# Patient Record
Sex: FEMALE | Race: WHITE | NOT HISPANIC OR LATINO | Employment: FULL TIME | URBAN - METROPOLITAN AREA
[De-identification: names, ages, dates, MRNs, and addresses within clinical notes are randomized per-mention and may not be internally consistent; named-entity substitution may affect disease eponyms.]

---

## 2017-03-10 NOTE — TELEPHONE ENCOUNTER
----- Message from Matilde Mercedes sent at 3/10/2017  1:28 PM EST -----  Contact: [phone]  PT NEEDS A REFILL ON HER MAXALT

## 2017-03-13 RX ORDER — RIZATRIPTAN BENZOATE 10 MG/1
10 TABLET ORAL ONCE AS NEEDED
Qty: 12 TABLET | Refills: 5 | Status: SHIPPED | OUTPATIENT
Start: 2017-03-13 | End: 2017-04-12

## 2017-05-08 ENCOUNTER — OFFICE VISIT (OUTPATIENT)
Dept: NEUROLOGY | Facility: CLINIC | Age: 32
End: 2017-05-08

## 2017-05-08 VITALS — WEIGHT: 276 LBS | HEART RATE: 86 BPM | OXYGEN SATURATION: 99 % | BODY MASS INDEX: 47.12 KG/M2 | HEIGHT: 64 IN

## 2017-05-08 DIAGNOSIS — G44.86 CERVICOGENIC HEADACHE: ICD-10-CM

## 2017-05-08 DIAGNOSIS — G43.019 INTRACTABLE MIGRAINE WITHOUT AURA AND WITHOUT STATUS MIGRAINOSUS: ICD-10-CM

## 2017-05-08 PROCEDURE — 99213 OFFICE O/P EST LOW 20 MIN: CPT | Performed by: PSYCHIATRY & NEUROLOGY

## 2017-05-08 RX ORDER — NORTRIPTYLINE HYDROCHLORIDE 50 MG/1
CAPSULE ORAL
COMMUNITY
Start: 2017-04-20 | End: 2017-05-08 | Stop reason: SDUPTHER

## 2017-05-08 RX ORDER — RIZATRIPTAN BENZOATE 10 MG/1
TABLET ORAL
COMMUNITY
Start: 2017-04-20 | End: 2017-05-08 | Stop reason: SDUPTHER

## 2017-05-08 RX ORDER — NORTRIPTYLINE HYDROCHLORIDE 50 MG/1
50 CAPSULE ORAL NIGHTLY
Qty: 30 CAPSULE | Refills: 6 | Status: SHIPPED | OUTPATIENT
Start: 2017-05-08 | End: 2017-06-07

## 2017-05-08 RX ORDER — RIZATRIPTAN BENZOATE 10 MG/1
10 TABLET ORAL ONCE AS NEEDED
Qty: 12 TABLET | Refills: 6 | Status: SHIPPED | OUTPATIENT
Start: 2017-05-08 | End: 2017-06-07

## 2018-07-02 ENCOUNTER — HOSPITAL ENCOUNTER (EMERGENCY)
Facility: HOSPITAL | Age: 33
Discharge: HOME | End: 2018-07-02
Attending: EMERGENCY MEDICINE | Admitting: EMERGENCY MEDICINE

## 2018-07-02 ENCOUNTER — APPOINTMENT (OUTPATIENT)
Dept: CT IMAGING | Facility: HOSPITAL | Age: 33
End: 2018-07-02

## 2018-07-02 VITALS
WEIGHT: 293 LBS | HEART RATE: 94 BPM | BODY MASS INDEX: 50.02 KG/M2 | RESPIRATION RATE: 18 BRPM | DIASTOLIC BLOOD PRESSURE: 90 MMHG | OXYGEN SATURATION: 97 % | SYSTOLIC BLOOD PRESSURE: 137 MMHG | HEIGHT: 64 IN | TEMPERATURE: 98.4 F

## 2018-07-02 DIAGNOSIS — J18.9 PNEUMONIA OF LEFT LOWER LOBE DUE TO INFECTIOUS ORGANISM: ICD-10-CM

## 2018-07-02 DIAGNOSIS — R10.32 ABDOMINAL WALL PAIN IN LEFT LOWER QUADRANT: Primary | ICD-10-CM

## 2018-07-02 LAB
ALBUMIN SERPL-MCNC: 3.8 G/DL (ref 3.5–5.2)
ALBUMIN/GLOB SERPL: 1.4 G/DL
ALP SERPL-CCNC: 71 U/L (ref 40–129)
ALT SERPL W P-5'-P-CCNC: 39 U/L (ref 5–33)
ANION GAP SERPL CALCULATED.3IONS-SCNC: 13.8 MMOL/L
AST SERPL-CCNC: 37 U/L (ref 5–32)
B-HCG UR QL: NEGATIVE
BASOPHILS # BLD AUTO: 0.02 10*3/MM3 (ref 0–0.2)
BASOPHILS NFR BLD AUTO: 0.6 % (ref 0–2)
BILIRUB SERPL-MCNC: 0.5 MG/DL (ref 0.2–1.2)
BILIRUB UR QL STRIP: NEGATIVE
BUN BLD-MCNC: 8 MG/DL (ref 6–20)
BUN/CREAT SERPL: 13.1 (ref 7–25)
CALCIUM SPEC-SCNC: 8.9 MG/DL (ref 8.6–10.5)
CHLORIDE SERPL-SCNC: 103 MMOL/L (ref 98–107)
CLARITY UR: ABNORMAL
CO2 SERPL-SCNC: 22.2 MMOL/L (ref 22–29)
COLOR UR: ABNORMAL
CREAT BLD-MCNC: 0.61 MG/DL (ref 0.57–1)
DEPRECATED RDW RBC AUTO: 39.7 FL (ref 37–54)
EOSINOPHIL # BLD AUTO: 0.07 10*3/MM3 (ref 0.1–0.3)
EOSINOPHIL NFR BLD AUTO: 2.1 % (ref 0–4)
ERYTHROCYTE [DISTWIDTH] IN BLOOD BY AUTOMATED COUNT: 13.7 % (ref 11.5–14.5)
GFR SERPL CREATININE-BSD FRML MDRD: 112 ML/MIN/1.73
GLOBULIN UR ELPH-MCNC: 2.8 GM/DL
GLUCOSE BLD-MCNC: 93 MG/DL (ref 65–99)
GLUCOSE UR STRIP-MCNC: NEGATIVE MG/DL
HCT VFR BLD AUTO: 42 % (ref 37–47)
HGB BLD-MCNC: 13.8 G/DL (ref 12–16)
HGB UR QL STRIP.AUTO: NEGATIVE
IMM GRANULOCYTES # BLD: 0.01 10*3/MM3 (ref 0–0.03)
IMM GRANULOCYTES NFR BLD: 0.3 % (ref 0–0.5)
KETONES UR QL STRIP: ABNORMAL
LEUKOCYTE ESTERASE UR QL STRIP.AUTO: NEGATIVE
LIPASE SERPL-CCNC: 30 U/L (ref 13–60)
LYMPHOCYTES # BLD AUTO: 1.15 10*3/MM3 (ref 0.6–4.8)
LYMPHOCYTES NFR BLD AUTO: 34.7 % (ref 20–45)
MCH RBC QN AUTO: 26.3 PG (ref 27–31)
MCHC RBC AUTO-ENTMCNC: 32.9 G/DL (ref 31–37)
MCV RBC AUTO: 80 FL (ref 81–99)
MONOCYTES # BLD AUTO: 0.33 10*3/MM3 (ref 0–1)
MONOCYTES NFR BLD AUTO: 10 % (ref 3–8)
NEUTROPHILS # BLD AUTO: 1.73 10*3/MM3 (ref 1.5–8.3)
NEUTROPHILS NFR BLD AUTO: 52.3 % (ref 45–70)
NITRITE UR QL STRIP: NEGATIVE
NRBC BLD MANUAL-RTO: 0 /100 WBC (ref 0–0)
PH UR STRIP.AUTO: 5.5 [PH] (ref 4.5–8)
PLATELET # BLD AUTO: 166 10*3/MM3 (ref 140–500)
PMV BLD AUTO: 10.6 FL (ref 7.4–10.4)
POTASSIUM BLD-SCNC: 3.8 MMOL/L (ref 3.5–5.2)
PROT SERPL-MCNC: 6.6 G/DL (ref 6–8.5)
PROT UR QL STRIP: NEGATIVE
RBC # BLD AUTO: 5.25 10*6/MM3 (ref 4.2–5.4)
SODIUM BLD-SCNC: 139 MMOL/L (ref 136–145)
SP GR UR STRIP: 1.02 (ref 1–1.03)
UROBILINOGEN UR QL STRIP: ABNORMAL
WBC NRBC COR # BLD: 3.31 10*3/MM3 (ref 4.8–10.8)

## 2018-07-02 PROCEDURE — 81003 URINALYSIS AUTO W/O SCOPE: CPT | Performed by: EMERGENCY MEDICINE

## 2018-07-02 PROCEDURE — 0 IOPAMIDOL PER 1 ML: Performed by: EMERGENCY MEDICINE

## 2018-07-02 PROCEDURE — 80053 COMPREHEN METABOLIC PANEL: CPT | Performed by: EMERGENCY MEDICINE

## 2018-07-02 PROCEDURE — 74177 CT ABD & PELVIS W/CONTRAST: CPT

## 2018-07-02 PROCEDURE — 25010000002 ONDANSETRON PER 1 MG: Performed by: EMERGENCY MEDICINE

## 2018-07-02 PROCEDURE — 99284 EMERGENCY DEPT VISIT MOD MDM: CPT | Performed by: EMERGENCY MEDICINE

## 2018-07-02 PROCEDURE — 81025 URINE PREGNANCY TEST: CPT | Performed by: EMERGENCY MEDICINE

## 2018-07-02 PROCEDURE — 96374 THER/PROPH/DIAG INJ IV PUSH: CPT

## 2018-07-02 PROCEDURE — 85025 COMPLETE CBC W/AUTO DIFF WBC: CPT | Performed by: EMERGENCY MEDICINE

## 2018-07-02 PROCEDURE — 99284 EMERGENCY DEPT VISIT MOD MDM: CPT

## 2018-07-02 PROCEDURE — 96361 HYDRATE IV INFUSION ADD-ON: CPT

## 2018-07-02 PROCEDURE — 83690 ASSAY OF LIPASE: CPT | Performed by: EMERGENCY MEDICINE

## 2018-07-02 RX ORDER — ONDANSETRON 2 MG/ML
8 INJECTION INTRAMUSCULAR; INTRAVENOUS ONCE
Status: COMPLETED | OUTPATIENT
Start: 2018-07-02 | End: 2018-07-02

## 2018-07-02 RX ORDER — SODIUM CHLORIDE 0.9 % (FLUSH) 0.9 %
10 SYRINGE (ML) INJECTION AS NEEDED
Status: DISCONTINUED | OUTPATIENT
Start: 2018-07-02 | End: 2018-07-02 | Stop reason: HOSPADM

## 2018-07-02 RX ORDER — AZITHROMYCIN 250 MG/1
TABLET, FILM COATED ORAL
Qty: 6 TABLET | Refills: 0 | Status: SHIPPED | OUTPATIENT
Start: 2018-07-02 | End: 2018-08-10

## 2018-07-02 RX ORDER — PROMETHAZINE HYDROCHLORIDE 25 MG/1
25 TABLET ORAL EVERY 6 HOURS PRN
Qty: 10 TABLET | Refills: 0 | Status: SHIPPED | OUTPATIENT
Start: 2018-07-02 | End: 2019-03-20

## 2018-07-02 RX ORDER — SODIUM CHLORIDE 9 MG/ML
250 INJECTION, SOLUTION INTRAVENOUS CONTINUOUS
Status: DISCONTINUED | OUTPATIENT
Start: 2018-07-02 | End: 2018-07-02 | Stop reason: HOSPADM

## 2018-07-02 RX ORDER — NAPROXEN SODIUM 220 MG
220 TABLET ORAL 2 TIMES DAILY PRN
COMMUNITY
End: 2021-08-17

## 2018-07-02 RX ORDER — AZITHROMYCIN 250 MG/1
500 TABLET, FILM COATED ORAL ONCE
Status: COMPLETED | OUTPATIENT
Start: 2018-07-02 | End: 2018-07-02

## 2018-07-02 RX ADMIN — SODIUM CHLORIDE 250 ML/HR: 9 INJECTION, SOLUTION INTRAVENOUS at 17:40

## 2018-07-02 RX ADMIN — ONDANSETRON 8 MG: 2 INJECTION, SOLUTION INTRAMUSCULAR; INTRAVENOUS at 17:42

## 2018-07-02 RX ADMIN — AZITHROMYCIN MONOHYDRATE 500 MG: 250 TABLET ORAL at 21:33

## 2018-07-02 RX ADMIN — IOPAMIDOL 95 ML: 755 INJECTION, SOLUTION INTRAVENOUS at 21:24

## 2018-07-03 NOTE — ED PROVIDER NOTES
"Subjective     History provided by:  Patient    History of Present Illness    · Chief complaint: Abdominal wall holding    · Location: Just below the umbilicus and to the left lower abdomen    · Quality/Severity: The patient reports a bulging of her lower abdomen below her umbilicus and left lower quadrant and is concerned that she might have a hernia.  She states that when she standing in the area is bulging, she has no pain.  She does report pain in that area when she lays supine.  She states the pain is \"dull\" and comes and go.    · Timing/Onset: Started about 3 months ago.    · Modifying Factors: The bulge is present when she stands, but she has no abdominal pain.  When she lays supine with a bolus this appears, but she has abdominal pain.    · Associated symptoms: She reports nausea without vomiting for about a week.  Reports anorexia.    · Narrative: The patient is a 32-year-old white female presented saying complaining of a bulging of her lower abdomen for the past 3 months.  She also reports abdominal discomfort when she lay supine, that is relieved by standing.  Her past medical history is significant for a 4 pound left ovarian cyst that was removed via midline incision about 4 years ago.  GYN history: She is a  2 para 2 with a history of polycystic ovary syndrome and no menstrual period for the last 5 months.  She is sexually active without birth control.  Social history she works for the [employer] and also works as a \"\".  She doesn't smoke or drink alcohol.    ED Triage Vitals   Temp Heart Rate Resp BP SpO2   18 1519 18 1519 18 1519 18 1519 18 1519   98.4 °F (36.9 °C) 99 18 (!) 162/111 96 %      Temp src Heart Rate Source Patient Position BP Location FiO2 (%)   -- -- 18 2131 18 1519 --     Lying Right arm        Review of Systems   Constitutional: Positive for appetite change. Negative for activity change, chills, diaphoresis, " fatigue and fever.   HENT: Negative for congestion, dental problem, ear pain, hearing loss, mouth sores, postnasal drip, rhinorrhea, sinus pressure, sore throat and voice change.    Eyes: Negative for photophobia, pain, discharge, redness and visual disturbance.   Respiratory: Positive for cough (nonproductive for a couple of days). Negative for chest tightness, shortness of breath, wheezing and stridor.    Cardiovascular: Negative for chest pain, palpitations and leg swelling.   Gastrointestinal: Positive for abdominal pain and nausea. Negative for blood in stool, diarrhea and vomiting.   Genitourinary: Negative for difficulty urinating, dysuria, flank pain, frequency, hematuria and urgency.   Musculoskeletal: Negative for arthralgias, back pain, gait problem, joint swelling, myalgias, neck pain and neck stiffness.   Skin: Negative for color change and rash.   Neurological: Negative for dizziness, tremors, seizures, syncope, facial asymmetry, speech difficulty, weakness, light-headedness, numbness and headaches.   Hematological: Negative for adenopathy.   Psychiatric/Behavioral: Negative.  Negative for confusion and decreased concentration. The patient is not nervous/anxious.        Past Medical History:   Diagnosis Date   • Depression    • Diabetes mellitus (CMS/HCC)    • Headache, tension-type    • Insulin resistance    • Migraine    • Panic attack    • PCOS (polycystic ovarian syndrome)    • Seasonal allergies        Allergies   Allergen Reactions   • Oxycodone-Acetaminophen Other (See Comments)     Pt reports this is no longer an allergy       Past Surgical History:   Procedure Laterality Date   • ADENOIDECTOMY     •  SECTION     • CHOLECYSTECTOMY     • HERNIA REPAIR     • HX OVARIAN CYSTECTOMY     • KNEE ACL RECONSTRUCTION     • TONSILLECTOMY         Family History   Problem Relation Age of Onset   • Stroke Mother    • Heart disease Mother    • Hypertension Mother    • Heart disease Father    •  Hypertension Father    • Diabetes Father    • Dementia Maternal Grandmother    • Stroke Maternal Grandmother    • Diabetes Maternal Grandmother    • Stroke Paternal Grandmother    • Hypertension Paternal Grandmother    • Cancer Paternal Grandmother    • Heart disease Paternal Grandfather    • Diabetes Paternal Grandfather        Social History     Social History   • Marital status:      Social History Main Topics   • Smoking status: Former Smoker   • Alcohol use Yes      Comment: occasional   • Drug use: No   • Sexual activity: Yes     Partners: Male     Birth control/ protection: OCP      Comment: LNMP irregular     Other Topics Concern   • Not on file           Objective   Physical Exam   Constitutional: She is oriented to person, place, and time. She appears well-developed and well-nourished. No distress.   The patient is morbidly obese and appears in no acute distress.  Review of her vital signs: She is afebrile, her blood pressure is elevated 162/111, she is tachycardic with heart rate of 99, oxygen saturation 96% on room air.   HENT:   Head: Normocephalic and atraumatic.   Nose: Nose normal.   Mouth/Throat: Oropharynx is clear and moist. No oropharyngeal exudate.   Eyes: EOM are normal. Pupils are equal, round, and reactive to light. Right eye exhibits no discharge. Left eye exhibits no discharge. No scleral icterus.   Neck: Normal range of motion. Neck supple. No JVD present. No thyromegaly present.   Cardiovascular: Normal rate, regular rhythm and normal heart sounds.    No murmur heard.  Pulmonary/Chest: Effort normal and breath sounds normal. She has no wheezes. She has no rales. She exhibits no tenderness.   Abdominal: Soft. Bowel sounds are normal.   The patient is abdomen is soft.  With standing she is a bulging of his other infraumbilical and left lower quadrant abdominal wall with some mild tenderness.  There is no guarding or rebound.  She does not have a surgical abdomen.  She does not have a  hugo abdominal wall herniation that I can appreciate.   Musculoskeletal: Normal range of motion. She exhibits no edema, tenderness or deformity.   Lymphadenopathy:     She has no cervical adenopathy.   Neurological: She is alert and oriented to person, place, and time. No cranial nerve deficit. Coordination normal.   No focal motor sensory deficit   Skin: Skin is warm and dry. No rash noted. She is not diaphoretic.   Psychiatric: She has a normal mood and affect. Her behavior is normal. Judgment and thought content normal.   Nursing note and vitals reviewed.      Procedures           ED Course  ED Course as of Jul 03 0003   Mon Jul 02, 2018   2017 [facility] Report [id number] is blank  [TP]   Tue Jul 03, 2018   0000 Review the patient's laboratory studies: Her white blood cell count was low at 3.3 with a normal differential, hemoglobin and hematocrit and platelets within normal limits.  Her CMP had normal electrolytes and normal renal function tests.  She had minimal elevations of her ALT and AST, but her total bili and alkaline phosphatase were within normal limits.  Her urinalysis was negative for infection.  Her urine hCG was negative.  A CT scan of the abdomen and pelvis with oral and IV contrast was obtained which showed an anterior abdominal wall bowling centered at the umbilicus, but no focal herniation.  It also showed a patchy infiltrate in the left posterior lower lobe.  The patient mentioned she did have a cough for a couple of days, so she was treated with Zithromax for a possible unrelated pneumonia.  [TP]      ED Course User Index  [TP] Brigido Snider MD                  MDM  Number of Diagnoses or Management Options  Abdominal wall pain in left lower quadrant: new and requires workup  Pneumonia of left lower lobe due to infectious organism (CMS/HCC):      Amount and/or Complexity of Data Reviewed  Clinical lab tests: ordered and reviewed  Tests in the radiology section of CPT®: ordered and  reviewed  Independent visualization of images, tracings, or specimens: yes    Risk of Complications, Morbidity, and/or Mortality  Presenting problems: high  Diagnostic procedures: high  Management options: high  General comments: My differential diagnosis for abdominal pain includes but is not limited to:  Gastritis, gastroenteritis, peptic ulcer disease, GERD, esophageal perforation, acute appendicitis, mesenteric adenitis, Meckel’s diverticulum, epiploic appendagitis, diverticulitis, colon cancer, ulcerative colitis, Crohn’s disease, intussusception, small bowel obstruction, adhesions, ischemic bowel, perforated viscus, ileus, obstipation, biliary colic, cholecystitis, cholelithiasis, Woody-Dusty Albert, hepatitis, pancreatitis, common bile duct obstruction, cholangitis, bile leak, splenic trauma, splenic rupture, splenic infarction, splenic abscess, abdominal abscess, ascites, spontaneous bacterial peritonitis, hernia, UTI, cystitis, prostatitis, ureterolithiasis, urinary obstruction, ovarian cyst, torsion, pregnancy, ectopic pregnancy, PID, pelvic abscess, mittelschmerz, endometriosis, AAA, myocardial infarction, pneumonia, cancer, porphyria, DKA, medications, sickle cell, viral syndrome, zoster    Patient Progress  Patient progress: stable        Final diagnoses:   Abdominal wall pain in left lower quadrant   Pneumonia of left lower lobe due to infectious organism (CMS/HCC)           Labs Reviewed   COMPREHENSIVE METABOLIC PANEL - Abnormal; Notable for the following:        Result Value    ALT (SGPT) 39 (*)     AST (SGOT) 37 (*)     All other components within normal limits   URINALYSIS W/ MICROSCOPIC IF INDICATED (NO CULTURE) - Abnormal; Notable for the following:     Appearance, UA Cloudy (*)     Ketones, UA Trace (*)     All other components within normal limits    Narrative:     Urine microscopic not indicated.   CBC WITH AUTO DIFFERENTIAL - Abnormal; Notable for the following:     WBC 3.31 (*)     MCV 80.0  (*)     MCH 26.3 (*)     MPV 10.6 (*)     Monocyte % 10.0 (*)     Eosinophils, Absolute 0.07 (*)     All other components within normal limits   PREGNANCY, URINE - Normal   LIPASE - Normal   CBC AND DIFFERENTIAL    Narrative:     The following orders were created for panel order CBC & Differential.  Procedure                               Abnormality         Status                     ---------                               -----------         ------                     CBC Auto Differential[[id number]]         Abnormal            Final result                 Please view results for these tests on the individual orders.     CT Abdomen Pelvis With Contrast   ED Interpretation   Patchy subsegmental infiltrate posterior left lung base, possible pneumonia.  Anterior abdominal wall bowing centered at the umbilicus without focal hernia.  Fatty liver.  Normal appendix.  Per Dr. Rasmussen             Medication List      New Prescriptions    azithromycin 250 MG tablet  Commonly known as:  ZITHROMAX Z-KAYLAN  Take 2 tablets the first day, then 1 tablet daily for 4 days.     promethazine 25 MG tablet  Commonly known as:  PHENERGAN  Take 1 tablet by mouth Every 6 (Six) Hours As Needed for Nausea or   Vomiting for up to 10 doses.               Brigido Snider MD  07/03/18 0003

## 2018-07-03 NOTE — ED NOTES
Educated pt on medications, home care, follow-up care, and reasons to return to ER. Patient verbalized understanding. Patient ambulatory from ER.     Hilda Tapia RN  07/02/18 [id number]

## 2018-08-10 ENCOUNTER — PROCEDURE VISIT (OUTPATIENT)
Dept: OBSTETRICS AND GYNECOLOGY | Facility: CLINIC | Age: 33
End: 2018-08-10

## 2018-08-10 ENCOUNTER — OFFICE VISIT (OUTPATIENT)
Dept: OBSTETRICS AND GYNECOLOGY | Facility: CLINIC | Age: 33
End: 2018-08-10

## 2018-08-10 VITALS
BODY MASS INDEX: 50.02 KG/M2 | DIASTOLIC BLOOD PRESSURE: 94 MMHG | WEIGHT: 293 LBS | SYSTOLIC BLOOD PRESSURE: 130 MMHG | HEIGHT: 64 IN

## 2018-08-10 DIAGNOSIS — Z01.419 WELL FEMALE EXAM WITH ROUTINE GYNECOLOGICAL EXAM: Primary | ICD-10-CM

## 2018-08-10 DIAGNOSIS — N93.8 DUB (DYSFUNCTIONAL UTERINE BLEEDING): Primary | ICD-10-CM

## 2018-08-10 DIAGNOSIS — N91.4 SECONDARY OLIGOMENORRHEA: ICD-10-CM

## 2018-08-10 DIAGNOSIS — Z11.51 SPECIAL SCREENING EXAMINATION FOR HUMAN PAPILLOMAVIRUS (HPV): ICD-10-CM

## 2018-08-10 LAB
B-HCG UR QL: NEGATIVE
BILIRUB BLD-MCNC: NEGATIVE MG/DL
CLARITY, POC: CLEAR
COLOR UR: YELLOW
GLUCOSE UR STRIP-MCNC: NEGATIVE MG/DL
INTERNAL NEGATIVE CONTROL: NEGATIVE
INTERNAL POSITIVE CONTROL: POSITIVE
KETONES UR QL: NEGATIVE
LEUKOCYTE EST, POC: NEGATIVE
Lab: NORMAL
NITRITE UR-MCNC: NEGATIVE MG/ML
PH UR: 6.5 [PH] (ref 5–8)
PROT UR STRIP-MCNC: NEGATIVE MG/DL
RBC # UR STRIP: NEGATIVE /UL
SP GR UR: 1.02 (ref 1–1.03)
UROBILINOGEN UR QL: NORMAL

## 2018-08-10 PROCEDURE — 99213 OFFICE O/P EST LOW 20 MIN: CPT | Performed by: OBSTETRICS & GYNECOLOGY

## 2018-08-10 PROCEDURE — 76830 TRANSVAGINAL US NON-OB: CPT | Performed by: OBSTETRICS & GYNECOLOGY

## 2018-08-10 PROCEDURE — 81002 URINALYSIS NONAUTO W/O SCOPE: CPT | Performed by: OBSTETRICS & GYNECOLOGY

## 2018-08-10 PROCEDURE — 81025 URINE PREGNANCY TEST: CPT | Performed by: OBSTETRICS & GYNECOLOGY

## 2018-08-10 PROCEDURE — 99395 PREV VISIT EST AGE 18-39: CPT | Performed by: OBSTETRICS & GYNECOLOGY

## 2018-08-10 RX ORDER — RIZATRIPTAN BENZOATE 10 MG/1
10 TABLET, ORALLY DISINTEGRATING ORAL ONCE AS NEEDED
COMMUNITY
End: 2021-03-08 | Stop reason: SDUPTHER

## 2018-08-10 RX ORDER — ERGOCALCIFEROL 1.25 MG/1
50000 CAPSULE ORAL WEEKLY
Status: ON HOLD | COMMUNITY
End: 2018-11-15

## 2018-08-10 RX ORDER — NORTRIPTYLINE HYDROCHLORIDE 50 MG/1
100 CAPSULE ORAL NIGHTLY
COMMUNITY
End: 2021-06-25

## 2018-08-10 NOTE — PROGRESS NOTES
GYN Annual Exam     CC- Here for annual exam.     Valery Aguilar is a 33 y.o. female who presents for annual well woman exam. Pt was last seen for an annual exam 2016. Periods are irregular.  Pt has PCOS and was just recently put on Metformin XR 1,000mg po daily. Pt has been off OCPs for at least a year. Pt has gone as long as 7 months without a cycle. Pt reports that she found a pill pack and she started it and had a very heavy cycle during placebo week.  Patient is sexually active  yes -  . Patient is satisfied with her contraception no. Pt needs to discuss contraception bc she is not on anything and having very irregular cycles.    OB History     No data available          Current contraception: none  History of abnormal Pap smear: no  History of abnormal mammogram: no  Family history of uterine, colon or ovarian cancer: no  Family history of breast cancer: yes - paternal aunt with breast cancer, paternal GM    Health Maintenance   Topic Date Due   • ANNUAL PHYSICAL  1987   • TDAP/TD VACCINES (1 - Tdap) 2003   • PAP SMEAR  2017   • INFLUENZA VACCINE  2018       Past Medical History:   Diagnosis Date   • Depression    • Diabetes mellitus (CMS/HCC)    • Headache, tension-type    • Insulin resistance    • Migraine    • Panic attack    • PCOS (polycystic ovarian syndrome)    • Seasonal allergies        Past Surgical History:   Procedure Laterality Date   • ADENOIDECTOMY     •  SECTION     • CHOLECYSTECTOMY     • HERNIA REPAIR     • HX OVARIAN CYSTECTOMY     • KNEE ACL RECONSTRUCTION     • TONSILLECTOMY           Current Outpatient Prescriptions:   •  metFORMIN (GLUCOPHAGE) 500 MG tablet, Take 500 mg by mouth 2 (Two) Times a Day With Meals., Disp: , Rfl:   •  nortriptyline (PAMELOR) 50 MG capsule, Take 50 mg by mouth 2 (Two) Times a Day., Disp: , Rfl:   •  rizatriptan MLT (MAXALT-MLT) 10 MG disintegrating tablet, Take 10 mg by mouth 1 (One) Time As Needed for Migraine. May  "repeat in 2 hours if needed, Disp: , Rfl:   •  vitamin D (ERGOCALCIFEROL) 67338 units capsule capsule, Take 50,000 Units by mouth 1 (One) Time Per Week., Disp: , Rfl:   •  naproxen sodium (ALEVE) 220 MG tablet, Take 220 mg by mouth 2 (Two) Times a Day As Needed., Disp: , Rfl:   •  promethazine (PHENERGAN) 25 MG tablet, Take 1 tablet by mouth Every 6 (Six) Hours As Needed for Nausea or Vomiting for up to 10 doses., Disp: 10 tablet, Rfl: 0    Allergies   Allergen Reactions   • Oxycodone-Acetaminophen Other (See Comments)     Pt reports this is no longer an allergy       Social History   Substance Use Topics   • Smoking status: Former Smoker   • Smokeless tobacco: Not on file   • Alcohol use Yes      Comment: occasional       Family History   Problem Relation Age of Onset   • Stroke Mother    • Heart disease Mother    • Hypertension Mother    • Heart disease Father    • Hypertension Father    • Diabetes Father    • Dementia Maternal Grandmother    • Stroke Maternal Grandmother    • Diabetes Maternal Grandmother    • Stroke Paternal Grandmother    • Hypertension Paternal Grandmother    • Cancer Paternal Grandmother    • Heart disease Paternal Grandfather    • Diabetes Paternal Grandfather        Review of Systems    /94   Ht 162.6 cm (64\")   Wt (!) 138 kg (304 lb)   LMP 07/17/2018   BMI 52.18 kg/m²     Physical Exam   Constitutional: She is oriented to person, place, and time. She appears well-developed and well-nourished.   Morbidly obese   HENT:   Mouth/Throat: Normal dentition. No dental caries.   Cardiovascular: Normal rate and regular rhythm.    Pulmonary/Chest: Effort normal and breath sounds normal. Right breast exhibits no inverted nipple, no mass, no nipple discharge, no skin change and no tenderness. Left breast exhibits no inverted nipple, no mass, no nipple discharge, no skin change and no tenderness.   Abdominal: Soft. She exhibits no distension and no mass. There is no tenderness. "   Genitourinary: There is no rash, tenderness or lesion on the right labia. There is no rash, tenderness or lesion on the left labia. Uterus is not deviated, not enlarged, not fixed and not tender. Cervix exhibits no motion tenderness, no discharge and no friability. Right adnexum displays no mass, no tenderness and no fullness. Left adnexum displays no mass, no tenderness and no fullness. No tenderness or bleeding in the vagina. No vaginal discharge found.   Neurological: She is alert and oriented to person, place, and time.   Psychiatric: She has a normal mood and affect. Her behavior is normal. Judgment and thought content normal.   Vitals reviewed.         Assessment/Plan    1) GYN HM: Check pap smear. SBE demonstrated and encouraged.  2) PCOS: Being managed by primary care physician. Pt is on metformin XR 1,000mg daily  3) Contraception: none. Discussed proceeding with a Mirena IUD.  4) Family Planning: .  5) Diet and Exercise discussed  6) Smoking Status: nonsmoker  7) Oligomenorrhea: Pt is at high risk for endometrial hyperplasia. TVUS done today reveals a normal EM lining of 6mm with a small 2 x 2 cm fibroid. Normal ovaries. Discussed different treatment options. Pt encouraged to consider a Mirena IUD to protect her endometrium. Otherwise, she can restart OCPs.   8) Follow up prn and 1 year       Diagnoses and all orders for this visit:    Well female exam with routine gynecological exam  -     POC Pregnancy, Urine  -     POC Urinalysis Dipstick  -     Pap IG, HPV-hr    Special screening examination for human papillomavirus (HPV)  -     Pap IG, HPV-hr    Other orders  -     metFORMIN (GLUCOPHAGE) 500 MG tablet; Take 500 mg by mouth 2 (Two) Times a Day With Meals.  -     nortriptyline (PAMELOR) 50 MG capsule; Take 50 mg by mouth 2 (Two) Times a Day.  -     rizatriptan MLT (MAXALT-MLT) 10 MG disintegrating tablet; Take 10 mg by mouth 1 (One) Time As Needed for Migraine. May repeat in 2 hours if needed  -      vitamin D (ERGOCALCIFEROL) 19502 units capsule capsule; Take 50,000 Units by mouth 1 (One) Time Per Week.          Karen Marvin DO  8/15/2018  9:53 PM

## 2018-08-15 LAB
CYTOLOGIST CVX/VAG CYTO: NORMAL
CYTOLOGY CVX/VAG DOC THIN PREP: NORMAL
DX ICD CODE: NORMAL
HIV 1 & 2 AB SER-IMP: NORMAL
HPV I/H RISK 1 DNA CVX QL PROBE+SIG AMP: NEGATIVE
OTHER STN SPEC: NORMAL
PATH REPORT.FINAL DX SPEC: NORMAL
PATHOLOGIST CVX/VAG CYTO: NORMAL
RECOM F/U CVX/VAG CYTO: NORMAL
STAT OF ADQ CVX/VAG CYTO-IMP: NORMAL

## 2018-08-20 PROBLEM — N91.4 SECONDARY OLIGOMENORRHEA: Status: ACTIVE | Noted: 2018-08-20

## 2018-10-09 ENCOUNTER — OFFICE VISIT (OUTPATIENT)
Dept: OBSTETRICS AND GYNECOLOGY | Facility: CLINIC | Age: 33
End: 2018-10-09

## 2018-10-09 VITALS
BODY MASS INDEX: 50.02 KG/M2 | SYSTOLIC BLOOD PRESSURE: 118 MMHG | DIASTOLIC BLOOD PRESSURE: 82 MMHG | WEIGHT: 293 LBS | HEIGHT: 64 IN

## 2018-10-09 DIAGNOSIS — Z13.9 SCREENING FOR CONDITION: ICD-10-CM

## 2018-10-09 DIAGNOSIS — Z80.3 FAMILY HISTORY OF BREAST CANCER: ICD-10-CM

## 2018-10-09 DIAGNOSIS — R19.04 LEFT LOWER QUADRANT ABDOMINAL MASS: ICD-10-CM

## 2018-10-09 DIAGNOSIS — Z30.430 ENCOUNTER FOR IUD INSERTION: Primary | ICD-10-CM

## 2018-10-09 LAB
B-HCG UR QL: NEGATIVE
BILIRUB BLD-MCNC: NEGATIVE MG/DL
CLARITY, POC: CLEAR
COLOR UR: YELLOW
GLUCOSE UR STRIP-MCNC: NEGATIVE MG/DL
INTERNAL NEGATIVE CONTROL: NEGATIVE
INTERNAL POSITIVE CONTROL: POSITIVE
KETONES UR QL: NEGATIVE
LEUKOCYTE EST, POC: NEGATIVE
Lab: NORMAL
NITRITE UR-MCNC: NEGATIVE MG/ML
PH UR: 5 [PH] (ref 5–8)
PROT UR STRIP-MCNC: NEGATIVE MG/DL
RBC # UR STRIP: NEGATIVE /UL
SP GR UR: 1 (ref 1–1.03)
UROBILINOGEN UR QL: NORMAL

## 2018-10-09 PROCEDURE — 58300 INSERT INTRAUTERINE DEVICE: CPT | Performed by: OBSTETRICS & GYNECOLOGY

## 2018-10-09 PROCEDURE — 99213 OFFICE O/P EST LOW 20 MIN: CPT | Performed by: OBSTETRICS & GYNECOLOGY

## 2018-10-09 PROCEDURE — 81002 URINALYSIS NONAUTO W/O SCOPE: CPT | Performed by: OBSTETRICS & GYNECOLOGY

## 2018-10-09 PROCEDURE — 81025 URINE PREGNANCY TEST: CPT | Performed by: OBSTETRICS & GYNECOLOGY

## 2018-10-09 NOTE — PROGRESS NOTES
Procedure: Intrauterine device insertion    Chief Complaint: IUD insertion    Pre procedure indication 1) Desires Mirena  Post procedure indication 1) Desires Mirena    The risks, benefits, and alternatives to IUD were explained at length with the patient. All her questions were answered and consents were signed.    The patient was placed in a dorsal lithotomy position on the examining table in [facility]. A bimanual exam confirmed the uterus was normal in size, anteverted. A warmed metal speculum was inserted into the vagina and the cervix was brought into view.    The cervix was prepped with Betadine. The anterior lip was grasped with a single-tooth tenaculum. The endometrial cavity was then sounded to 8 cm without use of a dilator. The IUD was removed in a sterile fashion.    The  was then carefully advanced to the cervical canal into the uterus to the level of the fundus. This was then backed off about 1.5-2 cm to allow sufficient space for the arms to open. The device was deployed. The  was removed carefully from the uterus. The threads were then cut leaving 2-3 cm visible outside of the cervix.  The single-tooth tenaculum was removed from the anterior lip. Good hemostasis was noted.     All other instruments were removed from the vagina.   There were no complications.  The patient tolerated the procedure well with a minimal amount of discomfort.    The patient was counseled about the need to return in 4 weeks for string check.     She was counseled about the need to use a backup method of contraception such as condoms for 1-2 weeks. The patient is counseled to contact us if she has any significant or increasing bleeding, pain, fever, chills, or other concerns. She is instructed to see a doctor right away if she believes that she may be pregnant at any time with the IUD in place.    Karen Marvin DO    10/9/2018  10:13 AM

## 2018-10-09 NOTE — PROGRESS NOTES
"PROBLEM VISIT    Chief Complaint: Oligomenorrhea      Valery Aguilar is a 33 y.o. patient who presents for IUD insertion. Pt has oligomenorrhea due to PCOS. Pt reporting that her paternal aunt was diagnosed with inflammatory breast cancer. This is is her second occurrence of breast cancer. She was initially diagnosed in the her 30's and had a mastectomy. Her paternal GM also had breast cancer dx in her 80's. Also, pt complaining of large bulge in LLQ/mid abdomen and she thinks it is getting bigger. Pt reporting some intermittent nausea but no vomiting.   Chief Complaint   Patient presents with   • Contraception     Mirena             The following portions of the patient's history were reviewed and updated as appropriate: allergies, current medications and problem list.    Review of Systems   Gastrointestinal: Positive for nausea. Negative for vomiting.   Genitourinary: Positive for menstrual problem. Negative for pelvic pain, vaginal bleeding and vaginal discharge.       /82   Ht 162.6 cm (64\")   Wt (!) 137 kg (303 lb)   LMP 09/15/2018 (Approximate)   BMI 52.01 kg/m²     Physical Exam   Constitutional: She is oriented to person, place, and time. She appears well-developed and well-nourished. No distress.   Abdominal: She exhibits mass. She exhibits no distension. There is no tenderness.   15cm palpable mass noted in subcutaneous layer? Located to left of umbilicus.   Neurological: She is alert and oriented to person, place, and time.   Skin: She is not diaphoretic.   Psychiatric: She has a normal mood and affect. Her behavior is normal. Judgment and thought content normal.   Vitals reviewed.        Assessment/Plan   Valery was seen today for contraception.    Diagnoses and all orders for this visit:    Encounter for IUD insertion    Screening for condition  -     POC Urinalysis Dipstick  -     POC Pregnancy, Urine    Left lower quadrant abdominal mass  -     Ambulatory Referral to General " "Surgery      32yo with hx of PCOS and oligomenorrhea presents for Mirena IUD insertion and is complaining of an abdominal mass and family history of breast cancer    1) PCOS and Oligomenorrhea: Pt had a TVUS done 8/10/18 with EM lining 7mm. Mirena IUD to be inserted today- see procedure note.    2) Abdominal mass?: Large, firm area palpated in the subcutaneous area measuring approx 15cm. Pt had a CT scan 7/2018 revealing \"anterior bowing of the bowel without hernia\". Since mass is palpable on PE will have pt evaluated by general surgery to see if this needs surgical exploration.    3) Family Hx of breast cancer: pt's paternal aunt has now gotten her second breast cancer with the first in her 30's. Pt's paternal aunt is still alive. Pt does not know if she has been tested for BRCA. Pt is a candidate if her aunt will not be tested. Pt oferred testing but she wants to talk to her aunt to see if she has been tested first. Will further discuss at fu appt in 4 weeks    4) FU 4 weeks for IUD string check             No Follow-up on file.      Karen Marvin, DO    10/9/2018  10:15 AM  "

## 2018-10-15 ENCOUNTER — PROCEDURE VISIT (OUTPATIENT)
Dept: OBSTETRICS AND GYNECOLOGY | Facility: CLINIC | Age: 33
End: 2018-10-15

## 2018-10-15 ENCOUNTER — OFFICE VISIT (OUTPATIENT)
Dept: OBSTETRICS AND GYNECOLOGY | Facility: CLINIC | Age: 33
End: 2018-10-15

## 2018-10-15 VITALS
DIASTOLIC BLOOD PRESSURE: 98 MMHG | HEIGHT: 64 IN | SYSTOLIC BLOOD PRESSURE: 144 MMHG | WEIGHT: 293 LBS | BODY MASS INDEX: 50.02 KG/M2

## 2018-10-15 DIAGNOSIS — Z13.9 SCREENING FOR CONDITION: Primary | ICD-10-CM

## 2018-10-15 DIAGNOSIS — T83.32XA INTRAUTERINE CONTRACEPTIVE DEVICE THREADS LOST, INITIAL ENCOUNTER: ICD-10-CM

## 2018-10-15 DIAGNOSIS — R10.32 LLQ PAIN: ICD-10-CM

## 2018-10-15 DIAGNOSIS — Z30.431 IUD CHECK UP: Primary | ICD-10-CM

## 2018-10-15 LAB
B-HCG UR QL: NEGATIVE
INTERNAL NEGATIVE CONTROL: NEGATIVE
INTERNAL POSITIVE CONTROL: POSITIVE
Lab: NORMAL

## 2018-10-15 PROCEDURE — 76830 TRANSVAGINAL US NON-OB: CPT | Performed by: OBSTETRICS & GYNECOLOGY

## 2018-10-15 PROCEDURE — 81025 URINE PREGNANCY TEST: CPT | Performed by: OBSTETRICS & GYNECOLOGY

## 2018-10-15 PROCEDURE — 99213 OFFICE O/P EST LOW 20 MIN: CPT | Performed by: OBSTETRICS & GYNECOLOGY

## 2018-10-15 NOTE — PROGRESS NOTES
"      Valery Aguilar is a 33 y.o. patient who presents for follow up of   Chief Complaint   Patient presents with   • Abdominal Pain         32 yo est pt of practice but new to me and comes into be seen for sharp left groin pain since her IUD insertion. She is also having pelvic cramping that has been \"intense\". She has been nauseated but no emesis. She has also had new onset GERD. She has a mass in her abdominal wall and is seeing Dr. Dumont next week for this issue. She had a CT of abd and pelvis that showed only \"bowing\" of the anterior abdominal wall.  No new partners. No fevers or chills.     The following portions of the patient's history were reviewed and updated as appropriate: allergies, current medications and problem list.    Review of Systems   Constitutional: Negative for activity change, appetite change, chills, fatigue and fever.   Gastrointestinal: Positive for abdominal pain and nausea. Negative for constipation, diarrhea and vomiting.   Genitourinary: Positive for pelvic pain. Negative for decreased urine volume and vaginal bleeding.   Musculoskeletal: Negative for back pain.   All other systems reviewed and are negative.      /98   Ht 162.6 cm (64.02\")   Wt (!) 137 kg (302 lb 12.8 oz)   LMP 09/15/2018 (Approximate)   Breastfeeding? No   BMI 51.95 kg/m²     Physical Exam   Constitutional: She is oriented to person, place, and time. She appears well-developed and well-nourished.   HENT:   Head: Normocephalic and atraumatic.   Eyes: Conjunctivae are normal. No scleral icterus.   Neck: Neck supple. No thyromegaly present.   Abdominal: Soft. Bowel sounds are normal. She exhibits mass. She exhibits no distension. There is tenderness. There is no rebound and no guarding. No hernia.   Large mass to the left of the midline/umbilicus ? hernia   Genitourinary: Uterus normal. Pelvic exam was performed with patient supine. There is no rash, tenderness, lesion or injury on the right labia. There is " "no rash, tenderness, lesion or injury on the left labia. Cervix exhibits no motion tenderness, no discharge and no friability. Right adnexum displays no mass, no tenderness and no fullness. Left adnexum displays tenderness. Left adnexum displays no mass and no fullness. No erythema, tenderness or bleeding in the vagina. No foreign body in the vagina. No signs of injury around the vagina. No vaginal discharge found.   Genitourinary Comments: IUD string not seen   Neurological: She is alert and oriented to person, place, and time.   Skin: Skin is warm and dry.   Psychiatric: She has a normal mood and affect. Her behavior is normal. Judgment and thought content normal.   Nursing note and vitals reviewed.      A/P:  1. \"Lost\" IUD strings- TVUS today shows 7.9 cm uterus with IUD seen in the endometrium and normal ovaries. No free fluid.US compared to that on 8/10/18.  2. LLQ pain- no gyn cause seen for her pain other than typical discomfort for IUD insertion. Encourage her to continue followup with Dr Dumont for abdominal mass. This feels like attenuation of the fascia more than a true hernia.     Assessment/Plan   Valery was seen today for abdominal pain.    Diagnoses and all orders for this visit:    Screening for condition  -     POC Pregnancy, Urine    Intrauterine contraceptive device threads lost, initial encounter    LLQ pain                   No Follow-up on file.      Anca Hathaway MD    10/15/18  7:45 AM  "

## 2018-10-24 ENCOUNTER — OFFICE VISIT (OUTPATIENT)
Dept: SURGERY | Facility: CLINIC | Age: 33
End: 2018-10-24

## 2018-10-24 VITALS
HEART RATE: 89 BPM | SYSTOLIC BLOOD PRESSURE: 140 MMHG | DIASTOLIC BLOOD PRESSURE: 90 MMHG | HEIGHT: 64 IN | OXYGEN SATURATION: 98 % | BODY MASS INDEX: 50.02 KG/M2 | WEIGHT: 293 LBS

## 2018-10-24 DIAGNOSIS — K43.2 INCISIONAL HERNIA, WITHOUT OBSTRUCTION OR GANGRENE: Primary | ICD-10-CM

## 2018-10-24 PROCEDURE — 99203 OFFICE O/P NEW LOW 30 MIN: CPT | Performed by: SURGERY

## 2018-10-24 NOTE — PROGRESS NOTES
Chief Complaint   Patient presents with   • Abdominal Pain     possible hernia        Patient is a 33 y.o. female referred by Karen Marvin M.D. for evaluation of abdominal discomfort and bulging.  Patient reports it is just a gnawing type sensation around her previous midline incision and she can feel a mass in this area.  Patient reports she can fill it better standing.  Activities make the discomfort worse rest makes it better.  Patient reports she is nauseated and it makes it hard to sleep.  She describes the pain as a constant discomfort.  Patient reports that the bulging is getting bigger.  Patient denies fever or chills.  Past Medical History:   Diagnosis Date   • Abdominal pain    • Depression    • Diabetes mellitus (CMS/HCC)    • Headache, tension-type    • Insulin resistance    • Migraine    • Panic attack    • PCOS (polycystic ovarian syndrome)    • Seasonal allergies      Past Surgical History:   Procedure Laterality Date   • ADENOIDECTOMY     •  SECTION     • CHOLECYSTECTOMY     • HERNIA REPAIR     • HX OVARIAN CYSTECTOMY     • KNEE ACL RECONSTRUCTION     • TONSILLECTOMY       Family History   Problem Relation Age of Onset   • Stroke Mother    • Heart disease Mother    • Hypertension Mother    • Heart disease Father    • Hypertension Father    • Diabetes Father    • Dementia Maternal Grandmother    • Stroke Maternal Grandmother    • Diabetes Maternal Grandmother    • Stroke Paternal Grandmother    • Hypertension Paternal Grandmother    • Cancer Paternal Grandmother    • Heart disease Paternal Grandfather    • Diabetes Paternal Grandfather      Social History   Substance Use Topics   • Smoking status: Former Smoker   • Smokeless tobacco: Never Used   • Alcohol use Yes      Comment: occasional     No Known Allergies    (Not in a hospital admission)    Review of Systems   Review of Systems   Gastrointestinal: Positive for abdominal distention and abdominal pain.   Musculoskeletal: Positive for  "joint swelling.   All other systems reviewed and are negative.  .  Vitals:    10/24/18 0852   BP: 140/90   Pulse: 89   SpO2: 98%   Weight: 135 kg (297 lb 9.6 oz)   Height: 162.6 cm (64\")       Physical Exam  General/physcological:   Alert and oriented x3, in no acute distress, obese  HEENT: Normal cephalic, atraumatic, PERRLA, EOMI, sclera anicteric, moist mucous membranes, neck is supple, no JVD, no carotid bruits, no thyromegaly no adenopathy  Respiratory: CTA and percussion  CVA: RRR, normal S1-S2, no murmurs, no gallops or rubs  GI: Positive BS, soft, nondistended, nontender, no rebound, no guarding, large reducible incisional hernia, no organomegaly and no masses  Musculoskeletal: Full range of motion, no clubbing, no cyanosis or edema  Neurovascular: Grossly intact  Debilities: none  Emotional behavior: appropriate   I have reviewed the CT scan from July which revealed a hernia    Patient does not use tobacco products currently and I have counseled the patient to not start using tobacco products in the future.    Assessment:  Incisional hernia symptomatic  Plan:  I have recommended that the patient undergo a open incisional hernia repair with mesh.  I have discussed this in detail with the patient.  I have discussed the risk, benefits and alternatives.  I have discussed the risk of anesthesia, bleeding, infection, DVTs, bowel injuries and recurrence.  Patient understands these risks, benefits and alternatives and wishes to proceed.  I have her scheduled at her earliest convenience.    Rachel Dolan MD  General, Minimally Invasive and Endoscopic Surgery  [facility] [address], [address]  [place], [place], [address]  P: [phone]  F: [phone]    Cc:  Zandra Rios MD                    "

## 2018-10-24 NOTE — PROGRESS NOTES
Subjective   Valery Aguilar is a 33 y.o. female.     History of Present Illness     {Common H&P Review Areas:90437}    Review of Systems   Gastrointestinal: Positive for abdominal distention and abdominal pain.   Musculoskeletal: Positive for joint swelling.   All other systems reviewed and are negative.      Objective   Physical Exam    Assessment/Plan   {Assess/PlanSmartLinks:25520}

## 2018-11-05 ENCOUNTER — APPOINTMENT (OUTPATIENT)
Dept: PREADMISSION TESTING | Facility: HOSPITAL | Age: 33
End: 2018-11-05

## 2018-11-05 VITALS
DIASTOLIC BLOOD PRESSURE: 96 MMHG | HEART RATE: 99 BPM | BODY MASS INDEX: 50.02 KG/M2 | RESPIRATION RATE: 18 BRPM | SYSTOLIC BLOOD PRESSURE: 142 MMHG | WEIGHT: 293 LBS | HEIGHT: 64 IN | OXYGEN SATURATION: 98 %

## 2018-11-05 LAB
ANION GAP SERPL CALCULATED.3IONS-SCNC: 14.9 MMOL/L
BUN BLD-MCNC: 12 MG/DL (ref 6–20)
BUN/CREAT SERPL: 19.4 (ref 7–25)
CALCIUM SPEC-SCNC: 8.6 MG/DL (ref 8.6–10.5)
CHLORIDE SERPL-SCNC: 104 MMOL/L (ref 98–107)
CO2 SERPL-SCNC: 19.1 MMOL/L (ref 22–29)
CREAT BLD-MCNC: 0.62 MG/DL (ref 0.57–1)
DEPRECATED RDW RBC AUTO: 38.7 FL (ref 37–54)
ERYTHROCYTE [DISTWIDTH] IN BLOOD BY AUTOMATED COUNT: 13.3 % (ref 11.5–14.5)
GFR SERPL CREATININE-BSD FRML MDRD: 110 ML/MIN/1.73
GLUCOSE BLD-MCNC: 106 MG/DL (ref 65–99)
HCT VFR BLD AUTO: 46.5 % (ref 37–47)
HGB BLD-MCNC: 14.8 G/DL (ref 12–16)
MCH RBC QN AUTO: 25.7 PG (ref 27–31)
MCHC RBC AUTO-ENTMCNC: 31.8 G/DL (ref 31–37)
MCV RBC AUTO: 80.7 FL (ref 81–99)
PLATELET # BLD AUTO: 210 10*3/MM3 (ref 140–500)
PMV BLD AUTO: 10.6 FL (ref 7.4–10.4)
POTASSIUM BLD-SCNC: 4.7 MMOL/L (ref 3.5–5.2)
RBC # BLD AUTO: 5.76 10*6/MM3 (ref 4.2–5.4)
SODIUM BLD-SCNC: 138 MMOL/L (ref 136–145)
WBC NRBC COR # BLD: 5.67 10*3/MM3 (ref 4.8–10.8)

## 2018-11-05 PROCEDURE — 80048 BASIC METABOLIC PNL TOTAL CA: CPT | Performed by: SURGERY

## 2018-11-05 PROCEDURE — 36415 COLL VENOUS BLD VENIPUNCTURE: CPT

## 2018-11-05 PROCEDURE — 93010 ELECTROCARDIOGRAM REPORT: CPT | Performed by: INTERNAL MEDICINE

## 2018-11-05 PROCEDURE — 93005 ELECTROCARDIOGRAM TRACING: CPT

## 2018-11-05 PROCEDURE — 85027 COMPLETE CBC AUTOMATED: CPT | Performed by: SURGERY

## 2018-11-05 RX ORDER — RANITIDINE 150 MG/1
150 TABLET ORAL NIGHTLY
COMMUNITY
End: 2021-03-06

## 2018-11-05 RX ORDER — CETIRIZINE HYDROCHLORIDE 10 MG/1
10 TABLET ORAL NIGHTLY
COMMUNITY

## 2018-11-05 NOTE — DISCHARGE INSTRUCTIONS
HOLD ALEVE FOR 1 WEEK PRIOR TO SURGERY    HOLD METFORMIN THE NIGHT BEFORE SURGERY    TO STOP CLEARS/GATORADE @ 4:30 AM    PRE-ADMISSION TESTING INSTRUCTIONS FOR ADULTS    Take these medications the morning of surgery with a small sip of water: NONE      No aspirin, advil, aleve, ibuprofen, naproxen, diet pills, decongestants, or herbal/vitamins for a week prior to surgery.    General Instructions:    • Do not eat solid food after midnight the night before surgery.  No gum, mints, or hard candy after midnight the night before surgery.  • You may drink clear liquids the day of surgery up until 2 hours before your arrival time.  • Clear liquids are liquids you can see through. Nothing RED in color.    Plain water    Sports drinks  Sodas     Gelatin (Jell-O)  Fruit juices without pulp such as white grape juice and apple juice  Popsicles that contain no fruit or yogurt  Tea or coffee (no cream or milk added)    • It is beneficial for you to have a clear drink that contains carbohydrates just before you leave your house and before your fasting time begins.  We suggest a 20 ounce bottle of Gatorade or Powerade for non-diabetic patients or a 20 ounce bottle of G2 or Powerade Zero for diabetic patients.     • Patients who avoid smoking, chewing tobacco and alcohol for 4 weeks prior to surgery have a reduced risk of post-operative complications.  If at all possible, quit smoking as many days before surgery as you can.    • Do not smoke, use chewing tobacco or drink alcohol the day of surgery    • Bring your C-PAP/ BI-PAP machine if you use one.  • Wear clean comfortable clothes and socks.  • Do not wear contact lenses, lotion, deodorant, or make-up.  Bring a case for your glasses if applicable. You may brush your teeth the morning of surgery.  • You may wear dentures/partials, do not put adhesive/glue on them.  • Bring crutches or walker if applicable.  • Leave all other jewelry and valuables at home.      Preventing a  Surgical Site Infection:    • Shower the night before and on the morning of surgery using the chlorhexidine soap you were given.  Use a clean washcloth with the soap.  Place clean sheets on your bed after showering the night before surgery. Do not use the CHG soap on your hair, face, or private areas. Wash your body gently for five (5) minutes. Do not scrub your skin.  Dry with a clean towel and dress in clean clothing.    • Do not shave the surgical area for 10 days-2 weeks prior to surgery  because the razor can irritate skin and make it easier to develop an infection.  • Make sure you, your family, and all healthcare providers clean their hands with soap and water or an alcohol based hand  before caring for you or your wound.      Day of surgery:    Your surgeon’s office will advise you of your arrival time for the day of surgery.    Upon arrival, a Pre-op nurse and Anesthesia provider will review your health history, obtain vital signs, and answer questions you may have.  The only belongings needed at this time will be your home medications and if applicable your C-PAP/BI-PAP machine.  If you are staying overnight your family can leave the rest of your belongings in the car and bring them to your room later.  A Pre-op nurse will start an IV and you may receive medication in preparation for surgery, including something to help you relax.  Your family will be able to see you in the Pre-op area.  While you are in surgery your family should notify the waiting room  if they leave the waiting room area and provide a contact phone number.    IF you have any questions, you can call the Pre-Admission Department at [phone] or your surgeon's office.  Notify your surgeon if  you become sick, have a fever, productive cough, or cannot be here the day of surgery    Please be aware that surgery does come with discomfort.  We want to make every effort to control your discomfort so please discuss any  uncontrolled symptoms with your nurse.   Your doctor will most likely have prescribed pain medications.      If you are going home after surgery, you will receive individualized written care instructions before being discharged.  A responsible adult (over the age of 18) must drive you to and from the hospital on the day of your surgery and stay with you for 24 hours after anesthesia.    If you are staying overnight following surgery, you will be transported to your hospital room following the recovery period.  [facility] has all private rooms.    Deductibles and co-payments are collected on the day of service. Please be prepared to pay the required co-pay, deductible or deposit on the day of service as defined by your plan.

## 2018-11-08 ENCOUNTER — OFFICE VISIT (OUTPATIENT)
Dept: OBSTETRICS AND GYNECOLOGY | Facility: CLINIC | Age: 33
End: 2018-11-08

## 2018-11-08 VITALS
DIASTOLIC BLOOD PRESSURE: 90 MMHG | HEIGHT: 64 IN | BODY MASS INDEX: 50.02 KG/M2 | SYSTOLIC BLOOD PRESSURE: 154 MMHG | WEIGHT: 293 LBS

## 2018-11-08 DIAGNOSIS — Z30.431 IUD CHECK UP: Primary | ICD-10-CM

## 2018-11-08 DIAGNOSIS — Z13.9 SCREENING FOR CONDITION: ICD-10-CM

## 2018-11-08 PROCEDURE — 99213 OFFICE O/P EST LOW 20 MIN: CPT | Performed by: OBSTETRICS & GYNECOLOGY

## 2018-11-08 PROCEDURE — 81025 URINE PREGNANCY TEST: CPT | Performed by: OBSTETRICS & GYNECOLOGY

## 2018-11-08 NOTE — PROGRESS NOTES
"PROBLEM VISIT    Chief Complaint: IUD check      Valery Aguilar is a 33 y.o. patient who presents for follow up of IUD. Pt reporting VB but mostly brown discharge. Cramping has resolved.  Pt is scheduled for a ventral hernia repair next week.  Chief Complaint   Patient presents with   • Follow-up     string check             The following portions of the patient's history were reviewed and updated as appropriate: allergies, current medications and problem list.    Review of Systems   Genitourinary: Positive for vaginal bleeding. Negative for dyspareunia, pelvic pain and vaginal discharge.       /90   Ht 162.6 cm (64\")   Wt 134 kg (295 lb)   LMP  (LMP Unknown)   BMI 50.64 kg/m²     Physical Exam   Constitutional: She is oriented to person, place, and time. She appears well-developed and well-nourished. No distress.   Genitourinary: There is no rash, tenderness, lesion or injury on the right labia. There is no rash, tenderness, lesion or injury on the left labia. There is bleeding in the vagina. No erythema or tenderness in the vagina. No foreign body in the vagina. No signs of injury around the vagina. No vaginal discharge found.   Genitourinary Comments: IUD string tips palpated. Brown discharge.   Neurological: She is alert and oriented to person, place, and time.   Skin: She is not diaphoretic.   Psychiatric: She has a normal mood and affect. Her behavior is normal. Judgment and thought content normal.   Vitals reviewed.        Assessment/Plan   Valery was seen today for follow-up.    Diagnoses and all orders for this visit:    IUD check up    Screening for condition  -     POC Pregnancy, Urine      32yo for IUD check    1) IUD check: IUD in place.    2) Gyn HM: LPS 8/2018    3) Ventral hernia: planned for repair next week             No Follow-up on file.      Karen Marvin DO    11/8/2018  2:02 PM  "

## 2018-11-14 ENCOUNTER — ANESTHESIA EVENT (OUTPATIENT)
Dept: PERIOP | Facility: HOSPITAL | Age: 33
End: 2018-11-14

## 2018-11-15 ENCOUNTER — HOSPITAL ENCOUNTER (OUTPATIENT)
Facility: HOSPITAL | Age: 33
Setting detail: OBSERVATION
Discharge: HOME | End: 2018-11-19
Attending: SURGERY | Admitting: SURGERY

## 2018-11-15 ENCOUNTER — ANESTHESIA (OUTPATIENT)
Dept: PERIOP | Facility: HOSPITAL | Age: 33
End: 2018-11-15

## 2018-11-15 DIAGNOSIS — K43.2 INCISIONAL HERNIA, WITHOUT OBSTRUCTION OR GANGRENE: ICD-10-CM

## 2018-11-15 LAB
GLUCOSE BLDC GLUCOMTR-MCNC: 160 MG/DL (ref 70–130)
GLUCOSE BLDC GLUCOMTR-MCNC: 166 MG/DL (ref 70–130)
GLUCOSE BLDC GLUCOMTR-MCNC: 92 MG/DL (ref 70–130)
HCG SERPL QL: NEGATIVE

## 2018-11-15 PROCEDURE — 25010000002 SUCCINYLCHOLINE PER 20 MG: Performed by: NURSE ANESTHETIST, CERTIFIED REGISTERED

## 2018-11-15 PROCEDURE — G0378 HOSPITAL OBSERVATION PER HR: HCPCS

## 2018-11-15 PROCEDURE — 25010000002 HYDROMORPHONE PER 4 MG: Performed by: NURSE ANESTHETIST, CERTIFIED REGISTERED

## 2018-11-15 PROCEDURE — 25010000002 PROMETHAZINE PER 50 MG: Performed by: NURSE ANESTHETIST, CERTIFIED REGISTERED

## 2018-11-15 PROCEDURE — 82962 GLUCOSE BLOOD TEST: CPT

## 2018-11-15 PROCEDURE — 25010000002 HYDROMORPHONE 1 MG/ML SOLUTION: Performed by: NURSE ANESTHETIST, CERTIFIED REGISTERED

## 2018-11-15 PROCEDURE — 25010000002 PROPOFOL 10 MG/ML EMULSION: Performed by: NURSE ANESTHETIST, CERTIFIED REGISTERED

## 2018-11-15 PROCEDURE — 25010000003 CEFAZOLIN PER 500 MG: Performed by: SURGERY

## 2018-11-15 PROCEDURE — 25010000002 ONDANSETRON PER 1 MG: Performed by: NURSE ANESTHETIST, CERTIFIED REGISTERED

## 2018-11-15 PROCEDURE — 15734 MUSCLE-SKIN GRAFT TRUNK: CPT | Performed by: SURGERY

## 2018-11-15 PROCEDURE — 88305 TISSUE EXAM BY PATHOLOGIST: CPT | Performed by: SURGERY

## 2018-11-15 PROCEDURE — 49560 PR REPAIR INCISIONAL HERNIA,REDUCIBLE: CPT | Performed by: SURGERY

## 2018-11-15 PROCEDURE — 25010000002 FENTANYL CITRATE (PF) 100 MCG/2ML SOLUTION: Performed by: NURSE ANESTHETIST, CERTIFIED REGISTERED

## 2018-11-15 PROCEDURE — C1781 MESH (IMPLANTABLE): HCPCS | Performed by: SURGERY

## 2018-11-15 PROCEDURE — 25010000002 DEXAMETHASONE PER 1 MG: Performed by: NURSE ANESTHETIST, CERTIFIED REGISTERED

## 2018-11-15 PROCEDURE — 94799 UNLISTED PULMONARY SVC/PX: CPT

## 2018-11-15 PROCEDURE — 25010000003 HYDROMORPHONE PER 4 MG

## 2018-11-15 PROCEDURE — 49568 PR IMPLANT MESH HERNIA REPAIR/DEBRIDEMENT CLOSURE: CPT | Performed by: SURGERY

## 2018-11-15 PROCEDURE — 84703 CHORIONIC GONADOTROPIN ASSAY: CPT | Performed by: NURSE ANESTHETIST, CERTIFIED REGISTERED

## 2018-11-15 PROCEDURE — 25010000002 METOCLOPRAMIDE PER 10 MG: Performed by: NURSE ANESTHETIST, CERTIFIED REGISTERED

## 2018-11-15 PROCEDURE — 25010000002 MIDAZOLAM PER 1 MG: Performed by: NURSE ANESTHETIST, CERTIFIED REGISTERED

## 2018-11-15 PROCEDURE — 25010000002 KETOROLAC TROMETHAMINE PER 15 MG: Performed by: NURSE ANESTHETIST, CERTIFIED REGISTERED

## 2018-11-15 DEVICE — BARD SOFT MESH
Type: IMPLANTABLE DEVICE | Site: ABDOMEN | Status: NON-FUNCTIONAL
Brand: BARD SOFT MESH
Removed: 2018-12-02

## 2018-11-15 DEVICE — SEALANT WND FIBRIN TISSEEL VAPOR/HEAT/PREFIL/SYR 10ML: Type: IMPLANTABLE DEVICE | Site: ABDOMEN | Status: FUNCTIONAL

## 2018-11-15 RX ORDER — SODIUM CHLORIDE, SODIUM LACTATE, POTASSIUM CHLORIDE, CALCIUM CHLORIDE 600; 310; 30; 20 MG/100ML; MG/100ML; MG/100ML; MG/100ML
9 INJECTION, SOLUTION INTRAVENOUS CONTINUOUS
Status: DISCONTINUED | OUTPATIENT
Start: 2018-11-15 | End: 2018-11-18

## 2018-11-15 RX ORDER — SODIUM CHLORIDE 9 MG/ML
40 INJECTION, SOLUTION INTRAVENOUS AS NEEDED
Status: DISCONTINUED | OUTPATIENT
Start: 2018-11-15 | End: 2018-11-15 | Stop reason: HOSPADM

## 2018-11-15 RX ORDER — LIDOCAINE HYDROCHLORIDE 20 MG/ML
INJECTION, SOLUTION INFILTRATION; PERINEURAL AS NEEDED
Status: DISCONTINUED | OUTPATIENT
Start: 2018-11-15 | End: 2018-11-15 | Stop reason: SURG

## 2018-11-15 RX ORDER — SUCCINYLCHOLINE CHLORIDE 20 MG/ML
INJECTION INTRAMUSCULAR; INTRAVENOUS AS NEEDED
Status: DISCONTINUED | OUTPATIENT
Start: 2018-11-15 | End: 2018-11-15 | Stop reason: SURG

## 2018-11-15 RX ORDER — DEXAMETHASONE SODIUM PHOSPHATE 4 MG/ML
8 INJECTION, SOLUTION INTRA-ARTICULAR; INTRALESIONAL; INTRAMUSCULAR; INTRAVENOUS; SOFT TISSUE ONCE
Status: COMPLETED | OUTPATIENT
Start: 2018-11-15 | End: 2018-11-15

## 2018-11-15 RX ORDER — SODIUM CHLORIDE, SODIUM LACTATE, POTASSIUM CHLORIDE, CALCIUM CHLORIDE 600; 310; 30; 20 MG/100ML; MG/100ML; MG/100ML; MG/100ML
100 INJECTION, SOLUTION INTRAVENOUS CONTINUOUS
Status: DISCONTINUED | OUTPATIENT
Start: 2018-11-15 | End: 2018-11-15

## 2018-11-15 RX ORDER — ONDANSETRON 4 MG/1
4 TABLET, ORALLY DISINTEGRATING ORAL EVERY 6 HOURS PRN
Status: DISCONTINUED | OUTPATIENT
Start: 2018-11-15 | End: 2018-11-19 | Stop reason: HOSPADM

## 2018-11-15 RX ORDER — PROMETHAZINE HYDROCHLORIDE 25 MG/ML
INJECTION, SOLUTION INTRAMUSCULAR; INTRAVENOUS AS NEEDED
Status: DISCONTINUED | OUTPATIENT
Start: 2018-11-15 | End: 2018-11-15 | Stop reason: SURG

## 2018-11-15 RX ORDER — ONDANSETRON 2 MG/ML
4 INJECTION INTRAMUSCULAR; INTRAVENOUS ONCE AS NEEDED
Status: DISCONTINUED | OUTPATIENT
Start: 2018-11-15 | End: 2018-11-15 | Stop reason: HOSPADM

## 2018-11-15 RX ORDER — MIDAZOLAM HYDROCHLORIDE 1 MG/ML
1 INJECTION INTRAMUSCULAR; INTRAVENOUS
Status: DISCONTINUED | OUTPATIENT
Start: 2018-11-15 | End: 2018-11-15 | Stop reason: HOSPADM

## 2018-11-15 RX ORDER — PROMETHAZINE HYDROCHLORIDE 25 MG/ML
12.5 INJECTION, SOLUTION INTRAMUSCULAR; INTRAVENOUS EVERY 4 HOURS PRN
Status: DISCONTINUED | OUTPATIENT
Start: 2018-11-15 | End: 2018-11-19 | Stop reason: HOSPADM

## 2018-11-15 RX ORDER — HYDROMORPHONE HCL 110MG/55ML
PATIENT CONTROLLED ANALGESIA SYRINGE INTRAVENOUS AS NEEDED
Status: DISCONTINUED | OUTPATIENT
Start: 2018-11-15 | End: 2018-11-15 | Stop reason: SURG

## 2018-11-15 RX ORDER — FENTANYL CITRATE 50 UG/ML
INJECTION, SOLUTION INTRAMUSCULAR; INTRAVENOUS AS NEEDED
Status: DISCONTINUED | OUTPATIENT
Start: 2018-11-15 | End: 2018-11-15 | Stop reason: SURG

## 2018-11-15 RX ORDER — NORTRIPTYLINE HYDROCHLORIDE 25 MG/1
100 CAPSULE ORAL NIGHTLY
Status: DISCONTINUED | OUTPATIENT
Start: 2018-11-15 | End: 2018-11-19 | Stop reason: HOSPADM

## 2018-11-15 RX ORDER — LIDOCAINE HYDROCHLORIDE 10 MG/ML
0.5 INJECTION, SOLUTION EPIDURAL; INFILTRATION; INTRACAUDAL; PERINEURAL ONCE AS NEEDED
Status: COMPLETED | OUTPATIENT
Start: 2018-11-15 | End: 2018-11-15

## 2018-11-15 RX ORDER — METOCLOPRAMIDE HYDROCHLORIDE 5 MG/ML
10 INJECTION INTRAMUSCULAR; INTRAVENOUS ONCE AS NEEDED
Status: COMPLETED | OUTPATIENT
Start: 2018-11-15 | End: 2018-11-15

## 2018-11-15 RX ORDER — SODIUM CHLORIDE 0.9 % (FLUSH) 0.9 %
1-10 SYRINGE (ML) INJECTION AS NEEDED
Status: DISCONTINUED | OUTPATIENT
Start: 2018-11-15 | End: 2018-11-15 | Stop reason: HOSPADM

## 2018-11-15 RX ORDER — ONDANSETRON 2 MG/ML
4 INJECTION INTRAMUSCULAR; INTRAVENOUS EVERY 6 HOURS PRN
Status: DISCONTINUED | OUTPATIENT
Start: 2018-11-15 | End: 2018-11-19 | Stop reason: HOSPADM

## 2018-11-15 RX ORDER — PROPOFOL 10 MG/ML
VIAL (ML) INTRAVENOUS AS NEEDED
Status: DISCONTINUED | OUTPATIENT
Start: 2018-11-15 | End: 2018-11-15 | Stop reason: SURG

## 2018-11-15 RX ORDER — SODIUM CHLORIDE 0.9 % (FLUSH) 0.9 %
3 SYRINGE (ML) INJECTION EVERY 12 HOURS SCHEDULED
Status: DISCONTINUED | OUTPATIENT
Start: 2018-11-15 | End: 2018-11-15 | Stop reason: HOSPADM

## 2018-11-15 RX ORDER — KETAMINE HYDROCHLORIDE 100 MG/ML
INJECTION INTRAMUSCULAR; INTRAVENOUS AS NEEDED
Status: DISCONTINUED | OUTPATIENT
Start: 2018-11-15 | End: 2018-11-15 | Stop reason: SURG

## 2018-11-15 RX ORDER — SCOLOPAMINE TRANSDERMAL SYSTEM 1 MG/1
1 PATCH, EXTENDED RELEASE TRANSDERMAL CONTINUOUS
Status: DISPENSED | OUTPATIENT
Start: 2018-11-15 | End: 2018-11-16

## 2018-11-15 RX ORDER — SODIUM CHLORIDE AND POTASSIUM CHLORIDE 150; 450 MG/100ML; MG/100ML
50 INJECTION, SOLUTION INTRAVENOUS CONTINUOUS
Status: DISCONTINUED | OUTPATIENT
Start: 2018-11-15 | End: 2018-11-18

## 2018-11-15 RX ORDER — CETIRIZINE HYDROCHLORIDE 10 MG/1
10 TABLET ORAL NIGHTLY
Status: DISCONTINUED | OUTPATIENT
Start: 2018-11-15 | End: 2018-11-19 | Stop reason: HOSPADM

## 2018-11-15 RX ORDER — ONDANSETRON 2 MG/ML
4 INJECTION INTRAMUSCULAR; INTRAVENOUS ONCE AS NEEDED
Status: COMPLETED | OUTPATIENT
Start: 2018-11-15 | End: 2018-11-15

## 2018-11-15 RX ORDER — MAGNESIUM HYDROXIDE 1200 MG/15ML
LIQUID ORAL AS NEEDED
Status: DISCONTINUED | OUTPATIENT
Start: 2018-11-15 | End: 2018-11-15 | Stop reason: HOSPADM

## 2018-11-15 RX ORDER — ONDANSETRON 4 MG/1
4 TABLET, FILM COATED ORAL EVERY 6 HOURS PRN
Status: DISCONTINUED | OUTPATIENT
Start: 2018-11-15 | End: 2018-11-19 | Stop reason: HOSPADM

## 2018-11-15 RX ORDER — KETOROLAC TROMETHAMINE 30 MG/ML
INJECTION, SOLUTION INTRAMUSCULAR; INTRAVENOUS AS NEEDED
Status: DISCONTINUED | OUTPATIENT
Start: 2018-11-15 | End: 2018-11-15 | Stop reason: SURG

## 2018-11-15 RX ORDER — DIPHENHYDRAMINE HYDROCHLORIDE 50 MG/ML
25 INJECTION INTRAMUSCULAR; INTRAVENOUS EVERY 6 HOURS PRN
Status: DISCONTINUED | OUTPATIENT
Start: 2018-11-15 | End: 2018-11-19 | Stop reason: HOSPADM

## 2018-11-15 RX ORDER — MIDAZOLAM HYDROCHLORIDE 1 MG/ML
2 INJECTION INTRAMUSCULAR; INTRAVENOUS
Status: DISCONTINUED | OUTPATIENT
Start: 2018-11-15 | End: 2018-11-15 | Stop reason: HOSPADM

## 2018-11-15 RX ORDER — FAMOTIDINE 20 MG/1
20 TABLET, FILM COATED ORAL 2 TIMES DAILY
Status: DISCONTINUED | OUTPATIENT
Start: 2018-11-15 | End: 2018-11-19 | Stop reason: HOSPADM

## 2018-11-15 RX ORDER — NICOTINE POLACRILEX 4 MG
15 LOZENGE BUCCAL
Status: DISCONTINUED | OUTPATIENT
Start: 2018-11-15 | End: 2018-11-19 | Stop reason: HOSPADM

## 2018-11-15 RX ORDER — DEXTROSE MONOHYDRATE 25 G/50ML
25 INJECTION, SOLUTION INTRAVENOUS
Status: DISCONTINUED | OUTPATIENT
Start: 2018-11-15 | End: 2018-11-19 | Stop reason: HOSPADM

## 2018-11-15 RX ORDER — NALOXONE HCL 0.4 MG/ML
0.1 VIAL (ML) INJECTION
Status: DISCONTINUED | OUTPATIENT
Start: 2018-11-15 | End: 2018-11-19 | Stop reason: HOSPADM

## 2018-11-15 RX ORDER — MEPERIDINE HYDROCHLORIDE 25 MG/ML
12.5 INJECTION INTRAMUSCULAR; INTRAVENOUS; SUBCUTANEOUS
Status: DISCONTINUED | OUTPATIENT
Start: 2018-11-15 | End: 2018-11-15 | Stop reason: HOSPADM

## 2018-11-15 RX ORDER — ROCURONIUM BROMIDE 10 MG/ML
INJECTION, SOLUTION INTRAVENOUS AS NEEDED
Status: DISCONTINUED | OUTPATIENT
Start: 2018-11-15 | End: 2018-11-15 | Stop reason: SURG

## 2018-11-15 RX ADMIN — CETIRIZINE HYDROCHLORIDE 10 MG: 10 TABLET, FILM COATED ORAL at 21:44

## 2018-11-15 RX ADMIN — ONDANSETRON 4 MG: 2 INJECTION INTRAMUSCULAR; INTRAVENOUS at 07:54

## 2018-11-15 RX ADMIN — DEXAMETHASONE SODIUM PHOSPHATE 8 MG: 4 INJECTION, SOLUTION INTRAMUSCULAR; INTRAVENOUS at 07:54

## 2018-11-15 RX ADMIN — PROPOFOL 200 MG: 10 INJECTION, EMULSION INTRAVENOUS at 08:31

## 2018-11-15 RX ADMIN — HYDROMORPHONE HYDROCHLORIDE 0.5 MG: 1 INJECTION, SOLUTION INTRAMUSCULAR; INTRAVENOUS; SUBCUTANEOUS at 11:52

## 2018-11-15 RX ADMIN — SUGAMMADEX 266 MG: 100 INJECTION, SOLUTION INTRAVENOUS at 10:39

## 2018-11-15 RX ADMIN — HYDROMORPHONE HYDROCHLORIDE 1 MG: 2 INJECTION, SOLUTION INTRAMUSCULAR; INTRAVENOUS; SUBCUTANEOUS at 10:46

## 2018-11-15 RX ADMIN — HYDROMORPHONE HYDROCHLORIDE 0.5 MG: 1 INJECTION, SOLUTION INTRAMUSCULAR; INTRAVENOUS; SUBCUTANEOUS at 11:43

## 2018-11-15 RX ADMIN — HYDROMORPHONE HYDROCHLORIDE 0.5 MG: 1 INJECTION, SOLUTION INTRAMUSCULAR; INTRAVENOUS; SUBCUTANEOUS at 12:05

## 2018-11-15 RX ADMIN — SODIUM CHLORIDE, POTASSIUM CHLORIDE, SODIUM LACTATE AND CALCIUM CHLORIDE 100 ML/HR: 600; 310; 30; 20 INJECTION, SOLUTION INTRAVENOUS at 13:20

## 2018-11-15 RX ADMIN — FENTANYL CITRATE 100 MCG: 50 INJECTION, SOLUTION INTRAMUSCULAR; INTRAVENOUS at 08:27

## 2018-11-15 RX ADMIN — LIDOCAINE HYDROCHLORIDE 100 MG: 20 INJECTION, SOLUTION INFILTRATION; PERINEURAL at 10:47

## 2018-11-15 RX ADMIN — METOCLOPRAMIDE 10 MG: 5 INJECTION, SOLUTION INTRAMUSCULAR; INTRAVENOUS at 08:20

## 2018-11-15 RX ADMIN — FAMOTIDINE 20 MG: 10 INJECTION, SOLUTION INTRAVENOUS at 07:54

## 2018-11-15 RX ADMIN — SODIUM CHLORIDE, POTASSIUM CHLORIDE, SODIUM LACTATE AND CALCIUM CHLORIDE 9 ML/HR: 600; 310; 30; 20 INJECTION, SOLUTION INTRAVENOUS at 06:55

## 2018-11-15 RX ADMIN — ROCURONIUM BROMIDE 20 MG: 10 INJECTION INTRAVENOUS at 09:52

## 2018-11-15 RX ADMIN — FAMOTIDINE 20 MG: 20 TABLET, FILM COATED ORAL at 21:44

## 2018-11-15 RX ADMIN — SODIUM CHLORIDE 3 G: 9 INJECTION, SOLUTION INTRAVENOUS at 08:46

## 2018-11-15 RX ADMIN — KETOROLAC TROMETHAMINE 30 MG: 30 INJECTION INTRAMUSCULAR; INTRAVENOUS at 10:39

## 2018-11-15 RX ADMIN — SUCCINYLCHOLINE CHLORIDE 200 MG: 20 INJECTION, SOLUTION INTRAMUSCULAR; INTRAVENOUS at 08:31

## 2018-11-15 RX ADMIN — KETAMINE HYDROCHLORIDE 20 MG: 100 INJECTION INTRAMUSCULAR; INTRAVENOUS at 09:56

## 2018-11-15 RX ADMIN — FENTANYL CITRATE 50 MCG: 50 INJECTION, SOLUTION INTRAMUSCULAR; INTRAVENOUS at 08:48

## 2018-11-15 RX ADMIN — LIDOCAINE HYDROCHLORIDE 0.5 ML: 10 INJECTION, SOLUTION EPIDURAL; INFILTRATION; INTRACAUDAL; PERINEURAL at 06:55

## 2018-11-15 RX ADMIN — ROCURONIUM BROMIDE 35 MG: 10 INJECTION INTRAVENOUS at 08:41

## 2018-11-15 RX ADMIN — PROMETHAZINE HYDROCHLORIDE 12.5 MG: 25 INJECTION INTRAMUSCULAR; INTRAVENOUS at 08:35

## 2018-11-15 RX ADMIN — SCOPALAMINE 1 PATCH: 1 PATCH, EXTENDED RELEASE TRANSDERMAL at 07:54

## 2018-11-15 RX ADMIN — FENTANYL CITRATE 50 MCG: 50 INJECTION, SOLUTION INTRAMUSCULAR; INTRAVENOUS at 09:52

## 2018-11-15 RX ADMIN — LIDOCAINE HYDROCHLORIDE 100 MG: 20 INJECTION, SOLUTION INFILTRATION; PERINEURAL at 08:27

## 2018-11-15 RX ADMIN — ROCURONIUM BROMIDE 5 MG: 10 INJECTION INTRAVENOUS at 08:27

## 2018-11-15 RX ADMIN — SODIUM CHLORIDE AND POTASSIUM CHLORIDE 100 ML/HR: 4.5; 1.49 INJECTION, SOLUTION INTRAVENOUS at 16:20

## 2018-11-15 RX ADMIN — FENTANYL CITRATE 50 MCG: 50 INJECTION, SOLUTION INTRAMUSCULAR; INTRAVENOUS at 09:35

## 2018-11-15 RX ADMIN — HYDROMORPHONE HYDROCHLORIDE: 10 INJECTION, SOLUTION INTRAMUSCULAR; INTRAVENOUS; SUBCUTANEOUS at 13:20

## 2018-11-15 RX ADMIN — HYDROMORPHONE HYDROCHLORIDE 0.5 MG: 1 INJECTION, SOLUTION INTRAMUSCULAR; INTRAVENOUS; SUBCUTANEOUS at 12:17

## 2018-11-15 RX ADMIN — NORTRIPTYLINE HYDROCHLORIDE 100 MG: 25 CAPSULE ORAL at 21:44

## 2018-11-15 RX ADMIN — KETAMINE HYDROCHLORIDE 40 MG: 100 INJECTION INTRAMUSCULAR; INTRAVENOUS at 08:34

## 2018-11-15 RX ADMIN — MIDAZOLAM HYDROCHLORIDE 2 MG: 1 INJECTION, SOLUTION INTRAMUSCULAR; INTRAVENOUS at 08:20

## 2018-11-15 RX ADMIN — ROCURONIUM BROMIDE 10 MG: 10 INJECTION INTRAVENOUS at 09:17

## 2018-11-15 NOTE — ANESTHESIA PROCEDURE NOTES
ANESTHESIA INTUBATION  Urgency: elective    Date/Time: 11/15/2018 8:32 AM  End Time:11/15/2018 8:32 AM  Airway not difficult    General Information and Staff    Patient location during procedure: OR  CRNA: Jacqueline Ruiz CRNA    Indications and Patient Condition  Indications for airway management: airway protection    Preoxygenated: yes  MILS maintained throughout  Mask difficulty assessment: 0 - not attempted    Final Airway Details  Final airway type: endotracheal airway      Successful airway: ETT  Cuffed: yes   Successful intubation technique: direct laryngoscopy  Facilitating devices/methods: intubating stylet  Endotracheal tube insertion site: oral  Blade: Queen  Blade size: 2  ETT size (mm): 7.0  Cormack-Lehane Classification: grade I - full view of glottis  Placement verified by: chest auscultation and capnometry   Measured from: lips  ETT to lips (cm): 21  Number of attempts at approach: 1    Additional Comments  BEBS, +ETCO2, VSS. TEETH AND GUMS AS PRE-OP.

## 2018-11-15 NOTE — INTERVAL H&P NOTE
H&P reviewed. The patient was examined and there are no changes to the H&P.      BP (!) 161/105 (BP Location: Right arm, Patient Position: Lying)   Pulse 96   Temp 97.9 °F (36.6 °C) (Oral)   Resp 18   Wt 133 kg (293 lb)   LMP  (LMP Unknown)   SpO2 97%   BMI 50.29 kg/m²

## 2018-11-15 NOTE — ANESTHESIA PREPROCEDURE EVALUATION
Anesthesia Evaluation     Patient summary reviewed and Nursing notes reviewed   history of anesthetic complications (spinal headache?): PONV  NPO Solid Status: > 8 hours  NPO Liquid Status: > 2 hours           Airway   Mallampati: II  TM distance: >3 FB  Neck ROM: full  No difficulty expected  Dental      Pulmonary     breath sounds clear to auscultation  (+) pneumonia (july) resolved , a smoker (quit at age 17 then socially, pt states has no smoked in a long time) Former,   Sleep apnea: snores, ? SHANE.  Cardiovascular   Exercise tolerance: good (4-7 METS)    ECG reviewed  Rhythm: regular  Rate: normal    (+) hypertension (not medicated ) poorly controlled, hyperlipidemia (boarderline not medicated ),     ROS comment: Narrative     RR Interval= 632 ms  IL Interval= 168 ms  QRSD Interval= 96 ms  QT Interval= 344 ms  QTc Interval= 433 ms  Heart Rate= 95 ms  P Axis= 35 deg  QRS Axis= 9 deg  T Wave Axis= 65 deg  I: 40 Axis= 29 deg  T: 40 Axis= 0 deg  ST Axis= 89 deg  Sinus rhythm  No Prior Tracing for Comparison  Electronically Signed by:  Arleth Knowles ([facility]) 05-Nov-2018 15:45:16  Date and Time of Study: 2018-11-05 10:26:44        Neuro/Psych  (+) headaches, psychiatric history (panic attacks) Anxiety and Depression,     GI/Hepatic/Renal/Endo    (+) morbid obesity, GERD well controlled,      Musculoskeletal     Abdominal   (+) obese,    Substance History   (+) alcohol use (social, occ),      OB/GYN    (+) Preeclampsia,     Comment: PCOS      Other   (+) arthritis     ROS/Med Hx Other: CL finished at 0515                  Anesthesia Plan    ASA 3     general     intravenous induction   Anesthetic plan, all risks, benefits, and alternatives have been provided, discussed and informed consent has been obtained with: patient.  Use of blood products discussed with patient  Consented to blood products.

## 2018-11-15 NOTE — PLAN OF CARE
Problem: Patient Care Overview  Goal: Plan of Care Review  Outcome: Ongoing (interventions implemented as appropriate)   11/15/18 1723   Coping/Psychosocial   Plan of Care Reviewed With patient   Plan of Care Review   Progress improving   OTHER   Outcome Summary patient post op open hernia repair, vitals stable, dilaudid PCA for pain, canseco for GI surgery, 2 SANDEEP drains with minimal output. Anticipate to get out of bed before shift change.      Goal: Individualization and Mutuality  Outcome: Ongoing (interventions implemented as appropriate)      Problem: Pain, Acute (Adult)  Goal: Identify Related Risk Factors and Signs and Symptoms  Outcome: Ongoing (interventions implemented as appropriate)   11/15/18 1723   Pain, Acute (Adult)   Related Risk Factors (Acute Pain) persistent pain;procedure/treatment   Signs and Symptoms (Acute Pain) verbalization of pain descriptors     Goal: Acceptable Pain Control/Comfort Level  Outcome: Ongoing (interventions implemented as appropriate)   11/15/18 1723   Pain, Acute (Adult)   Acceptable Pain Control/Comfort Level making progress toward outcome       Problem: Surgery Nonspecified (Adult)  Goal: Signs and Symptoms of Listed Potential Problems Will be Absent, Minimized or Managed (Surgery Nonspecified)  Outcome: Ongoing (interventions implemented as appropriate)   11/15/18 1723   Goal/Outcome Evaluation   Problems Assessed (Surgery) all   Problems Present (Surgery) none

## 2018-11-15 NOTE — OP NOTE
Pre-op Dx: 1. Incisional Hernia                      2. Right side skin lesion     Post-op Dx: 1. Same                        2. Pending pathology     Procedure:  1. Lateral component separation and advancement flaps                            herniorrhaphy with mesh                        2. Adhesiolysis                         3. Excision of right side skin lesion     Surgeon:  Rachel Dolan M.D.    Assistant: CAL Ortez    Anesthesia:  GET     EBL:  Minimum less than 10cc    Specimens: none    Order Name Source Comment Collection Info Order Time   HCG, SERUM, QUALITATIVE   Collected By: Bailee Tinsley RN 11/15/2018  6:33 AM       Complications:  None    Findings:  Large multiple incisional midline hernias, multiple omental adhesions     Indications:  This is a pleasant 33 year old female that presented with symptomatic incisional hernias.     Procedure:     After general endotracheal anesthesia, patient was prepped and draped in the usual sterile fashion. Using a 10 blade scalpel, a skin incision was performed over the palpable incisional hernia. The subcutaneous tissues dissected using cautery.  Multiple midline incisional hernias were noted throughout the length of the entire midline incision.  Therefore, the incision was extended from the supraumbilical down to the pubic symphysis.  The hernia sacs were identified in the subcutaneous tissue was dissected free from the hernia sac in a circumferential manner at the base of the fascia. The hernia sacs were opened and the omental contents was freed from the hernia sacs using cautery and Metzenbaum scissors. Once the omentum was freed from the hernia was returned intra-abdominally.  There were so many multiple midline hernias that they were connected using cautery throughout the length of the incision.  Extensive adhesio lysis was continued and carried out on the anterior abdominal wall bilaterally.  Bilateral rectus muscles had retracted  "laterally with loss of abdominal domain.  Therefore an extensive lateral component release with advancement flaps was carried out using cautery.  The subcutaneous tissue bilaterally was dissected using cautery down to the fascia of the anterior rectus abdominis sheath and extended laterally to the external oblique aponeurosis.  Bilaterally the external oblique aponeurosis was released from the costal margins down to the ASIS.  This created a nice advancement flap without tension to the midline.  Meticulous hemostasis was maintained throughout the procedure.  The rectus abdominis muscles then were closed using a looped #1 PDS superiorly and inferiorly.  A large, 12\" x 12\", Bard soft mesh was placed anteriorly to the rectus abdominis muscles and secured in a circumferential manner with 0 Nurolon pop-off's. The fascia was closed using figure eights over the top of the mesh With 0 Nurolon suture. The subcutaneous tissue was copiously irrigated.  2 #19 Uzbek Eze drains were laid in the lateral component area exiting the lateral sides.  Both drains were secured to the skin with 2-0 silk sutures.  10 cc of Tisseel was placed bilaterally.  The subcutaneous tissue was closed with interrupted 2-0 Vicryl.  All needles and sponge counts were correct ×2. The skin was closed using staples. A sterile dressing was applied and the patient was transferred to recovery room in stable condition.    Rachel Dolan MD  General, Minimally Invasive and Endoscopic Surgery  [facility]    [facility] [address]    [address]  [place] [id number]               [place] [address]    P: [phone]  F: [phone]    Cc:  Zandra Rios MD      "

## 2018-11-16 LAB
ANION GAP SERPL CALCULATED.3IONS-SCNC: 9.4 MMOL/L
BASOPHILS # BLD AUTO: 0.03 10*3/MM3 (ref 0–0.2)
BASOPHILS NFR BLD AUTO: 0.3 % (ref 0–2)
BUN BLD-MCNC: 8 MG/DL (ref 6–20)
BUN/CREAT SERPL: 12.1 (ref 7–25)
CALCIUM SPEC-SCNC: 8.9 MG/DL (ref 8.6–10.5)
CHLORIDE SERPL-SCNC: 103 MMOL/L (ref 98–107)
CO2 SERPL-SCNC: 23.6 MMOL/L (ref 22–29)
CREAT BLD-MCNC: 0.66 MG/DL (ref 0.57–1)
CYTO UR: NORMAL
DEPRECATED RDW RBC AUTO: 39.7 FL (ref 37–54)
EOSINOPHIL # BLD AUTO: 0 10*3/MM3 (ref 0.1–0.3)
EOSINOPHIL NFR BLD AUTO: 0 % (ref 0–4)
ERYTHROCYTE [DISTWIDTH] IN BLOOD BY AUTOMATED COUNT: 13.6 % (ref 11.5–14.5)
GFR SERPL CREATININE-BSD FRML MDRD: 103 ML/MIN/1.73
GLUCOSE BLD-MCNC: 115 MG/DL (ref 65–99)
GLUCOSE BLDC GLUCOMTR-MCNC: 102 MG/DL (ref 70–130)
GLUCOSE BLDC GLUCOMTR-MCNC: 134 MG/DL (ref 70–130)
GLUCOSE BLDC GLUCOMTR-MCNC: 79 MG/DL (ref 70–130)
GLUCOSE BLDC GLUCOMTR-MCNC: 84 MG/DL (ref 70–130)
HCT VFR BLD AUTO: 39.2 % (ref 37–47)
HGB BLD-MCNC: 12.6 G/DL (ref 12–16)
IMM GRANULOCYTES # BLD: 0.04 10*3/MM3 (ref 0–0.03)
IMM GRANULOCYTES NFR BLD: 0.4 % (ref 0–0.5)
LAB AP CASE REPORT: NORMAL
LYMPHOCYTES # BLD AUTO: 1.38 10*3/MM3 (ref 0.6–4.8)
LYMPHOCYTES NFR BLD AUTO: 12.2 % (ref 20–45)
MCH RBC QN AUTO: 26 PG (ref 27–31)
MCHC RBC AUTO-ENTMCNC: 32.1 G/DL (ref 31–37)
MCV RBC AUTO: 81 FL (ref 81–99)
MONOCYTES # BLD AUTO: 0.66 10*3/MM3 (ref 0–1)
MONOCYTES NFR BLD AUTO: 5.9 % (ref 3–8)
NEUTROPHILS # BLD AUTO: 9.16 10*3/MM3 (ref 1.5–8.3)
NEUTROPHILS NFR BLD AUTO: 81.2 % (ref 45–70)
NRBC BLD MANUAL-RTO: 0 /100 WBC (ref 0–0)
PATH REPORT.FINAL DX SPEC: NORMAL
PATH REPORT.GROSS SPEC: NORMAL
PLATELET # BLD AUTO: 225 10*3/MM3 (ref 140–500)
PMV BLD AUTO: 11 FL (ref 7.4–10.4)
POTASSIUM BLD-SCNC: 4.6 MMOL/L (ref 3.5–5.2)
RBC # BLD AUTO: 4.84 10*6/MM3 (ref 4.2–5.4)
SODIUM BLD-SCNC: 136 MMOL/L (ref 136–145)
WBC NRBC COR # BLD: 11.27 10*3/MM3 (ref 4.8–10.8)

## 2018-11-16 PROCEDURE — G0378 HOSPITAL OBSERVATION PER HR: HCPCS

## 2018-11-16 PROCEDURE — 82962 GLUCOSE BLOOD TEST: CPT

## 2018-11-16 PROCEDURE — 25010000002 PROMETHAZINE PER 50 MG: Performed by: SURGERY

## 2018-11-16 PROCEDURE — 25010000002 ENOXAPARIN PER 10 MG: Performed by: SURGERY

## 2018-11-16 PROCEDURE — 99024 POSTOP FOLLOW-UP VISIT: CPT | Performed by: SURGERY

## 2018-11-16 PROCEDURE — 25010000003 HYDROMORPHONE PER 4 MG

## 2018-11-16 PROCEDURE — 80048 BASIC METABOLIC PNL TOTAL CA: CPT | Performed by: SURGERY

## 2018-11-16 PROCEDURE — 85025 COMPLETE CBC W/AUTO DIFF WBC: CPT | Performed by: SURGERY

## 2018-11-16 PROCEDURE — 94799 UNLISTED PULMONARY SVC/PX: CPT

## 2018-11-16 RX ADMIN — FAMOTIDINE 20 MG: 20 TABLET, FILM COATED ORAL at 20:05

## 2018-11-16 RX ADMIN — ENOXAPARIN SODIUM 30 MG: 30 INJECTION SUBCUTANEOUS at 11:33

## 2018-11-16 RX ADMIN — FAMOTIDINE 20 MG: 20 TABLET, FILM COATED ORAL at 11:34

## 2018-11-16 RX ADMIN — SODIUM CHLORIDE AND POTASSIUM CHLORIDE 100 ML/HR: 4.5; 1.49 INJECTION, SOLUTION INTRAVENOUS at 12:13

## 2018-11-16 RX ADMIN — HYDROMORPHONE HYDROCHLORIDE: 10 INJECTION INTRAMUSCULAR; INTRAVENOUS; SUBCUTANEOUS at 16:27

## 2018-11-16 RX ADMIN — HYDROMORPHONE HYDROCHLORIDE: 10 INJECTION INTRAMUSCULAR; INTRAVENOUS; SUBCUTANEOUS at 01:34

## 2018-11-16 RX ADMIN — SODIUM CHLORIDE AND POTASSIUM CHLORIDE 100 ML/HR: 4.5; 1.49 INJECTION, SOLUTION INTRAVENOUS at 01:44

## 2018-11-16 RX ADMIN — NORTRIPTYLINE HYDROCHLORIDE 100 MG: 25 CAPSULE ORAL at 20:05

## 2018-11-16 RX ADMIN — PROMETHAZINE HYDROCHLORIDE 12.5 MG: 25 INJECTION INTRAMUSCULAR; INTRAVENOUS at 22:10

## 2018-11-16 RX ADMIN — CETIRIZINE HYDROCHLORIDE 10 MG: 10 TABLET, FILM COATED ORAL at 20:05

## 2018-11-16 NOTE — NURSING NOTE
Continued Stay Note  [facility]     Patient Name: Valery Aguilar  MRN: [id number]  Today's Date: 11/16/2018    Admit Date: 11/15/2018    Discharge Plan     Row Name 11/16/18 1031       Plan    Plan  Home when stable    Patient/Family in Agreement with Plan  yes    Plan Comments  Spoke with Mrs Candelario at bedside.  She and her  Alex live in a double wide in [place] with their two sons, 11 & 14.  She does not have home health, DME or oxygen.  She is independent with her ADL's and drives hersefl to her full-time job at [employer].  Her pharmacy is [facility] in [place] and there are no issues with paying for her prescriptions.  She does not have an advanced directive.  Plan is home when stable.  Will continue to follow.        Discharge Codes    No documentation.             Ashley Argueta RN

## 2018-11-16 NOTE — PROGRESS NOTES
Chief Complaint: POD # 1    Subjective   Pt reports ambulating in the chery, no N&V, tolerating clear liquids    Objective     Vital signs in last 24 hours:  Temp:  [97.1 °F (36.2 °C)-99.1 °F (37.3 °C)] 98.8 °F (37.1 °C)  Heart Rate:  [] 97  Resp:  [18-23] 23  BP: (112-156)/(65-74) 112/72    Intake/Output last 3 shifts:  I/O last 3 completed shifts:  In: 2130.1 [P.O.:240; I.V.:1890.1]  Out: 910 [Urine:700; Drains:210]    Intake/Output this shift:  I/O this shift:  In: 1600 [P.O.:600; I.V.:1000]  Out: 1175 [Urine:1175]    Physical Exam:  Respiratory: CTA, good inspiratory effort  CV: RRR  Abd: + BS, soft, ND, usual post-op tenderness, no rebound, no guarding, incision dry, JPs serosanguinous 60cc, 150cc  Results from last 7 days   Lab Units  11/16/18   0431   WBC 10*3/mm3  11.27*   HEMOGLOBIN g/dL  12.6   HEMATOCRIT %  39.2   PLATELETS 10*3/mm3  225     Results from last 7 days   Lab Units  11/16/18   0431   SODIUM mmol/L  136   POTASSIUM mmol/L  4.6   CHLORIDE mmol/L  103   CO2 mmol/L  23.6   BUN mg/dL  8   CREATININE mg/dL  0.66   GLUCOSE mg/dL  115*   CALCIUM mg/dL  8.9         Assessment/Plan   S/P Lateral component separation and advancement flaps         herniorrhaphy with mesh  Advance diet   Decrease IVFs      Rachel Dolan MD  General, Minimally Invasive and Endoscopic Surgery  [facility]    [address] [facility] [address] [facility] [address]    [address]  [address] [address]    P: [phone]  F: [phone]    Cc:  Zandra Rios MD  1

## 2018-11-16 NOTE — PROGRESS NOTES
Patient: Valery GARCIA Gross  Procedure(s):  Lateral Component Separation Herniorrhapy Repair with Mesh, Lysis of adhesions, and skin lesion excision of Right Side  Anesthesia type: [unfilled]    Patient location: [facility] Surgical Floor  Last vitals:   Vitals:    11/16/18 0900   BP:    Pulse:    Resp:    Temp:    SpO2: 98%     Level of consciousness: awake, alert and oriented    Post-anesthesia pain: adequate analgesia  Airway patency: patent  Respiratory: unassisted  Cardiovascular: stable and blood pressure at baseline  Hydration: euvolemic    Anesthetic complications: no

## 2018-11-16 NOTE — PLAN OF CARE
Problem: Patient Care Overview  Goal: Discharge Needs Assessment  Outcome: Ongoing (interventions implemented as appropriate)   11/15/18 1400 11/16/18 1030   Disability   Equipment Currently Used at Home --  none   Discharge Needs Assessment   Readmission Within the Last 30 Days --  no previous admission in last 30 days   Concerns to be Addressed --  no discharge needs identified;denies needs/concerns at this time   Patient/Family Anticipates Transition to --  home with family   Patient/Family Anticipated Services at Transition --  none   Transportation Concerns car, none --    Transportation Anticipated --  family or friend will provide   Anticipated Changes Related to Illness --  none

## 2018-11-16 NOTE — PROGRESS NOTES
S/W pt while on Med Surg. Pt did screen positive for sleep Apnea.  Has appointment on 11/27/18 at 1pm with Dr. Tod Gore at [facility].

## 2018-11-17 LAB
GLUCOSE BLDC GLUCOMTR-MCNC: 101 MG/DL (ref 70–130)
GLUCOSE BLDC GLUCOMTR-MCNC: 102 MG/DL (ref 70–130)
GLUCOSE BLDC GLUCOMTR-MCNC: 103 MG/DL (ref 70–130)
GLUCOSE BLDC GLUCOMTR-MCNC: 85 MG/DL (ref 70–130)

## 2018-11-17 PROCEDURE — 25010000002 ONDANSETRON PER 1 MG: Performed by: SURGERY

## 2018-11-17 PROCEDURE — 25010000002 KETOROLAC TROMETHAMINE PER 15 MG: Performed by: SURGERY

## 2018-11-17 PROCEDURE — 99024 POSTOP FOLLOW-UP VISIT: CPT | Performed by: SURGERY

## 2018-11-17 PROCEDURE — 25010000003 HYDROMORPHONE PER 4 MG

## 2018-11-17 PROCEDURE — 94799 UNLISTED PULMONARY SVC/PX: CPT

## 2018-11-17 PROCEDURE — 82962 GLUCOSE BLOOD TEST: CPT

## 2018-11-17 PROCEDURE — G0378 HOSPITAL OBSERVATION PER HR: HCPCS

## 2018-11-17 PROCEDURE — 25010000002 ENOXAPARIN PER 10 MG: Performed by: SURGERY

## 2018-11-17 RX ORDER — KETOROLAC TROMETHAMINE 30 MG/ML
30 INJECTION, SOLUTION INTRAMUSCULAR; INTRAVENOUS EVERY 8 HOURS
Status: DISCONTINUED | OUTPATIENT
Start: 2018-11-17 | End: 2018-11-19 | Stop reason: HOSPADM

## 2018-11-17 RX ORDER — SUMATRIPTAN 25 MG/1
50 TABLET, FILM COATED ORAL ONCE
Status: COMPLETED | OUTPATIENT
Start: 2018-11-17 | End: 2018-11-17

## 2018-11-17 RX ADMIN — SODIUM CHLORIDE AND POTASSIUM CHLORIDE 50 ML/HR: 4.5; 1.49 INJECTION, SOLUTION INTRAVENOUS at 05:13

## 2018-11-17 RX ADMIN — SUMATRIPTAN SUCCINATE 50 MG: 25 TABLET, FILM COATED ORAL at 12:05

## 2018-11-17 RX ADMIN — KETOROLAC TROMETHAMINE 30 MG: 30 INJECTION, SOLUTION INTRAMUSCULAR at 21:13

## 2018-11-17 RX ADMIN — FAMOTIDINE 20 MG: 20 TABLET, FILM COATED ORAL at 09:24

## 2018-11-17 RX ADMIN — ONDANSETRON 4 MG: 2 INJECTION, SOLUTION INTRAMUSCULAR; INTRAVENOUS at 11:24

## 2018-11-17 RX ADMIN — KETOROLAC TROMETHAMINE 30 MG: 30 INJECTION, SOLUTION INTRAMUSCULAR at 12:05

## 2018-11-17 RX ADMIN — CETIRIZINE HYDROCHLORIDE 10 MG: 10 TABLET, FILM COATED ORAL at 21:16

## 2018-11-17 RX ADMIN — NORTRIPTYLINE HYDROCHLORIDE 100 MG: 25 CAPSULE ORAL at 21:14

## 2018-11-17 RX ADMIN — ENOXAPARIN SODIUM 30 MG: 30 INJECTION SUBCUTANEOUS at 09:23

## 2018-11-17 RX ADMIN — FAMOTIDINE 20 MG: 20 TABLET, FILM COATED ORAL at 21:16

## 2018-11-17 RX ADMIN — HYDROMORPHONE HYDROCHLORIDE: 10 INJECTION INTRAMUSCULAR; INTRAVENOUS; SUBCUTANEOUS at 11:12

## 2018-11-17 NOTE — PROGRESS NOTES
Chief Complaint: POD # 2    Subjective   Pt reports she has a HA, nauseated   Objective     Vital signs in last 24 hours:  Temp:  [98 °F (36.7 °C)-99.8 °F (37.7 °C)] 99.1 °F (37.3 °C)  Heart Rate:  [] 103  Resp:  [18-22] 18  BP: (112-140)/(72-81) 133/79    Intake/Output last 3 shifts:  I/O last 3 completed shifts:  In: 3013.3 [P.O.:1080; I.V.:1933.3]  Out: 2124 [Urine:1875; Drains:249]    Intake/Output this shift:  No intake/output data recorded.    Physical Exam:  Respiratory: CTA, good inspiratory effort  CV: RRR  Abd: decreased BS, soft, ND, usual post-op tenderness, no rebound, no guarding, dressing dry   Results from last 7 days   Lab Units  11/16/18   0431   WBC 10*3/mm3  11.27*   HEMOGLOBIN g/dL  12.6   HEMATOCRIT %  39.2   PLATELETS 10*3/mm3  225     Results from last 7 days   Lab Units  11/16/18   0431   SODIUM mmol/L  136   POTASSIUM mmol/L  4.6   CHLORIDE mmol/L  103   CO2 mmol/L  23.6   BUN mg/dL  8   CREATININE mg/dL  0.66   GLUCOSE mg/dL  115*   CALCIUM mg/dL  8.9     Assessment/Plan   S/P Lateral component separation and advancement flaps         herniorrhaphy with mesh  Restart Maxalt  Toradol      Rachel Dolan MD  General, Minimally Invasive and Endoscopic Surgery  [facility]    [facility] [address]    [address]  [place] [address]               [place] [address]    P: [phone]  F: [phone]    Cc:  Zandra Rios MD

## 2018-11-17 NOTE — PLAN OF CARE
Problem: Patient Care Overview  Goal: Plan of Care Review  Outcome: Ongoing (interventions implemented as appropriate)    Goal: Individualization and Mutuality  Outcome: Ongoing (interventions implemented as appropriate)    Goal: Interprofessional Rounds/Family Conf  Outcome: Ongoing (interventions implemented as appropriate)      Problem: Pain, Acute (Adult)  Goal: Identify Related Risk Factors and Signs and Symptoms  Outcome: Ongoing (interventions implemented as appropriate)    Goal: Acceptable Pain Control/Comfort Level  Outcome: Ongoing (interventions implemented as appropriate)      Problem: Surgery Nonspecified (Adult)  Goal: Signs and Symptoms of Listed Potential Problems Will be Absent, Minimized or Managed (Surgery Nonspecified)  Outcome: Ongoing (interventions implemented as appropriate)    Goal: Anesthesia/Sedation Recovery  Outcome: Outcome(s) achieved Date Met: 11/17/18

## 2018-11-18 LAB — GLUCOSE BLDC GLUCOMTR-MCNC: 93 MG/DL (ref 70–130)

## 2018-11-18 PROCEDURE — 25010000002 ENOXAPARIN PER 10 MG: Performed by: SURGERY

## 2018-11-18 PROCEDURE — 99024 POSTOP FOLLOW-UP VISIT: CPT | Performed by: SURGERY

## 2018-11-18 PROCEDURE — 82962 GLUCOSE BLOOD TEST: CPT

## 2018-11-18 PROCEDURE — G0378 HOSPITAL OBSERVATION PER HR: HCPCS

## 2018-11-18 PROCEDURE — 25010000002 KETOROLAC TROMETHAMINE PER 15 MG: Performed by: SURGERY

## 2018-11-18 PROCEDURE — 94799 UNLISTED PULMONARY SVC/PX: CPT

## 2018-11-18 RX ORDER — OXYCODONE HYDROCHLORIDE AND ACETAMINOPHEN 5; 325 MG/1; MG/1
2 TABLET ORAL EVERY 4 HOURS PRN
Status: DISCONTINUED | OUTPATIENT
Start: 2018-11-18 | End: 2018-11-19 | Stop reason: HOSPADM

## 2018-11-18 RX ADMIN — NORTRIPTYLINE HYDROCHLORIDE 100 MG: 25 CAPSULE ORAL at 20:02

## 2018-11-18 RX ADMIN — OXYCODONE HYDROCHLORIDE AND ACETAMINOPHEN 2 TABLET: 5; 325 TABLET ORAL at 13:51

## 2018-11-18 RX ADMIN — FAMOTIDINE 20 MG: 20 TABLET, FILM COATED ORAL at 20:02

## 2018-11-18 RX ADMIN — SODIUM CHLORIDE AND POTASSIUM CHLORIDE 50 ML/HR: 4.5; 1.49 INJECTION, SOLUTION INTRAVENOUS at 01:35

## 2018-11-18 RX ADMIN — KETOROLAC TROMETHAMINE 30 MG: 30 INJECTION, SOLUTION INTRAMUSCULAR at 20:01

## 2018-11-18 RX ADMIN — CETIRIZINE HYDROCHLORIDE 10 MG: 10 TABLET, FILM COATED ORAL at 20:02

## 2018-11-18 RX ADMIN — OXYCODONE HYDROCHLORIDE AND ACETAMINOPHEN 2 TABLET: 5; 325 TABLET ORAL at 17:57

## 2018-11-18 RX ADMIN — KETOROLAC TROMETHAMINE 30 MG: 30 INJECTION, SOLUTION INTRAMUSCULAR at 12:10

## 2018-11-18 RX ADMIN — OXYCODONE HYDROCHLORIDE AND ACETAMINOPHEN 2 TABLET: 5; 325 TABLET ORAL at 22:18

## 2018-11-18 RX ADMIN — FAMOTIDINE 20 MG: 20 TABLET, FILM COATED ORAL at 09:01

## 2018-11-18 RX ADMIN — ENOXAPARIN SODIUM 30 MG: 30 INJECTION SUBCUTANEOUS at 09:01

## 2018-11-18 RX ADMIN — KETOROLAC TROMETHAMINE 30 MG: 30 INJECTION, SOLUTION INTRAMUSCULAR at 05:06

## 2018-11-18 NOTE — PLAN OF CARE
Problem: Patient Care Overview  Goal: Plan of Care Review  Outcome: Ongoing (interventions implemented as appropriate)    Goal: Interprofessional Rounds/Family Conf  Outcome: Ongoing (interventions implemented as appropriate)      Problem: Pain, Acute (Adult)  Goal: Identify Related Risk Factors and Signs and Symptoms  Outcome: Ongoing (interventions implemented as appropriate)    Goal: Acceptable Pain Control/Comfort Level  Outcome: Ongoing (interventions implemented as appropriate)      Problem: Surgery Nonspecified (Adult)  Goal: Signs and Symptoms of Listed Potential Problems Will be Absent, Minimized or Managed (Surgery Nonspecified)  Outcome: Ongoing (interventions implemented as appropriate)

## 2018-11-18 NOTE — PROGRESS NOTES
Chief Complaint: POD # 3    Subjective   Pt doing well, still mild HA, pain controlled   Objective     Vital signs in last 24 hours:  Temp:  [97.4 °F (36.3 °C)-98.8 °F (37.1 °C)] 98.1 °F (36.7 °C)  Heart Rate:  [102-108] 106  Resp:  [18-20] 20  BP: (120-129)/(64-78) 127/75    Intake/Output last 3 shifts:  I/O last 3 completed shifts:  In: 1042 [P.O.:360; I.V.:682]  Out: 36 [Drains:36]    Intake/Output this shift:  I/O this shift:  In: 480 [P.O.:480]  Out: -     Physical Exam:  Respiratory: CTA, good inspiratory effort  CV: RRR  Abd: + BS, soft, ND, usual post-op tenderness, no rebound, no guarding, incision C/D/I, JPs serosanguinous, 10cc and 22cc    Results from last 7 days   Lab Units  11/16/18   0431   WBC 10*3/mm3  11.27*   HEMOGLOBIN g/dL  12.6   HEMATOCRIT %  39.2   PLATELETS 10*3/mm3  225     Assessment/Plan   S/P Lateral component separation and advancement flaps         herniorrhaphy with mesh  Advance diet  Change PCA to po pain meds     Rachel Dolan MD  General, Minimally Invasive and Endoscopic Surgery  [facility]    [address] [facility] [address] [place] [address]    [address]  [address] [address]    P: [phone]  F: [phone]    Cc:  Zandra Rios MD

## 2018-11-18 NOTE — PLAN OF CARE
Problem: Patient Care Overview  Goal: Plan of Care Review  Outcome: Ongoing (interventions implemented as appropriate)   11/18/18 0526   Coping/Psychosocial   Plan of Care Reviewed With patient   Plan of Care Review   Progress improving       Problem: Pain, Acute (Adult)  Goal: Acceptable Pain Control/Comfort Level  Outcome: Ongoing (interventions implemented as appropriate)      Problem: Surgery Nonspecified (Adult)  Goal: Signs and Symptoms of Listed Potential Problems Will be Absent, Minimized or Managed (Surgery Nonspecified)  Outcome: Ongoing (interventions implemented as appropriate)   11/18/18 0526   Goal/Outcome Evaluation   Problems Assessed (Surgery) all   Problems Present (Surgery) none

## 2018-11-19 VITALS
TEMPERATURE: 97.2 F | BODY MASS INDEX: 49.68 KG/M2 | HEART RATE: 92 BPM | SYSTOLIC BLOOD PRESSURE: 126 MMHG | DIASTOLIC BLOOD PRESSURE: 74 MMHG | WEIGHT: 291.01 LBS | OXYGEN SATURATION: 93 % | RESPIRATION RATE: 20 BRPM | HEIGHT: 64 IN

## 2018-11-19 PROCEDURE — 25010000002 ENOXAPARIN PER 10 MG: Performed by: SURGERY

## 2018-11-19 PROCEDURE — 25010000002 KETOROLAC TROMETHAMINE PER 15 MG: Performed by: SURGERY

## 2018-11-19 PROCEDURE — 99024 POSTOP FOLLOW-UP VISIT: CPT | Performed by: SURGERY

## 2018-11-19 PROCEDURE — G0378 HOSPITAL OBSERVATION PER HR: HCPCS

## 2018-11-19 RX ORDER — OXYCODONE HYDROCHLORIDE AND ACETAMINOPHEN 5; 325 MG/1; MG/1
1 TABLET ORAL EVERY 4 HOURS PRN
Qty: 30 TABLET | Refills: 0 | Status: SHIPPED | OUTPATIENT
Start: 2018-11-19 | End: 2018-11-29

## 2018-11-19 RX ADMIN — FAMOTIDINE 20 MG: 20 TABLET, FILM COATED ORAL at 08:04

## 2018-11-19 RX ADMIN — KETOROLAC TROMETHAMINE 30 MG: 30 INJECTION, SOLUTION INTRAMUSCULAR at 05:07

## 2018-11-19 RX ADMIN — ENOXAPARIN SODIUM 30 MG: 30 INJECTION SUBCUTANEOUS at 08:04

## 2018-11-19 RX ADMIN — OXYCODONE HYDROCHLORIDE AND ACETAMINOPHEN 2 TABLET: 5; 325 TABLET ORAL at 08:04

## 2018-11-19 NOTE — DISCHARGE SUMMARY
Admitting date: 11/15/2018    Discharging date: 11/19/2018    Admitting diagnosis: Recurrent incisional hernia    Discharging diagnoses: Same    Admitting/discharging physician: Dr. Dolan    Procedures: Lateral component separation and advancement flaps herniorrhaphy with mesh    Hospital course:  This is a pleasant 33-year-old female patient who is admitted on 11/15/2018 for a recurrent incisional hernia.  Patient underwent a open lateral component separation advancement flaps herniorrhaphy with mesh.  Postoperatively the patient had an expected postoperative ileus.  Patient's diet was advanced slowly.  Patient was tolerating a regular diet, ambulating in the chery, urinating without difficulties and pain was controlled on oral pain medicine on 11/19/2018.  Patient was discharged home in stable condition on her admitting medications and Percocet.  Patient was instructed not to lift greater than 10 pounds.  Patient was educated on SANDEEP drain care and recording.  Patient will follow-up in my office in one week.        Rachel Dolan MD  General, Minimally Invasive and Endoscopic Surgery  [facility]    [facility] [address]    [address]  [place], [place] [address] [place] [address]    P: [phone]  F: [phone]    Cc:  Zandra Rios MD

## 2018-11-19 NOTE — PLAN OF CARE
Problem: Patient Care Overview  Goal: Plan of Care Review  Outcome: Ongoing (interventions implemented as appropriate)   11/19/18 0437   Coping/Psychosocial   Plan of Care Reviewed With patient;parent   Plan of Care Review   Progress improving       Problem: Pain, Acute (Adult)  Goal: Acceptable Pain Control/Comfort Level  Outcome: Ongoing (interventions implemented as appropriate)   11/19/18 0437   Pain, Acute (Adult)   Acceptable Pain Control/Comfort Level making progress toward outcome       Problem: Surgery Nonspecified (Adult)  Goal: Signs and Symptoms of Listed Potential Problems Will be Absent, Minimized or Managed (Surgery Nonspecified)  Outcome: Ongoing (interventions implemented as appropriate)   11/19/18 0437   Goal/Outcome Evaluation   Problems Assessed (Surgery) all   Problems Present (Surgery) pain

## 2018-11-19 NOTE — NURSING NOTE
Case Management Discharge Note    Final Note: Discharged home    Destination      No service has been selected for the patient.      Durable Medical Equipment      No service has been selected for the patient.      Dialysis/Infusion      No service has been selected for the patient.      Home Medical Care      No service has been selected for the patient.      Community Resources      No service has been selected for the patient.             Final Discharge Disposition Code: 01 - home or self-care

## 2018-11-19 NOTE — PROGRESS NOTES
Chief Complaint: POD # 4    Subjective   Pt tolerating diet   Objective     Vital signs in last 24 hours:  Temp:  [97.2 °F (36.2 °C)-98.9 °F (37.2 °C)] 97.2 °F (36.2 °C)  Heart Rate:  [] 92  Resp:  [20] 20  BP: (123-134)/(65-76) 126/74    Intake/Output last 3 shifts:  I/O last 3 completed shifts:  In: 1562 [P.O.:1080; I.V.:482]  Out: 310 [Drains:310]    Intake/Output this shift:  No intake/output data recorded.    Physical Exam:  Respiratory: CTA, good inspiratory effort  CV: RRR  Abd: + BS, soft, ND, usual post-op tenderness, no rebound, no guarding, incision C/D/I, JPs serous 280cc, 30cc    Assessment/Plan   S/P  Lateral component separation and advancement flaps         herniorrhaphy with mesh  D/c home  F/u in my office in one week   Teach pt SANDEEP drain care and recording of output     Rachel Dolan MD  General, Minimally Invasive and Endoscopic Surgery  [facility]    [address] [facility] [address] [facility] [address]    [address]  [place] [address]               [place] [address]    P: [phone]  F: [phone]    Cc:  Zandra Rios MD

## 2018-11-19 NOTE — DISCHARGE INSTRUCTIONS
ABDOMINAL HERNIA REPAIR  POST OP RECOMMENDATIONS  Dr. Dolan  [phone]    ACTIVITIES:  1. Expect to rest the day of surgery, but get up several times daily to reduce the risk of getting a clot in your legs.  2. No strenuous activity or lifting over 10 lbs. for until 6 weeks out from surgery.  Try to avoid squatting and deep bends for about a week.  3. Do not drive while on pain medicine.  You must be off pain meds 24 hours before driving.  4. You can climb stairs, but minimize this and initially do one step at a time (both feet on one step rather than going up with each step.)  5. If an abdominal binder is recommended, wear only when not recumbent.     SYMPTOMS:  1. Skin blistering is not unusual at the incision due to the thinness of the skin from the chronic herniation.  If this is detected at the post-operative appointment it may simply be treated with a topical antibiotic.  2. Constipation is common when taking pain medication for surgery.  Over the counter laxatives, such as Miralax and Milk of Magnesium, can be used temporarily.   3. Fatigue and decreased stamina is not unusual for about a week or so after surgery due to anesthesia.  Try to take walks and some mild activity between resting.  4. If your procedure was performed laparoscopically as opposed to open, shoulder pain is not unusual from the “gas” used in laparoscopy which will dissipate within 1-3 days.  If it does not, call your physician.    WOUND SITE:  1. Dressings can be removed 2 days after procedure.The “tega-derm” may be removed in 2 days if instructed to.  2. Dressings may occasionally have spots of blood on them.  As long as it is dry, these do not need to be changed.  If it is soaked, then the dressing should be removed and a new dressing placed.  3. Skin irritation, redness or itching can prompt removal of the bandage earlier if present.  4. Redness or warmth that extends outside of the bandage or is spreading should prompt a call to  physician.  5. Steri-strips are to be left in place until they fall off on their own in 1-2 weeks.  If they are irritating then they may be removed sooner.  6. Showering may occur while the “tega-derm” is in place if you do not have a drain.  If it is off, then you must wait 2 days after surgery before showering.  7. If you have a drain, no showering until removed, only sponge bathe.  Empty the drain daily and record output.  Keep drain entry site covered.    MEALS:  1. Eat and Drink very lightly when returning home following surgery.  Jell-O, ginger ale, chicken noodle soup and crackers are good examples.  The day after surgery you may broaden your diet.  2. Do NOT take pain pills on an empty stomach.    WORK:  1. In general if you have a sedentary job, you can return to work in 7-10 days at the earliest.  If heavy lifting is required it may be 6 weeks or more.   Any changes to these numbers will be discussed post-operatively.  2. Use discretion and remember that your stamina will be decreased post operatively.  3. Return to work notes can be provided at the time of your post-operative appointment.    FOLLOW UP:  Call and make a post-operative appointment for approximately 2 weeks after the procedure.      Rachel Dolan MD  General, Minimally Invasive and Endoscopic Surgery  [facility]    [facility] [address] [place] [address] [address]    [address]  [place] [address]               [place] [address]    P: [phone]  F: [phone]    Cc:  Zandra Rios MD

## 2018-11-20 ENCOUNTER — READMISSION MANAGEMENT (OUTPATIENT)
Dept: CALL CENTER | Facility: HOSPITAL | Age: 33
End: 2018-11-20

## 2018-11-20 NOTE — OUTREACH NOTE
Prep Survey      Responses   Facility patient discharged from?  [facility]   Is LACE score < 7 ?  Yes   Is patient eligible?  Yes   Discharge diagnosis  Recurrent incisional hernia, s/p Lateral component separation and advancement flaps hemiorrhaphy with mesh   Does the patient have one of the following disease processes/diagnoses(primary or secondary)?  Other   Does the patient have Home health ordered?  No   Is there a DME ordered?  No   Comments regarding appointments  See AVS   Prep survey completed?  Yes          Ashley Schofield RN

## 2018-11-21 ENCOUNTER — OFFICE VISIT (OUTPATIENT)
Dept: SURGERY | Facility: CLINIC | Age: 33
End: 2018-11-21

## 2018-11-21 ENCOUNTER — READMISSION MANAGEMENT (OUTPATIENT)
Dept: CALL CENTER | Facility: HOSPITAL | Age: 33
End: 2018-11-21

## 2018-11-21 VITALS
BODY MASS INDEX: 50.02 KG/M2 | DIASTOLIC BLOOD PRESSURE: 80 MMHG | SYSTOLIC BLOOD PRESSURE: 120 MMHG | OXYGEN SATURATION: 99 % | WEIGHT: 293 LBS | HEART RATE: 98 BPM | HEIGHT: 64 IN

## 2018-11-21 DIAGNOSIS — Z09 FOLLOW UP: Primary | ICD-10-CM

## 2018-11-21 PROCEDURE — 99024 POSTOP FOLLOW-UP VISIT: CPT | Performed by: SURGERY

## 2018-11-21 NOTE — OUTREACH NOTE
LAG < 7 Survey      Responses   Facility patient discharged from?  [facility]   Does the patient have one of the following disease processes/diagnoses(primary or secondary)?  Other   Is there a successful TCM telephone encounter documented?  No   BHLAG <7 Attempt successful?  Yes   Call start time  0732   Call end time  0734   Discharge diagnosis  Recurrent incisional hernia, s/p Lateral component separation and advancement flaps hemiorrhaphy with mesh   Meds reviewed with patient/caregiver?  Yes   Is the patient having any side effects they believe may be caused by any medication additions or changes?  No   Does the patient have all medications ordered at discharge?  Yes   Is the patient taking all medications as directed (includes completed medication regime)?  Yes   Does the patient have a primary care provider?   Yes   Does the patient have an appointment with their PCP within 7 days of discharge?  Yes   Has the patient kept scheduled appointments due by today?  Yes   Comments  Seeing her surgeon today at 11:45.   Has home health visited the patient within 72 hours of discharge?  N/A   Psychosocial issues?  No   Did the patient receive a copy of their discharge instructions?  Yes   Nursing interventions  Reviewed instructions with patient   What is the patient's perception of their health status since discharge?  Improving   Is the patient/caregiver able to teach back signs and symptoms related to disease process for when to call PCP?  Yes   Is the patient/caregiver able to teach back signs and symptoms related to disease process for when to call 911?  Yes   Is the patient/caregiver able to teach back the hierarchy of who to call/visit for symptoms/problems? PCP, Specialist, Home health nurse, Urgent Care, ED, 911  Yes   Graduated  Yes          Winsome Mcdowell RN

## 2018-11-21 NOTE — PROGRESS NOTES
"Chief complaint:  Post-op  Follow up    History of Present Illness    This is Valery Aguilar 33 y.o. status post Lateral component separation hernia repair with mesh. Patient concerned that the left drain was not functioning. Patient denies fever, chills, nausea or vomiting.  Patient's pain is well-controlled.      The following portions of the patient's history were reviewed and updated as appropriate: allergies, current medications, past family history, past medical history, past social history, past surgical history and problem list.    Vitals:    11/21/18 1135   BP: 120/80   Pulse: 98   SpO2: 99%   Weight: 134 kg (294 lb 12.8 oz)   Height: 162.6 cm (64\")       Physical Exam  Gen.: Patient is alert and oriented ×3, no acute distress  HEENT: Normal cephalic, atraumatic, moist mucous membranes, anicteric  Incision is well-healed without evidence of infection. SANDEEP drains serous and functioning    Assessment/plan:  status post Lateral component separation hernia repair with mesh  Drains were stripped and are functioning properly   I have instructed the patient not lift greater than 10 pounds for total of 6 weeks from the time of surgery.   I have instructed the patient follow-up next week.    Rachel Dolan MD  General, Minimally Invasive and Endoscopic Surgery  [facility]    [address] [facility] [address] [facility] [address]    [address]  [place], [place] [address] [place] [address]    P: [phone]  F: [phone]    Cc:  Zandra Rios MD  "

## 2018-11-27 ENCOUNTER — OFFICE VISIT (OUTPATIENT)
Dept: SLEEP MEDICINE | Facility: HOSPITAL | Age: 33
End: 2018-11-27
Attending: INTERNAL MEDICINE

## 2018-11-27 VITALS
HEART RATE: 106 BPM | DIASTOLIC BLOOD PRESSURE: 90 MMHG | WEIGHT: 290 LBS | BODY MASS INDEX: 49.51 KG/M2 | HEIGHT: 64 IN | SYSTOLIC BLOOD PRESSURE: 137 MMHG

## 2018-11-27 DIAGNOSIS — G43.019 INTRACTABLE MIGRAINE WITHOUT AURA AND WITHOUT STATUS MIGRAINOSUS: ICD-10-CM

## 2018-11-27 DIAGNOSIS — K21.9 GERD WITH APNEA: ICD-10-CM

## 2018-11-27 DIAGNOSIS — G47.33 OBSTRUCTIVE SLEEP APNEA, ADULT: Primary | ICD-10-CM

## 2018-11-27 DIAGNOSIS — R06.81 GERD WITH APNEA: ICD-10-CM

## 2018-11-27 PROCEDURE — G0463 HOSPITAL OUTPT CLINIC VISIT: HCPCS

## 2018-11-27 NOTE — PROGRESS NOTES
PULMONARY SLEEP CONSULT NOTE      PATIENT IDENTIFICATION:  Name: Valery Aguilar  Age: 33 y.o.  Sex: female  :  1985  MRN: [id number]    DATE OF CONSULTATION:  2018   Referring Physician: No admitting provider for patient encounter.                  CHIEF COMPLAINT: Obstructive Sleep Apnea    History of Present Illness:   Valery Aguilar is a 33 y.o. female  Pt with still multiple wakening up at night with sleepiness fatigue and snoring, witnessed apnea, Hard  to get up in the morning. Daytime fatigue sleepiness loss of energy, Gaines score of ( 13 )    pt had sleep study showed SHANE and after that tonsilectomy but she did not f/u    Review of Systems:   Constitutional: As above    Eyes: negative   ENT/oropharynx: negative   Cardiovascular: negative   Respiratory: negative   Gastrointestinal: negative   Genitourinary: negative   Neurological: negative   Musculoskeletal: negative   Integument/breast: negative   Endocrine: negative   Allergic/Immunologic: negative     Past Medical History:  Past Medical History:   Diagnosis Date   • Abdominal pain    • Anxiety    • Arthritis     KNEE   • Depression    • GERD (gastroesophageal reflux disease)    • Headache, tension-type    • Hypertension     PRE-ECLAMPSIA WITH BOTH CHILDREN   • Incisional hernia     SCHEDULED FOR REPAIR   • Insulin resistance     D/T PCOS   • Migraine    • Panic attack    • PCOS (polycystic ovarian syndrome)    • Pneumonia 2018   • PONV (postoperative nausea and vomiting)    • Seasonal allergies    • Spinal headache        Past Surgical History:  Past Surgical History:   Procedure Laterality Date   • ADENOIDECTOMY     •  SECTION      X2   • CHOLECYSTECTOMY      LAP   • HERNIA REPAIR     • HX OVARIAN CYSTECTOMY     • KNEE ACL RECONSTRUCTION     • TONSILLECTOMY          Family History:  Family History   Problem Relation Age of Onset   • Stroke Mother    • Heart disease Mother    • Hypertension Mother    • Heart disease  Father    • Hypertension Father    • Diabetes Father    • Dementia Maternal Grandmother    • Stroke Maternal Grandmother    • Diabetes Maternal Grandmother    • Stroke Paternal Grandmother    • Hypertension Paternal Grandmother    • Cancer Paternal Grandmother    • Heart disease Paternal Grandfather    • Diabetes Paternal Grandfather         Social History:   Social History     Tobacco Use   • Smoking status: Former Smoker     Packs/day: 0.50     Years: 1.00     Pack years: 0.50     Last attempt to quit: 2004     Years since quittin.9   • Smokeless tobacco: Never Used   Substance Use Topics   • Alcohol use: Yes     Comment: occasional        Allergies:  No Known Allergies    Home Meds:    (Not in a hospital admission)    Objective:    Vitals Ranges:   Heart Rate:  [106] 106  BP: (137)/(90) 137/90  Body mass index is 49.78 kg/m².     Exam:  General Appearance:  WDWN    HEENT:   without obvious abnormality,  Conjunctiva/corneas clear,  Normal external ear canals, no drainage    Clear orsalmucosa,  Mallampati score 3    Neck:  Supple, symmetrical, trachea midline. No JVD.  Lungs:   Bilateral basal rhonchi bilaterally, respirations unlabored symmetrical wall movement.    Chest wall:  No tenderness or deformity.    Heart:  Regular rate and rhythm, S1 and S2 normal.  Extremities: Trace edema no clubbing or Cyanosis        Data Review:  All labs (24hrs): No results found for this or any previous visit (from the past 24 hour(s)).     Imaging:  [unfilled]    ASSESSMENT:  Diagnoses and all orders for this visit:    Obstructive sleep apnea, adult    Intractable migraine without aura and without status migrainosus    GERD with apnea        PLAN:   This is patient with symptoms of obstructive sleep apnea, NPSG study ASAP / split night study, Avoid supine avoid sedative meds in pm, weight loss, Avoid driving. Long discussion with patient about the physiology of SHANE, and long term and short term   benefit of treating SHANE     proved. Encourage to use it more frequent, I re-emphasized on pt the long and short term benefit of treating SHANE.     Treating SHANE will improve BP control    It is recommended to keep thyroid function optimized to improve quality of sleep    Tod Gore MD. D, ABSM.  11/27/2018  1:20 PM

## 2018-11-28 ENCOUNTER — OFFICE VISIT (OUTPATIENT)
Dept: SURGERY | Facility: CLINIC | Age: 33
End: 2018-11-28

## 2018-11-28 VITALS
SYSTOLIC BLOOD PRESSURE: 140 MMHG | HEART RATE: 111 BPM | HEIGHT: 64 IN | BODY MASS INDEX: 49.37 KG/M2 | DIASTOLIC BLOOD PRESSURE: 90 MMHG | OXYGEN SATURATION: 98 % | WEIGHT: 289.2 LBS

## 2018-11-28 DIAGNOSIS — Z09 FOLLOW UP: Primary | ICD-10-CM

## 2018-11-28 PROCEDURE — 99024 POSTOP FOLLOW-UP VISIT: CPT | Performed by: SURGERY

## 2018-11-28 RX ORDER — SULFAMETHOXAZOLE AND TRIMETHOPRIM 800; 160 MG/1; MG/1
1 TABLET ORAL 2 TIMES DAILY
Qty: 28 TABLET | Refills: 0 | Status: SHIPPED | OUTPATIENT
Start: 2018-11-28 | End: 2018-12-14 | Stop reason: HOSPADM

## 2018-11-28 RX ORDER — FLUCONAZOLE 100 MG/1
100 TABLET ORAL DAILY
Qty: 3 TABLET | Refills: 0 | Status: SHIPPED | OUTPATIENT
Start: 2018-11-28 | End: 2018-12-14 | Stop reason: HOSPADM

## 2018-11-28 RX ORDER — OXYCODONE HYDROCHLORIDE AND ACETAMINOPHEN 5; 325 MG/1; MG/1
1 TABLET ORAL EVERY 4 HOURS PRN
Qty: 30 TABLET | Refills: 0 | Status: ON HOLD | OUTPATIENT
Start: 2018-11-28 | End: 2018-12-14

## 2018-11-28 NOTE — PROGRESS NOTES
"Chief complaint:  Post-op  Follow up    History of Present Illness    This is Valery Aguilar 33 y.o. status post 2 weeks from a lateral component separation incisional hernia repair with mesh.  Patient reports she is still having pain.  Patient reports she's having some foul older wrist drainage around the SANDEEP drain on the left side.  Patient denies fever, chills, nausea or vomiting.      The following portions of the patient's history were reviewed and updated as appropriate: allergies, current medications, past family history, past medical history, past social history, past surgical history and problem list.    Vitals:    11/28/18 0851   BP: 140/90   Pulse: 111   SpO2: 98%   Weight: 131 kg (289 lb 3.2 oz)   Height: 162.6 cm (64\")       Physical Exam  Gen.: Patient is alert and oriented ×3, no acute distress  HEENT: Normal cephalic, atraumatic, moist mucous membranes, anicteric  The upper part of the incision is well-healed with no evidence of infection and the lower part of the incision has ecchymosis and some blistering as well as partial separation between the staples.  A sterile Q-tip was placed in the opening and no drainage was returned.  The left SANDEEP drain appeared to be draining a dark colored old blood.  Patient reported she had had minimal drainage from the left drain.  I removed the left drain and the opening had an appearance of some necrotic tissue which was debrided manually.  The upper part of the incision staples were removed with a staple remover.  The right drain appeared to have a serosanguineous appearance.    Assessment/plan:  Status post 2 weeks from a lateral component separation incisional hernia repair with mesh  Left drain was removed, upper staples were removed  Secondary to the foul older around the left drain I have placed the patient on Bactrim  Will see her back in one week for a wound check  Patient has been instructed to call sooner if any other issues arise.    Rachel CRUZ" MD [place]  General, Minimally Invasive and Endoscopic Surgery  [facility]    [address]    [address]  [address] [address]    P: [phone]  F: [phone]    Cc:  Zandra Rios MD

## 2018-12-01 ENCOUNTER — APPOINTMENT (OUTPATIENT)
Dept: CT IMAGING | Facility: HOSPITAL | Age: 33
End: 2018-12-01

## 2018-12-01 ENCOUNTER — HOSPITAL ENCOUNTER (INPATIENT)
Facility: HOSPITAL | Age: 33
LOS: 12 days | Discharge: HOME HEALTH | End: 2018-12-14
Attending: EMERGENCY MEDICINE | Admitting: SURGERY

## 2018-12-01 DIAGNOSIS — T81.49XA WOUND INFECTION AFTER SURGERY: ICD-10-CM

## 2018-12-01 DIAGNOSIS — T14.8XXA WOUND INFECTION: Primary | ICD-10-CM

## 2018-12-01 DIAGNOSIS — L08.9 WOUND INFECTION: Primary | ICD-10-CM

## 2018-12-01 DIAGNOSIS — R50.9 FEVER, UNSPECIFIED FEVER CAUSE: ICD-10-CM

## 2018-12-01 LAB
ALBUMIN SERPL-MCNC: 3.5 G/DL (ref 3.5–5.2)
ALBUMIN/GLOB SERPL: 0.8 G/DL
ALP SERPL-CCNC: 70 U/L (ref 40–129)
ALT SERPL W P-5'-P-CCNC: 50 U/L (ref 5–33)
ANION GAP SERPL CALCULATED.3IONS-SCNC: 15.2 MMOL/L
AST SERPL-CCNC: 35 U/L (ref 5–32)
B-HCG UR QL: NEGATIVE
BACTERIA UR QL AUTO: ABNORMAL /HPF
BASOPHILS # BLD AUTO: 0.05 10*3/MM3 (ref 0–0.2)
BASOPHILS NFR BLD AUTO: 0.5 % (ref 0–2)
BILIRUB SERPL-MCNC: 0.5 MG/DL (ref 0.2–1.2)
BILIRUB UR QL STRIP: ABNORMAL
BUN BLD-MCNC: 11 MG/DL (ref 6–20)
BUN/CREAT SERPL: 14.5 (ref 7–25)
CALCIUM SPEC-SCNC: 9.1 MG/DL (ref 8.6–10.5)
CHLORIDE SERPL-SCNC: 100 MMOL/L (ref 98–107)
CLARITY UR: ABNORMAL
CO2 SERPL-SCNC: 18.8 MMOL/L (ref 22–29)
COLOR UR: YELLOW
CREAT BLD-MCNC: 0.76 MG/DL (ref 0.57–1)
D-LACTATE SERPL-SCNC: 1.7 MMOL/L (ref 0.5–2)
DEPRECATED RDW RBC AUTO: 39.8 FL (ref 37–54)
EOSINOPHIL # BLD AUTO: 0.12 10*3/MM3 (ref 0.1–0.3)
EOSINOPHIL NFR BLD AUTO: 1.2 % (ref 0–4)
ERYTHROCYTE [DISTWIDTH] IN BLOOD BY AUTOMATED COUNT: 14 % (ref 11.5–14.5)
GFR SERPL CREATININE-BSD FRML MDRD: 87 ML/MIN/1.73
GLOBULIN UR ELPH-MCNC: 4.3 GM/DL
GLUCOSE BLD-MCNC: 138 MG/DL (ref 65–99)
GLUCOSE UR STRIP-MCNC: NEGATIVE MG/DL
HCT VFR BLD AUTO: 36.8 % (ref 37–47)
HGB BLD-MCNC: 11.9 G/DL (ref 12–16)
HGB UR QL STRIP.AUTO: ABNORMAL
HYALINE CASTS UR QL AUTO: ABNORMAL /LPF
IMM GRANULOCYTES # BLD: 0.09 10*3/MM3 (ref 0–0.03)
IMM GRANULOCYTES NFR BLD: 0.9 % (ref 0–0.5)
KETONES UR QL STRIP: NEGATIVE
LEUKOCYTE ESTERASE UR QL STRIP.AUTO: NEGATIVE
LYMPHOCYTES # BLD AUTO: 2.29 10*3/MM3 (ref 0.6–4.8)
LYMPHOCYTES NFR BLD AUTO: 23.7 % (ref 20–45)
MCH RBC QN AUTO: 25.3 PG (ref 27–31)
MCHC RBC AUTO-ENTMCNC: 32.3 G/DL (ref 31–37)
MCV RBC AUTO: 78.1 FL (ref 81–99)
MONOCYTES # BLD AUTO: 0.86 10*3/MM3 (ref 0–1)
MONOCYTES NFR BLD AUTO: 8.9 % (ref 3–8)
NEUTROPHILS # BLD AUTO: 6.27 10*3/MM3 (ref 1.5–8.3)
NEUTROPHILS NFR BLD AUTO: 64.8 % (ref 45–70)
NITRITE UR QL STRIP: NEGATIVE
NRBC BLD MANUAL-RTO: 0 /100 WBC (ref 0–0)
PH UR STRIP.AUTO: 6.5 [PH] (ref 4.5–8)
PLATELET # BLD AUTO: 431 10*3/MM3 (ref 140–500)
PMV BLD AUTO: 10.3 FL (ref 7.4–10.4)
POTASSIUM BLD-SCNC: 3.9 MMOL/L (ref 3.5–5.2)
PROT SERPL-MCNC: 7.8 G/DL (ref 6–8.5)
PROT UR QL STRIP: NEGATIVE
RBC # BLD AUTO: 4.71 10*6/MM3 (ref 4.2–5.4)
RBC # UR: ABNORMAL /HPF
REF LAB TEST METHOD: ABNORMAL
SODIUM BLD-SCNC: 134 MMOL/L (ref 136–145)
SP GR UR STRIP: 1.03 (ref 1–1.03)
SQUAMOUS #/AREA URNS HPF: ABNORMAL /HPF
UROBILINOGEN UR QL STRIP: ABNORMAL
WBC NRBC COR # BLD: 9.68 10*3/MM3 (ref 4.8–10.8)
WBC UR QL AUTO: ABNORMAL /HPF

## 2018-12-01 PROCEDURE — 99285 EMERGENCY DEPT VISIT HI MDM: CPT

## 2018-12-01 PROCEDURE — 81025 URINE PREGNANCY TEST: CPT | Performed by: EMERGENCY MEDICINE

## 2018-12-01 PROCEDURE — 25010000002 MORPHINE PER 10 MG: Performed by: EMERGENCY MEDICINE

## 2018-12-01 PROCEDURE — 25010000002 ONDANSETRON PER 1 MG: Performed by: EMERGENCY MEDICINE

## 2018-12-01 PROCEDURE — 74177 CT ABD & PELVIS W/CONTRAST: CPT

## 2018-12-01 PROCEDURE — 83605 ASSAY OF LACTIC ACID: CPT | Performed by: EMERGENCY MEDICINE

## 2018-12-01 PROCEDURE — 85025 COMPLETE CBC W/AUTO DIFF WBC: CPT | Performed by: EMERGENCY MEDICINE

## 2018-12-01 PROCEDURE — 87040 BLOOD CULTURE FOR BACTERIA: CPT | Performed by: EMERGENCY MEDICINE

## 2018-12-01 PROCEDURE — 80053 COMPREHEN METABOLIC PANEL: CPT | Performed by: EMERGENCY MEDICINE

## 2018-12-01 PROCEDURE — 81001 URINALYSIS AUTO W/SCOPE: CPT | Performed by: EMERGENCY MEDICINE

## 2018-12-01 RX ORDER — ONDANSETRON 2 MG/ML
4 INJECTION INTRAMUSCULAR; INTRAVENOUS ONCE
Status: COMPLETED | OUTPATIENT
Start: 2018-12-01 | End: 2018-12-01

## 2018-12-01 RX ORDER — SODIUM CHLORIDE, SODIUM LACTATE, POTASSIUM CHLORIDE, CALCIUM CHLORIDE 600; 310; 30; 20 MG/100ML; MG/100ML; MG/100ML; MG/100ML
150 INJECTION, SOLUTION INTRAVENOUS CONTINUOUS
Status: DISCONTINUED | OUTPATIENT
Start: 2018-12-01 | End: 2018-12-02

## 2018-12-01 RX ORDER — SODIUM CHLORIDE 0.9 % (FLUSH) 0.9 %
10 SYRINGE (ML) INJECTION AS NEEDED
Status: DISCONTINUED | OUTPATIENT
Start: 2018-12-01 | End: 2018-12-14 | Stop reason: HOSPADM

## 2018-12-01 RX ADMIN — MORPHINE SULFATE 4 MG: 4 INJECTION INTRAVENOUS at 23:23

## 2018-12-01 RX ADMIN — ONDANSETRON 4 MG: 2 INJECTION, SOLUTION INTRAMUSCULAR; INTRAVENOUS at 23:23

## 2018-12-01 RX ADMIN — SODIUM CHLORIDE, POTASSIUM CHLORIDE, SODIUM LACTATE AND CALCIUM CHLORIDE 125 ML/HR: 600; 310; 30; 20 INJECTION, SOLUTION INTRAVENOUS at 23:23

## 2018-12-02 ENCOUNTER — ANESTHESIA (OUTPATIENT)
Dept: PERIOP | Facility: HOSPITAL | Age: 33
End: 2018-12-02

## 2018-12-02 ENCOUNTER — ANESTHESIA EVENT (OUTPATIENT)
Dept: PERIOP | Facility: HOSPITAL | Age: 33
End: 2018-12-02

## 2018-12-02 PROBLEM — T14.8XXA WOUND INFECTION: Status: ACTIVE | Noted: 2018-12-02

## 2018-12-02 PROBLEM — L08.9 WOUND INFECTION: Status: ACTIVE | Noted: 2018-12-02

## 2018-12-02 LAB
ANION GAP SERPL CALCULATED.3IONS-SCNC: 13.8 MMOL/L
BUN BLD-MCNC: 11 MG/DL (ref 6–20)
BUN/CREAT SERPL: 14.7 (ref 7–25)
CALCIUM SPEC-SCNC: 8.8 MG/DL (ref 8.6–10.5)
CHLORIDE SERPL-SCNC: 102 MMOL/L (ref 98–107)
CO2 SERPL-SCNC: 21.2 MMOL/L (ref 22–29)
CREAT BLD-MCNC: 0.75 MG/DL (ref 0.57–1)
DEPRECATED RDW RBC AUTO: 42.1 FL (ref 37–54)
ERYTHROCYTE [DISTWIDTH] IN BLOOD BY AUTOMATED COUNT: 14.3 % (ref 11.5–14.5)
GFR SERPL CREATININE-BSD FRML MDRD: 88 ML/MIN/1.73
GLUCOSE BLD-MCNC: 102 MG/DL (ref 65–99)
HCT VFR BLD AUTO: 33.4 % (ref 37–47)
HGB BLD-MCNC: 10.3 G/DL (ref 12–16)
MCH RBC QN AUTO: 24.9 PG (ref 27–31)
MCHC RBC AUTO-ENTMCNC: 30.8 G/DL (ref 31–37)
MCV RBC AUTO: 80.7 FL (ref 81–99)
PLATELET # BLD AUTO: 344 10*3/MM3 (ref 140–500)
PMV BLD AUTO: 10.6 FL (ref 7.4–10.4)
POTASSIUM BLD-SCNC: 4.1 MMOL/L (ref 3.5–5.2)
RBC # BLD AUTO: 4.14 10*6/MM3 (ref 4.2–5.4)
SODIUM BLD-SCNC: 137 MMOL/L (ref 136–145)
WBC NRBC COR # BLD: 7.93 10*3/MM3 (ref 4.8–10.8)

## 2018-12-02 PROCEDURE — 87186 SC STD MICRODIL/AGAR DIL: CPT | Performed by: EMERGENCY MEDICINE

## 2018-12-02 PROCEDURE — 25010000002 PIPERACILLIN SOD-TAZOBACTAM PER 1 G: Performed by: SURGERY

## 2018-12-02 PROCEDURE — 0WPF0YZ REMOVAL OF OTHER DEVICE FROM ABDOMINAL WALL, OPEN APPROACH: ICD-10-PCS | Performed by: SURGERY

## 2018-12-02 PROCEDURE — 25010000002 MIDAZOLAM PER 1 MG: Performed by: ANESTHESIOLOGY

## 2018-12-02 PROCEDURE — 25010000002 FENTANYL CITRATE (PF) 100 MCG/2ML SOLUTION: Performed by: ANESTHESIOLOGY

## 2018-12-02 PROCEDURE — 25010000002 ONDANSETRON PER 1 MG: Performed by: SURGERY

## 2018-12-02 PROCEDURE — 87185 SC STD ENZYME DETCJ PER NZM: CPT | Performed by: SURGERY

## 2018-12-02 PROCEDURE — 0JB80ZZ EXCISION OF ABDOMEN SUBCUTANEOUS TISSUE AND FASCIA, OPEN APPROACH: ICD-10-PCS | Performed by: SURGERY

## 2018-12-02 PROCEDURE — 87070 CULTURE OTHR SPECIMN AEROBIC: CPT | Performed by: SURGERY

## 2018-12-02 PROCEDURE — 87077 CULTURE AEROBIC IDENTIFY: CPT | Performed by: EMERGENCY MEDICINE

## 2018-12-02 PROCEDURE — 25010000002 IOPAMIDOL 61 % SOLUTION: Performed by: EMERGENCY MEDICINE

## 2018-12-02 PROCEDURE — 11005 DBRDMT SKIN ABDOMINAL WALL: CPT | Performed by: SURGERY

## 2018-12-02 PROCEDURE — 87076 CULTURE ANAEROBE IDENT EACH: CPT | Performed by: EMERGENCY MEDICINE

## 2018-12-02 PROCEDURE — 11008 RMV PRSTC MTRL/MESH ABD WALL: CPT | Performed by: SURGERY

## 2018-12-02 PROCEDURE — 87075 CULTR BACTERIA EXCEPT BLOOD: CPT | Performed by: SURGERY

## 2018-12-02 PROCEDURE — 25010000002 HYDROMORPHONE PER 4 MG: Performed by: ANESTHESIOLOGY

## 2018-12-02 PROCEDURE — 25010000002 MORPHINE PER 10 MG

## 2018-12-02 PROCEDURE — 94799 UNLISTED PULMONARY SVC/PX: CPT

## 2018-12-02 PROCEDURE — 10061 I&D ABSCESS COMP/MULTIPLE: CPT | Performed by: SURGERY

## 2018-12-02 PROCEDURE — 99284 EMERGENCY DEPT VISIT MOD MDM: CPT | Performed by: EMERGENCY MEDICINE

## 2018-12-02 PROCEDURE — 87070 CULTURE OTHR SPECIMN AEROBIC: CPT | Performed by: EMERGENCY MEDICINE

## 2018-12-02 PROCEDURE — 87186 SC STD MICRODIL/AGAR DIL: CPT | Performed by: SURGERY

## 2018-12-02 PROCEDURE — 87077 CULTURE AEROBIC IDENTIFY: CPT | Performed by: SURGERY

## 2018-12-02 PROCEDURE — 25010000002 PROMETHAZINE PER 50 MG: Performed by: ANESTHESIOLOGY

## 2018-12-02 PROCEDURE — 80048 BASIC METABOLIC PNL TOTAL CA: CPT | Performed by: SURGERY

## 2018-12-02 PROCEDURE — 25010000002 PIPERACILLIN-TAZOBACTAM: Performed by: EMERGENCY MEDICINE

## 2018-12-02 PROCEDURE — 25010000002 PROPOFOL 10 MG/ML EMULSION: Performed by: ANESTHESIOLOGY

## 2018-12-02 PROCEDURE — 0W9F0ZZ DRAINAGE OF ABDOMINAL WALL, OPEN APPROACH: ICD-10-PCS | Performed by: SURGERY

## 2018-12-02 PROCEDURE — 87205 SMEAR GRAM STAIN: CPT | Performed by: EMERGENCY MEDICINE

## 2018-12-02 PROCEDURE — 85027 COMPLETE CBC AUTOMATED: CPT | Performed by: SURGERY

## 2018-12-02 PROCEDURE — 87076 CULTURE ANAEROBE IDENT EACH: CPT | Performed by: SURGERY

## 2018-12-02 PROCEDURE — 25010000002 NEOSTIGMINE PER 0.5 MG: Performed by: ANESTHESIOLOGY

## 2018-12-02 PROCEDURE — 87205 SMEAR GRAM STAIN: CPT | Performed by: SURGERY

## 2018-12-02 PROCEDURE — 25010000002 SUCCINYLCHOLINE PER 20 MG: Performed by: ANESTHESIOLOGY

## 2018-12-02 PROCEDURE — 99223 1ST HOSP IP/OBS HIGH 75: CPT | Performed by: SURGERY

## 2018-12-02 RX ORDER — MORPHINE SULFATE 2 MG/ML
INJECTION, SOLUTION INTRAMUSCULAR; INTRAVENOUS
Status: COMPLETED
Start: 2018-12-02 | End: 2018-12-02

## 2018-12-02 RX ORDER — ACETAMINOPHEN 500 MG
1000 TABLET ORAL ONCE
Status: COMPLETED | OUTPATIENT
Start: 2018-12-02 | End: 2018-12-02

## 2018-12-02 RX ORDER — OXYCODONE HYDROCHLORIDE AND ACETAMINOPHEN 5; 325 MG/1; MG/1
1 TABLET ORAL EVERY 4 HOURS PRN
Status: DISCONTINUED | OUTPATIENT
Start: 2018-12-02 | End: 2018-12-06 | Stop reason: SDUPTHER

## 2018-12-02 RX ORDER — ONDANSETRON 2 MG/ML
4 INJECTION INTRAMUSCULAR; INTRAVENOUS EVERY 4 HOURS PRN
Status: DISCONTINUED | OUTPATIENT
Start: 2018-12-02 | End: 2018-12-14 | Stop reason: HOSPADM

## 2018-12-02 RX ORDER — FENTANYL CITRATE 50 UG/ML
INJECTION, SOLUTION INTRAMUSCULAR; INTRAVENOUS AS NEEDED
Status: DISCONTINUED | OUTPATIENT
Start: 2018-12-02 | End: 2018-12-02 | Stop reason: SURG

## 2018-12-02 RX ORDER — SODIUM HYPOCHLORITE 1.25 MG/ML
SOLUTION TOPICAL AS NEEDED
Status: DISCONTINUED | OUTPATIENT
Start: 2018-12-02 | End: 2018-12-02 | Stop reason: HOSPADM

## 2018-12-02 RX ORDER — SODIUM CHLORIDE, SODIUM LACTATE, POTASSIUM CHLORIDE, CALCIUM CHLORIDE 600; 310; 30; 20 MG/100ML; MG/100ML; MG/100ML; MG/100ML
9 INJECTION, SOLUTION INTRAVENOUS CONTINUOUS PRN
Status: DISCONTINUED | OUTPATIENT
Start: 2018-12-02 | End: 2018-12-02

## 2018-12-02 RX ORDER — CLINDAMYCIN PHOSPHATE 900 MG/50ML
900 INJECTION INTRAVENOUS EVERY 8 HOURS
Status: DISCONTINUED | OUTPATIENT
Start: 2018-12-02 | End: 2018-12-08

## 2018-12-02 RX ORDER — SODIUM CHLORIDE 0.9 % (FLUSH) 0.9 %
1-10 SYRINGE (ML) INJECTION AS NEEDED
Status: DISCONTINUED | OUTPATIENT
Start: 2018-12-02 | End: 2018-12-14 | Stop reason: HOSPADM

## 2018-12-02 RX ORDER — PROMETHAZINE HYDROCHLORIDE 25 MG/ML
INJECTION, SOLUTION INTRAMUSCULAR; INTRAVENOUS AS NEEDED
Status: DISCONTINUED | OUTPATIENT
Start: 2018-12-02 | End: 2018-12-02 | Stop reason: SURG

## 2018-12-02 RX ORDER — SUCCINYLCHOLINE CHLORIDE 20 MG/ML
INJECTION INTRAMUSCULAR; INTRAVENOUS AS NEEDED
Status: DISCONTINUED | OUTPATIENT
Start: 2018-12-02 | End: 2018-12-02 | Stop reason: SURG

## 2018-12-02 RX ORDER — SODIUM CHLORIDE, SODIUM LACTATE, POTASSIUM CHLORIDE, CALCIUM CHLORIDE 600; 310; 30; 20 MG/100ML; MG/100ML; MG/100ML; MG/100ML
100 INJECTION, SOLUTION INTRAVENOUS CONTINUOUS
Status: DISCONTINUED | OUTPATIENT
Start: 2018-12-02 | End: 2018-12-02

## 2018-12-02 RX ORDER — SODIUM CHLORIDE 0.9 % (FLUSH) 0.9 %
10 SYRINGE (ML) INJECTION EVERY 12 HOURS SCHEDULED
Status: DISCONTINUED | OUTPATIENT
Start: 2018-12-02 | End: 2018-12-05

## 2018-12-02 RX ORDER — MORPHINE SULFATE 2 MG/ML
2 INJECTION, SOLUTION INTRAMUSCULAR; INTRAVENOUS
Status: DISCONTINUED | OUTPATIENT
Start: 2018-12-02 | End: 2018-12-12

## 2018-12-02 RX ORDER — MIDAZOLAM HYDROCHLORIDE 1 MG/ML
1 INJECTION INTRAMUSCULAR; INTRAVENOUS
Status: DISCONTINUED | OUTPATIENT
Start: 2018-12-02 | End: 2018-12-02

## 2018-12-02 RX ORDER — SODIUM CHLORIDE 0.9 % (FLUSH) 0.9 %
20 SYRINGE (ML) INJECTION AS NEEDED
Status: DISCONTINUED | OUTPATIENT
Start: 2018-12-02 | End: 2018-12-14 | Stop reason: HOSPADM

## 2018-12-02 RX ORDER — MORPHINE SULFATE 10 MG/ML
4 INJECTION INTRAMUSCULAR; INTRAVENOUS; SUBCUTANEOUS
Status: DISCONTINUED | OUTPATIENT
Start: 2018-12-02 | End: 2018-12-12

## 2018-12-02 RX ORDER — DOCUSATE SODIUM 100 MG/1
100 CAPSULE, LIQUID FILLED ORAL 2 TIMES DAILY
COMMUNITY
End: 2019-01-17

## 2018-12-02 RX ORDER — SODIUM CHLORIDE 0.9 % (FLUSH) 0.9 %
10 SYRINGE (ML) INJECTION AS NEEDED
Status: DISCONTINUED | OUTPATIENT
Start: 2018-12-02 | End: 2018-12-14 | Stop reason: HOSPADM

## 2018-12-02 RX ORDER — SODIUM HYPOCHLORITE 1.25 MG/ML
SOLUTION TOPICAL ONCE
Status: DISCONTINUED | OUTPATIENT
Start: 2018-12-02 | End: 2018-12-14 | Stop reason: HOSPADM

## 2018-12-02 RX ORDER — HYDROMORPHONE HCL 110MG/55ML
PATIENT CONTROLLED ANALGESIA SYRINGE INTRAVENOUS AS NEEDED
Status: DISCONTINUED | OUTPATIENT
Start: 2018-12-02 | End: 2018-12-02 | Stop reason: SURG

## 2018-12-02 RX ORDER — MAGNESIUM HYDROXIDE 1200 MG/15ML
LIQUID ORAL AS NEEDED
Status: DISCONTINUED | OUTPATIENT
Start: 2018-12-02 | End: 2018-12-02 | Stop reason: HOSPADM

## 2018-12-02 RX ORDER — SODIUM CHLORIDE 0.9 % (FLUSH) 0.9 %
3 SYRINGE (ML) INJECTION EVERY 12 HOURS SCHEDULED
Status: DISCONTINUED | OUTPATIENT
Start: 2018-12-02 | End: 2018-12-14 | Stop reason: HOSPADM

## 2018-12-02 RX ORDER — SCOLOPAMINE TRANSDERMAL SYSTEM 1 MG/1
1 PATCH, EXTENDED RELEASE TRANSDERMAL CONTINUOUS
Status: ACTIVE | OUTPATIENT
Start: 2018-12-02 | End: 2018-12-03

## 2018-12-02 RX ORDER — ROCURONIUM BROMIDE 10 MG/ML
INJECTION, SOLUTION INTRAVENOUS AS NEEDED
Status: DISCONTINUED | OUTPATIENT
Start: 2018-12-02 | End: 2018-12-02 | Stop reason: SURG

## 2018-12-02 RX ORDER — PROPOFOL 10 MG/ML
VIAL (ML) INTRAVENOUS AS NEEDED
Status: DISCONTINUED | OUTPATIENT
Start: 2018-12-02 | End: 2018-12-02 | Stop reason: SURG

## 2018-12-02 RX ORDER — MIDAZOLAM HYDROCHLORIDE 1 MG/ML
2 INJECTION INTRAMUSCULAR; INTRAVENOUS
Status: DISCONTINUED | OUTPATIENT
Start: 2018-12-02 | End: 2018-12-02

## 2018-12-02 RX ORDER — SODIUM CHLORIDE, SODIUM LACTATE, POTASSIUM CHLORIDE, CALCIUM CHLORIDE 600; 310; 30; 20 MG/100ML; MG/100ML; MG/100ML; MG/100ML
50 INJECTION, SOLUTION INTRAVENOUS CONTINUOUS
Status: DISCONTINUED | OUTPATIENT
Start: 2018-12-02 | End: 2018-12-05

## 2018-12-02 RX ORDER — LIDOCAINE HYDROCHLORIDE 20 MG/ML
INJECTION, SOLUTION INFILTRATION; PERINEURAL AS NEEDED
Status: DISCONTINUED | OUTPATIENT
Start: 2018-12-02 | End: 2018-12-02 | Stop reason: SURG

## 2018-12-02 RX ORDER — GLYCOPYRROLATE 0.2 MG/ML
INJECTION INTRAMUSCULAR; INTRAVENOUS AS NEEDED
Status: DISCONTINUED | OUTPATIENT
Start: 2018-12-02 | End: 2018-12-02 | Stop reason: SURG

## 2018-12-02 RX ADMIN — CLINDAMYCIN IN 5 PERCENT DEXTROSE 900 MG: 18 INJECTION, SOLUTION INTRAVENOUS at 18:00

## 2018-12-02 RX ADMIN — NEOSTIGMINE METHYLSULFATE 2 MG: 1 INJECTION, SOLUTION INTRAMUSCULAR; INTRAVENOUS; SUBCUTANEOUS at 11:39

## 2018-12-02 RX ADMIN — OXYCODONE HYDROCHLORIDE AND ACETAMINOPHEN 1 TABLET: 5; 325 TABLET ORAL at 18:06

## 2018-12-02 RX ADMIN — MIDAZOLAM HYDROCHLORIDE 2 MG: 1 INJECTION, SOLUTION INTRAMUSCULAR; INTRAVENOUS at 10:40

## 2018-12-02 RX ADMIN — CLINDAMYCIN IN 5 PERCENT DEXTROSE 900 MG: 18 INJECTION, SOLUTION INTRAVENOUS at 11:00

## 2018-12-02 RX ADMIN — SCOPALAMINE 1 PATCH: 1 PATCH, EXTENDED RELEASE TRANSDERMAL at 10:40

## 2018-12-02 RX ADMIN — PIPERACILLIN SODIUM,TAZOBACTAM SODIUM 4.5 G: 4; .5 INJECTION, POWDER, FOR SOLUTION INTRAVENOUS at 08:03

## 2018-12-02 RX ADMIN — GLYCOPYRROLATE 0.3 MG: 0.2 INJECTION INTRAMUSCULAR; INTRAVENOUS at 11:39

## 2018-12-02 RX ADMIN — SODIUM CHLORIDE, PRESERVATIVE FREE 20 ML: 5 INJECTION INTRAVENOUS at 11:05

## 2018-12-02 RX ADMIN — SODIUM CHLORIDE, POTASSIUM CHLORIDE, SODIUM LACTATE AND CALCIUM CHLORIDE 100 ML/HR: 600; 310; 30; 20 INJECTION, SOLUTION INTRAVENOUS at 19:51

## 2018-12-02 RX ADMIN — MORPHINE SULFATE 2 MG: 2 INJECTION, SOLUTION INTRAMUSCULAR; INTRAVENOUS at 08:56

## 2018-12-02 RX ADMIN — HYDROMORPHONE HYDROCHLORIDE 1 MG: 2 INJECTION, SOLUTION INTRAMUSCULAR; INTRAVENOUS; SUBCUTANEOUS at 11:54

## 2018-12-02 RX ADMIN — ROCURONIUM BROMIDE 10 MG: 10 INJECTION INTRAVENOUS at 11:09

## 2018-12-02 RX ADMIN — OXYCODONE HYDROCHLORIDE AND ACETAMINOPHEN 1 TABLET: 5; 325 TABLET ORAL at 22:02

## 2018-12-02 RX ADMIN — CLINDAMYCIN IN 5 PERCENT DEXTROSE 900 MG: 18 INJECTION, SOLUTION INTRAVENOUS at 04:13

## 2018-12-02 RX ADMIN — MORPHINE SULFATE 2 MG: 2 INJECTION, SOLUTION INTRAMUSCULAR; INTRAVENOUS at 14:59

## 2018-12-02 RX ADMIN — ONDANSETRON 4 MG: 2 INJECTION, SOLUTION INTRAMUSCULAR; INTRAVENOUS at 08:56

## 2018-12-02 RX ADMIN — SUCCINYLCHOLINE CHLORIDE 160 MG: 20 INJECTION, SOLUTION INTRAMUSCULAR; INTRAVENOUS at 10:55

## 2018-12-02 RX ADMIN — SODIUM CHLORIDE, POTASSIUM CHLORIDE, SODIUM LACTATE AND CALCIUM CHLORIDE 150 ML/HR: 600; 310; 30; 20 INJECTION, SOLUTION INTRAVENOUS at 07:58

## 2018-12-02 RX ADMIN — TAZOBACTAM SODIUM AND PIPERACILLIN SODIUM 4.5 G: .5; 4 INJECTION, POWDER, LYOPHILIZED, FOR SOLUTION INTRAVENOUS at 02:13

## 2018-12-02 RX ADMIN — PIPERACILLIN SODIUM,TAZOBACTAM SODIUM 4.5 G: 4; .5 INJECTION, POWDER, FOR SOLUTION INTRAVENOUS at 17:02

## 2018-12-02 RX ADMIN — ROCURONIUM BROMIDE 5 MG: 10 INJECTION INTRAVENOUS at 10:54

## 2018-12-02 RX ADMIN — LIDOCAINE HYDROCHLORIDE 100 MG: 20 INJECTION, SOLUTION INFILTRATION; PERINEURAL at 10:55

## 2018-12-02 RX ADMIN — PROMETHAZINE HYDROCHLORIDE 10 MG: 25 INJECTION INTRAMUSCULAR; INTRAVENOUS at 11:06

## 2018-12-02 RX ADMIN — SODIUM CHLORIDE, PRESERVATIVE FREE 3 ML: 5 INJECTION INTRAVENOUS at 08:18

## 2018-12-02 RX ADMIN — FENTANYL CITRATE 50 MCG: 50 INJECTION, SOLUTION INTRAMUSCULAR; INTRAVENOUS at 10:51

## 2018-12-02 RX ADMIN — IOPAMIDOL 100 ML: 612 INJECTION, SOLUTION INTRAVENOUS at 00:22

## 2018-12-02 RX ADMIN — FAMOTIDINE 20 MG: 10 INJECTION INTRAVENOUS at 10:39

## 2018-12-02 RX ADMIN — PROPOFOL 150 MG: 10 INJECTION, EMULSION INTRAVENOUS at 10:55

## 2018-12-02 RX ADMIN — ACETAMINOPHEN 1000 MG: 500 TABLET, FILM COATED ORAL at 00:13

## 2018-12-02 NOTE — ANESTHESIA PROCEDURE NOTES
ANESTHESIA INTUBATION  Urgency: elective    Date/Time: 12/2/2018 10:56 AM  Airway not difficult    General Information and Staff    Patient location during procedure: OR  Anesthesiologist: Meg Stiles MD    Indications and Patient Condition  Indications for airway management: airway protection    Preoxygenated: yes  MILS maintained throughout  Mask difficulty assessment: 1 - vent by mask    Final Airway Details  Final airway type: endotracheal airway      Successful airway: ETT  Cuffed: yes   Successful intubation technique: direct laryngoscopy  Facilitating devices/methods: intubating stylet  Endotracheal tube insertion site: oral  Blade: Queen  Blade size: 3  ETT size (mm): 7.5  Cormack-Lehane Classification: grade IIa - partial view of glottis  Placement verified by: chest auscultation and capnometry   Cuff volume (mL): 4  Measured from: lips  ETT to lips (cm): 21  Number of attempts at approach: 1    Additional Comments  Atraumatic intubation, teeth and gums as before, equal bilateral breath sounds, positive EtCO2.  Ramped and reverse trendelenburg for intubation.

## 2018-12-02 NOTE — ANESTHESIA PREPROCEDURE EVALUATION
Anesthesia Evaluation     history of anesthetic complications: PONV               Airway   Mallampati: I  TM distance: >3 FB  Neck ROM: full  No difficulty expected  Dental - normal exam     Pulmonary - normal exam    breath sounds clear to auscultation  (+) shortness of breath (easily SOA since surgery, tires with taking shower), sleep apnea (to do a sleep study),   (-) not a smokerPneumonia: 7/2018.  Cardiovascular - normal exam  Exercise tolerance: poor (<4 METS)    ECG reviewed  Rhythm: regular  Rate: normal    Hypertension: denies.    ROS comment: Narrative     RR Interval= 632 ms  OH Interval= 168 ms  QRSD Interval= 96 ms  QT Interval= 344 ms  QTc Interval= 433 ms  Heart Rate= 95 ms  P Axis= 35 deg  QRS Axis= 9 deg  T Wave Axis= 65 deg  I: 40 Axis= 29 deg  T: 40 Axis= 0 deg  ST Axis= 89 deg  Sinus rhythm  No Prior Tracing for Comparison  Electronically Signed by:  Arleth Knowles ([facility]) 05-Nov-2018 15:45:16  Date and Time of Study: 2018-11-05 10:26:44        Neuro/Psych  (+) headaches (migraines),     Dizziness: dizzy yesterday with current issues.  GI/Hepatic/Renal/Endo    (+) morbid obesity, GERD (zantac, no real indigestion complaints) well controlled,      Musculoskeletal     Abdominal   (+) obese,    Substance History   Alcohol use: rare.     OB/GYN      Comment: PCOS      Other   (+) arthritis (knees)                     Anesthesia Plan    ASA 3 - emergent     general     intravenous induction   Anesthetic plan, all risks, benefits, and alternatives have been provided, discussed and informed consent has been obtained with: patient and spouse/significant other.  Use of blood products discussed with patient and spouse/significant other  Consented to blood products.

## 2018-12-02 NOTE — ANESTHESIA POSTPROCEDURE EVALUATION
Patient: Valery Aguilar    Procedure Summary     Date:  12/02/18 Room / Location:   [facility] OR 2 /  [facility] OR    Anesthesia Start:  1044 Anesthesia Stop:  1203    Procedure:  Debridement of abdominal wall (N/A Abdomen) Diagnosis:       Wound infection      (Wound infection [T14.8XXA, L08.9])    Surgeon:  Brigido Navarrete MD Provider:  Meg Stiles MD    Anesthesia Type:  general ASA Status:  3 - Emergent          Anesthesia Type: general  Last vitals  BP   127/75 (12/02/18 1232)   Temp   96.6 °F (35.9 °C) (12/02/18 1205)   Pulse   109 (12/02/18 1232)   Resp   15 (12/02/18 1227)     SpO2   96 % (12/02/18 1232)     Post Anesthesia Care and Evaluation    Patient location during evaluation: PACU  Patient participation: complete - patient participated  Level of consciousness: awake and alert  Pain score: 6  Pain management: satisfactory to patient  Airway patency: patent  Anesthetic complications: No anesthetic complications  PONV Status: none  Cardiovascular status: acceptable  Respiratory status: acceptable  Hydration status: acceptable

## 2018-12-03 ENCOUNTER — APPOINTMENT (OUTPATIENT)
Dept: GENERAL RADIOLOGY | Facility: HOSPITAL | Age: 33
End: 2018-12-03

## 2018-12-03 LAB
ANION GAP SERPL CALCULATED.3IONS-SCNC: 12.5 MMOL/L
BASOPHILS # BLD AUTO: 0.04 10*3/MM3 (ref 0–0.2)
BASOPHILS NFR BLD AUTO: 0.5 % (ref 0–2)
BUN BLD-MCNC: 8 MG/DL (ref 6–20)
BUN/CREAT SERPL: 13.6 (ref 7–25)
CALCIUM SPEC-SCNC: 8.6 MG/DL (ref 8.6–10.5)
CHLORIDE SERPL-SCNC: 100 MMOL/L (ref 98–107)
CO2 SERPL-SCNC: 22.5 MMOL/L (ref 22–29)
CREAT BLD-MCNC: 0.59 MG/DL (ref 0.57–1)
DEPRECATED RDW RBC AUTO: 41 FL (ref 37–54)
EOSINOPHIL # BLD AUTO: 0.1 10*3/MM3 (ref 0.1–0.3)
EOSINOPHIL NFR BLD AUTO: 1.1 % (ref 0–4)
ERYTHROCYTE [DISTWIDTH] IN BLOOD BY AUTOMATED COUNT: 14.3 % (ref 11.5–14.5)
GFR SERPL CREATININE-BSD FRML MDRD: 117 ML/MIN/1.73
GLUCOSE BLD-MCNC: 117 MG/DL (ref 65–99)
HCT VFR BLD AUTO: 30.8 % (ref 37–47)
HGB BLD-MCNC: 9.8 G/DL (ref 12–16)
IMM GRANULOCYTES # BLD: 0.08 10*3/MM3 (ref 0–0.03)
IMM GRANULOCYTES NFR BLD: 0.9 % (ref 0–0.5)
LYMPHOCYTES # BLD AUTO: 1.73 10*3/MM3 (ref 0.6–4.8)
LYMPHOCYTES NFR BLD AUTO: 19.7 % (ref 20–45)
MCH RBC QN AUTO: 25.1 PG (ref 27–31)
MCHC RBC AUTO-ENTMCNC: 31.8 G/DL (ref 31–37)
MCV RBC AUTO: 79 FL (ref 81–99)
MONOCYTES # BLD AUTO: 0.91 10*3/MM3 (ref 0–1)
MONOCYTES NFR BLD AUTO: 10.4 % (ref 3–8)
NEUTROPHILS # BLD AUTO: 5.91 10*3/MM3 (ref 1.5–8.3)
NEUTROPHILS NFR BLD AUTO: 67.4 % (ref 45–70)
NRBC BLD MANUAL-RTO: 0 /100 WBC (ref 0–0)
PLATELET # BLD AUTO: 367 10*3/MM3 (ref 140–500)
PMV BLD AUTO: 10.6 FL (ref 7.4–10.4)
POTASSIUM BLD-SCNC: 4.2 MMOL/L (ref 3.5–5.2)
RBC # BLD AUTO: 3.9 10*6/MM3 (ref 4.2–5.4)
SODIUM BLD-SCNC: 135 MMOL/L (ref 136–145)
WBC NRBC COR # BLD: 8.77 10*3/MM3 (ref 4.8–10.8)

## 2018-12-03 PROCEDURE — 02HV33Z INSERTION OF INFUSION DEVICE INTO SUPERIOR VENA CAVA, PERCUTANEOUS APPROACH: ICD-10-PCS | Performed by: SURGERY

## 2018-12-03 PROCEDURE — 25010000002 KETOROLAC TROMETHAMINE PER 15 MG: Performed by: SURGERY

## 2018-12-03 PROCEDURE — 85025 COMPLETE CBC W/AUTO DIFF WBC: CPT | Performed by: SURGERY

## 2018-12-03 PROCEDURE — 99024 POSTOP FOLLOW-UP VISIT: CPT | Performed by: SURGERY

## 2018-12-03 PROCEDURE — C1751 CATH, INF, PER/CENT/MIDLINE: HCPCS

## 2018-12-03 PROCEDURE — 25010000002 PIPERACILLIN-TAZOBACTAM: Performed by: SURGERY

## 2018-12-03 PROCEDURE — 25010000002 PIPERACILLIN SOD-TAZOBACTAM PER 1 G: Performed by: SURGERY

## 2018-12-03 PROCEDURE — 25010000002 ENOXAPARIN PER 10 MG: Performed by: SURGERY

## 2018-12-03 PROCEDURE — 80048 BASIC METABOLIC PNL TOTAL CA: CPT | Performed by: SURGERY

## 2018-12-03 PROCEDURE — 71045 X-RAY EXAM CHEST 1 VIEW: CPT

## 2018-12-03 RX ORDER — FAMOTIDINE 20 MG/1
20 TABLET, FILM COATED ORAL 2 TIMES DAILY
Status: DISCONTINUED | OUTPATIENT
Start: 2018-12-03 | End: 2018-12-05 | Stop reason: SDUPTHER

## 2018-12-03 RX ORDER — KETOROLAC TROMETHAMINE 30 MG/ML
30 INJECTION, SOLUTION INTRAMUSCULAR; INTRAVENOUS EVERY 6 HOURS PRN
Status: DISPENSED | OUTPATIENT
Start: 2018-12-03 | End: 2018-12-08

## 2018-12-03 RX ORDER — SODIUM CHLORIDE 0.9 % (FLUSH) 0.9 %
20 SYRINGE (ML) INJECTION AS NEEDED
Status: DISCONTINUED | OUTPATIENT
Start: 2018-12-03 | End: 2018-12-14 | Stop reason: HOSPADM

## 2018-12-03 RX ORDER — SODIUM CHLORIDE 0.9 % (FLUSH) 0.9 %
10 SYRINGE (ML) INJECTION AS NEEDED
Status: DISCONTINUED | OUTPATIENT
Start: 2018-12-03 | End: 2018-12-13 | Stop reason: SDUPTHER

## 2018-12-03 RX ORDER — SODIUM CHLORIDE 0.9 % (FLUSH) 0.9 %
10 SYRINGE (ML) INJECTION EVERY 12 HOURS SCHEDULED
Status: DISCONTINUED | OUTPATIENT
Start: 2018-12-03 | End: 2018-12-14 | Stop reason: HOSPADM

## 2018-12-03 RX ORDER — NORTRIPTYLINE HYDROCHLORIDE 25 MG/1
50 CAPSULE ORAL NIGHTLY
Status: DISCONTINUED | OUTPATIENT
Start: 2018-12-03 | End: 2018-12-04

## 2018-12-03 RX ORDER — RIZATRIPTAN BENZOATE 10 MG/1
10 TABLET ORAL
Status: DISCONTINUED | OUTPATIENT
Start: 2018-12-03 | End: 2018-12-14 | Stop reason: HOSPADM

## 2018-12-03 RX ADMIN — PIPERACILLIN SODIUM,TAZOBACTAM SODIUM 4.5 G: 4; .5 INJECTION, POWDER, FOR SOLUTION INTRAVENOUS at 16:53

## 2018-12-03 RX ADMIN — KETOROLAC TROMETHAMINE 30 MG: 30 INJECTION, SOLUTION INTRAMUSCULAR at 23:21

## 2018-12-03 RX ADMIN — SODIUM CHLORIDE, POTASSIUM CHLORIDE, SODIUM LACTATE AND CALCIUM CHLORIDE 50 ML/HR: 600; 310; 30; 20 INJECTION, SOLUTION INTRAVENOUS at 08:56

## 2018-12-03 RX ADMIN — CLINDAMYCIN IN 5 PERCENT DEXTROSE 900 MG: 18 INJECTION, SOLUTION INTRAVENOUS at 20:48

## 2018-12-03 RX ADMIN — ENOXAPARIN SODIUM 40 MG: 40 INJECTION SUBCUTANEOUS at 08:49

## 2018-12-03 RX ADMIN — OXYCODONE HYDROCHLORIDE AND ACETAMINOPHEN 1 TABLET: 5; 325 TABLET ORAL at 16:09

## 2018-12-03 RX ADMIN — OXYCODONE HYDROCHLORIDE AND ACETAMINOPHEN 1 TABLET: 5; 325 TABLET ORAL at 02:17

## 2018-12-03 RX ADMIN — SODIUM CHLORIDE, PRESERVATIVE FREE 10 ML: 5 INJECTION INTRAVENOUS at 10:25

## 2018-12-03 RX ADMIN — OXYCODONE HYDROCHLORIDE AND ACETAMINOPHEN 1 TABLET: 5; 325 TABLET ORAL at 21:14

## 2018-12-03 RX ADMIN — NORTRIPTYLINE HYDROCHLORIDE 50 MG: 25 CAPSULE ORAL at 21:08

## 2018-12-03 RX ADMIN — CLINDAMYCIN IN 5 PERCENT DEXTROSE 900 MG: 18 INJECTION, SOLUTION INTRAVENOUS at 12:04

## 2018-12-03 RX ADMIN — CLINDAMYCIN IN 5 PERCENT DEXTROSE 900 MG: 18 INJECTION, SOLUTION INTRAVENOUS at 02:23

## 2018-12-03 RX ADMIN — KETOROLAC TROMETHAMINE 30 MG: 30 INJECTION, SOLUTION INTRAMUSCULAR at 17:20

## 2018-12-03 RX ADMIN — OXYCODONE HYDROCHLORIDE AND ACETAMINOPHEN 1 TABLET: 5; 325 TABLET ORAL at 06:47

## 2018-12-03 RX ADMIN — SODIUM CHLORIDE, POTASSIUM CHLORIDE, SODIUM LACTATE AND CALCIUM CHLORIDE 50 ML/HR: 600; 310; 30; 20 INJECTION, SOLUTION INTRAVENOUS at 12:59

## 2018-12-03 RX ADMIN — FAMOTIDINE 20 MG: 20 TABLET ORAL at 20:52

## 2018-12-03 RX ADMIN — RIZATRIPTAN BENZOATE 10 MG: 10 TABLET ORAL at 06:47

## 2018-12-03 RX ADMIN — OXYCODONE HYDROCHLORIDE AND ACETAMINOPHEN 1 TABLET: 5; 325 TABLET ORAL at 12:04

## 2018-12-03 RX ADMIN — PIPERACILLIN SODIUM,TAZOBACTAM SODIUM 4.5 G: 4; .5 INJECTION, POWDER, FOR SOLUTION INTRAVENOUS at 08:48

## 2018-12-03 RX ADMIN — PIPERACILLIN SODIUM,TAZOBACTAM SODIUM 4.5 G: 4; .5 INJECTION, POWDER, FOR SOLUTION INTRAVENOUS at 00:00

## 2018-12-03 RX ADMIN — SODIUM CHLORIDE, PRESERVATIVE FREE 3 ML: 5 INJECTION INTRAVENOUS at 08:56

## 2018-12-04 ENCOUNTER — ANESTHESIA EVENT (OUTPATIENT)
Dept: PERIOP | Facility: HOSPITAL | Age: 33
End: 2018-12-04

## 2018-12-04 LAB
BACTERIA SPEC AEROBE CULT: ABNORMAL
BACTERIA SPEC AEROBE CULT: ABNORMAL
GRAM STN SPEC: ABNORMAL

## 2018-12-04 PROCEDURE — 25010000002 PIPERACILLIN SOD-TAZOBACTAM PER 1 G: Performed by: SURGERY

## 2018-12-04 PROCEDURE — 25010000002 ENOXAPARIN PER 10 MG: Performed by: SURGERY

## 2018-12-04 PROCEDURE — 25010000002 KETOROLAC TROMETHAMINE PER 15 MG: Performed by: SURGERY

## 2018-12-04 PROCEDURE — 99024 POSTOP FOLLOW-UP VISIT: CPT | Performed by: SURGERY

## 2018-12-04 RX ORDER — METFORMIN HYDROCHLORIDE 500 MG/1
500 TABLET, EXTENDED RELEASE ORAL
Status: DISCONTINUED | OUTPATIENT
Start: 2018-12-04 | End: 2018-12-14 | Stop reason: HOSPADM

## 2018-12-04 RX ORDER — NORTRIPTYLINE HYDROCHLORIDE 25 MG/1
50 CAPSULE ORAL NIGHTLY
Status: DISCONTINUED | OUTPATIENT
Start: 2018-12-04 | End: 2018-12-05

## 2018-12-04 RX ADMIN — OXYCODONE HYDROCHLORIDE AND ACETAMINOPHEN 1 TABLET: 5; 325 TABLET ORAL at 10:31

## 2018-12-04 RX ADMIN — SODIUM CHLORIDE, POTASSIUM CHLORIDE, SODIUM LACTATE AND CALCIUM CHLORIDE 50 ML/HR: 600; 310; 30; 20 INJECTION, SOLUTION INTRAVENOUS at 17:44

## 2018-12-04 RX ADMIN — OXYCODONE HYDROCHLORIDE AND ACETAMINOPHEN 1 TABLET: 5; 325 TABLET ORAL at 05:26

## 2018-12-04 RX ADMIN — OXYCODONE HYDROCHLORIDE AND ACETAMINOPHEN 1 TABLET: 5; 325 TABLET ORAL at 21:36

## 2018-12-04 RX ADMIN — KETOROLAC TROMETHAMINE 30 MG: 30 INJECTION, SOLUTION INTRAMUSCULAR at 06:34

## 2018-12-04 RX ADMIN — SODIUM CHLORIDE, PRESERVATIVE FREE 10 ML: 5 INJECTION INTRAVENOUS at 08:32

## 2018-12-04 RX ADMIN — RIZATRIPTAN BENZOATE 10 MG: 10 TABLET ORAL at 00:19

## 2018-12-04 RX ADMIN — PIPERACILLIN SODIUM,TAZOBACTAM SODIUM 4.5 G: 4; .5 INJECTION, POWDER, FOR SOLUTION INTRAVENOUS at 08:29

## 2018-12-04 RX ADMIN — PIPERACILLIN SODIUM,TAZOBACTAM SODIUM 4.5 G: 4; .5 INJECTION, POWDER, FOR SOLUTION INTRAVENOUS at 16:09

## 2018-12-04 RX ADMIN — SODIUM CHLORIDE, PRESERVATIVE FREE 3 ML: 5 INJECTION INTRAVENOUS at 08:33

## 2018-12-04 RX ADMIN — CLINDAMYCIN IN 5 PERCENT DEXTROSE 900 MG: 18 INJECTION, SOLUTION INTRAVENOUS at 02:32

## 2018-12-04 RX ADMIN — FAMOTIDINE 20 MG: 20 TABLET ORAL at 08:33

## 2018-12-04 RX ADMIN — RIZATRIPTAN BENZOATE 10 MG: 10 TABLET ORAL at 23:23

## 2018-12-04 RX ADMIN — FAMOTIDINE 20 MG: 20 TABLET ORAL at 21:36

## 2018-12-04 RX ADMIN — ENOXAPARIN SODIUM 40 MG: 40 INJECTION SUBCUTANEOUS at 08:33

## 2018-12-04 RX ADMIN — CLINDAMYCIN IN 5 PERCENT DEXTROSE 900 MG: 18 INJECTION, SOLUTION INTRAVENOUS at 17:44

## 2018-12-04 RX ADMIN — PIPERACILLIN SODIUM,TAZOBACTAM SODIUM 4.5 G: 4; .5 INJECTION, POWDER, FOR SOLUTION INTRAVENOUS at 00:19

## 2018-12-04 RX ADMIN — NORTRIPTYLINE HYDROCHLORIDE 50 MG: 25 CAPSULE ORAL at 21:36

## 2018-12-04 RX ADMIN — METFORMIN HYDROCHLORIDE 500 MG: 500 TABLET, EXTENDED RELEASE ORAL at 10:31

## 2018-12-04 RX ADMIN — MAGNESIUM HYDROXIDE 30 ML: 400 SUSPENSION ORAL at 10:31

## 2018-12-04 RX ADMIN — OXYCODONE HYDROCHLORIDE AND ACETAMINOPHEN 1 TABLET: 5; 325 TABLET ORAL at 15:17

## 2018-12-04 RX ADMIN — KETOROLAC TROMETHAMINE 30 MG: 30 INJECTION, SOLUTION INTRAMUSCULAR at 13:04

## 2018-12-04 RX ADMIN — CLINDAMYCIN IN 5 PERCENT DEXTROSE 900 MG: 18 INJECTION, SOLUTION INTRAVENOUS at 10:43

## 2018-12-05 ENCOUNTER — ANESTHESIA (OUTPATIENT)
Dept: PERIOP | Facility: HOSPITAL | Age: 33
End: 2018-12-05

## 2018-12-05 LAB
BACTERIA SPEC AEROBE CULT: ABNORMAL
BASOPHILS # BLD AUTO: 0.02 10*3/MM3 (ref 0–0.2)
BASOPHILS NFR BLD AUTO: 0.3 % (ref 0–2)
CYTO UR: NORMAL
DEPRECATED RDW RBC AUTO: 41.1 FL (ref 37–54)
EOSINOPHIL # BLD AUTO: 0.19 10*3/MM3 (ref 0.1–0.3)
EOSINOPHIL NFR BLD AUTO: 2.7 % (ref 0–4)
ERYTHROCYTE [DISTWIDTH] IN BLOOD BY AUTOMATED COUNT: 14.1 % (ref 11.5–14.5)
GLUCOSE BLDC GLUCOMTR-MCNC: 102 MG/DL (ref 70–130)
GRAM STN SPEC: ABNORMAL
HCT VFR BLD AUTO: 28.5 % (ref 37–47)
HGB BLD-MCNC: 9.1 G/DL (ref 12–16)
IMM GRANULOCYTES # BLD: 0.1 10*3/MM3 (ref 0–0.03)
IMM GRANULOCYTES NFR BLD: 1.4 % (ref 0–0.5)
LAB AP CASE REPORT: NORMAL
LYMPHOCYTES # BLD AUTO: 2 10*3/MM3 (ref 0.6–4.8)
LYMPHOCYTES NFR BLD AUTO: 28.5 % (ref 20–45)
MCH RBC QN AUTO: 25.6 PG (ref 27–31)
MCHC RBC AUTO-ENTMCNC: 31.9 G/DL (ref 31–37)
MCV RBC AUTO: 80.1 FL (ref 81–99)
MONOCYTES # BLD AUTO: 0.56 10*3/MM3 (ref 0–1)
MONOCYTES NFR BLD AUTO: 8 % (ref 3–8)
NEUTROPHILS # BLD AUTO: 4.15 10*3/MM3 (ref 1.5–8.3)
NEUTROPHILS NFR BLD AUTO: 59.1 % (ref 45–70)
NRBC BLD MANUAL-RTO: 0 /100 WBC (ref 0–0)
PATH REPORT.FINAL DX SPEC: NORMAL
PATH REPORT.GROSS SPEC: NORMAL
PLATELET # BLD AUTO: 331 10*3/MM3 (ref 140–500)
PMV BLD AUTO: 10.3 FL (ref 7.4–10.4)
RBC # BLD AUTO: 3.56 10*6/MM3 (ref 4.2–5.4)
WBC NRBC COR # BLD: 7.02 10*3/MM3 (ref 4.8–10.8)

## 2018-12-05 PROCEDURE — 25010000002 ONDANSETRON PER 1 MG: Performed by: NURSE ANESTHETIST, CERTIFIED REGISTERED

## 2018-12-05 PROCEDURE — 25010000002 PROPOFOL 10 MG/ML EMULSION: Performed by: ANESTHESIOLOGY

## 2018-12-05 PROCEDURE — 25010000002 MIDAZOLAM PER 1 MG: Performed by: NURSE ANESTHETIST, CERTIFIED REGISTERED

## 2018-12-05 PROCEDURE — 82962 GLUCOSE BLOOD TEST: CPT

## 2018-12-05 PROCEDURE — 94799 UNLISTED PULMONARY SVC/PX: CPT

## 2018-12-05 PROCEDURE — 25010000002 KETOROLAC TROMETHAMINE PER 15 MG: Performed by: SURGERY

## 2018-12-05 PROCEDURE — 25010000002 MORPHINE PER 10 MG: Performed by: SURGERY

## 2018-12-05 PROCEDURE — 25010000002 SUCCINYLCHOLINE PER 20 MG: Performed by: ANESTHESIOLOGY

## 2018-12-05 PROCEDURE — 25010000002 PIPERACILLIN SOD-TAZOBACTAM PER 1 G: Performed by: SURGERY

## 2018-12-05 PROCEDURE — 25010000002 DEXAMETHASONE PER 1 MG: Performed by: NURSE ANESTHETIST, CERTIFIED REGISTERED

## 2018-12-05 PROCEDURE — 11045 DBRDMT SUBQ TISS EACH ADDL: CPT | Performed by: SURGERY

## 2018-12-05 PROCEDURE — 25010000002 FENTANYL CITRATE (PF) 100 MCG/2ML SOLUTION: Performed by: ANESTHESIOLOGY

## 2018-12-05 PROCEDURE — 11042 DBRDMT SUBQ TIS 1ST 20SQCM/<: CPT | Performed by: SURGERY

## 2018-12-05 PROCEDURE — 85025 COMPLETE CBC W/AUTO DIFF WBC: CPT | Performed by: SURGERY

## 2018-12-05 PROCEDURE — 25010000002 MORPHINE PER 10 MG

## 2018-12-05 PROCEDURE — 0JB80ZZ EXCISION OF ABDOMEN SUBCUTANEOUS TISSUE AND FASCIA, OPEN APPROACH: ICD-10-PCS | Performed by: SURGERY

## 2018-12-05 RX ORDER — SCOLOPAMINE TRANSDERMAL SYSTEM 1 MG/1
1 PATCH, EXTENDED RELEASE TRANSDERMAL CONTINUOUS
Status: ACTIVE | OUTPATIENT
Start: 2018-12-05 | End: 2018-12-06

## 2018-12-05 RX ORDER — SODIUM CHLORIDE 0.9 % (FLUSH) 0.9 %
1-10 SYRINGE (ML) INJECTION AS NEEDED
Status: DISCONTINUED | OUTPATIENT
Start: 2018-12-05 | End: 2018-12-05

## 2018-12-05 RX ORDER — LIDOCAINE HYDROCHLORIDE 10 MG/ML
0.5 INJECTION, SOLUTION EPIDURAL; INFILTRATION; INTRACAUDAL; PERINEURAL ONCE AS NEEDED
Status: DISCONTINUED | OUTPATIENT
Start: 2018-12-05 | End: 2018-12-05

## 2018-12-05 RX ORDER — MEPERIDINE HYDROCHLORIDE 25 MG/ML
12.5 INJECTION INTRAMUSCULAR; INTRAVENOUS; SUBCUTANEOUS
Status: DISCONTINUED | OUTPATIENT
Start: 2018-12-05 | End: 2018-12-05 | Stop reason: HOSPADM

## 2018-12-05 RX ORDER — ONDANSETRON 2 MG/ML
4 INJECTION INTRAMUSCULAR; INTRAVENOUS ONCE AS NEEDED
Status: DISCONTINUED | OUTPATIENT
Start: 2018-12-05 | End: 2018-12-05 | Stop reason: HOSPADM

## 2018-12-05 RX ORDER — OXYCODONE HYDROCHLORIDE AND ACETAMINOPHEN 5; 325 MG/1; MG/1
1 TABLET ORAL EVERY 4 HOURS PRN
Status: DISCONTINUED | OUTPATIENT
Start: 2018-12-05 | End: 2018-12-14 | Stop reason: HOSPADM

## 2018-12-05 RX ORDER — SODIUM CHLORIDE 9 MG/ML
40 INJECTION, SOLUTION INTRAVENOUS AS NEEDED
Status: DISCONTINUED | OUTPATIENT
Start: 2018-12-05 | End: 2018-12-05

## 2018-12-05 RX ORDER — SODIUM CHLORIDE, SODIUM LACTATE, POTASSIUM CHLORIDE, CALCIUM CHLORIDE 600; 310; 30; 20 MG/100ML; MG/100ML; MG/100ML; MG/100ML
9 INJECTION, SOLUTION INTRAVENOUS CONTINUOUS
Status: DISCONTINUED | OUTPATIENT
Start: 2018-12-05 | End: 2018-12-06

## 2018-12-05 RX ORDER — SUCCINYLCHOLINE CHLORIDE 20 MG/ML
INJECTION INTRAMUSCULAR; INTRAVENOUS AS NEEDED
Status: DISCONTINUED | OUTPATIENT
Start: 2018-12-05 | End: 2018-12-05 | Stop reason: SURG

## 2018-12-05 RX ORDER — NORTRIPTYLINE HYDROCHLORIDE 25 MG/1
100 CAPSULE ORAL NIGHTLY
Status: DISCONTINUED | OUTPATIENT
Start: 2018-12-05 | End: 2018-12-14 | Stop reason: HOSPADM

## 2018-12-05 RX ORDER — ONDANSETRON 2 MG/ML
4 INJECTION INTRAMUSCULAR; INTRAVENOUS ONCE AS NEEDED
Status: COMPLETED | OUTPATIENT
Start: 2018-12-05 | End: 2018-12-05

## 2018-12-05 RX ORDER — FAMOTIDINE 20 MG/1
20 TABLET, FILM COATED ORAL DAILY
Status: DISCONTINUED | OUTPATIENT
Start: 2018-12-05 | End: 2018-12-14 | Stop reason: HOSPADM

## 2018-12-05 RX ORDER — PROMETHAZINE HYDROCHLORIDE 25 MG/1
25 TABLET ORAL EVERY 6 HOURS PRN
Status: DISCONTINUED | OUTPATIENT
Start: 2018-12-05 | End: 2018-12-14 | Stop reason: HOSPADM

## 2018-12-05 RX ORDER — PROPOFOL 10 MG/ML
VIAL (ML) INTRAVENOUS AS NEEDED
Status: DISCONTINUED | OUTPATIENT
Start: 2018-12-05 | End: 2018-12-05 | Stop reason: SURG

## 2018-12-05 RX ORDER — MAGNESIUM HYDROXIDE 1200 MG/15ML
LIQUID ORAL AS NEEDED
Status: DISCONTINUED | OUTPATIENT
Start: 2018-12-05 | End: 2018-12-05 | Stop reason: HOSPADM

## 2018-12-05 RX ORDER — SODIUM HYPOCHLORITE 1.25 MG/ML
SOLUTION TOPICAL AS NEEDED
Status: DISCONTINUED | OUTPATIENT
Start: 2018-12-05 | End: 2018-12-05 | Stop reason: HOSPADM

## 2018-12-05 RX ORDER — FENTANYL CITRATE 50 UG/ML
INJECTION, SOLUTION INTRAMUSCULAR; INTRAVENOUS AS NEEDED
Status: DISCONTINUED | OUTPATIENT
Start: 2018-12-05 | End: 2018-12-05 | Stop reason: SURG

## 2018-12-05 RX ORDER — MIDAZOLAM HYDROCHLORIDE 1 MG/ML
1 INJECTION INTRAMUSCULAR; INTRAVENOUS
Status: DISCONTINUED | OUTPATIENT
Start: 2018-12-05 | End: 2018-12-05

## 2018-12-05 RX ORDER — SODIUM CHLORIDE 0.9 % (FLUSH) 0.9 %
3 SYRINGE (ML) INJECTION EVERY 12 HOURS SCHEDULED
Status: DISCONTINUED | OUTPATIENT
Start: 2018-12-05 | End: 2018-12-05

## 2018-12-05 RX ORDER — SODIUM CHLORIDE, SODIUM LACTATE, POTASSIUM CHLORIDE, CALCIUM CHLORIDE 600; 310; 30; 20 MG/100ML; MG/100ML; MG/100ML; MG/100ML
100 INJECTION, SOLUTION INTRAVENOUS CONTINUOUS
Status: DISCONTINUED | OUTPATIENT
Start: 2018-12-05 | End: 2018-12-08

## 2018-12-05 RX ORDER — ROCURONIUM BROMIDE 10 MG/ML
INJECTION, SOLUTION INTRAVENOUS AS NEEDED
Status: DISCONTINUED | OUTPATIENT
Start: 2018-12-05 | End: 2018-12-05 | Stop reason: SURG

## 2018-12-05 RX ORDER — MIDAZOLAM HYDROCHLORIDE 1 MG/ML
2 INJECTION INTRAMUSCULAR; INTRAVENOUS
Status: DISCONTINUED | OUTPATIENT
Start: 2018-12-05 | End: 2018-12-05

## 2018-12-05 RX ORDER — LIDOCAINE HYDROCHLORIDE 20 MG/ML
INJECTION, SOLUTION INFILTRATION; PERINEURAL AS NEEDED
Status: DISCONTINUED | OUTPATIENT
Start: 2018-12-05 | End: 2018-12-05 | Stop reason: SURG

## 2018-12-05 RX ORDER — DEXAMETHASONE SODIUM PHOSPHATE 4 MG/ML
8 INJECTION, SOLUTION INTRA-ARTICULAR; INTRALESIONAL; INTRAMUSCULAR; INTRAVENOUS; SOFT TISSUE ONCE AS NEEDED
Status: COMPLETED | OUTPATIENT
Start: 2018-12-05 | End: 2018-12-05

## 2018-12-05 RX ADMIN — DEXAMETHASONE SODIUM PHOSPHATE 8 MG: 4 INJECTION, SOLUTION INTRA-ARTICULAR; INTRALESIONAL; INTRAMUSCULAR; INTRAVENOUS; SOFT TISSUE at 12:12

## 2018-12-05 RX ADMIN — PIPERACILLIN SODIUM,TAZOBACTAM SODIUM 4.5 G: 4; .5 INJECTION, POWDER, FOR SOLUTION INTRAVENOUS at 16:08

## 2018-12-05 RX ADMIN — CLINDAMYCIN IN 5 PERCENT DEXTROSE 900 MG: 18 INJECTION, SOLUTION INTRAVENOUS at 03:34

## 2018-12-05 RX ADMIN — FENTANYL CITRATE 50 MCG: 50 INJECTION, SOLUTION INTRAMUSCULAR; INTRAVENOUS at 14:07

## 2018-12-05 RX ADMIN — NORTRIPTYLINE HYDROCHLORIDE 100 MG: 25 CAPSULE ORAL at 21:16

## 2018-12-05 RX ADMIN — RIZATRIPTAN BENZOATE 10 MG: 10 TABLET ORAL at 15:19

## 2018-12-05 RX ADMIN — MORPHINE SULFATE 2 MG: 2 INJECTION, SOLUTION INTRAMUSCULAR; INTRAVENOUS at 06:34

## 2018-12-05 RX ADMIN — SODIUM CHLORIDE, PRESERVATIVE FREE 10 ML: 5 INJECTION INTRAVENOUS at 08:20

## 2018-12-05 RX ADMIN — SCOPALAMINE 1 PATCH: 1 PATCH, EXTENDED RELEASE TRANSDERMAL at 12:13

## 2018-12-05 RX ADMIN — SODIUM CHLORIDE, PRESERVATIVE FREE 3 ML: 5 INJECTION INTRAVENOUS at 08:20

## 2018-12-05 RX ADMIN — PIPERACILLIN SODIUM,TAZOBACTAM SODIUM 4.5 G: 4; .5 INJECTION, POWDER, FOR SOLUTION INTRAVENOUS at 08:19

## 2018-12-05 RX ADMIN — SODIUM CHLORIDE, POTASSIUM CHLORIDE, SODIUM LACTATE AND CALCIUM CHLORIDE: 600; 310; 30; 20 INJECTION, SOLUTION INTRAVENOUS at 12:30

## 2018-12-05 RX ADMIN — MORPHINE SULFATE 4 MG: 10 INJECTION INTRAVENOUS at 03:22

## 2018-12-05 RX ADMIN — PROPOFOL 150 MG: 10 INJECTION, EMULSION INTRAVENOUS at 12:53

## 2018-12-05 RX ADMIN — LIDOCAINE HYDROCHLORIDE 100 MG: 20 INJECTION, SOLUTION INFILTRATION; PERINEURAL at 12:49

## 2018-12-05 RX ADMIN — FENTANYL CITRATE 50 MCG: 50 INJECTION, SOLUTION INTRAMUSCULAR; INTRAVENOUS at 13:06

## 2018-12-05 RX ADMIN — FENTANYL CITRATE 50 MCG: 50 INJECTION, SOLUTION INTRAMUSCULAR; INTRAVENOUS at 13:48

## 2018-12-05 RX ADMIN — ONDANSETRON 4 MG: 2 INJECTION, SOLUTION INTRAMUSCULAR; INTRAVENOUS at 12:12

## 2018-12-05 RX ADMIN — OXYCODONE HYDROCHLORIDE AND ACETAMINOPHEN 1 TABLET: 5; 325 TABLET ORAL at 21:16

## 2018-12-05 RX ADMIN — KETOROLAC TROMETHAMINE 30 MG: 30 INJECTION, SOLUTION INTRAMUSCULAR at 08:24

## 2018-12-05 RX ADMIN — SODIUM CHLORIDE, POTASSIUM CHLORIDE, SODIUM LACTATE AND CALCIUM CHLORIDE 100 ML/HR: 600; 310; 30; 20 INJECTION, SOLUTION INTRAVENOUS at 20:45

## 2018-12-05 RX ADMIN — PIPERACILLIN SODIUM,TAZOBACTAM SODIUM 4.5 G: 4; .5 INJECTION, POWDER, FOR SOLUTION INTRAVENOUS at 00:19

## 2018-12-05 RX ADMIN — SODIUM CHLORIDE, POTASSIUM CHLORIDE, SODIUM LACTATE AND CALCIUM CHLORIDE 100 ML/HR: 600; 310; 30; 20 INJECTION, SOLUTION INTRAVENOUS at 15:30

## 2018-12-05 RX ADMIN — FAMOTIDINE 20 MG: 10 INJECTION, SOLUTION INTRAVENOUS at 12:13

## 2018-12-05 RX ADMIN — CLINDAMYCIN IN 5 PERCENT DEXTROSE 900 MG: 18 INJECTION, SOLUTION INTRAVENOUS at 10:04

## 2018-12-05 RX ADMIN — ROCURONIUM BROMIDE 5 MG: 10 INJECTION INTRAVENOUS at 12:48

## 2018-12-05 RX ADMIN — OXYCODONE HYDROCHLORIDE AND ACETAMINOPHEN 1 TABLET: 5; 325 TABLET ORAL at 17:19

## 2018-12-05 RX ADMIN — SUCCINYLCHOLINE CHLORIDE 160 MG: 20 INJECTION, SOLUTION INTRAMUSCULAR; INTRAVENOUS at 12:49

## 2018-12-05 RX ADMIN — CLINDAMYCIN IN 5 PERCENT DEXTROSE 900 MG: 18 INJECTION, SOLUTION INTRAVENOUS at 17:50

## 2018-12-05 RX ADMIN — FENTANYL CITRATE 50 MCG: 50 INJECTION, SOLUTION INTRAMUSCULAR; INTRAVENOUS at 12:46

## 2018-12-05 RX ADMIN — FAMOTIDINE 20 MG: 20 TABLET ORAL at 15:23

## 2018-12-05 RX ADMIN — MIDAZOLAM HYDROCHLORIDE 2 MG: 1 INJECTION, SOLUTION INTRAMUSCULAR; INTRAVENOUS at 12:24

## 2018-12-05 RX ADMIN — MORPHINE SULFATE 2 MG: 4 INJECTION, SOLUTION INTRAMUSCULAR; INTRAVENOUS at 06:34

## 2018-12-05 NOTE — ANESTHESIA PREPROCEDURE EVALUATION
Anesthesia Evaluation     Patient summary reviewed and Nursing notes reviewed   history of anesthetic complications: PONV    NPO Liquid Status: > 8 hours           Airway   Mallampati: I  TM distance: >3 FB  Neck ROM: full  No difficulty expected  Dental - normal exam     Pulmonary - normal exam    breath sounds clear to auscultation  (+) shortness of breath (easily SOA since surgery), sleep apnea (to do a sleep study),   (-) not a smokerPneumonia: 7/2018.  Cardiovascular - normal exam  Exercise tolerance: poor (<4 METS)    ECG reviewed  Rhythm: regular  Rate: normal    Hypertension: denies.    ROS comment: Narrative     RR Interval= 632 ms  AL Interval= 168 ms  QRSD Interval= 96 ms  QT Interval= 344 ms  QTc Interval= 433 ms  Heart Rate= 95 ms  P Axis= 35 deg  QRS Axis= 9 deg  T Wave Axis= 65 deg  I: 40 Axis= 29 deg  T: 40 Axis= 0 deg  ST Axis= 89 deg  Sinus rhythm  No Prior Tracing for Comparison  Electronically Signed by:  Arleth Knowles ([facility]) 05-Nov-2018 15:45:16  Date and Time of Study: 2018-11-05 10:26:44        Neuro/Psych  (+) headaches (migraines), psychiatric history ( hx anxiety/depression but not currently being treated ) Anxiety and Depression,     Dizziness: dizzy yesterday with current issues.  GI/Hepatic/Renal/Endo    (+) morbid obesity, GERD (zantac, no real indigestion complaints) well controlled,      Musculoskeletal     Abdominal   (+) obese,    Substance History   Alcohol use: rare.     OB/GYN      Comment: PCOS      Other   (+) arthritis (knees)     ROS/Med Hx Other: Metformin for PCOS                    Anesthesia Plan    ASA 3 - emergent     general     intravenous induction   Anesthetic plan, all risks, benefits, and alternatives have been provided, discussed and informed consent has been obtained with: patient and spouse/significant other.  Use of blood products discussed with patient and spouse/significant other  Consented to blood products.

## 2018-12-05 NOTE — ANESTHESIA POSTPROCEDURE EVALUATION
Patient: Valery Aguilar    Procedure Summary     Date:  12/05/18 Room / Location:   [facility] OR 2 /  [facility] OR    Anesthesia Start:  1243 Anesthesia Stop:  1422    Procedure:  Abdominal wound debridement (N/A Abdomen) Diagnosis:       Wound infection      (Wound infection [T14.8XXA, L08.9])    Surgeon:  Rachel Dolan MD Provider:  Meg Stiles MD    Anesthesia Type:  general ASA Status:  3 - Emergent          Anesthesia Type: general  Last vitals  BP   152/85 (12/05/18 1457)   Temp   97.6 °F (36.4 °C) (12/05/18 1422)   Pulse   100 (12/05/18 1457)   Resp   26 (12/05/18 1422)     SpO2   95 % (12/05/18 1457)     Post Anesthesia Care and Evaluation    Patient location during evaluation: bedside  Patient participation: complete - patient participated  Level of consciousness: awake and alert  Pain score: 1  Pain management: adequate  Airway patency: patent  Anesthetic complications: No anesthetic complications  PONV Status: none  Cardiovascular status: acceptable  Respiratory status: acceptable  Hydration status: acceptable

## 2018-12-05 NOTE — ANESTHESIA PROCEDURE NOTES
ANESTHESIA INTUBATION  Urgency: elective    Date/Time: 12/5/2018 12:51 PM  Airway not difficult    General Information and Staff    Patient location during procedure: OR  Anesthesiologist: Meg Stiles MD    Indications and Patient Condition  Indications for airway management: airway protection    Preoxygenated: yes  MILS maintained throughout  Mask difficulty assessment: 1 - vent by mask    Final Airway Details  Final airway type: endotracheal airway      Successful airway: ETT  Cuffed: yes   Successful intubation technique: direct laryngoscopy  Facilitating devices/methods: intubating stylet  Endotracheal tube insertion site: oral  Blade: Queen  Blade size: 3  ETT size (mm): 7.5  Cormack-Lehane Classification: grade I - full view of glottis  Placement verified by: chest auscultation and capnometry   Cuff volume (mL): 5  Measured from: lips  ETT to lips (cm): 21  Number of attempts at approach: 1    Additional Comments  Atraumatic intubation, teeth and gums as before. Equal bilateral breath sounds, positive end tidal CO2.

## 2018-12-06 LAB
BACTERIA SPEC AEROBE CULT: NORMAL
BACTERIA SPEC AEROBE CULT: NORMAL

## 2018-12-06 PROCEDURE — 99024 POSTOP FOLLOW-UP VISIT: CPT | Performed by: SURGERY

## 2018-12-06 PROCEDURE — 25010000002 PIPERACILLIN SOD-TAZOBACTAM PER 1 G: Performed by: SURGERY

## 2018-12-06 PROCEDURE — 94799 UNLISTED PULMONARY SVC/PX: CPT

## 2018-12-06 PROCEDURE — 25010000002 ENOXAPARIN PER 10 MG: Performed by: SURGERY

## 2018-12-06 PROCEDURE — 25010000002 KETOROLAC TROMETHAMINE PER 15 MG: Performed by: SURGERY

## 2018-12-06 RX ADMIN — CLINDAMYCIN IN 5 PERCENT DEXTROSE 900 MG: 18 INJECTION, SOLUTION INTRAVENOUS at 18:32

## 2018-12-06 RX ADMIN — OXYCODONE HYDROCHLORIDE AND ACETAMINOPHEN 1 TABLET: 5; 325 TABLET ORAL at 06:46

## 2018-12-06 RX ADMIN — PIPERACILLIN SODIUM,TAZOBACTAM SODIUM 4.5 G: 4; .5 INJECTION, POWDER, FOR SOLUTION INTRAVENOUS at 08:41

## 2018-12-06 RX ADMIN — METFORMIN HYDROCHLORIDE 500 MG: 500 TABLET, EXTENDED RELEASE ORAL at 08:05

## 2018-12-06 RX ADMIN — OXYCODONE HYDROCHLORIDE AND ACETAMINOPHEN 1 TABLET: 5; 325 TABLET ORAL at 10:56

## 2018-12-06 RX ADMIN — OXYCODONE HYDROCHLORIDE AND ACETAMINOPHEN 1 TABLET: 5; 325 TABLET ORAL at 14:57

## 2018-12-06 RX ADMIN — OXYCODONE HYDROCHLORIDE AND ACETAMINOPHEN 1 TABLET: 5; 325 TABLET ORAL at 20:09

## 2018-12-06 RX ADMIN — PIPERACILLIN SODIUM,TAZOBACTAM SODIUM 4.5 G: 4; .5 INJECTION, POWDER, FOR SOLUTION INTRAVENOUS at 00:29

## 2018-12-06 RX ADMIN — SODIUM CHLORIDE, POTASSIUM CHLORIDE, SODIUM LACTATE AND CALCIUM CHLORIDE 100 ML/HR: 600; 310; 30; 20 INJECTION, SOLUTION INTRAVENOUS at 18:32

## 2018-12-06 RX ADMIN — OXYCODONE HYDROCHLORIDE AND ACETAMINOPHEN 1 TABLET: 5; 325 TABLET ORAL at 01:08

## 2018-12-06 RX ADMIN — KETOROLAC TROMETHAMINE 30 MG: 30 INJECTION, SOLUTION INTRAMUSCULAR at 11:57

## 2018-12-06 RX ADMIN — KETOROLAC TROMETHAMINE 30 MG: 30 INJECTION, SOLUTION INTRAMUSCULAR at 21:18

## 2018-12-06 RX ADMIN — ENOXAPARIN SODIUM 40 MG: 40 INJECTION SUBCUTANEOUS at 08:05

## 2018-12-06 RX ADMIN — FAMOTIDINE 20 MG: 20 TABLET ORAL at 08:05

## 2018-12-06 RX ADMIN — NORTRIPTYLINE HYDROCHLORIDE 100 MG: 25 CAPSULE ORAL at 20:08

## 2018-12-06 RX ADMIN — CLINDAMYCIN IN 5 PERCENT DEXTROSE 900 MG: 18 INJECTION, SOLUTION INTRAVENOUS at 10:56

## 2018-12-06 RX ADMIN — PIPERACILLIN SODIUM,TAZOBACTAM SODIUM 4.5 G: 4; .5 INJECTION, POWDER, FOR SOLUTION INTRAVENOUS at 17:19

## 2018-12-06 RX ADMIN — SODIUM CHLORIDE, POTASSIUM CHLORIDE, SODIUM LACTATE AND CALCIUM CHLORIDE 100 ML/HR: 600; 310; 30; 20 INJECTION, SOLUTION INTRAVENOUS at 08:01

## 2018-12-06 RX ADMIN — CLINDAMYCIN IN 5 PERCENT DEXTROSE 900 MG: 18 INJECTION, SOLUTION INTRAVENOUS at 04:46

## 2018-12-07 LAB
BACTERIA SPEC ANAEROBE CULT: ABNORMAL
BACTERIA SPEC ANAEROBE CULT: ABNORMAL

## 2018-12-07 PROCEDURE — 25010000002 KETOROLAC TROMETHAMINE PER 15 MG: Performed by: SURGERY

## 2018-12-07 PROCEDURE — 25010000002 PIPERACILLIN SOD-TAZOBACTAM PER 1 G: Performed by: SURGERY

## 2018-12-07 PROCEDURE — 94799 UNLISTED PULMONARY SVC/PX: CPT

## 2018-12-07 PROCEDURE — 25010000002 ENOXAPARIN PER 10 MG: Performed by: SURGERY

## 2018-12-07 RX ADMIN — SODIUM CHLORIDE, POTASSIUM CHLORIDE, SODIUM LACTATE AND CALCIUM CHLORIDE 100 ML/HR: 600; 310; 30; 20 INJECTION, SOLUTION INTRAVENOUS at 04:46

## 2018-12-07 RX ADMIN — SODIUM CHLORIDE, PRESERVATIVE FREE 10 ML: 5 INJECTION INTRAVENOUS at 08:01

## 2018-12-07 RX ADMIN — NORTRIPTYLINE HYDROCHLORIDE 100 MG: 25 CAPSULE ORAL at 20:48

## 2018-12-07 RX ADMIN — OXYCODONE HYDROCHLORIDE AND ACETAMINOPHEN 1 TABLET: 5; 325 TABLET ORAL at 19:31

## 2018-12-07 RX ADMIN — OXYCODONE HYDROCHLORIDE AND ACETAMINOPHEN 1 TABLET: 5; 325 TABLET ORAL at 06:28

## 2018-12-07 RX ADMIN — KETOROLAC TROMETHAMINE 30 MG: 30 INJECTION, SOLUTION INTRAMUSCULAR at 11:47

## 2018-12-07 RX ADMIN — FAMOTIDINE 20 MG: 20 TABLET ORAL at 08:02

## 2018-12-07 RX ADMIN — OXYCODONE HYDROCHLORIDE AND ACETAMINOPHEN 1 TABLET: 5; 325 TABLET ORAL at 10:29

## 2018-12-07 RX ADMIN — METFORMIN HYDROCHLORIDE 500 MG: 500 TABLET, EXTENDED RELEASE ORAL at 08:01

## 2018-12-07 RX ADMIN — CLINDAMYCIN IN 5 PERCENT DEXTROSE 900 MG: 18 INJECTION, SOLUTION INTRAVENOUS at 18:03

## 2018-12-07 RX ADMIN — SODIUM CHLORIDE, PRESERVATIVE FREE 3 ML: 5 INJECTION INTRAVENOUS at 20:48

## 2018-12-07 RX ADMIN — ENOXAPARIN SODIUM 40 MG: 40 INJECTION SUBCUTANEOUS at 08:02

## 2018-12-07 RX ADMIN — KETOROLAC TROMETHAMINE 30 MG: 30 INJECTION, SOLUTION INTRAMUSCULAR at 05:35

## 2018-12-07 RX ADMIN — CLINDAMYCIN IN 5 PERCENT DEXTROSE 900 MG: 18 INJECTION, SOLUTION INTRAVENOUS at 04:33

## 2018-12-07 RX ADMIN — KETOROLAC TROMETHAMINE 30 MG: 30 INJECTION, SOLUTION INTRAMUSCULAR at 18:03

## 2018-12-07 RX ADMIN — CLINDAMYCIN IN 5 PERCENT DEXTROSE 900 MG: 18 INJECTION, SOLUTION INTRAVENOUS at 10:29

## 2018-12-07 RX ADMIN — PIPERACILLIN SODIUM,TAZOBACTAM SODIUM 4.5 G: 4; .5 INJECTION, POWDER, FOR SOLUTION INTRAVENOUS at 16:55

## 2018-12-07 RX ADMIN — SODIUM CHLORIDE, POTASSIUM CHLORIDE, SODIUM LACTATE AND CALCIUM CHLORIDE 100 ML/HR: 600; 310; 30; 20 INJECTION, SOLUTION INTRAVENOUS at 14:40

## 2018-12-07 RX ADMIN — SODIUM CHLORIDE, PRESERVATIVE FREE 10 ML: 5 INJECTION INTRAVENOUS at 20:48

## 2018-12-07 RX ADMIN — PIPERACILLIN SODIUM,TAZOBACTAM SODIUM 4.5 G: 4; .5 INJECTION, POWDER, FOR SOLUTION INTRAVENOUS at 08:08

## 2018-12-07 RX ADMIN — PIPERACILLIN SODIUM,TAZOBACTAM SODIUM 4.5 G: 4; .5 INJECTION, POWDER, FOR SOLUTION INTRAVENOUS at 00:37

## 2018-12-07 RX ADMIN — OXYCODONE HYDROCHLORIDE AND ACETAMINOPHEN 1 TABLET: 5; 325 TABLET ORAL at 14:34

## 2018-12-08 ENCOUNTER — APPOINTMENT (OUTPATIENT)
Dept: ULTRASOUND IMAGING | Facility: HOSPITAL | Age: 33
End: 2018-12-08

## 2018-12-08 LAB
ANION GAP SERPL CALCULATED.3IONS-SCNC: 11.8 MMOL/L
BASOPHILS # BLD AUTO: 0.05 10*3/MM3 (ref 0–0.2)
BASOPHILS NFR BLD AUTO: 0.6 % (ref 0–2)
BUN BLD-MCNC: 8 MG/DL (ref 6–20)
BUN/CREAT SERPL: 11 (ref 7–25)
CALCIUM SPEC-SCNC: 8.6 MG/DL (ref 8.6–10.5)
CHLORIDE SERPL-SCNC: 99 MMOL/L (ref 98–107)
CO2 SERPL-SCNC: 27.2 MMOL/L (ref 22–29)
CREAT BLD-MCNC: 0.73 MG/DL (ref 0.57–1)
DEPRECATED RDW RBC AUTO: 41.2 FL (ref 37–54)
EOSINOPHIL # BLD AUTO: 0.25 10*3/MM3 (ref 0.1–0.3)
EOSINOPHIL NFR BLD AUTO: 3 % (ref 0–4)
ERYTHROCYTE [DISTWIDTH] IN BLOOD BY AUTOMATED COUNT: 14.3 % (ref 11.5–14.5)
GFR SERPL CREATININE-BSD FRML MDRD: 91 ML/MIN/1.73
GLUCOSE BLD-MCNC: 92 MG/DL (ref 65–99)
HCT VFR BLD AUTO: 29.7 % (ref 37–47)
HGB BLD-MCNC: 9.2 G/DL (ref 12–16)
IMM GRANULOCYTES # BLD: 0.49 10*3/MM3 (ref 0–0.03)
IMM GRANULOCYTES NFR BLD: 5.9 % (ref 0–0.5)
LYMPHOCYTES # BLD AUTO: 2.02 10*3/MM3 (ref 0.6–4.8)
LYMPHOCYTES NFR BLD AUTO: 24.2 % (ref 20–45)
MCH RBC QN AUTO: 24.7 PG (ref 27–31)
MCHC RBC AUTO-ENTMCNC: 31 G/DL (ref 31–37)
MCV RBC AUTO: 79.8 FL (ref 81–99)
MONOCYTES # BLD AUTO: 0.56 10*3/MM3 (ref 0–1)
MONOCYTES NFR BLD AUTO: 6.7 % (ref 3–8)
NEUTROPHILS # BLD AUTO: 4.98 10*3/MM3 (ref 1.5–8.3)
NEUTROPHILS NFR BLD AUTO: 59.6 % (ref 45–70)
NRBC BLD MANUAL-RTO: 0.2 /100 WBC (ref 0–0)
PLAT MORPH BLD: NORMAL
PLATELET # BLD AUTO: 401 10*3/MM3 (ref 140–500)
PMV BLD AUTO: 10.2 FL (ref 7.4–10.4)
POTASSIUM BLD-SCNC: 4.4 MMOL/L (ref 3.5–5.2)
RBC # BLD AUTO: 3.72 10*6/MM3 (ref 4.2–5.4)
RBC MORPH BLD: NORMAL
SODIUM BLD-SCNC: 138 MMOL/L (ref 136–145)
WBC MORPH BLD: NORMAL
WBC NRBC COR # BLD: 8.35 10*3/MM3 (ref 4.8–10.8)

## 2018-12-08 PROCEDURE — 25010000002 ENOXAPARIN PER 10 MG: Performed by: SURGERY

## 2018-12-08 PROCEDURE — 85025 COMPLETE CBC W/AUTO DIFF WBC: CPT | Performed by: SURGERY

## 2018-12-08 PROCEDURE — 94799 UNLISTED PULMONARY SVC/PX: CPT

## 2018-12-08 PROCEDURE — 25010000003 AMPICILLIN-SULBACTAM PER 1.5 G: Performed by: INTERNAL MEDICINE

## 2018-12-08 PROCEDURE — 25010000002 PIPERACILLIN SOD-TAZOBACTAM PER 1 G: Performed by: SURGERY

## 2018-12-08 PROCEDURE — 85007 BL SMEAR W/DIFF WBC COUNT: CPT | Performed by: SURGERY

## 2018-12-08 PROCEDURE — 99024 POSTOP FOLLOW-UP VISIT: CPT | Performed by: SURGERY

## 2018-12-08 PROCEDURE — 25010000002 KETOROLAC TROMETHAMINE PER 15 MG: Performed by: SURGERY

## 2018-12-08 PROCEDURE — 80048 BASIC METABOLIC PNL TOTAL CA: CPT | Performed by: SURGERY

## 2018-12-08 PROCEDURE — 93971 EXTREMITY STUDY: CPT

## 2018-12-08 RX ADMIN — SODIUM CHLORIDE, PRESERVATIVE FREE 3 ML: 5 INJECTION INTRAVENOUS at 20:35

## 2018-12-08 RX ADMIN — AMPICILLIN SODIUM AND SULBACTAM SODIUM 3 G: 2; 1 INJECTION, POWDER, FOR SOLUTION INTRAMUSCULAR; INTRAVENOUS at 11:47

## 2018-12-08 RX ADMIN — CLINDAMYCIN IN 5 PERCENT DEXTROSE 900 MG: 18 INJECTION, SOLUTION INTRAVENOUS at 02:07

## 2018-12-08 RX ADMIN — PIPERACILLIN SODIUM,TAZOBACTAM SODIUM 4.5 G: 4; .5 INJECTION, POWDER, FOR SOLUTION INTRAVENOUS at 08:14

## 2018-12-08 RX ADMIN — KETOROLAC TROMETHAMINE 30 MG: 30 INJECTION, SOLUTION INTRAMUSCULAR at 08:30

## 2018-12-08 RX ADMIN — KETOROLAC TROMETHAMINE 30 MG: 30 INJECTION, SOLUTION INTRAMUSCULAR at 14:59

## 2018-12-08 RX ADMIN — OXYCODONE HYDROCHLORIDE AND ACETAMINOPHEN 1 TABLET: 5; 325 TABLET ORAL at 16:25

## 2018-12-08 RX ADMIN — METFORMIN HYDROCHLORIDE 500 MG: 500 TABLET, EXTENDED RELEASE ORAL at 08:15

## 2018-12-08 RX ADMIN — SODIUM CHLORIDE, PRESERVATIVE FREE 10 ML: 5 INJECTION INTRAVENOUS at 08:16

## 2018-12-08 RX ADMIN — ENOXAPARIN SODIUM 40 MG: 40 INJECTION SUBCUTANEOUS at 09:01

## 2018-12-08 RX ADMIN — KETOROLAC TROMETHAMINE 30 MG: 30 INJECTION, SOLUTION INTRAMUSCULAR at 01:37

## 2018-12-08 RX ADMIN — NORTRIPTYLINE HYDROCHLORIDE 100 MG: 25 CAPSULE ORAL at 20:36

## 2018-12-08 RX ADMIN — AMPICILLIN SODIUM AND SULBACTAM SODIUM 3 G: 2; 1 INJECTION, POWDER, FOR SOLUTION INTRAMUSCULAR; INTRAVENOUS at 17:18

## 2018-12-08 RX ADMIN — OXYCODONE HYDROCHLORIDE AND ACETAMINOPHEN 1 TABLET: 5; 325 TABLET ORAL at 10:42

## 2018-12-08 RX ADMIN — OXYCODONE HYDROCHLORIDE AND ACETAMINOPHEN 1 TABLET: 5; 325 TABLET ORAL at 20:32

## 2018-12-08 RX ADMIN — FAMOTIDINE 20 MG: 20 TABLET ORAL at 09:01

## 2018-12-08 RX ADMIN — SODIUM CHLORIDE, POTASSIUM CHLORIDE, SODIUM LACTATE AND CALCIUM CHLORIDE 100 ML/HR: 600; 310; 30; 20 INJECTION, SOLUTION INTRAVENOUS at 01:28

## 2018-12-08 RX ADMIN — SODIUM CHLORIDE, PRESERVATIVE FREE 10 ML: 5 INJECTION INTRAVENOUS at 20:34

## 2018-12-08 RX ADMIN — PIPERACILLIN SODIUM,TAZOBACTAM SODIUM 4.5 G: 4; .5 INJECTION, POWDER, FOR SOLUTION INTRAVENOUS at 01:27

## 2018-12-08 RX ADMIN — OXYCODONE HYDROCHLORIDE AND ACETAMINOPHEN 1 TABLET: 5; 325 TABLET ORAL at 05:57

## 2018-12-09 PROCEDURE — 99024 POSTOP FOLLOW-UP VISIT: CPT | Performed by: SURGERY

## 2018-12-09 PROCEDURE — 25010000003 AMPICILLIN-SULBACTAM PER 1.5 G: Performed by: INTERNAL MEDICINE

## 2018-12-09 PROCEDURE — 94799 UNLISTED PULMONARY SVC/PX: CPT

## 2018-12-09 PROCEDURE — 25010000002 ENOXAPARIN PER 10 MG: Performed by: SURGERY

## 2018-12-09 RX ADMIN — SODIUM CHLORIDE, PRESERVATIVE FREE 10 ML: 5 INJECTION INTRAVENOUS at 08:57

## 2018-12-09 RX ADMIN — OXYCODONE HYDROCHLORIDE AND ACETAMINOPHEN 1 TABLET: 5; 325 TABLET ORAL at 00:34

## 2018-12-09 RX ADMIN — OXYCODONE HYDROCHLORIDE AND ACETAMINOPHEN 1 TABLET: 5; 325 TABLET ORAL at 14:37

## 2018-12-09 RX ADMIN — OXYCODONE HYDROCHLORIDE AND ACETAMINOPHEN 1 TABLET: 5; 325 TABLET ORAL at 18:29

## 2018-12-09 RX ADMIN — AMPICILLIN SODIUM AND SULBACTAM SODIUM 3 G: 2; 1 INJECTION, POWDER, FOR SOLUTION INTRAMUSCULAR; INTRAVENOUS at 00:30

## 2018-12-09 RX ADMIN — SODIUM CHLORIDE, PRESERVATIVE FREE 10 ML: 5 INJECTION INTRAVENOUS at 21:18

## 2018-12-09 RX ADMIN — OXYCODONE HYDROCHLORIDE AND ACETAMINOPHEN 1 TABLET: 5; 325 TABLET ORAL at 05:54

## 2018-12-09 RX ADMIN — OXYCODONE HYDROCHLORIDE AND ACETAMINOPHEN 1 TABLET: 5; 325 TABLET ORAL at 10:21

## 2018-12-09 RX ADMIN — AMPICILLIN SODIUM AND SULBACTAM SODIUM 3 G: 2; 1 INJECTION, POWDER, FOR SOLUTION INTRAMUSCULAR; INTRAVENOUS at 10:43

## 2018-12-09 RX ADMIN — ENOXAPARIN SODIUM 40 MG: 40 INJECTION SUBCUTANEOUS at 08:56

## 2018-12-09 RX ADMIN — NORTRIPTYLINE HYDROCHLORIDE 100 MG: 25 CAPSULE ORAL at 21:18

## 2018-12-09 RX ADMIN — FAMOTIDINE 20 MG: 20 TABLET ORAL at 08:56

## 2018-12-09 RX ADMIN — AMPICILLIN SODIUM AND SULBACTAM SODIUM 3 G: 2; 1 INJECTION, POWDER, FOR SOLUTION INTRAMUSCULAR; INTRAVENOUS at 23:43

## 2018-12-09 RX ADMIN — METFORMIN HYDROCHLORIDE 500 MG: 500 TABLET, EXTENDED RELEASE ORAL at 08:56

## 2018-12-09 RX ADMIN — AMPICILLIN SODIUM AND SULBACTAM SODIUM 3 G: 2; 1 INJECTION, POWDER, FOR SOLUTION INTRAMUSCULAR; INTRAVENOUS at 05:41

## 2018-12-09 RX ADMIN — AMPICILLIN SODIUM AND SULBACTAM SODIUM 3 G: 2; 1 INJECTION, POWDER, FOR SOLUTION INTRAMUSCULAR; INTRAVENOUS at 16:32

## 2018-12-09 RX ADMIN — OXYCODONE HYDROCHLORIDE AND ACETAMINOPHEN 1 TABLET: 5; 325 TABLET ORAL at 22:32

## 2018-12-10 PROCEDURE — 25010000002 ENOXAPARIN PER 10 MG: Performed by: SURGERY

## 2018-12-10 PROCEDURE — 99024 POSTOP FOLLOW-UP VISIT: CPT | Performed by: SURGERY

## 2018-12-10 PROCEDURE — 25010000003 AMPICILLIN-SULBACTAM PER 1.5 G: Performed by: INTERNAL MEDICINE

## 2018-12-10 RX ADMIN — FAMOTIDINE 20 MG: 20 TABLET ORAL at 09:30

## 2018-12-10 RX ADMIN — OXYCODONE HYDROCHLORIDE AND ACETAMINOPHEN 1 TABLET: 5; 325 TABLET ORAL at 19:03

## 2018-12-10 RX ADMIN — OXYCODONE HYDROCHLORIDE AND ACETAMINOPHEN 1 TABLET: 5; 325 TABLET ORAL at 10:39

## 2018-12-10 RX ADMIN — NORTRIPTYLINE HYDROCHLORIDE 100 MG: 25 CAPSULE ORAL at 20:13

## 2018-12-10 RX ADMIN — SODIUM CHLORIDE, PRESERVATIVE FREE 10 ML: 5 INJECTION INTRAVENOUS at 09:32

## 2018-12-10 RX ADMIN — OXYCODONE HYDROCHLORIDE AND ACETAMINOPHEN 1 TABLET: 5; 325 TABLET ORAL at 02:40

## 2018-12-10 RX ADMIN — SODIUM CHLORIDE, PRESERVATIVE FREE 3 ML: 5 INJECTION INTRAVENOUS at 09:33

## 2018-12-10 RX ADMIN — OXYCODONE HYDROCHLORIDE AND ACETAMINOPHEN 1 TABLET: 5; 325 TABLET ORAL at 06:43

## 2018-12-10 RX ADMIN — AMPICILLIN SODIUM AND SULBACTAM SODIUM 3 G: 2; 1 INJECTION, POWDER, FOR SOLUTION INTRAMUSCULAR; INTRAVENOUS at 13:27

## 2018-12-10 RX ADMIN — ENOXAPARIN SODIUM 40 MG: 40 INJECTION SUBCUTANEOUS at 09:30

## 2018-12-10 RX ADMIN — OXYCODONE HYDROCHLORIDE AND ACETAMINOPHEN 1 TABLET: 5; 325 TABLET ORAL at 14:36

## 2018-12-10 RX ADMIN — AMPICILLIN SODIUM AND SULBACTAM SODIUM 3 G: 2; 1 INJECTION, POWDER, FOR SOLUTION INTRAMUSCULAR; INTRAVENOUS at 18:09

## 2018-12-10 RX ADMIN — OXYCODONE HYDROCHLORIDE AND ACETAMINOPHEN 1 TABLET: 5; 325 TABLET ORAL at 23:03

## 2018-12-10 RX ADMIN — METFORMIN HYDROCHLORIDE 500 MG: 500 TABLET, EXTENDED RELEASE ORAL at 09:32

## 2018-12-10 RX ADMIN — SODIUM CHLORIDE, PRESERVATIVE FREE 10 ML: 5 INJECTION INTRAVENOUS at 20:13

## 2018-12-10 RX ADMIN — AMPICILLIN SODIUM AND SULBACTAM SODIUM 3 G: 2; 1 INJECTION, POWDER, FOR SOLUTION INTRAMUSCULAR; INTRAVENOUS at 06:19

## 2018-12-11 ENCOUNTER — ANESTHESIA (OUTPATIENT)
Dept: PERIOP | Facility: HOSPITAL | Age: 33
End: 2018-12-11

## 2018-12-11 ENCOUNTER — ANESTHESIA EVENT (OUTPATIENT)
Dept: PERIOP | Facility: HOSPITAL | Age: 33
End: 2018-12-11

## 2018-12-11 PROCEDURE — 25010000002 PROPOFOL 10 MG/ML EMULSION: Performed by: NURSE ANESTHETIST, CERTIFIED REGISTERED

## 2018-12-11 PROCEDURE — 25010000002 SUCCINYLCHOLINE PER 20 MG: Performed by: NURSE ANESTHETIST, CERTIFIED REGISTERED

## 2018-12-11 PROCEDURE — 25010000002 ONDANSETRON PER 1 MG: Performed by: NURSE ANESTHETIST, CERTIFIED REGISTERED

## 2018-12-11 PROCEDURE — 25010000003 AMPICILLIN-SULBACTAM PER 1.5 G: Performed by: INTERNAL MEDICINE

## 2018-12-11 PROCEDURE — 94799 UNLISTED PULMONARY SVC/PX: CPT

## 2018-12-11 PROCEDURE — 11042 DBRDMT SUBQ TIS 1ST 20SQCM/<: CPT | Performed by: SURGERY

## 2018-12-11 PROCEDURE — 25010000002 MORPHINE PER 10 MG: Performed by: SURGERY

## 2018-12-11 PROCEDURE — 11045 DBRDMT SUBQ TISS EACH ADDL: CPT | Performed by: SURGERY

## 2018-12-11 PROCEDURE — 25010000002 DEXAMETHASONE PER 1 MG: Performed by: NURSE ANESTHETIST, CERTIFIED REGISTERED

## 2018-12-11 PROCEDURE — 25010000002 FENTANYL CITRATE (PF) 100 MCG/2ML SOLUTION: Performed by: NURSE ANESTHETIST, CERTIFIED REGISTERED

## 2018-12-11 PROCEDURE — 0JB80ZZ EXCISION OF ABDOMEN SUBCUTANEOUS TISSUE AND FASCIA, OPEN APPROACH: ICD-10-PCS | Performed by: SURGERY

## 2018-12-11 PROCEDURE — 25010000002 MIDAZOLAM PER 1 MG: Performed by: NURSE ANESTHETIST, CERTIFIED REGISTERED

## 2018-12-11 RX ORDER — LIDOCAINE HYDROCHLORIDE 20 MG/ML
INJECTION, SOLUTION INFILTRATION; PERINEURAL AS NEEDED
Status: DISCONTINUED | OUTPATIENT
Start: 2018-12-11 | End: 2018-12-11 | Stop reason: SURG

## 2018-12-11 RX ORDER — PROMETHAZINE HYDROCHLORIDE 25 MG/1
25 TABLET ORAL ONCE AS NEEDED
Status: DISCONTINUED | OUTPATIENT
Start: 2018-12-11 | End: 2018-12-11 | Stop reason: HOSPADM

## 2018-12-11 RX ORDER — SODIUM CHLORIDE 0.9 % (FLUSH) 0.9 %
3 SYRINGE (ML) INJECTION EVERY 12 HOURS SCHEDULED
Status: DISCONTINUED | OUTPATIENT
Start: 2018-12-11 | End: 2018-12-11 | Stop reason: HOSPADM

## 2018-12-11 RX ORDER — SUCCINYLCHOLINE CHLORIDE 20 MG/ML
INJECTION INTRAMUSCULAR; INTRAVENOUS AS NEEDED
Status: DISCONTINUED | OUTPATIENT
Start: 2018-12-11 | End: 2018-12-11 | Stop reason: SURG

## 2018-12-11 RX ORDER — SODIUM HYPOCHLORITE 1.25 MG/ML
SOLUTION TOPICAL AS NEEDED
Status: DISCONTINUED | OUTPATIENT
Start: 2018-12-11 | End: 2018-12-11 | Stop reason: HOSPADM

## 2018-12-11 RX ORDER — MIDAZOLAM HYDROCHLORIDE 1 MG/ML
2 INJECTION INTRAMUSCULAR; INTRAVENOUS
Status: DISCONTINUED | OUTPATIENT
Start: 2018-12-11 | End: 2018-12-11 | Stop reason: HOSPADM

## 2018-12-11 RX ORDER — SODIUM CHLORIDE, SODIUM LACTATE, POTASSIUM CHLORIDE, CALCIUM CHLORIDE 600; 310; 30; 20 MG/100ML; MG/100ML; MG/100ML; MG/100ML
9 INJECTION, SOLUTION INTRAVENOUS CONTINUOUS
Status: DISCONTINUED | OUTPATIENT
Start: 2018-12-11 | End: 2018-12-14 | Stop reason: HOSPADM

## 2018-12-11 RX ORDER — PROMETHAZINE HYDROCHLORIDE 25 MG/1
25 SUPPOSITORY RECTAL ONCE AS NEEDED
Status: DISCONTINUED | OUTPATIENT
Start: 2018-12-11 | End: 2018-12-11 | Stop reason: HOSPADM

## 2018-12-11 RX ORDER — ONDANSETRON 2 MG/ML
4 INJECTION INTRAMUSCULAR; INTRAVENOUS ONCE AS NEEDED
Status: DISCONTINUED | OUTPATIENT
Start: 2018-12-11 | End: 2018-12-11 | Stop reason: HOSPADM

## 2018-12-11 RX ORDER — ONDANSETRON 2 MG/ML
4 INJECTION INTRAMUSCULAR; INTRAVENOUS ONCE AS NEEDED
Status: COMPLETED | OUTPATIENT
Start: 2018-12-11 | End: 2018-12-11

## 2018-12-11 RX ORDER — PROMETHAZINE HYDROCHLORIDE 25 MG/ML
6.25 INJECTION, SOLUTION INTRAMUSCULAR; INTRAVENOUS ONCE AS NEEDED
Status: DISCONTINUED | OUTPATIENT
Start: 2018-12-11 | End: 2018-12-11 | Stop reason: HOSPADM

## 2018-12-11 RX ORDER — SODIUM CHLORIDE, SODIUM LACTATE, POTASSIUM CHLORIDE, CALCIUM CHLORIDE 600; 310; 30; 20 MG/100ML; MG/100ML; MG/100ML; MG/100ML
100 INJECTION, SOLUTION INTRAVENOUS CONTINUOUS
Status: DISCONTINUED | OUTPATIENT
Start: 2018-12-11 | End: 2018-12-12

## 2018-12-11 RX ORDER — MIDAZOLAM HYDROCHLORIDE 1 MG/ML
1 INJECTION INTRAMUSCULAR; INTRAVENOUS
Status: DISCONTINUED | OUTPATIENT
Start: 2018-12-11 | End: 2018-12-11 | Stop reason: HOSPADM

## 2018-12-11 RX ORDER — SODIUM CHLORIDE 0.9 % (FLUSH) 0.9 %
1-10 SYRINGE (ML) INJECTION AS NEEDED
Status: DISCONTINUED | OUTPATIENT
Start: 2018-12-11 | End: 2018-12-11 | Stop reason: HOSPADM

## 2018-12-11 RX ORDER — ROCURONIUM BROMIDE 10 MG/ML
INJECTION, SOLUTION INTRAVENOUS AS NEEDED
Status: DISCONTINUED | OUTPATIENT
Start: 2018-12-11 | End: 2018-12-11 | Stop reason: SURG

## 2018-12-11 RX ORDER — FENTANYL CITRATE 50 UG/ML
INJECTION, SOLUTION INTRAMUSCULAR; INTRAVENOUS AS NEEDED
Status: DISCONTINUED | OUTPATIENT
Start: 2018-12-11 | End: 2018-12-11 | Stop reason: SURG

## 2018-12-11 RX ORDER — SCOLOPAMINE TRANSDERMAL SYSTEM 1 MG/1
1 PATCH, EXTENDED RELEASE TRANSDERMAL CONTINUOUS
Status: ACTIVE | OUTPATIENT
Start: 2018-12-11 | End: 2018-12-12

## 2018-12-11 RX ORDER — MAGNESIUM HYDROXIDE 1200 MG/15ML
LIQUID ORAL AS NEEDED
Status: DISCONTINUED | OUTPATIENT
Start: 2018-12-11 | End: 2018-12-11 | Stop reason: HOSPADM

## 2018-12-11 RX ORDER — LIDOCAINE HYDROCHLORIDE 10 MG/ML
0.5 INJECTION, SOLUTION EPIDURAL; INFILTRATION; INTRACAUDAL; PERINEURAL ONCE AS NEEDED
Status: DISCONTINUED | OUTPATIENT
Start: 2018-12-11 | End: 2018-12-11 | Stop reason: HOSPADM

## 2018-12-11 RX ORDER — FENTANYL CITRATE 50 UG/ML
50 INJECTION, SOLUTION INTRAMUSCULAR; INTRAVENOUS
Status: DISCONTINUED | OUTPATIENT
Start: 2018-12-11 | End: 2018-12-11 | Stop reason: HOSPADM

## 2018-12-11 RX ORDER — DEXAMETHASONE SODIUM PHOSPHATE 4 MG/ML
8 INJECTION, SOLUTION INTRA-ARTICULAR; INTRALESIONAL; INTRAMUSCULAR; INTRAVENOUS; SOFT TISSUE ONCE AS NEEDED
Status: COMPLETED | OUTPATIENT
Start: 2018-12-11 | End: 2018-12-11

## 2018-12-11 RX ORDER — SODIUM CHLORIDE 9 MG/ML
40 INJECTION, SOLUTION INTRAVENOUS AS NEEDED
Status: DISCONTINUED | OUTPATIENT
Start: 2018-12-11 | End: 2018-12-11 | Stop reason: HOSPADM

## 2018-12-11 RX ORDER — PROPOFOL 10 MG/ML
VIAL (ML) INTRAVENOUS AS NEEDED
Status: DISCONTINUED | OUTPATIENT
Start: 2018-12-11 | End: 2018-12-11 | Stop reason: SURG

## 2018-12-11 RX ADMIN — RIZATRIPTAN BENZOATE 10 MG: 10 TABLET ORAL at 18:18

## 2018-12-11 RX ADMIN — RIZATRIPTAN BENZOATE 10 MG: 10 TABLET ORAL at 04:18

## 2018-12-11 RX ADMIN — SODIUM CHLORIDE, PRESERVATIVE FREE 3 ML: 5 INJECTION INTRAVENOUS at 21:21

## 2018-12-11 RX ADMIN — OXYCODONE HYDROCHLORIDE AND ACETAMINOPHEN 1 TABLET: 5; 325 TABLET ORAL at 06:10

## 2018-12-11 RX ADMIN — DEXAMETHASONE SODIUM PHOSPHATE 8 MG: 4 INJECTION, SOLUTION INTRA-ARTICULAR; INTRALESIONAL; INTRAMUSCULAR; INTRAVENOUS; SOFT TISSUE at 14:47

## 2018-12-11 RX ADMIN — NORTRIPTYLINE HYDROCHLORIDE 100 MG: 25 CAPSULE ORAL at 21:21

## 2018-12-11 RX ADMIN — MIDAZOLAM HYDROCHLORIDE 2 MG: 1 INJECTION, SOLUTION INTRAMUSCULAR; INTRAVENOUS at 15:25

## 2018-12-11 RX ADMIN — SODIUM CHLORIDE, POTASSIUM CHLORIDE, SODIUM LACTATE AND CALCIUM CHLORIDE 100 ML/HR: 600; 310; 30; 20 INJECTION, SOLUTION INTRAVENOUS at 18:15

## 2018-12-11 RX ADMIN — LIDOCAINE HYDROCHLORIDE 100 MG: 20 INJECTION, SOLUTION INFILTRATION; PERINEURAL at 15:37

## 2018-12-11 RX ADMIN — PROPOFOL 20 MG: 10 INJECTION, EMULSION INTRAVENOUS at 16:25

## 2018-12-11 RX ADMIN — FENTANYL CITRATE 50 MCG: 50 INJECTION, SOLUTION INTRAMUSCULAR; INTRAVENOUS at 16:30

## 2018-12-11 RX ADMIN — PROPOFOL 30 MG: 10 INJECTION, EMULSION INTRAVENOUS at 15:48

## 2018-12-11 RX ADMIN — SCOPALAMINE 1 PATCH: 1 PATCH, EXTENDED RELEASE TRANSDERMAL at 14:48

## 2018-12-11 RX ADMIN — AMPICILLIN SODIUM AND SULBACTAM SODIUM 3 G: 2; 1 INJECTION, POWDER, FOR SOLUTION INTRAMUSCULAR; INTRAVENOUS at 11:24

## 2018-12-11 RX ADMIN — FAMOTIDINE 20 MG: 10 INJECTION, SOLUTION INTRAVENOUS at 14:47

## 2018-12-11 RX ADMIN — PROPOFOL 150 MG: 10 INJECTION, EMULSION INTRAVENOUS at 15:38

## 2018-12-11 RX ADMIN — SODIUM CHLORIDE, POTASSIUM CHLORIDE, SODIUM LACTATE AND CALCIUM CHLORIDE: 600; 310; 30; 20 INJECTION, SOLUTION INTRAVENOUS at 15:28

## 2018-12-11 RX ADMIN — AMPICILLIN SODIUM AND SULBACTAM SODIUM 3 G: 2; 1 INJECTION, POWDER, FOR SOLUTION INTRAMUSCULAR; INTRAVENOUS at 00:10

## 2018-12-11 RX ADMIN — FENTANYL CITRATE 50 MCG: 50 INJECTION, SOLUTION INTRAMUSCULAR; INTRAVENOUS at 15:37

## 2018-12-11 RX ADMIN — SODIUM CHLORIDE, PRESERVATIVE FREE 10 ML: 5 INJECTION INTRAVENOUS at 21:00

## 2018-12-11 RX ADMIN — AMPICILLIN SODIUM AND SULBACTAM SODIUM 3 G: 2; 1 INJECTION, POWDER, FOR SOLUTION INTRAMUSCULAR; INTRAVENOUS at 06:11

## 2018-12-11 RX ADMIN — ROCURONIUM BROMIDE 5 MG: 10 INJECTION INTRAVENOUS at 15:36

## 2018-12-11 RX ADMIN — FENTANYL CITRATE 50 MCG: 50 INJECTION, SOLUTION INTRAMUSCULAR; INTRAVENOUS at 15:36

## 2018-12-11 RX ADMIN — FENTANYL CITRATE 50 MCG: 50 INJECTION, SOLUTION INTRAMUSCULAR; INTRAVENOUS at 17:00

## 2018-12-11 RX ADMIN — ONDANSETRON 4 MG: 2 INJECTION, SOLUTION INTRAMUSCULAR; INTRAVENOUS at 14:47

## 2018-12-11 RX ADMIN — AMPICILLIN SODIUM AND SULBACTAM SODIUM 3 G: 2; 1 INJECTION, POWDER, FOR SOLUTION INTRAMUSCULAR; INTRAVENOUS at 23:54

## 2018-12-11 RX ADMIN — MORPHINE SULFATE 2 MG: 10 INJECTION INTRAVENOUS at 11:23

## 2018-12-11 RX ADMIN — SUCCINYLCHOLINE CHLORIDE 140 MG: 20 INJECTION, SOLUTION INTRAMUSCULAR; INTRAVENOUS at 15:38

## 2018-12-11 NOTE — ANESTHESIA PREPROCEDURE EVALUATION
Anesthesia Evaluation     Patient summary reviewed and Nursing notes reviewed   history of anesthetic complications: PONV  NPO Solid Status: > 8 hours  NPO Liquid Status: > 8 hours           Airway   Mallampati: I  TM distance: >3 FB  Neck ROM: full  No difficulty expected  Dental - normal exam     Pulmonary - normal exam    breath sounds clear to auscultation  (+) shortness of breath (easily SOA since surgery), sleep apnea (to do a sleep study),   (-) not a smokerPneumonia: 7/2018.  Cardiovascular - normal exam  Exercise tolerance: poor (<4 METS)    ECG reviewed  Rhythm: regular  Rate: normal    Hypertension: denies.    ROS comment: Narrative     RR Interval= 632 ms  SC Interval= 168 ms  QRSD Interval= 96 ms  QT Interval= 344 ms  QTc Interval= 433 ms  Heart Rate= 95 ms  P Axis= 35 deg  QRS Axis= 9 deg  T Wave Axis= 65 deg  I: 40 Axis= 29 deg  T: 40 Axis= 0 deg  ST Axis= 89 deg  Sinus rhythm  No Prior Tracing for Comparison  Electronically Signed by:  Arleth Knowles ([facility]) 05-Nov-2018 15:45:16  Date and Time of Study: 2018-11-05 10:26:44        Neuro/Psych  (+) headaches (migraines), psychiatric history ( hx anxiety/depression but not currently being treated ) Anxiety and Depression,     Dizziness: dizzy yesterday with current issues.  GI/Hepatic/Renal/Endo    (+) morbid obesity, GERD (zantac, no real indigestion complaints) well controlled,      Musculoskeletal     Abdominal   (+) obese,    Substance History   Alcohol use: rare.     OB/GYN      Comment: PCOS      Other   (+) arthritis (knees)     ROS/Med Hx Other: Metformin for PCOS         no significant changes since last anesthesia           Anesthesia Plan    ASA 3 - emergent     general     intravenous induction   Anesthetic plan, all risks, benefits, and alternatives have been provided, discussed and informed consent has been obtained with: patient and spouse/significant other.  Use of blood products discussed with patient and spouse/significant other   Consented to blood products.

## 2018-12-11 NOTE — ANESTHESIA PROCEDURE NOTES
ANESTHESIA INTUBATION  Urgency: elective    Airway not difficult    General Information and Staff    Patient location during procedure: OR  CRNA: Bo Bruno CRNA    Indications and Patient Condition  Indications for airway management: airway protection    Preoxygenated: yes  MILS not maintained throughout      Final Airway Details  Final airway type: endotracheal airway      Successful airway: ETT  Cuffed: yes   Successful intubation technique: direct laryngoscopy  Facilitating devices/methods: intubating stylet and cricoid pressure  Endotracheal tube insertion site: oral  Blade: Queen  Blade size: 2  ETT size (mm): 7.5  Cormack-Lehane Classification: grade I - full view of glottis  Placement verified by: chest auscultation and capnometry   Cuff volume (mL): 7  Measured from: lips  ETT to lips (cm): 21  Number of attempts at approach: 1    Additional Comments  Atraumatic

## 2018-12-12 PROCEDURE — 94799 UNLISTED PULMONARY SVC/PX: CPT

## 2018-12-12 PROCEDURE — 25010000002 ENOXAPARIN PER 10 MG: Performed by: SURGERY

## 2018-12-12 PROCEDURE — 25010000003 AMPICILLIN-SULBACTAM PER 1.5 G: Performed by: INTERNAL MEDICINE

## 2018-12-12 PROCEDURE — 25010000002 PIPERACILLIN-TAZOBACTAM: Performed by: INTERNAL MEDICINE

## 2018-12-12 PROCEDURE — 25010000003 AMPICILLIN-SULBACTAM PER 1.5 G

## 2018-12-12 PROCEDURE — 99024 POSTOP FOLLOW-UP VISIT: CPT | Performed by: SURGERY

## 2018-12-12 RX ADMIN — NORTRIPTYLINE HYDROCHLORIDE 100 MG: 25 CAPSULE ORAL at 21:10

## 2018-12-12 RX ADMIN — METFORMIN HYDROCHLORIDE 500 MG: 500 TABLET, EXTENDED RELEASE ORAL at 08:22

## 2018-12-12 RX ADMIN — OXYCODONE HYDROCHLORIDE AND ACETAMINOPHEN 1 TABLET: 5; 325 TABLET ORAL at 06:03

## 2018-12-12 RX ADMIN — SODIUM CHLORIDE, PRESERVATIVE FREE 10 ML: 5 INJECTION INTRAVENOUS at 22:48

## 2018-12-12 RX ADMIN — FAMOTIDINE 20 MG: 20 TABLET ORAL at 08:22

## 2018-12-12 RX ADMIN — ENOXAPARIN SODIUM 40 MG: 40 INJECTION SUBCUTANEOUS at 08:22

## 2018-12-12 RX ADMIN — AMPICILLIN SODIUM AND SULBACTAM SODIUM 3 G: 2; 1 INJECTION, POWDER, FOR SOLUTION INTRAMUSCULAR; INTRAVENOUS at 06:04

## 2018-12-12 RX ADMIN — AMPICILLIN SODIUM AND SULBACTAM SODIUM 3 G: 2; 1 INJECTION, POWDER, FOR SOLUTION INTRAMUSCULAR; INTRAVENOUS at 11:18

## 2018-12-12 RX ADMIN — SODIUM CHLORIDE, POTASSIUM CHLORIDE, SODIUM LACTATE AND CALCIUM CHLORIDE 100 ML/HR: 600; 310; 30; 20 INJECTION, SOLUTION INTRAVENOUS at 06:08

## 2018-12-12 RX ADMIN — SODIUM CHLORIDE, PRESERVATIVE FREE 3 ML: 5 INJECTION INTRAVENOUS at 08:22

## 2018-12-12 RX ADMIN — OXYCODONE HYDROCHLORIDE AND ACETAMINOPHEN 1 TABLET: 5; 325 TABLET ORAL at 15:16

## 2018-12-12 RX ADMIN — SODIUM CHLORIDE, PRESERVATIVE FREE 10 ML: 5 INJECTION INTRAVENOUS at 08:22

## 2018-12-12 RX ADMIN — OXYCODONE HYDROCHLORIDE AND ACETAMINOPHEN 1 TABLET: 5; 325 TABLET ORAL at 21:12

## 2018-12-12 RX ADMIN — OXYCODONE HYDROCHLORIDE AND ACETAMINOPHEN 1 TABLET: 5; 325 TABLET ORAL at 11:23

## 2018-12-12 RX ADMIN — OXYCODONE HYDROCHLORIDE AND ACETAMINOPHEN 1 TABLET: 5; 325 TABLET ORAL at 00:12

## 2018-12-12 RX ADMIN — TAZOBACTAM SODIUM AND PIPERACILLIN SODIUM 4.5 G: .5; 4 INJECTION, POWDER, LYOPHILIZED, FOR SOLUTION INTRAVENOUS at 17:07

## 2018-12-13 LAB
ANION GAP SERPL CALCULATED.3IONS-SCNC: 11.2 MMOL/L
BASOPHILS # BLD AUTO: 0.02 10*3/MM3 (ref 0–0.2)
BASOPHILS NFR BLD AUTO: 0.3 % (ref 0–2)
BUN BLD-MCNC: 14 MG/DL (ref 6–20)
BUN/CREAT SERPL: 21.2 (ref 7–25)
CALCIUM SPEC-SCNC: 8.5 MG/DL (ref 8.6–10.5)
CHLORIDE SERPL-SCNC: 104 MMOL/L (ref 98–107)
CO2 SERPL-SCNC: 24.8 MMOL/L (ref 22–29)
CREAT BLD-MCNC: 0.66 MG/DL (ref 0.57–1)
DEPRECATED RDW RBC AUTO: 43.9 FL (ref 37–54)
EOSINOPHIL # BLD AUTO: 0.14 10*3/MM3 (ref 0.1–0.3)
EOSINOPHIL NFR BLD AUTO: 1.9 % (ref 0–4)
ERYTHROCYTE [DISTWIDTH] IN BLOOD BY AUTOMATED COUNT: 15.1 % (ref 11.5–14.5)
GFR SERPL CREATININE-BSD FRML MDRD: 103 ML/MIN/1.73
GLUCOSE BLD-MCNC: 94 MG/DL (ref 65–99)
HCT VFR BLD AUTO: 29 % (ref 37–47)
HGB BLD-MCNC: 8.9 G/DL (ref 12–16)
IMM GRANULOCYTES # BLD: 0.23 10*3/MM3 (ref 0–0.03)
IMM GRANULOCYTES NFR BLD: 3.1 % (ref 0–0.5)
LYMPHOCYTES # BLD AUTO: 2.5 10*3/MM3 (ref 0.6–4.8)
LYMPHOCYTES NFR BLD AUTO: 33.5 % (ref 20–45)
MCH RBC QN AUTO: 24.9 PG (ref 27–31)
MCHC RBC AUTO-ENTMCNC: 30.7 G/DL (ref 31–37)
MCV RBC AUTO: 81 FL (ref 81–99)
MONOCYTES # BLD AUTO: 0.5 10*3/MM3 (ref 0–1)
MONOCYTES NFR BLD AUTO: 6.7 % (ref 3–8)
NEUTROPHILS # BLD AUTO: 4.08 10*3/MM3 (ref 1.5–8.3)
NEUTROPHILS NFR BLD AUTO: 54.5 % (ref 45–70)
NRBC BLD MANUAL-RTO: 0 /100 WBC (ref 0–0)
PLATELET # BLD AUTO: 311 10*3/MM3 (ref 140–500)
PMV BLD AUTO: 9.9 FL (ref 7.4–10.4)
POTASSIUM BLD-SCNC: 4.2 MMOL/L (ref 3.5–5.2)
RBC # BLD AUTO: 3.58 10*6/MM3 (ref 4.2–5.4)
SODIUM BLD-SCNC: 140 MMOL/L (ref 136–145)
WBC NRBC COR # BLD: 7.47 10*3/MM3 (ref 4.8–10.8)

## 2018-12-13 PROCEDURE — 80048 BASIC METABOLIC PNL TOTAL CA: CPT | Performed by: SURGERY

## 2018-12-13 PROCEDURE — 25010000002 PIPERACILLIN SOD-TAZOBACTAM PER 1 G: Performed by: INTERNAL MEDICINE

## 2018-12-13 PROCEDURE — 25010000002 PIPERACILLIN-TAZOBACTAM: Performed by: INTERNAL MEDICINE

## 2018-12-13 PROCEDURE — 85025 COMPLETE CBC W/AUTO DIFF WBC: CPT | Performed by: SURGERY

## 2018-12-13 PROCEDURE — 94799 UNLISTED PULMONARY SVC/PX: CPT

## 2018-12-13 PROCEDURE — 25010000002 ENOXAPARIN PER 10 MG: Performed by: SURGERY

## 2018-12-13 PROCEDURE — 99024 POSTOP FOLLOW-UP VISIT: CPT | Performed by: SURGERY

## 2018-12-13 RX ADMIN — SODIUM CHLORIDE, PRESERVATIVE FREE 10 ML: 5 INJECTION INTRAVENOUS at 20:38

## 2018-12-13 RX ADMIN — PIPERACILLIN AND TAZOBACTAM 4.5 G: 4; .5 INJECTION, POWDER, LYOPHILIZED, FOR SOLUTION INTRAVENOUS; PARENTERAL at 18:06

## 2018-12-13 RX ADMIN — SODIUM CHLORIDE, PRESERVATIVE FREE 3 ML: 5 INJECTION INTRAVENOUS at 20:38

## 2018-12-13 RX ADMIN — OXYCODONE HYDROCHLORIDE AND ACETAMINOPHEN 1 TABLET: 5; 325 TABLET ORAL at 01:51

## 2018-12-13 RX ADMIN — METFORMIN HYDROCHLORIDE 500 MG: 500 TABLET, EXTENDED RELEASE ORAL at 08:51

## 2018-12-13 RX ADMIN — OXYCODONE HYDROCHLORIDE AND ACETAMINOPHEN 1 TABLET: 5; 325 TABLET ORAL at 06:06

## 2018-12-13 RX ADMIN — SODIUM CHLORIDE, PRESERVATIVE FREE 10 ML: 5 INJECTION INTRAVENOUS at 00:47

## 2018-12-13 RX ADMIN — NORTRIPTYLINE HYDROCHLORIDE 100 MG: 25 CAPSULE ORAL at 20:34

## 2018-12-13 RX ADMIN — SODIUM CHLORIDE, PRESERVATIVE FREE 10 ML: 5 INJECTION INTRAVENOUS at 08:51

## 2018-12-13 RX ADMIN — PIPERACILLIN AND TAZOBACTAM 4.5 G: 4; .5 INJECTION, POWDER, LYOPHILIZED, FOR SOLUTION INTRAVENOUS; PARENTERAL at 08:50

## 2018-12-13 RX ADMIN — OXYCODONE HYDROCHLORIDE AND ACETAMINOPHEN 1 TABLET: 5; 325 TABLET ORAL at 20:33

## 2018-12-13 RX ADMIN — OXYCODONE HYDROCHLORIDE AND ACETAMINOPHEN 1 TABLET: 5; 325 TABLET ORAL at 15:36

## 2018-12-13 RX ADMIN — PIPERACILLIN AND TAZOBACTAM 4.5 G: 4; .5 INJECTION, POWDER, LYOPHILIZED, FOR SOLUTION INTRAVENOUS; PARENTERAL at 00:47

## 2018-12-13 RX ADMIN — SODIUM CHLORIDE, PRESERVATIVE FREE 10 ML: 5 INJECTION INTRAVENOUS at 06:02

## 2018-12-13 RX ADMIN — OXYCODONE HYDROCHLORIDE AND ACETAMINOPHEN 1 TABLET: 5; 325 TABLET ORAL at 11:37

## 2018-12-13 RX ADMIN — FAMOTIDINE 20 MG: 20 TABLET ORAL at 08:51

## 2018-12-13 RX ADMIN — ENOXAPARIN SODIUM 40 MG: 40 INJECTION SUBCUTANEOUS at 08:50

## 2018-12-14 ENCOUNTER — TELEPHONE (OUTPATIENT)
Dept: SURGERY | Facility: CLINIC | Age: 33
End: 2018-12-14

## 2018-12-14 VITALS
RESPIRATION RATE: 20 BRPM | HEART RATE: 97 BPM | BODY MASS INDEX: 48.55 KG/M2 | HEIGHT: 64 IN | TEMPERATURE: 98.4 F | WEIGHT: 284.39 LBS | SYSTOLIC BLOOD PRESSURE: 129 MMHG | OXYGEN SATURATION: 100 % | DIASTOLIC BLOOD PRESSURE: 92 MMHG

## 2018-12-14 PROCEDURE — 25010000002 PIPERACILLIN SOD-TAZOBACTAM PER 1 G: Performed by: INTERNAL MEDICINE

## 2018-12-14 PROCEDURE — 94799 UNLISTED PULMONARY SVC/PX: CPT

## 2018-12-14 PROCEDURE — 99024 POSTOP FOLLOW-UP VISIT: CPT | Performed by: SURGERY

## 2018-12-14 PROCEDURE — 25010000002 ONDANSETRON PER 1 MG: Performed by: SURGERY

## 2018-12-14 PROCEDURE — 25010000002 ENOXAPARIN PER 10 MG: Performed by: SURGERY

## 2018-12-14 RX ORDER — OXYCODONE HYDROCHLORIDE AND ACETAMINOPHEN 5; 325 MG/1; MG/1
2 TABLET ORAL EVERY 4 HOURS PRN
Qty: 30 TABLET | Refills: 0 | Status: SHIPPED | OUTPATIENT
Start: 2018-12-14 | End: 2018-12-19

## 2018-12-14 RX ORDER — SODIUM HYPOCHLORITE 1.25 MG/ML
25 SOLUTION TOPICAL 2 TIMES DAILY
Qty: 473 ML | Refills: 0 | Status: SHIPPED | OUTPATIENT
Start: 2018-12-14 | End: 2019-01-17

## 2018-12-14 RX ORDER — OXYCODONE HYDROCHLORIDE AND ACETAMINOPHEN 5; 325 MG/1; MG/1
1 TABLET ORAL EVERY 4 HOURS PRN
Qty: 30 TABLET | Refills: 0 | Status: SHIPPED | OUTPATIENT
Start: 2018-12-14 | End: 2018-12-19

## 2018-12-14 RX ADMIN — FAMOTIDINE 20 MG: 20 TABLET ORAL at 07:36

## 2018-12-14 RX ADMIN — PIPERACILLIN AND TAZOBACTAM 4.5 G: 4; .5 INJECTION, POWDER, LYOPHILIZED, FOR SOLUTION INTRAVENOUS; PARENTERAL at 00:35

## 2018-12-14 RX ADMIN — PIPERACILLIN AND TAZOBACTAM 4.5 G: 4; .5 INJECTION, POWDER, LYOPHILIZED, FOR SOLUTION INTRAVENOUS; PARENTERAL at 09:27

## 2018-12-14 RX ADMIN — SODIUM CHLORIDE, PRESERVATIVE FREE 3 ML: 5 INJECTION INTRAVENOUS at 09:25

## 2018-12-14 RX ADMIN — OXYCODONE HYDROCHLORIDE AND ACETAMINOPHEN 1 TABLET: 5; 325 TABLET ORAL at 07:35

## 2018-12-14 RX ADMIN — METFORMIN HYDROCHLORIDE 500 MG: 500 TABLET, EXTENDED RELEASE ORAL at 07:35

## 2018-12-14 RX ADMIN — OXYCODONE HYDROCHLORIDE AND ACETAMINOPHEN 1 TABLET: 5; 325 TABLET ORAL at 12:31

## 2018-12-14 RX ADMIN — SODIUM CHLORIDE, PRESERVATIVE FREE 10 ML: 5 INJECTION INTRAVENOUS at 09:24

## 2018-12-14 RX ADMIN — ENOXAPARIN SODIUM 40 MG: 40 INJECTION SUBCUTANEOUS at 09:24

## 2018-12-14 RX ADMIN — ONDANSETRON 4 MG: 2 INJECTION, SOLUTION INTRAMUSCULAR; INTRAVENOUS at 09:27

## 2018-12-14 NOTE — TELEPHONE ENCOUNTER
FTI Patient called concerning how many times to change her dressing.  Hospital discharge was different from what we told her.  She is having home health visit tomorrow and is going to consult them.  Her appt for post op is 12/19/2018.  Advised her to call office on Tuesday

## 2018-12-15 ENCOUNTER — READMISSION MANAGEMENT (OUTPATIENT)
Dept: CALL CENTER | Facility: HOSPITAL | Age: 33
End: 2018-12-15

## 2018-12-15 NOTE — OUTREACH NOTE
Prep Survey      Responses   Facility patient discharged from?  [facility]   Is LACE score < 7 ?  No   Is patient eligible?  Yes   Discharge diagnosis  soft tissue infectionS/P exploration of abdominal wall incision and drainage of abscess explantation of mesh and excisional debridement skin and subcutaneous tissue 19 x 11 x 11    Does the patient have one of the following disease processes/diagnoses(primary or secondary)?  General Surgery   Does the patient have Home health ordered?  Yes   What is the [facility] agency?   [facility]    Is there a DME ordered?  Yes   What DME was ordered?  [employer] [place] for bed rail    Prep survey completed?  Yes          Ely Jacobo RN

## 2018-12-17 ENCOUNTER — READMISSION MANAGEMENT (OUTPATIENT)
Dept: CALL CENTER | Facility: HOSPITAL | Age: 33
End: 2018-12-17

## 2018-12-17 ENCOUNTER — LAB REQUISITION (OUTPATIENT)
Dept: LAB | Facility: HOSPITAL | Age: 33
End: 2018-12-17

## 2018-12-17 DIAGNOSIS — K46.9 ABDOMINAL HERNIA WITHOUT OBSTRUCTION OR GANGRENE: ICD-10-CM

## 2018-12-17 DIAGNOSIS — T86.821 SKIN GRAFT FAILURE: ICD-10-CM

## 2018-12-17 LAB
ANION GAP SERPL CALCULATED.3IONS-SCNC: 14.1 MMOL/L
BASOPHILS # BLD AUTO: 0.05 10*3/MM3 (ref 0–0.2)
BASOPHILS NFR BLD AUTO: 0.7 % (ref 0–2)
BUN BLD-MCNC: 10 MG/DL (ref 6–20)
BUN/CREAT SERPL: 14.1 (ref 7–25)
CALCIUM SPEC-SCNC: 8.9 MG/DL (ref 8.6–10.5)
CHLORIDE SERPL-SCNC: 100 MMOL/L (ref 98–107)
CO2 SERPL-SCNC: 23.9 MMOL/L (ref 22–29)
CREAT BLD-MCNC: 0.71 MG/DL (ref 0.57–1)
CRP SERPL-MCNC: 4.96 MG/DL (ref 0–0.5)
DEPRECATED RDW RBC AUTO: 44.3 FL (ref 37–54)
EOSINOPHIL # BLD AUTO: 0.15 10*3/MM3 (ref 0.1–0.3)
EOSINOPHIL NFR BLD AUTO: 2 % (ref 0–4)
ERYTHROCYTE [DISTWIDTH] IN BLOOD BY AUTOMATED COUNT: 15.7 % (ref 11.5–14.5)
ERYTHROCYTE [SEDIMENTATION RATE] IN BLOOD: 85 MM/HR (ref 0–20)
GFR SERPL CREATININE-BSD FRML MDRD: 94 ML/MIN/1.73
GLUCOSE BLD-MCNC: 110 MG/DL (ref 65–99)
HCT VFR BLD AUTO: 33.6 % (ref 37–47)
HGB BLD-MCNC: 10.1 G/DL (ref 12–16)
IMM GRANULOCYTES # BLD: 0.07 10*3/MM3 (ref 0–0.03)
IMM GRANULOCYTES NFR BLD: 0.9 % (ref 0–0.5)
LYMPHOCYTES # BLD AUTO: 1.92 10*3/MM3 (ref 0.6–4.8)
LYMPHOCYTES NFR BLD AUTO: 25.7 % (ref 20–45)
MCH RBC QN AUTO: 24.3 PG (ref 27–31)
MCHC RBC AUTO-ENTMCNC: 30.1 G/DL (ref 31–37)
MCV RBC AUTO: 80.8 FL (ref 81–99)
MONOCYTES # BLD AUTO: 0.52 10*3/MM3 (ref 0–1)
MONOCYTES NFR BLD AUTO: 7 % (ref 3–8)
NEUTROPHILS # BLD AUTO: 4.77 10*3/MM3 (ref 1.5–8.3)
NEUTROPHILS NFR BLD AUTO: 63.7 % (ref 45–70)
NRBC BLD MANUAL-RTO: 0 /100 WBC (ref 0–0)
PLATELET # BLD AUTO: 318 10*3/MM3 (ref 140–500)
PMV BLD AUTO: 10.7 FL (ref 7.4–10.4)
POTASSIUM BLD-SCNC: 4.8 MMOL/L (ref 3.5–5.2)
RBC # BLD AUTO: 4.16 10*6/MM3 (ref 4.2–5.4)
SODIUM BLD-SCNC: 138 MMOL/L (ref 136–145)
WBC NRBC COR # BLD: 7.48 10*3/MM3 (ref 4.8–10.8)

## 2018-12-17 PROCEDURE — 85652 RBC SED RATE AUTOMATED: CPT | Performed by: SURGERY

## 2018-12-17 PROCEDURE — 85025 COMPLETE CBC W/AUTO DIFF WBC: CPT | Performed by: SURGERY

## 2018-12-17 PROCEDURE — 80048 BASIC METABOLIC PNL TOTAL CA: CPT | Performed by: SURGERY

## 2018-12-17 PROCEDURE — 86140 C-REACTIVE PROTEIN: CPT | Performed by: SURGERY

## 2018-12-17 NOTE — OUTREACH NOTE
General Surgery Week 1 Survey      Responses   Facility patient discharged from?  [facility]   Does the patient have one of the following disease processes/diagnoses(primary or secondary)?  General Surgery   Is there a successful TCM telephone encounter documented?  No   Week 1 attempt successful?  Yes   Call start time  1209   Call end time  1223   Discharge diagnosis  soft tissue infectionS/P exploration of abdominal wall incision and drainage of abscess explantation of mesh and excisional debridement skin and subcutaneous tissue 19 x 11 x 11    Is patient permission given to speak with other caregiver?  Yes   List who call center can speak with  can speak to anyone   Meds reviewed with patient/caregiver?  Yes   Is the patient having any side effects they believe may be caused by any medication additions or changes?  No   Does the patient have all medications related to this admission filled (includes all antibiotics, pain medications, etc.)  Yes   Is the patient taking all medications as directed (includes completed medication regime)?  Yes   Medication comments  states she is just taking one percocet instead of two   Does the patient have a follow up appointment scheduled with their surgeon?  Yes   Has the patient kept scheduled appointments due by today?  N/A   What is the Home health agency?   [facility]   Has home health visited the patient within 72 hours of discharge?  Yes   What DME was ordered?  [place] for bed rail    Has all DME been delivered?  Yes   Psychosocial issues?  No   Did the patient receive a copy of their discharge instructions?  Yes   Nursing interventions  Reviewed instructions with patient   What is the patient's perception of their health status since discharge?  Improving   Nursing interventions  Nurse provided patient education   Is the patient /caregiver able to teach back basic post-op care?  Continue use of incentive spirometry at least 1 week post discharge, Practice 'cough and deep breath',  Drive as instructed by MD in discharge instructions, Take showers only when approved by MD-sponge bathe until then, Lifting as instructed by MD in discharge instructions, Keep incision areas clean,dry and protected, No tub bath, swimming, or hot tub until instructed by MD, Do not remove steri-strips   Is the patient/caregiver able to teach back signs and symptoms of incisional infection?  Increased redness, swelling or pain at the incisonal site, Increased drainage or bleeding, Incisional warmth, Pus or odor from incision, Fever   Is the patient/caregiver able to teach back steps to recovery at home?  Set small, achievable goals for return to baseline health, Make a list of questions for surgeon's appointment, Eat a well-balance diet, Rest and rebuild strength, gradually increase activity   If the patient is a current smoker, are they able to teach back resources for cessation?  -- [non-smoker]   Is the patient/caregiver able to teach back the hierarchy of who to call/visit for symptoms/problems? PCP, Specialist, Home health nurse, Urgent Care, ED, 911  Yes   Week 1 call completed?  Yes          Yoli Hester RN

## 2018-12-19 ENCOUNTER — OFFICE VISIT (OUTPATIENT)
Dept: SURGERY | Facility: CLINIC | Age: 33
End: 2018-12-19

## 2018-12-19 VITALS
SYSTOLIC BLOOD PRESSURE: 130 MMHG | OXYGEN SATURATION: 98 % | BODY MASS INDEX: 47.25 KG/M2 | DIASTOLIC BLOOD PRESSURE: 74 MMHG | HEIGHT: 64 IN | WEIGHT: 276.8 LBS | HEART RATE: 105 BPM

## 2018-12-19 DIAGNOSIS — Z09 FOLLOW UP: Primary | ICD-10-CM

## 2018-12-19 PROCEDURE — 99024 POSTOP FOLLOW-UP VISIT: CPT | Performed by: SURGERY

## 2018-12-19 RX ORDER — EPINEPHRINE 0.3 MG/.3ML
INJECTION SUBCUTANEOUS
COMMUNITY
Start: 2018-12-13 | End: 2021-03-06

## 2018-12-19 NOTE — PROGRESS NOTES
"Chief complaint:  Wound check    History of Present Illness    Patient is presenting for wound check.  Patient denies fever, chills, nausea or vomiting.  Patient's pain is well-controlled.      The following portions of the patient's history were reviewed and updated as appropriate: allergies, current medications, past family history, past medical history, past social history, past surgical history and problem list.    Vitals:    12/19/18 0935   BP: 130/74   Pulse: 105   SpO2: 98%   Weight: 126 kg (276 lb 12.8 oz)   Height: 162.6 cm (64\")       Physical Exam  Gen.: Patient is alert and oriented ×3, no acute distress  HEENT: Normal cephalic, atraumatic, moist mucous membranes, anicteric  Wound: Midline has good granulation tissue and roni in nicely, right lower transverse wound appears to be healthy, left skin flap with some necrosis which was bluntly created and sharply with scissors in the office    Assessment/plan:  Continue with dressing changes wet-to-dry with Dakin's twice a day.  Follow-up in 2 weeks for a wound check.    Rachel Dolan MD  General, Minimally Invasive and Endoscopic Surgery  [facility]    [address] [facility] [address] [facility] [address]    [address]  [address] [address]    P: [phone]  F: [phone]    Cc:  Zandra Rios MD  "

## 2018-12-24 ENCOUNTER — LAB REQUISITION (OUTPATIENT)
Dept: LAB | Facility: HOSPITAL | Age: 33
End: 2018-12-24

## 2018-12-24 DIAGNOSIS — T81.49XA INFECTION FOLLOWING A PROCEDURE, OTHER SURGICAL SITE, INITIAL ENCOUNTER: ICD-10-CM

## 2018-12-24 LAB
ALBUMIN SERPL-MCNC: 3.9 G/DL (ref 3.5–5.2)
ALBUMIN/GLOB SERPL: 1 G/DL
ALP SERPL-CCNC: 62 U/L (ref 40–129)
ALT SERPL W P-5'-P-CCNC: 17 U/L (ref 5–33)
ANION GAP SERPL CALCULATED.3IONS-SCNC: 14.4 MMOL/L
AST SERPL-CCNC: 19 U/L (ref 5–32)
BASOPHILS # BLD AUTO: 0.04 10*3/MM3 (ref 0–0.2)
BASOPHILS NFR BLD AUTO: 0.7 % (ref 0–2)
BILIRUB SERPL-MCNC: 0.4 MG/DL (ref 0.2–1.2)
BUN BLD-MCNC: 11 MG/DL (ref 6–20)
BUN/CREAT SERPL: 18.3 (ref 7–25)
CALCIUM SPEC-SCNC: 9.2 MG/DL (ref 8.6–10.5)
CHLORIDE SERPL-SCNC: 103 MMOL/L (ref 98–107)
CO2 SERPL-SCNC: 22.6 MMOL/L (ref 22–29)
CREAT BLD-MCNC: 0.6 MG/DL (ref 0.57–1)
CRP SERPL-MCNC: 5.98 MG/DL (ref 0–0.5)
DEPRECATED RDW RBC AUTO: 46.5 FL (ref 37–54)
EOSINOPHIL # BLD AUTO: 0.14 10*3/MM3 (ref 0.1–0.3)
EOSINOPHIL NFR BLD AUTO: 2.3 % (ref 0–4)
ERYTHROCYTE [DISTWIDTH] IN BLOOD BY AUTOMATED COUNT: 15.9 % (ref 11.5–14.5)
ERYTHROCYTE [SEDIMENTATION RATE] IN BLOOD: 90 MM/HR (ref 0–20)
GFR SERPL CREATININE-BSD FRML MDRD: 114 ML/MIN/1.73
GLOBULIN UR ELPH-MCNC: 3.8 GM/DL
GLUCOSE BLD-MCNC: 111 MG/DL (ref 65–99)
HCT VFR BLD AUTO: 34.9 % (ref 37–47)
HGB BLD-MCNC: 10.6 G/DL (ref 12–16)
IMM GRANULOCYTES # BLD AUTO: 0.02 10*3/MM3 (ref 0–0.03)
IMM GRANULOCYTES NFR BLD AUTO: 0.3 % (ref 0–0.5)
LYMPHOCYTES # BLD AUTO: 2.3 10*3/MM3 (ref 0.6–4.8)
LYMPHOCYTES NFR BLD AUTO: 37.5 % (ref 20–45)
MCH RBC QN AUTO: 24.5 PG (ref 27–31)
MCHC RBC AUTO-ENTMCNC: 30.4 G/DL (ref 31–37)
MCV RBC AUTO: 80.8 FL (ref 81–99)
MONOCYTES # BLD AUTO: 0.53 10*3/MM3 (ref 0–1)
MONOCYTES NFR BLD AUTO: 8.6 % (ref 3–8)
NEUTROPHILS # BLD AUTO: 3.1 10*3/MM3 (ref 1.5–8.3)
NEUTROPHILS NFR BLD AUTO: 50.6 % (ref 45–70)
NRBC BLD AUTO-RTO: 0 /100 WBC (ref 0–0)
PLATELET # BLD AUTO: 281 10*3/MM3 (ref 140–500)
PMV BLD AUTO: 10.7 FL (ref 7.4–10.4)
POTASSIUM BLD-SCNC: 4.1 MMOL/L (ref 3.5–5.2)
PROT SERPL-MCNC: 7.7 G/DL (ref 6–8.5)
RBC # BLD AUTO: 4.32 10*6/MM3 (ref 4.2–5.4)
SODIUM BLD-SCNC: 140 MMOL/L (ref 136–145)
WBC NRBC COR # BLD: 6.13 10*3/MM3 (ref 4.8–10.8)

## 2018-12-24 PROCEDURE — 80053 COMPREHEN METABOLIC PANEL: CPT | Performed by: INTERNAL MEDICINE

## 2018-12-24 PROCEDURE — 86140 C-REACTIVE PROTEIN: CPT | Performed by: INTERNAL MEDICINE

## 2018-12-24 PROCEDURE — 85652 RBC SED RATE AUTOMATED: CPT | Performed by: INTERNAL MEDICINE

## 2018-12-24 PROCEDURE — 85025 COMPLETE CBC W/AUTO DIFF WBC: CPT | Performed by: INTERNAL MEDICINE

## 2018-12-25 RX ORDER — OXYCODONE HYDROCHLORIDE AND ACETAMINOPHEN 5; 325 MG/1; MG/1
1 TABLET ORAL EVERY 4 HOURS PRN
Qty: 30 TABLET | Refills: 0 | Status: SHIPPED | OUTPATIENT
Start: 2018-12-25 | End: 2019-01-04

## 2018-12-27 ENCOUNTER — READMISSION MANAGEMENT (OUTPATIENT)
Dept: CALL CENTER | Facility: HOSPITAL | Age: 33
End: 2018-12-27

## 2018-12-27 NOTE — OUTREACH NOTE
General Surgery Week 2 Survey      Responses   Facility patient discharged from?  [facility]   Does the patient have one of the following disease processes/diagnoses(primary or secondary)?  General Surgery   Week 2 attempt successful?  Yes   Call start time  1010   Call end time  1017   Discharge diagnosis  soft tissue infectionS/P exploration of abdominal wall incision and drainage of abscess explantation of mesh and excisional debridement skin and subcutaneous tissue 19 x 11 x 11    Meds reviewed with patient/caregiver?  Yes   Is the patient having any side effects they believe may be caused by any medication additions or changes?  No   Does the patient have all medications related to this admission filled (includes all antibiotics, pain medications, etc.)  Yes   Is the patient taking all medications as directed (includes completed medication regime)?  Yes   Medication comments  IV antibiotic to PICC line until Jan 2.  Patient administers her antibiotics.   Has the patient kept scheduled appointments due by today?  Yes   Comments  12/25 Saw surgeon and had some debriding at her office.   What is the Home health agency?   [facility]   Home health comments  SN comes once a week for dressing changes to wound and PICC line.   Psychosocial issues?  No   Did the patient receive a copy of their discharge instructions?  Yes   Nursing interventions  Educated on MyChart, Reviewed instructions with patient   What is the patient's perception of their health status since discharge?  Improving   Nursing interventions  Nurse provided patient education   Is the patient /caregiver able to teach back basic post-op care?  Practice 'cough and deep breath', Lifting as instructed by MD in discharge instructions, Keep incision areas clean,dry and protected, Drive as instructed by MD in discharge instructions, Take showers only when approved by MD-sponge bathe until then, No tub bath, swimming, or hot tub until instructed by MD   Is the  patient/caregiver able to teach back signs and symptoms of incisional infection?  Increased redness, swelling or pain at the incisonal site, Increased drainage or bleeding, Incisional warmth, Pus or odor from incision, Fever   Is the patient/caregiver able to teach back steps to recovery at home?  Set small, achievable goals for return to baseline health, Rest and rebuild strength, gradually increase activity, Eat a well-balance diet   Additional teach back comments  Packing wound BID. Family members do the packing. Dankins soln stopped, using noraml saline  for packing.   Week 2 call completed?  Yes          Kori Salazar RN

## 2018-12-30 ENCOUNTER — HOSPITAL ENCOUNTER (EMERGENCY)
Facility: HOSPITAL | Age: 33
Discharge: HOME | End: 2018-12-30
Attending: EMERGENCY MEDICINE | Admitting: EMERGENCY MEDICINE

## 2018-12-30 ENCOUNTER — APPOINTMENT (OUTPATIENT)
Dept: CT IMAGING | Facility: HOSPITAL | Age: 33
End: 2018-12-30

## 2018-12-30 VITALS
TEMPERATURE: 99.4 F | WEIGHT: 276 LBS | OXYGEN SATURATION: 97 % | RESPIRATION RATE: 18 BRPM | HEART RATE: 110 BPM | BODY MASS INDEX: 47.12 KG/M2 | DIASTOLIC BLOOD PRESSURE: 86 MMHG | SYSTOLIC BLOOD PRESSURE: 133 MMHG | HEIGHT: 64 IN

## 2018-12-30 DIAGNOSIS — R10.13 EPIGASTRIC PAIN: Primary | ICD-10-CM

## 2018-12-30 LAB
ALBUMIN SERPL-MCNC: 3.7 G/DL (ref 3.5–5.2)
ALBUMIN/GLOB SERPL: 0.9 G/DL
ALP SERPL-CCNC: 69 U/L (ref 40–129)
ALT SERPL W P-5'-P-CCNC: 16 U/L (ref 5–33)
AMYLASE SERPL-CCNC: 18 U/L (ref 28–100)
ANION GAP SERPL CALCULATED.3IONS-SCNC: 15.3 MMOL/L
AST SERPL-CCNC: 19 U/L (ref 5–32)
BASOPHILS # BLD AUTO: 0.03 10*3/MM3 (ref 0–0.2)
BASOPHILS NFR BLD AUTO: 0.6 % (ref 0–2)
BILIRUB SERPL-MCNC: 0.5 MG/DL (ref 0.2–1.2)
BUN BLD-MCNC: 8 MG/DL (ref 6–20)
BUN/CREAT SERPL: 11.9 (ref 7–25)
CALCIUM SPEC-SCNC: 9.2 MG/DL (ref 8.6–10.5)
CHLORIDE SERPL-SCNC: 99 MMOL/L (ref 98–107)
CO2 SERPL-SCNC: 21.7 MMOL/L (ref 22–29)
CREAT BLD-MCNC: 0.67 MG/DL (ref 0.57–1)
DEPRECATED RDW RBC AUTO: 44.9 FL (ref 37–54)
EOSINOPHIL # BLD AUTO: 0.16 10*3/MM3 (ref 0.1–0.3)
EOSINOPHIL NFR BLD AUTO: 3 % (ref 0–4)
ERYTHROCYTE [DISTWIDTH] IN BLOOD BY AUTOMATED COUNT: 15.9 % (ref 11.5–14.5)
FLUAV AG NPH QL: NEGATIVE
FLUBV AG NPH QL IA: NEGATIVE
GFR SERPL CREATININE-BSD FRML MDRD: 101 ML/MIN/1.73
GLOBULIN UR ELPH-MCNC: 4.1 GM/DL
GLUCOSE BLD-MCNC: 118 MG/DL (ref 65–99)
HCT VFR BLD AUTO: 34.8 % (ref 37–47)
HGB BLD-MCNC: 10.6 G/DL (ref 12–16)
IMM GRANULOCYTES # BLD AUTO: 0.02 10*3/MM3 (ref 0–0.03)
IMM GRANULOCYTES NFR BLD AUTO: 0.4 % (ref 0–0.5)
LIPASE SERPL-CCNC: 22 U/L (ref 13–60)
LYMPHOCYTES # BLD AUTO: 2.09 10*3/MM3 (ref 0.6–4.8)
LYMPHOCYTES NFR BLD AUTO: 38.8 % (ref 20–45)
MCH RBC QN AUTO: 23.7 PG (ref 27–31)
MCHC RBC AUTO-ENTMCNC: 30.5 G/DL (ref 31–37)
MCV RBC AUTO: 77.9 FL (ref 81–99)
MONOCYTES # BLD AUTO: 0.68 10*3/MM3 (ref 0–1)
MONOCYTES NFR BLD AUTO: 12.6 % (ref 3–8)
NEUTROPHILS # BLD AUTO: 2.4 10*3/MM3 (ref 1.5–8.3)
NEUTROPHILS NFR BLD AUTO: 44.6 % (ref 45–70)
NRBC BLD AUTO-RTO: 0 /100 WBC (ref 0–0)
PLATELET # BLD AUTO: 357 10*3/MM3 (ref 140–500)
PMV BLD AUTO: 10 FL (ref 7.4–10.4)
POTASSIUM BLD-SCNC: 4 MMOL/L (ref 3.5–5.2)
PROT SERPL-MCNC: 7.8 G/DL (ref 6–8.5)
RBC # BLD AUTO: 4.47 10*6/MM3 (ref 4.2–5.4)
SODIUM BLD-SCNC: 136 MMOL/L (ref 136–145)
WBC NRBC COR # BLD: 5.38 10*3/MM3 (ref 4.8–10.8)

## 2018-12-30 PROCEDURE — 0 IOPAMIDOL PER 1 ML: Performed by: EMERGENCY MEDICINE

## 2018-12-30 PROCEDURE — 74177 CT ABD & PELVIS W/CONTRAST: CPT

## 2018-12-30 PROCEDURE — 83690 ASSAY OF LIPASE: CPT | Performed by: EMERGENCY MEDICINE

## 2018-12-30 PROCEDURE — 82150 ASSAY OF AMYLASE: CPT | Performed by: EMERGENCY MEDICINE

## 2018-12-30 PROCEDURE — 85025 COMPLETE CBC W/AUTO DIFF WBC: CPT | Performed by: EMERGENCY MEDICINE

## 2018-12-30 PROCEDURE — 25010000002 ONDANSETRON PER 1 MG: Performed by: EMERGENCY MEDICINE

## 2018-12-30 PROCEDURE — 25010000002 MORPHINE PER 10 MG: Performed by: EMERGENCY MEDICINE

## 2018-12-30 PROCEDURE — 99284 EMERGENCY DEPT VISIT MOD MDM: CPT | Performed by: EMERGENCY MEDICINE

## 2018-12-30 PROCEDURE — 80053 COMPREHEN METABOLIC PANEL: CPT | Performed by: EMERGENCY MEDICINE

## 2018-12-30 PROCEDURE — 99284 EMERGENCY DEPT VISIT MOD MDM: CPT

## 2018-12-30 PROCEDURE — 96375 TX/PRO/DX INJ NEW DRUG ADDON: CPT

## 2018-12-30 PROCEDURE — 96374 THER/PROPH/DIAG INJ IV PUSH: CPT

## 2018-12-30 PROCEDURE — 87804 INFLUENZA ASSAY W/OPTIC: CPT | Performed by: EMERGENCY MEDICINE

## 2018-12-30 PROCEDURE — 87040 BLOOD CULTURE FOR BACTERIA: CPT | Performed by: EMERGENCY MEDICINE

## 2018-12-30 RX ORDER — ONDANSETRON 2 MG/ML
4 INJECTION INTRAMUSCULAR; INTRAVENOUS ONCE
Status: COMPLETED | OUTPATIENT
Start: 2018-12-30 | End: 2018-12-30

## 2018-12-30 RX ORDER — SODIUM CHLORIDE 0.9 % (FLUSH) 0.9 %
10 SYRINGE (ML) INJECTION AS NEEDED
Status: DISCONTINUED | OUTPATIENT
Start: 2018-12-30 | End: 2018-12-30 | Stop reason: HOSPADM

## 2018-12-30 RX ADMIN — MORPHINE SULFATE 4 MG: 4 INJECTION, SOLUTION INTRAMUSCULAR; INTRAVENOUS at 05:10

## 2018-12-30 RX ADMIN — ONDANSETRON 4 MG: 2 INJECTION, SOLUTION INTRAMUSCULAR; INTRAVENOUS at 05:10

## 2018-12-30 RX ADMIN — IOPAMIDOL 100 ML: 755 INJECTION, SOLUTION INTRAVENOUS at 06:56

## 2018-12-31 ENCOUNTER — LAB REQUISITION (OUTPATIENT)
Dept: LAB | Facility: HOSPITAL | Age: 33
End: 2018-12-31

## 2018-12-31 DIAGNOSIS — T81.49XA INFECTION FOLLOWING A PROCEDURE, OTHER SURGICAL SITE, INITIAL ENCOUNTER: ICD-10-CM

## 2018-12-31 LAB
ANION GAP SERPL CALCULATED.3IONS-SCNC: 12.9 MMOL/L
BUN BLD-MCNC: 8 MG/DL (ref 6–20)
BUN/CREAT SERPL: 13.8 (ref 7–25)
CALCIUM SPEC-SCNC: 8.6 MG/DL (ref 8.6–10.5)
CHLORIDE SERPL-SCNC: 99 MMOL/L (ref 98–107)
CO2 SERPL-SCNC: 26.1 MMOL/L (ref 22–29)
CREAT BLD-MCNC: 0.58 MG/DL (ref 0.57–1)
CRP SERPL-MCNC: 17.22 MG/DL (ref 0–0.5)
DEPRECATED RDW RBC AUTO: 45.7 FL (ref 37–54)
ERYTHROCYTE [DISTWIDTH] IN BLOOD BY AUTOMATED COUNT: 16.1 % (ref 11.5–14.5)
ERYTHROCYTE [SEDIMENTATION RATE] IN BLOOD: 101 MM/HR (ref 0–20)
GFR SERPL CREATININE-BSD FRML MDRD: 119 ML/MIN/1.73
GLUCOSE BLD-MCNC: 82 MG/DL (ref 65–99)
HCT VFR BLD AUTO: 33.9 % (ref 37–47)
HGB BLD-MCNC: 10 G/DL (ref 12–16)
MCH RBC QN AUTO: 23.2 PG (ref 27–31)
MCHC RBC AUTO-ENTMCNC: 29.5 G/DL (ref 31–37)
MCV RBC AUTO: 78.7 FL (ref 81–99)
PLATELET # BLD AUTO: 358 10*3/MM3 (ref 140–500)
PMV BLD AUTO: 10.2 FL (ref 7.4–10.4)
POTASSIUM BLD-SCNC: 3.8 MMOL/L (ref 3.5–5.2)
RBC # BLD AUTO: 4.31 10*6/MM3 (ref 4.2–5.4)
SODIUM BLD-SCNC: 138 MMOL/L (ref 136–145)
WBC NRBC COR # BLD: 4.19 10*3/MM3 (ref 4.8–10.8)

## 2018-12-31 PROCEDURE — 80048 BASIC METABOLIC PNL TOTAL CA: CPT | Performed by: INTERNAL MEDICINE

## 2018-12-31 PROCEDURE — 85027 COMPLETE CBC AUTOMATED: CPT | Performed by: INTERNAL MEDICINE

## 2018-12-31 PROCEDURE — 85652 RBC SED RATE AUTOMATED: CPT | Performed by: INTERNAL MEDICINE

## 2018-12-31 PROCEDURE — 86140 C-REACTIVE PROTEIN: CPT | Performed by: INTERNAL MEDICINE

## 2019-01-02 ENCOUNTER — OFFICE VISIT (OUTPATIENT)
Dept: SURGERY | Facility: CLINIC | Age: 34
End: 2019-01-02

## 2019-01-02 VITALS
SYSTOLIC BLOOD PRESSURE: 128 MMHG | HEART RATE: 122 BPM | BODY MASS INDEX: 46.61 KG/M2 | OXYGEN SATURATION: 98 % | WEIGHT: 273 LBS | HEIGHT: 64 IN | DIASTOLIC BLOOD PRESSURE: 78 MMHG

## 2019-01-02 DIAGNOSIS — S31.109S CHRONIC ABDOMINAL WOUND INFECTION, SEQUELA: ICD-10-CM

## 2019-01-02 DIAGNOSIS — Z09 FOLLOW UP: Primary | ICD-10-CM

## 2019-01-02 DIAGNOSIS — L08.9 CHRONIC ABDOMINAL WOUND INFECTION, SEQUELA: ICD-10-CM

## 2019-01-02 PROCEDURE — 99024 POSTOP FOLLOW-UP VISIT: CPT | Performed by: SURGERY

## 2019-01-02 RX ORDER — OXYCODONE HYDROCHLORIDE AND ACETAMINOPHEN 5; 325 MG/1; MG/1
1 TABLET ORAL EVERY 4 HOURS PRN
Qty: 30 TABLET | Refills: 0 | Status: SHIPPED | OUTPATIENT
Start: 2019-01-02 | End: 2019-01-12

## 2019-01-02 RX ORDER — ONDANSETRON 4 MG/1
4 TABLET, FILM COATED ORAL EVERY 4 HOURS PRN
Qty: 30 TABLET | Refills: 1 | Status: SHIPPED | OUTPATIENT
Start: 2019-01-02 | End: 2019-02-01

## 2019-01-02 NOTE — PROGRESS NOTES
"Chief complaint:  Post-op  Follow up wound check     History of Present Illness     Patient is presenting today for a wound check.  Patient reports she had presented to the emergency room secondary to increasing fever, chills and abdominal pain.  Patient underwent a CT scan which was normal.  Wound appeared normal.  Patient does have a PICC line and has completed her Zosyn therapy.    The following portions of the patient's history were reviewed and updated as appropriate: allergies, current medications, past family history, past medical history, past social history, past surgical history and problem list.    Vitals:    01/02/19 1021   BP: 128/78   Pulse: (!) 122   SpO2: 98%   Weight: 124 kg (273 lb)   Height: 162.6 cm (64\")       Physical Exam  Gen.: Patient is alert and oriented ×3, no acute distress  HEENT: Normal cephalic, atraumatic, moist mucous membranes, anicteric  Right arm PICC line was removed and a sterile bandage was applied  Wound with very little necrotic tissue that was debrided with Metzenbaum scissors on the right side patient is developing good granulation tissue and should be ready for a wound VAC.    Assessment/plan:  Chronic wound with very little necrosis that was sharply debrided today in the office and is ready for a wound VAC which I have ordered.  I have removed the PICC line.  I have advised patient follow back up in one week.    Rachel Dolan MD  General, Minimally Invasive and Endoscopic Surgery  [facility]    [address] [facility] [address] [place] [address]    [address]  [address] [address]    P: [phone]  F: [phone]    Cc:  Zandra Rios MD  "

## 2019-01-03 ENCOUNTER — READMISSION MANAGEMENT (OUTPATIENT)
Dept: CALL CENTER | Facility: HOSPITAL | Age: 34
End: 2019-01-03

## 2019-01-03 NOTE — OUTREACH NOTE
General Surgery Week 3 Survey      Responses   Facility patient discharged from?  [facility]   Does the patient have one of the following disease processes/diagnoses(primary or secondary)?  General Surgery   Week 3 attempt successful?  Yes   Call start time  1422   Call end time  1433   Discharge diagnosis  soft tissue infectionS/P exploration of abdominal wall incision and drainage of abscess explantation of mesh and excisional debridement skin and subcutaneous tissue 19 x 11 x 11    Is patient permission given to speak with other caregiver?  Yes   List who call center can speak with  can speak to anyone   Meds reviewed with patient/caregiver?  Yes   Is the patient having any side effects they believe may be caused by any medication additions or changes?  No   Does the patient have all medications related to this admission filled (includes all antibiotics, pain medications, etc.)  Yes   Is the patient taking all medications as directed (includes completed medication regime)?  Yes   Medication comments  PICC line dc'd yesterday with Dr Dolan appt. Patient no longer on antibiotics.    Does the patient have a follow up appointment scheduled with their surgeon?  Yes   Has the patient kept scheduled appointments due by today?  Yes   What is the Home health agency?   [facility]   Has home health visited the patient within 72 hours of discharge?  Yes   Home health comments  Continued HH.    What DME was ordered?  Patient states that she will be getting a wound vac after her appt with Dr Dolan yesterday.    Psychosocial issues?  No   Did the patient receive a copy of their discharge instructions?  Yes   Nursing interventions  Reviewed instructions with patient   What is the patient's perception of their health status since discharge?  Improving   Nursing interventions  Nurse provided patient education   Is the patient /caregiver able to teach back basic post-op care?  No tub bath, swimming, or hot tub until instructed by MD,  Lifting as instructed by MD in discharge instructions   Is the patient/caregiver able to teach back signs and symptoms of incisional infection?  Increased redness, swelling or pain at the incisonal site, Increased drainage or bleeding, Incisional warmth, Pus or odor from incision, Fever   Is the patient/caregiver able to teach back steps to recovery at home?  Set small, achievable goals for return to baseline health, Rest and rebuild strength, gradually increase activity   Is the patient/caregiver able to teach back the hierarchy of who to call/visit for symptoms/problems? PCP, Specialist, Home health nurse, Urgent Care, ED, 911  Yes   Week 3 call completed?  Yes          Ashley Rodgers RN

## 2019-01-04 LAB
BACTERIA SPEC AEROBE CULT: NORMAL
BACTERIA SPEC AEROBE CULT: NORMAL

## 2019-01-09 ENCOUNTER — OFFICE VISIT (OUTPATIENT)
Dept: SURGERY | Facility: CLINIC | Age: 34
End: 2019-01-09

## 2019-01-09 VITALS
DIASTOLIC BLOOD PRESSURE: 70 MMHG | BODY MASS INDEX: 46.23 KG/M2 | WEIGHT: 270.8 LBS | SYSTOLIC BLOOD PRESSURE: 110 MMHG | HEIGHT: 64 IN | OXYGEN SATURATION: 98 % | HEART RATE: 105 BPM

## 2019-01-09 DIAGNOSIS — Z09 FOLLOW UP: Primary | ICD-10-CM

## 2019-01-09 PROCEDURE — 99024 POSTOP FOLLOW-UP VISIT: CPT | Performed by: SURGERY

## 2019-01-09 RX ORDER — OXYCODONE HYDROCHLORIDE AND ACETAMINOPHEN 5; 325 MG/1; MG/1
1 TABLET ORAL EVERY 4 HOURS PRN
Qty: 50 TABLET | Refills: 0 | Status: SHIPPED | OUTPATIENT
Start: 2019-01-09 | End: 2019-01-14

## 2019-01-09 NOTE — PROGRESS NOTES
"Chief complaint:  Post-op  Follow up    History of Present Illness    This is Valery Aguilar 33 y.o. presenting for follow-up wound check.  Patient denies fever, chills, nausea or vomiting.  Patient's pain is well-controlled.      The following portions of the patient's history were reviewed and updated as appropriate: allergies, current medications, past family history, past medical history, past social history, past surgical history and problem list.    Vitals:    01/09/19 1135   BP: 110/70   Pulse: 105   SpO2: 98%   Weight: 123 kg (270 lb 12.8 oz)   Height: 162.6 cm (64\")       Physical Exam  Gen.: Patient is alert and oriented ×3, no acute distress  HEENT: Normal cephalic, atraumatic, moist mucous membranes, anicteric  Wound appears healthy with good granulation tissue and appears ready for a wound VAC    Assessment/plan:  I will have wound VAC placed and patient will follow-up in 2 weeks    Rachel Dolan MD  General, Minimally Invasive and Endoscopic Surgery  [facility]    [address] [facility] [address] [place] [address]    [address]  [place], [place] [id number] [place] [id number]    P: [phone]  F: [phone]    Cc:  Zandra Rios MD  "

## 2019-01-17 ENCOUNTER — HOSPITAL ENCOUNTER (EMERGENCY)
Facility: HOSPITAL | Age: 34
Discharge: HOME | End: 2019-01-17
Attending: EMERGENCY MEDICINE | Admitting: EMERGENCY MEDICINE

## 2019-01-17 ENCOUNTER — APPOINTMENT (OUTPATIENT)
Dept: GENERAL RADIOLOGY | Facility: HOSPITAL | Age: 34
End: 2019-01-17

## 2019-01-17 ENCOUNTER — APPOINTMENT (OUTPATIENT)
Dept: CT IMAGING | Facility: HOSPITAL | Age: 34
End: 2019-01-17

## 2019-01-17 VITALS
TEMPERATURE: 97.7 F | OXYGEN SATURATION: 98 % | BODY MASS INDEX: 45.75 KG/M2 | WEIGHT: 268 LBS | HEART RATE: 107 BPM | RESPIRATION RATE: 16 BRPM | HEIGHT: 64 IN | SYSTOLIC BLOOD PRESSURE: 138 MMHG | DIASTOLIC BLOOD PRESSURE: 72 MMHG

## 2019-01-17 DIAGNOSIS — R11.0 NAUSEA: ICD-10-CM

## 2019-01-17 DIAGNOSIS — R07.9 CHEST PAIN, UNSPECIFIED TYPE: Primary | ICD-10-CM

## 2019-01-17 LAB
ALBUMIN SERPL-MCNC: 4 G/DL (ref 3.5–5.2)
ALBUMIN/GLOB SERPL: 1 G/DL
ALP SERPL-CCNC: 77 U/L (ref 40–129)
ALT SERPL W P-5'-P-CCNC: 28 U/L (ref 5–33)
ANION GAP SERPL CALCULATED.3IONS-SCNC: 17.6 MMOL/L
AST SERPL-CCNC: 40 U/L (ref 5–32)
B-HCG UR QL: NEGATIVE
BASOPHILS # BLD AUTO: 0.04 10*3/MM3 (ref 0–0.2)
BASOPHILS NFR BLD AUTO: 0.4 % (ref 0–2)
BILIRUB SERPL-MCNC: 0.4 MG/DL (ref 0.2–1.2)
BUN BLD-MCNC: 7 MG/DL (ref 6–20)
BUN/CREAT SERPL: 11.3 (ref 7–25)
CALCIUM SPEC-SCNC: 9.7 MG/DL (ref 8.6–10.5)
CHLORIDE SERPL-SCNC: 97 MMOL/L (ref 98–107)
CO2 SERPL-SCNC: 23.4 MMOL/L (ref 22–29)
CREAT BLD-MCNC: 0.62 MG/DL (ref 0.57–1)
DEPRECATED RDW RBC AUTO: 45.3 FL (ref 37–54)
EOSINOPHIL # BLD AUTO: 0.28 10*3/MM3 (ref 0.1–0.3)
EOSINOPHIL NFR BLD AUTO: 2.9 % (ref 0–4)
ERYTHROCYTE [DISTWIDTH] IN BLOOD BY AUTOMATED COUNT: 17 % (ref 11.5–14.5)
GFR SERPL CREATININE-BSD FRML MDRD: 110 ML/MIN/1.73
GLOBULIN UR ELPH-MCNC: 3.9 GM/DL
GLUCOSE BLD-MCNC: 129 MG/DL (ref 65–99)
HCT VFR BLD AUTO: 42.2 % (ref 37–47)
HGB BLD-MCNC: 12.4 G/DL (ref 12–16)
IMM GRANULOCYTES # BLD AUTO: 0.05 10*3/MM3 (ref 0–0.03)
IMM GRANULOCYTES NFR BLD AUTO: 0.5 % (ref 0–0.5)
LYMPHOCYTES # BLD AUTO: 2.36 10*3/MM3 (ref 0.6–4.8)
LYMPHOCYTES NFR BLD AUTO: 24.7 % (ref 20–45)
MCH RBC QN AUTO: 22.3 PG (ref 27–31)
MCHC RBC AUTO-ENTMCNC: 29.4 G/DL (ref 31–37)
MCV RBC AUTO: 75.8 FL (ref 81–99)
MONOCYTES # BLD AUTO: 0.53 10*3/MM3 (ref 0–1)
MONOCYTES NFR BLD AUTO: 5.5 % (ref 3–8)
NEUTROPHILS # BLD AUTO: 6.3 10*3/MM3 (ref 1.5–8.3)
NEUTROPHILS NFR BLD AUTO: 66 % (ref 45–70)
NRBC BLD AUTO-RTO: 0 /100 WBC (ref 0–0)
PLATELET # BLD AUTO: 479 10*3/MM3 (ref 140–500)
PMV BLD AUTO: 10.4 FL (ref 7.4–10.4)
POTASSIUM BLD-SCNC: 3.6 MMOL/L (ref 3.5–5.2)
PROT SERPL-MCNC: 7.9 G/DL (ref 6–8.5)
RBC # BLD AUTO: 5.57 10*6/MM3 (ref 4.2–5.4)
SODIUM BLD-SCNC: 138 MMOL/L (ref 136–145)
TROPONIN T SERPL-MCNC: <0.01 NG/ML (ref 0–0.03)
TROPONIN T SERPL-MCNC: >0.01 NG/ML (ref 0–0.03)
WBC NRBC COR # BLD: 9.56 10*3/MM3 (ref 4.8–10.8)

## 2019-01-17 PROCEDURE — 25010000002 PROMETHAZINE PER 50 MG: Performed by: EMERGENCY MEDICINE

## 2019-01-17 PROCEDURE — 71046 X-RAY EXAM CHEST 2 VIEWS: CPT

## 2019-01-17 PROCEDURE — 93010 ELECTROCARDIOGRAM REPORT: CPT | Performed by: INTERNAL MEDICINE

## 2019-01-17 PROCEDURE — 25010000002 MORPHINE PER 10 MG: Performed by: EMERGENCY MEDICINE

## 2019-01-17 PROCEDURE — 96374 THER/PROPH/DIAG INJ IV PUSH: CPT

## 2019-01-17 PROCEDURE — 84484 ASSAY OF TROPONIN QUANT: CPT | Performed by: EMERGENCY MEDICINE

## 2019-01-17 PROCEDURE — 0 IOPAMIDOL PER 1 ML: Performed by: EMERGENCY MEDICINE

## 2019-01-17 PROCEDURE — 93005 ELECTROCARDIOGRAM TRACING: CPT | Performed by: EMERGENCY MEDICINE

## 2019-01-17 PROCEDURE — 81025 URINE PREGNANCY TEST: CPT | Performed by: EMERGENCY MEDICINE

## 2019-01-17 PROCEDURE — 85025 COMPLETE CBC W/AUTO DIFF WBC: CPT | Performed by: EMERGENCY MEDICINE

## 2019-01-17 PROCEDURE — 25010000002 ONDANSETRON PER 1 MG: Performed by: EMERGENCY MEDICINE

## 2019-01-17 PROCEDURE — 99284 EMERGENCY DEPT VISIT MOD MDM: CPT

## 2019-01-17 PROCEDURE — 96375 TX/PRO/DX INJ NEW DRUG ADDON: CPT

## 2019-01-17 PROCEDURE — 99284 EMERGENCY DEPT VISIT MOD MDM: CPT | Performed by: EMERGENCY MEDICINE

## 2019-01-17 PROCEDURE — 71275 CT ANGIOGRAPHY CHEST: CPT

## 2019-01-17 PROCEDURE — 80053 COMPREHEN METABOLIC PANEL: CPT | Performed by: EMERGENCY MEDICINE

## 2019-01-17 PROCEDURE — 96361 HYDRATE IV INFUSION ADD-ON: CPT

## 2019-01-17 RX ORDER — SODIUM CHLORIDE 0.9 % (FLUSH) 0.9 %
10 SYRINGE (ML) INJECTION AS NEEDED
Status: DISCONTINUED | OUTPATIENT
Start: 2019-01-17 | End: 2019-01-17 | Stop reason: HOSPADM

## 2019-01-17 RX ORDER — PROMETHAZINE HYDROCHLORIDE 25 MG/ML
12.5 INJECTION, SOLUTION INTRAMUSCULAR; INTRAVENOUS ONCE
Status: COMPLETED | OUTPATIENT
Start: 2019-01-17 | End: 2019-01-17

## 2019-01-17 RX ORDER — OXYCODONE HYDROCHLORIDE AND ACETAMINOPHEN 5; 325 MG/1; MG/1
1 TABLET ORAL EVERY 4 HOURS PRN
COMMUNITY
End: 2019-04-10

## 2019-01-17 RX ORDER — ONDANSETRON 2 MG/ML
4 INJECTION INTRAMUSCULAR; INTRAVENOUS ONCE
Status: COMPLETED | OUTPATIENT
Start: 2019-01-17 | End: 2019-01-17

## 2019-01-17 RX ORDER — MELATONIN
2000 DAILY
COMMUNITY

## 2019-01-17 RX ADMIN — MORPHINE SULFATE 4 MG: 4 INJECTION INTRAVENOUS at 14:16

## 2019-01-17 RX ADMIN — ONDANSETRON 4 MG: 2 INJECTION, SOLUTION INTRAMUSCULAR; INTRAVENOUS at 14:16

## 2019-01-17 RX ADMIN — PROMETHAZINE HYDROCHLORIDE 12.5 MG: 25 INJECTION INTRAMUSCULAR; INTRAVENOUS at 14:52

## 2019-01-17 RX ADMIN — SODIUM CHLORIDE 1000 ML: 9 INJECTION, SOLUTION INTRAVENOUS at 14:16

## 2019-01-17 RX ADMIN — IOPAMIDOL 124 ML: 755 INJECTION, SOLUTION INTRAVENOUS at 16:12

## 2019-01-17 NOTE — DISCHARGE INSTRUCTIONS
Please return to the emergency room for any worsening pain, fevers, nausea, vomiting, weakness, dizziness, difficulties breathing or any other concerns.

## 2019-01-17 NOTE — ED PROVIDER NOTES
Subjective   History of Present Illness  History of Present Illness    Chief complaint: Chest pain, nausea    Location: Central chest, nonradiating    Quality/Severity:  Moderate sharp pain    Timing/Duration: Just began suddenly within the past hour    Modifying Factors: None    Narrative: This patient presents for evaluation of new onset Central chest pain symptoms as well as some nausea.  She has been recovering from an abdominal hernia surgery with wound infection complication.  She is wearing a wound VAC and has been followed by Dr. Navarrete and Dr. Dolan in the surgery office.  She says that she was feeling well this morning and did not have any chest symptoms.  Then, suddenly, this afternoon she had new sharp pain in the central chest as well as some nausea symptoms.  She had taken one Percocet tablet earlier in the day before this pain began.  When the nausea began she also took one Phenergan tablet.  However, the pain persisted.  The patient has no known cardiac history but there is a family history of cardiac problems that she is aware of.    Associated Symptoms: As above    Review of Systems   Constitutional: Negative for activity change and fever.   HENT: Negative.    Eyes: Negative for pain and visual disturbance.   Respiratory: Positive for shortness of breath. Negative for cough.    Cardiovascular: Positive for chest pain.   Gastrointestinal: Positive for nausea. Negative for abdominal pain and vomiting.   Genitourinary: Negative for dysuria and flank pain.   Skin: Negative for color change and rash.   Neurological: Positive for light-headedness. Negative for syncope and headaches.   Psychiatric/Behavioral: The patient is nervous/anxious.    All other systems reviewed and are negative.      Past Medical History:   Diagnosis Date   • Abdominal pain    • Anxiety    • Arthritis     KNEE   • Depression    • GERD (gastroesophageal reflux disease)    • Headache, tension-type    • Hypertension     PRE-ECLAMPSIA  WITH BOTH CHILDREN   • Incisional hernia     SCHEDULED FOR REPAIR   • Insulin resistance     D/T PCOS   • Migraine    • Panic attack    • PCOS (polycystic ovarian syndrome)    • Pneumonia 2018   • PONV (postoperative nausea and vomiting)    • Seasonal allergies    • Spinal headache        No Known Allergies    Past Surgical History:   Procedure Laterality Date   • ABDOMINAL WALL ABSCESS INCISION AND DRAINAGE N/A 2018    Procedure: Debridement of abdominal wall;  Surgeon: Brigido Navarrete MD;  Location:  [facility] OR;  Service: General   • ADENOIDECTOMY     •  SECTION      X2   • CHOLECYSTECTOMY      LAP   • HERNIA REPAIR     • HX OVARIAN CYSTECTOMY     • INCISION AND DRAINAGE TRUNK N/A 2018    Procedure: wound debridement, abdomen;  Surgeon: Rachel Dolan MD;  Location: [facility] OR;  Service: General   • KNEE ACL RECONSTRUCTION     • TONSILLECTOMY     • TRUNK DEBRIDEMENT N/A 2018    Procedure: Abdominal wound debridement;  Surgeon: Rachel Dolan MD;  Location: [facility] OR;  Service: General   • VENTRAL/INCISIONAL HERNIA REPAIR N/A 11/15/2018    Procedure: Lateral Component Separation Herniorrhapy Repair with Mesh, Lysis of adhesions, and skin lesion excision of Right Side;  Surgeon: Rachel Dolan MD;  Location: [facility] OR;  Service: General       Family History   Problem Relation Age of Onset   • Stroke Mother    • Heart disease Mother    • Hypertension Mother    • Heart disease Father    • Hypertension Father    • Diabetes Father    • Dementia Maternal Grandmother    • Stroke Maternal Grandmother    • Diabetes Maternal Grandmother    • Stroke Paternal Grandmother    • Hypertension Paternal Grandmother    • Cancer Paternal Grandmother    • Heart disease Paternal Grandfather    • Diabetes Paternal Grandfather        Social History     Socioeconomic History   • Marital status:      Spouse name: Not on file   • Number of children: Not on file   • Years of education: Not on  file   • Highest education level: Not on file   Tobacco Use   • Smoking status: Former Smoker     Packs/day: 0.50     Years: 1.00     Pack years: 0.50     Last attempt to quit: 2004     Years since quitting: 15.0   • Smokeless tobacco: Never Used   Substance and Sexual Activity   • Alcohol use: Yes     Comment: occasional   • Drug use: No   • Sexual activity: Defer     Partners: Male     Birth control/protection: OCP     Comment: LNMP irregular       ED Triage Vitals [01/17/19 1357]   Temp Heart Rate Resp BP SpO2   97.7 °F (36.5 °C) (!) 122 20 152/99 97 %      Temp src Heart Rate Source Patient Position BP Location FiO2 (%)   Oral Monitor -- -- --         Objective   Physical Exam   Constitutional: She is oriented to person, place, and time. She appears well-developed and well-nourished.  Non-toxic appearance. She does not appear ill. No distress.   Anxious appearing, but no distress   HENT:   Head: Normocephalic and atraumatic.   Eyes: EOM are normal. Pupils are equal, round, and reactive to light. Right eye exhibits no discharge. Left eye exhibits no discharge.   Neck: Normal range of motion. Neck supple.   Cardiovascular: Normal rate, regular rhythm and normal pulses. Exam reveals no gallop and no distant heart sounds.   No murmur heard.  Pulmonary/Chest: Effort normal. No respiratory distress. She has no decreased breath sounds. She has no wheezes. She has no rhonchi. She has no rales.   Abdominal: Soft. There is no rebound and no guarding.   Obese, soft.  Wound VAC noted   Musculoskeletal: Normal range of motion. She exhibits no edema or deformity.        Right lower leg: Normal. She exhibits no tenderness and no edema.        Left lower leg: Normal. She exhibits no tenderness and no edema.   Neurological: She is alert and oriented to person, place, and time.   Skin: Skin is warm and dry. No ecchymosis and no rash noted. No erythema. No pallor.   Psychiatric: Her behavior is normal. Judgment and thought content  normal. Her mood appears anxious.   Nursing note and vitals reviewed.      EKG           EKG time/Interp time: 1358/1400  Rhythm/Rate: Sinus tachycardia, 122 bpm  P waves and AR: P waves are present, 159 ms  QRS, axis: 91 ms, normal axis   ST and T waves: Baseline artifact limits interpretation.  No grossly apparent ischemic changes notable, however.     Independently interpreted by me contemporaneously with treatment    Results for orders placed or performed during the hospital encounter of 01/17/19   Comprehensive Metabolic Panel   Result Value Ref Range    Glucose 129 (H) 65 - 99 mg/dL    BUN 7 6 - 20 mg/dL    Creatinine 0.62 0.57 - 1.00 mg/dL    Sodium 138 136 - 145 mmol/L    Potassium 3.6 3.5 - 5.2 mmol/L    Chloride 97 (L) 98 - 107 mmol/L    CO2 23.4 22.0 - 29.0 mmol/L    Calcium 9.7 8.6 - 10.5 mg/dL    Total Protein 7.9 6.0 - 8.5 g/dL    Albumin 4.00 3.50 - 5.20 g/dL    ALT (SGPT) 28 5 - 33 U/L    AST (SGOT) 40 (H) 5 - 32 U/L    Alkaline Phosphatase 77 40 - 129 U/L    Total Bilirubin 0.4 0.2 - 1.2 mg/dL    eGFR Non African Amer 110 >60 mL/min/1.73    Globulin 3.9 gm/dL    A/G Ratio 1.0 g/dL    BUN/Creatinine Ratio 11.3 7.0 - 25.0    Anion Gap 17.6 mmol/L   Pregnancy, Urine - Urine, Clean Catch   Result Value Ref Range    HCG, Urine QL Negative Negative   Troponin   Result Value Ref Range    Troponin T <0.010 0.000 - 0.030 ng/mL   CBC Auto Differential   Result Value Ref Range    WBC 9.56 4.80 - 10.80 10*3/mm3    RBC 5.57 (H) 4.20 - 5.40 10*6/mm3    Hemoglobin 12.4 12.0 - 16.0 g/dL    Hematocrit 42.2 37.0 - 47.0 %    MCV 75.8 (L) 81.0 - 99.0 fL    MCH 22.3 (L) 27.0 - 31.0 pg    MCHC 29.4 (L) 31.0 - 37.0 g/dL    RDW 17.0 (H) 11.5 - 14.5 %    RDW-SD 45.3 37.0 - 54.0 fl    MPV 10.4 7.4 - 10.4 fL    Platelets 479 140 - 500 10*3/mm3    Neutrophil % 66.0 45.0 - 70.0 %    Lymphocyte % 24.7 20.0 - 45.0 %    Monocyte % 5.5 3.0 - 8.0 %    Eosinophil % 2.9 0.0 - 4.0 %    Basophil % 0.4 0.0 - 2.0 %    Immature Grans % 0.5  0.0 - 0.5 %    Neutrophils, Absolute 6.30 1.50 - 8.30 10*3/mm3    Lymphocytes, Absolute 2.36 0.60 - 4.80 10*3/mm3    Monocytes, Absolute 0.53 0.00 - 1.00 10*3/mm3    Eosinophils, Absolute 0.28 0.10 - 0.30 10*3/mm3    Basophils, Absolute 0.04 0.00 - 0.20 10*3/mm3    Immature Grans, Absolute 0.05 (H) 0.00 - 0.03 10*3/mm3    nRBC 0.0 0.0 - 0.0 /100 WBC   Troponin   Result Value Ref Range    Troponin T >0.010 0.000 - 0.030 ng/mL           RADIOLOGY        Study: Chest x-ray    Findings: Negative Chest    Interpreted contemporaneously with treatment by Dr. Oh, independently viewed by me      RADIOLOGY        Study: CTA chest    Findings: There is somewhat poor contrast opacification of the medial mid and small pulmonary arteries in the mid and peripheral lung zones. No large PE is present within main, lobar or interlobar pulmonary arteries. Thoracic aorta is normal in caliber with no aneurysm or dissection. Heart size is normal, and there is no pericardial effusion. The lungs are expanded and clear. No pleural effusion. No rib fracture or other chest wall lesion.     Interpreted contemporaneously with treatment by Dr. Oh, independently viewed by me                    Procedures           ED Course  ED Course as of Jan 18 1554   Thu Jan 17, 2019   8047 I have reviewed the EKG and labs and chest x-ray and CTA report from today's visit.  All findings so far are quite reassuring for no evidence of acute cardiopulmonary emergency condition.  The patient is feeling much better now.  She says she does not have chest pain.  She says she believes it was probably indigestion but given her family history she wanted to be cautious and make sure there was no other problem to blame.  I'm checking a second troponin at this time.  As long as that test is also normal, then I think the patient can be comfortably discharged home in good condition to follow up with her primary care doctor for further evaluation.  [JV]      ED  Course User Index  [JV] Kamari Alonzo MD                  MDM  Number of Diagnoses or Management Options     Amount and/or Complexity of Data Reviewed  Clinical lab tests: reviewed and ordered  Tests in the radiology section of CPT®: ordered and reviewed  Decide to obtain previous medical records or to obtain history from someone other than the patient: yes  Review and summarize past medical records: yes  Independent visualization of images, tracings, or specimens: yes    Risk of Complications, Morbidity, and/or Mortality  Presenting problems: moderate  Diagnostic procedures: moderate  Management options: moderate          Final diagnoses:   Chest pain, unspecified type   Nausea            Kamari Alonzo MD  01/18/19 1554

## 2019-01-19 ENCOUNTER — APPOINTMENT (OUTPATIENT)
Dept: CT IMAGING | Facility: HOSPITAL | Age: 34
End: 2019-01-19

## 2019-01-19 ENCOUNTER — HOSPITAL ENCOUNTER (INPATIENT)
Facility: HOSPITAL | Age: 34
LOS: 6 days | Discharge: HOME HEALTH | End: 2019-01-25
Attending: EMERGENCY MEDICINE | Admitting: SURGERY

## 2019-01-19 DIAGNOSIS — T14.8XXA WOUND INFECTION: ICD-10-CM

## 2019-01-19 DIAGNOSIS — R10.31 RIGHT LOWER QUADRANT ABDOMINAL PAIN: Primary | ICD-10-CM

## 2019-01-19 DIAGNOSIS — A41.9 SEPSIS, DUE TO UNSPECIFIED ORGANISM: ICD-10-CM

## 2019-01-19 DIAGNOSIS — L08.9 WOUND INFECTION: ICD-10-CM

## 2019-01-19 LAB
ALBUMIN SERPL-MCNC: 3.6 G/DL (ref 3.5–5.2)
ALBUMIN/GLOB SERPL: 0.9 G/DL
ALP SERPL-CCNC: 71 U/L (ref 40–129)
ALT SERPL W P-5'-P-CCNC: 20 U/L (ref 5–33)
ANION GAP SERPL CALCULATED.3IONS-SCNC: 15.1 MMOL/L
AST SERPL-CCNC: 24 U/L (ref 5–32)
B-HCG UR QL: NEGATIVE
BASOPHILS # BLD AUTO: 0.03 10*3/MM3 (ref 0–0.2)
BASOPHILS NFR BLD AUTO: 0.4 % (ref 0–2)
BILIRUB SERPL-MCNC: 0.3 MG/DL (ref 0.2–1.2)
BILIRUB UR QL STRIP: NEGATIVE
BUN BLD-MCNC: 6 MG/DL (ref 6–20)
BUN/CREAT SERPL: 9.5 (ref 7–25)
CALCIUM SPEC-SCNC: 9.2 MG/DL (ref 8.6–10.5)
CHLORIDE SERPL-SCNC: 96 MMOL/L (ref 98–107)
CLARITY UR: CLEAR
CO2 SERPL-SCNC: 24.9 MMOL/L (ref 22–29)
COLOR UR: YELLOW
CREAT BLD-MCNC: 0.63 MG/DL (ref 0.57–1)
D-LACTATE SERPL-SCNC: 1.4 MMOL/L (ref 0.5–2)
D-LACTATE SERPL-SCNC: 2.2 MMOL/L (ref 0.5–2)
DEPRECATED RDW RBC AUTO: 44.8 FL (ref 37–54)
EOSINOPHIL # BLD AUTO: 0.2 10*3/MM3 (ref 0.1–0.3)
EOSINOPHIL NFR BLD AUTO: 2.5 % (ref 0–4)
ERYTHROCYTE [DISTWIDTH] IN BLOOD BY AUTOMATED COUNT: 16.6 % (ref 11.5–14.5)
FLUAV AG NPH QL: NEGATIVE
FLUBV AG NPH QL IA: NEGATIVE
GFR SERPL CREATININE-BSD FRML MDRD: 108 ML/MIN/1.73
GLOBULIN UR ELPH-MCNC: 3.8 GM/DL
GLUCOSE BLD-MCNC: 114 MG/DL (ref 65–99)
GLUCOSE UR STRIP-MCNC: NEGATIVE MG/DL
HCT VFR BLD AUTO: 35.3 % (ref 37–47)
HGB BLD-MCNC: 10.6 G/DL (ref 12–16)
HGB UR QL STRIP.AUTO: NEGATIVE
HOLD SPECIMEN: NORMAL
IMM GRANULOCYTES # BLD AUTO: 0.04 10*3/MM3 (ref 0–0.03)
IMM GRANULOCYTES NFR BLD AUTO: 0.5 % (ref 0–0.5)
KETONES UR QL STRIP: NEGATIVE
LEUKOCYTE ESTERASE UR QL STRIP.AUTO: NEGATIVE
LYMPHOCYTES # BLD AUTO: 1.54 10*3/MM3 (ref 0.6–4.8)
LYMPHOCYTES NFR BLD AUTO: 19.4 % (ref 20–45)
MCH RBC QN AUTO: 22.4 PG (ref 27–31)
MCHC RBC AUTO-ENTMCNC: 30 G/DL (ref 31–37)
MCV RBC AUTO: 74.6 FL (ref 81–99)
MICROCYTES BLD QL: NORMAL
MONOCYTES # BLD AUTO: 0.56 10*3/MM3 (ref 0–1)
MONOCYTES NFR BLD AUTO: 7 % (ref 3–8)
NEUTROPHILS # BLD AUTO: 5.58 10*3/MM3 (ref 1.5–8.3)
NEUTROPHILS NFR BLD AUTO: 70.2 % (ref 45–70)
NITRITE UR QL STRIP: NEGATIVE
NRBC BLD AUTO-RTO: 0 /100 WBC (ref 0–0)
PH UR STRIP.AUTO: 8 [PH] (ref 4.5–8)
PLAT MORPH BLD: NORMAL
PLATELET # BLD AUTO: 361 10*3/MM3 (ref 140–500)
PMV BLD AUTO: 9.9 FL (ref 7.4–10.4)
POTASSIUM BLD-SCNC: 3.6 MMOL/L (ref 3.5–5.2)
PROT SERPL-MCNC: 7.4 G/DL (ref 6–8.5)
PROT UR QL STRIP: NEGATIVE
RBC # BLD AUTO: 4.73 10*6/MM3 (ref 4.2–5.4)
SODIUM BLD-SCNC: 136 MMOL/L (ref 136–145)
SP GR UR STRIP: 1.02 (ref 1–1.03)
UROBILINOGEN UR QL STRIP: NORMAL
WBC MORPH BLD: NORMAL
WBC NRBC COR # BLD: 7.95 10*3/MM3 (ref 4.8–10.8)

## 2019-01-19 PROCEDURE — 87040 BLOOD CULTURE FOR BACTERIA: CPT | Performed by: EMERGENCY MEDICINE

## 2019-01-19 PROCEDURE — 81025 URINE PREGNANCY TEST: CPT | Performed by: EMERGENCY MEDICINE

## 2019-01-19 PROCEDURE — 83605 ASSAY OF LACTIC ACID: CPT | Performed by: EMERGENCY MEDICINE

## 2019-01-19 PROCEDURE — 99284 EMERGENCY DEPT VISIT MOD MDM: CPT | Performed by: EMERGENCY MEDICINE

## 2019-01-19 PROCEDURE — 63710000001 DIPHENHYDRAMINE PER 50 MG: Performed by: SURGERY

## 2019-01-19 PROCEDURE — 85025 COMPLETE CBC W/AUTO DIFF WBC: CPT | Performed by: EMERGENCY MEDICINE

## 2019-01-19 PROCEDURE — 25010000002 CEFTRIAXONE SODIUM-DEXTROSE 2-2.22 GM-%(50ML) RECONSTITUTED SOLUTION: Performed by: EMERGENCY MEDICINE

## 2019-01-19 PROCEDURE — 0 IOPAMIDOL PER 1 ML: Performed by: EMERGENCY MEDICINE

## 2019-01-19 PROCEDURE — 74177 CT ABD & PELVIS W/CONTRAST: CPT

## 2019-01-19 PROCEDURE — 25010000002 HYDROMORPHONE PER 4 MG: Performed by: EMERGENCY MEDICINE

## 2019-01-19 PROCEDURE — 81003 URINALYSIS AUTO W/O SCOPE: CPT | Performed by: EMERGENCY MEDICINE

## 2019-01-19 PROCEDURE — 87804 INFLUENZA ASSAY W/OPTIC: CPT | Performed by: EMERGENCY MEDICINE

## 2019-01-19 PROCEDURE — 80053 COMPREHEN METABOLIC PANEL: CPT | Performed by: EMERGENCY MEDICINE

## 2019-01-19 PROCEDURE — 99285 EMERGENCY DEPT VISIT HI MDM: CPT

## 2019-01-19 PROCEDURE — 85007 BL SMEAR W/DIFF WBC COUNT: CPT | Performed by: EMERGENCY MEDICINE

## 2019-01-19 RX ORDER — ACETAMINOPHEN 500 MG
1000 TABLET ORAL ONCE
Status: COMPLETED | OUTPATIENT
Start: 2019-01-19 | End: 2019-01-19

## 2019-01-19 RX ORDER — CEFTRIAXONE 1 G/50ML
1 INJECTION, SOLUTION INTRAVENOUS EVERY 12 HOURS
Status: DISCONTINUED | OUTPATIENT
Start: 2019-01-20 | End: 2019-01-24

## 2019-01-19 RX ORDER — SODIUM CHLORIDE, SODIUM LACTATE, POTASSIUM CHLORIDE, CALCIUM CHLORIDE 600; 310; 30; 20 MG/100ML; MG/100ML; MG/100ML; MG/100ML
100 INJECTION, SOLUTION INTRAVENOUS CONTINUOUS
Status: DISCONTINUED | OUTPATIENT
Start: 2019-01-20 | End: 2019-01-20

## 2019-01-19 RX ORDER — OXYCODONE HYDROCHLORIDE AND ACETAMINOPHEN 5; 325 MG/1; MG/1
1 TABLET ORAL EVERY 4 HOURS PRN
Status: DISCONTINUED | OUTPATIENT
Start: 2019-01-19 | End: 2019-01-25 | Stop reason: HOSPADM

## 2019-01-19 RX ORDER — ONDANSETRON 2 MG/ML
4 INJECTION INTRAMUSCULAR; INTRAVENOUS EVERY 6 HOURS PRN
Status: DISCONTINUED | OUTPATIENT
Start: 2019-01-19 | End: 2019-01-25 | Stop reason: HOSPADM

## 2019-01-19 RX ORDER — CEFTRIAXONE 2 G/50ML
2 INJECTION, SOLUTION INTRAVENOUS ONCE
Status: COMPLETED | OUTPATIENT
Start: 2019-01-19 | End: 2019-01-19

## 2019-01-19 RX ORDER — DIPHENHYDRAMINE HCL 50 MG
50 CAPSULE ORAL EVERY 6 HOURS PRN
Status: DISCONTINUED | OUTPATIENT
Start: 2019-01-19 | End: 2019-01-25 | Stop reason: HOSPADM

## 2019-01-19 RX ORDER — DIPHENHYDRAMINE HCL 50 MG
50 CAPSULE ORAL EVERY 6 HOURS PRN
COMMUNITY
End: 2021-03-06

## 2019-01-19 RX ORDER — ONDANSETRON 4 MG/1
4 TABLET, ORALLY DISINTEGRATING ORAL ONCE
Status: COMPLETED | OUTPATIENT
Start: 2019-01-19 | End: 2019-01-19

## 2019-01-19 RX ORDER — HYDROMORPHONE HCL 110MG/55ML
1 PATIENT CONTROLLED ANALGESIA SYRINGE INTRAVENOUS ONCE
Status: COMPLETED | OUTPATIENT
Start: 2019-01-19 | End: 2019-01-19

## 2019-01-19 RX ADMIN — ACETAMINOPHEN 1000 MG: 500 TABLET, FILM COATED ORAL at 19:12

## 2019-01-19 RX ADMIN — OXYCODONE AND ACETAMINOPHEN 1 TABLET: 5; 325 TABLET ORAL at 23:58

## 2019-01-19 RX ADMIN — DIPHENHYDRAMINE HYDROCHLORIDE 50 MG: 50 CAPSULE ORAL at 23:58

## 2019-01-19 RX ADMIN — ONDANSETRON 4 MG: 4 TABLET, ORALLY DISINTEGRATING ORAL at 18:21

## 2019-01-19 RX ADMIN — SODIUM CHLORIDE, POTASSIUM CHLORIDE, SODIUM LACTATE AND CALCIUM CHLORIDE 100 ML/HR: 600; 310; 30; 20 INJECTION, SOLUTION INTRAVENOUS at 23:43

## 2019-01-19 RX ADMIN — SODIUM CHLORIDE 1000 ML: 9 INJECTION, SOLUTION INTRAVENOUS at 18:15

## 2019-01-19 RX ADMIN — IOPAMIDOL 100 ML: 755 INJECTION, SOLUTION INTRAVENOUS at 19:43

## 2019-01-19 RX ADMIN — HYDROMORPHONE HYDROCHLORIDE 1 MG: 2 INJECTION INTRAMUSCULAR; INTRAVENOUS; SUBCUTANEOUS at 18:23

## 2019-01-19 RX ADMIN — CEFTRIAXONE 2 G: 2 INJECTION, SOLUTION INTRAVENOUS at 21:03

## 2019-01-20 ENCOUNTER — APPOINTMENT (OUTPATIENT)
Dept: GENERAL RADIOLOGY | Facility: HOSPITAL | Age: 34
End: 2019-01-20

## 2019-01-20 PROBLEM — R10.31 RIGHT LOWER QUADRANT ABDOMINAL PAIN: Status: ACTIVE | Noted: 2019-01-20

## 2019-01-20 LAB
ANION GAP SERPL CALCULATED.3IONS-SCNC: 11.7 MMOL/L
BUN BLD-MCNC: 6 MG/DL (ref 6–20)
BUN/CREAT SERPL: 10.2 (ref 7–25)
C DIFF GDH STL QL: NEGATIVE
CALCIUM SPEC-SCNC: 8.7 MG/DL (ref 8.6–10.5)
CHLORIDE SERPL-SCNC: 100 MMOL/L (ref 98–107)
CO2 SERPL-SCNC: 26.3 MMOL/L (ref 22–29)
CREAT BLD-MCNC: 0.59 MG/DL (ref 0.57–1)
DEPRECATED RDW RBC AUTO: 46.5 FL (ref 37–54)
ERYTHROCYTE [DISTWIDTH] IN BLOOD BY AUTOMATED COUNT: 16.7 % (ref 11.5–14.5)
GFR SERPL CREATININE-BSD FRML MDRD: 117 ML/MIN/1.73
GLUCOSE BLD-MCNC: 96 MG/DL (ref 65–99)
HCT VFR BLD AUTO: 34.6 % (ref 37–47)
HGB BLD-MCNC: 10.1 G/DL (ref 12–16)
LDH SERPL-CCNC: 125 U/L (ref 135–214)
MCH RBC QN AUTO: 22.5 PG (ref 27–31)
MCHC RBC AUTO-ENTMCNC: 29.2 G/DL (ref 31–37)
MCV RBC AUTO: 77.1 FL (ref 81–99)
PLATELET # BLD AUTO: 335 10*3/MM3 (ref 140–500)
PMV BLD AUTO: 9.9 FL (ref 7.4–10.4)
POTASSIUM BLD-SCNC: 3.6 MMOL/L (ref 3.5–5.2)
RBC # BLD AUTO: 4.49 10*6/MM3 (ref 4.2–5.4)
SODIUM BLD-SCNC: 138 MMOL/L (ref 136–145)
TROPONIN T SERPL-MCNC: <0.01 NG/ML (ref 0–0.03)
WBC NRBC COR # BLD: 7.22 10*3/MM3 (ref 4.8–10.8)

## 2019-01-20 PROCEDURE — 87449 NOS EACH ORGANISM AG IA: CPT | Performed by: SURGERY

## 2019-01-20 PROCEDURE — 83615 LACTATE (LD) (LDH) ENZYME: CPT | Performed by: SURGERY

## 2019-01-20 PROCEDURE — 87186 SC STD MICRODIL/AGAR DIL: CPT | Performed by: SURGERY

## 2019-01-20 PROCEDURE — 25010000002 ONDANSETRON PER 1 MG: Performed by: SURGERY

## 2019-01-20 PROCEDURE — 71046 X-RAY EXAM CHEST 2 VIEWS: CPT

## 2019-01-20 PROCEDURE — 87324 CLOSTRIDIUM AG IA: CPT | Performed by: SURGERY

## 2019-01-20 PROCEDURE — 87075 CULTR BACTERIA EXCEPT BLOOD: CPT | Performed by: SURGERY

## 2019-01-20 PROCEDURE — 63710000001 DIPHENHYDRAMINE PER 50 MG: Performed by: SURGERY

## 2019-01-20 PROCEDURE — 87205 SMEAR GRAM STAIN: CPT | Performed by: SURGERY

## 2019-01-20 PROCEDURE — 25010000002 ENOXAPARIN PER 10 MG: Performed by: SURGERY

## 2019-01-20 PROCEDURE — 25010000002 HYDROMORPHONE 1 MG/ML SOLUTION: Performed by: SURGERY

## 2019-01-20 PROCEDURE — 85027 COMPLETE CBC AUTOMATED: CPT | Performed by: SURGERY

## 2019-01-20 PROCEDURE — 25010000002 CEFTRIAXONE SODIUM-DEXTROSE 1-3.74 GM-%(50ML) RECONSTITUTED SOLUTION: Performed by: SURGERY

## 2019-01-20 PROCEDURE — 87070 CULTURE OTHR SPECIMN AEROBIC: CPT | Performed by: SURGERY

## 2019-01-20 PROCEDURE — 87147 CULTURE TYPE IMMUNOLOGIC: CPT | Performed by: SURGERY

## 2019-01-20 PROCEDURE — 93010 ELECTROCARDIOGRAM REPORT: CPT | Performed by: INTERNAL MEDICINE

## 2019-01-20 PROCEDURE — 84484 ASSAY OF TROPONIN QUANT: CPT | Performed by: INTERNAL MEDICINE

## 2019-01-20 PROCEDURE — 93005 ELECTROCARDIOGRAM TRACING: CPT | Performed by: INTERNAL MEDICINE

## 2019-01-20 PROCEDURE — 80048 BASIC METABOLIC PNL TOTAL CA: CPT | Performed by: SURGERY

## 2019-01-20 PROCEDURE — 99221 1ST HOSP IP/OBS SF/LOW 40: CPT | Performed by: SURGERY

## 2019-01-20 RX ORDER — NORTRIPTYLINE HYDROCHLORIDE 25 MG/1
100 CAPSULE ORAL NIGHTLY
Status: DISCONTINUED | OUTPATIENT
Start: 2019-01-20 | End: 2019-01-25 | Stop reason: HOSPADM

## 2019-01-20 RX ORDER — MELATONIN
2000 DAILY
Status: DISCONTINUED | OUTPATIENT
Start: 2019-01-20 | End: 2019-01-25 | Stop reason: HOSPADM

## 2019-01-20 RX ORDER — CETIRIZINE HYDROCHLORIDE 10 MG/1
10 TABLET ORAL NIGHTLY
Status: DISCONTINUED | OUTPATIENT
Start: 2019-01-20 | End: 2019-01-25 | Stop reason: HOSPADM

## 2019-01-20 RX ORDER — FAMOTIDINE 20 MG/1
20 TABLET, FILM COATED ORAL NIGHTLY
Status: DISCONTINUED | OUTPATIENT
Start: 2019-01-20 | End: 2019-01-25 | Stop reason: HOSPADM

## 2019-01-20 RX ORDER — MAGNESIUM HYDROXIDE 1200 MG/15ML
LIQUID ORAL
Status: COMPLETED
Start: 2019-01-20 | End: 2019-01-20

## 2019-01-20 RX ADMIN — CEFTRIAXONE 1 G: 1 INJECTION, SOLUTION INTRAVENOUS at 09:58

## 2019-01-20 RX ADMIN — DIPHENHYDRAMINE HYDROCHLORIDE 50 MG: 50 CAPSULE ORAL at 05:39

## 2019-01-20 RX ADMIN — FAMOTIDINE 20 MG: 20 TABLET, FILM COATED ORAL at 20:08

## 2019-01-20 RX ADMIN — OXYCODONE AND ACETAMINOPHEN 1 TABLET: 5; 325 TABLET ORAL at 20:05

## 2019-01-20 RX ADMIN — OXYCODONE AND ACETAMINOPHEN 1 TABLET: 5; 325 TABLET ORAL at 08:20

## 2019-01-20 RX ADMIN — CETIRIZINE HYDROCHLORIDE 10 MG: 10 TABLET, FILM COATED ORAL at 20:10

## 2019-01-20 RX ADMIN — DIPHENHYDRAMINE HYDROCHLORIDE 50 MG: 50 CAPSULE ORAL at 20:06

## 2019-01-20 RX ADMIN — VITAMIN D, TAB 1000IU (100/BT) 2000 UNITS: 25 TAB at 08:21

## 2019-01-20 RX ADMIN — DIPHENHYDRAMINE HYDROCHLORIDE 50 MG: 50 CAPSULE ORAL at 13:10

## 2019-01-20 RX ADMIN — CEFTRIAXONE 1 G: 1 INJECTION, SOLUTION INTRAVENOUS at 20:06

## 2019-01-20 RX ADMIN — NORTRIPTYLINE HYDROCHLORIDE 100 MG: 25 CAPSULE ORAL at 20:07

## 2019-01-20 RX ADMIN — ONDANSETRON 4 MG: 2 INJECTION, SOLUTION INTRAMUSCULAR; INTRAVENOUS at 08:20

## 2019-01-20 RX ADMIN — OXYCODONE AND ACETAMINOPHEN 1 TABLET: 5; 325 TABLET ORAL at 13:10

## 2019-01-20 RX ADMIN — ENOXAPARIN SODIUM 40 MG: 100 INJECTION SUBCUTANEOUS at 08:20

## 2019-01-20 RX ADMIN — HYDROMORPHONE HYDROCHLORIDE 1 MG: 1 INJECTION, SOLUTION INTRAMUSCULAR; INTRAVENOUS; SUBCUTANEOUS at 05:16

## 2019-01-20 RX ADMIN — SODIUM CHLORIDE: 900 IRRIGANT IRRIGATION at 07:23

## 2019-01-21 ENCOUNTER — APPOINTMENT (OUTPATIENT)
Dept: CARDIOLOGY | Facility: HOSPITAL | Age: 34
End: 2019-01-21
Attending: NURSE PRACTITIONER

## 2019-01-21 LAB
BH CV ECHO MEAS - ACS: 2.2 CM
BH CV ECHO MEAS - AO MAX PG (FULL): 2.8 MMHG
BH CV ECHO MEAS - AO MAX PG: 6.3 MMHG
BH CV ECHO MEAS - AO MEAN PG (FULL): 1 MMHG
BH CV ECHO MEAS - AO MEAN PG: 3 MMHG
BH CV ECHO MEAS - AO ROOT AREA (BSA CORRECTED): 1.3
BH CV ECHO MEAS - AO ROOT AREA: 7.1 CM^2
BH CV ECHO MEAS - AO ROOT DIAM: 3 CM
BH CV ECHO MEAS - AO V2 MAX: 125 CM/SEC
BH CV ECHO MEAS - AO V2 MEAN: 78.4 CM/SEC
BH CV ECHO MEAS - AO V2 VTI: 19.2 CM
BH CV ECHO MEAS - AVA(I,A): 3.1 CM^2
BH CV ECHO MEAS - AVA(I,D): 3.1 CM^2
BH CV ECHO MEAS - AVA(V,A): 3.1 CM^2
BH CV ECHO MEAS - AVA(V,D): 3.1 CM^2
BH CV ECHO MEAS - BSA(HAYCOCK): 2.4 M^2
BH CV ECHO MEAS - BSA: 2.2 M^2
BH CV ECHO MEAS - BZI_BMI: 46.7 KILOGRAMS/M^2
BH CV ECHO MEAS - BZI_METRIC_HEIGHT: 162.6 CM
BH CV ECHO MEAS - BZI_METRIC_WEIGHT: 123.4 KG
BH CV ECHO MEAS - EDV(CUBED): 34.3 ML
BH CV ECHO MEAS - EDV(MOD-SP2): 88.8 ML
BH CV ECHO MEAS - EDV(MOD-SP4): 95.8 ML
BH CV ECHO MEAS - EDV(TEICH): 42.5 ML
BH CV ECHO MEAS - EF(CUBED): 70.2 %
BH CV ECHO MEAS - EF(MOD-BP): 56 %
BH CV ECHO MEAS - EF(MOD-SP2): 55.9 %
BH CV ECHO MEAS - EF(MOD-SP4): 54.2 %
BH CV ECHO MEAS - EF(TEICH): 63.2 %
BH CV ECHO MEAS - ESV(CUBED): 10.2 ML
BH CV ECHO MEAS - ESV(MOD-SP2): 39.2 ML
BH CV ECHO MEAS - ESV(MOD-SP4): 43.9 ML
BH CV ECHO MEAS - ESV(TEICH): 15.7 ML
BH CV ECHO MEAS - FS: 33.2 %
BH CV ECHO MEAS - IVS/LVPW: 1.1
BH CV ECHO MEAS - IVSD: 1.3 CM
BH CV ECHO MEAS - LA DIMENSION: 3.9 CM
BH CV ECHO MEAS - LA/AO: 1.3
BH CV ECHO MEAS - LAT PEAK E' VEL: 5 CM/SEC
BH CV ECHO MEAS - LV DIASTOLIC VOL/BSA (35-75): 43 ML/M^2
BH CV ECHO MEAS - LV MASS(C)D: 129.4 GRAMS
BH CV ECHO MEAS - LV MASS(C)DI: 58 GRAMS/M^2
BH CV ECHO MEAS - LV MAX PG: 3.5 MMHG
BH CV ECHO MEAS - LV MEAN PG: 2 MMHG
BH CV ECHO MEAS - LV SYSTOLIC VOL/BSA (12-30): 19.7 ML/M^2
BH CV ECHO MEAS - LV V1 MAX: 92.9 CM/SEC
BH CV ECHO MEAS - LV V1 MEAN: 62.9 CM/SEC
BH CV ECHO MEAS - LV V1 VTI: 14.2 CM
BH CV ECHO MEAS - LVIDD: 3.3 CM
BH CV ECHO MEAS - LVIDS: 2.2 CM
BH CV ECHO MEAS - LVLD AP2: 7.7 CM
BH CV ECHO MEAS - LVLD AP4: 8.2 CM
BH CV ECHO MEAS - LVLS AP2: 6.5 CM
BH CV ECHO MEAS - LVLS AP4: 6.6 CM
BH CV ECHO MEAS - LVOT AREA (M): 4.2 CM^2
BH CV ECHO MEAS - LVOT AREA: 4.2 CM^2
BH CV ECHO MEAS - LVOT DIAM: 2.3 CM
BH CV ECHO MEAS - LVPWD: 1.2 CM
BH CV ECHO MEAS - MED PEAK E' VEL: 13 CM/SEC
BH CV ECHO MEAS - MV A DUR: 0.1 SEC
BH CV ECHO MEAS - MV A MAX VEL: 69.8 CM/SEC
BH CV ECHO MEAS - MV DEC SLOPE: 745 CM/SEC^2
BH CV ECHO MEAS - MV DEC TIME: 0.2 SEC
BH CV ECHO MEAS - MV E MAX VEL: 78.8 CM/SEC
BH CV ECHO MEAS - MV E/A: 1.1
BH CV ECHO MEAS - MV MAX PG: 4.8 MMHG
BH CV ECHO MEAS - MV MEAN PG: 3 MMHG
BH CV ECHO MEAS - MV P1/2T MAX VEL: 117 CM/SEC
BH CV ECHO MEAS - MV P1/2T: 46 MSEC
BH CV ECHO MEAS - MV V2 MAX: 109 CM/SEC
BH CV ECHO MEAS - MV V2 MEAN: 73.8 CM/SEC
BH CV ECHO MEAS - MV V2 VTI: 21.2 CM
BH CV ECHO MEAS - MVA P1/2T LCG: 1.9 CM^2
BH CV ECHO MEAS - MVA(P1/2T): 4.8 CM^2
BH CV ECHO MEAS - MVA(VTI): 2.8 CM^2
BH CV ECHO MEAS - PA ACC TIME: 0.08 SEC
BH CV ECHO MEAS - PA MAX PG (FULL): 1.9 MMHG
BH CV ECHO MEAS - PA MAX PG: 4.2 MMHG
BH CV ECHO MEAS - PA PR(ACCEL): 42.6 MMHG
BH CV ECHO MEAS - PA V2 MAX: 102 CM/SEC
BH CV ECHO MEAS - PULM A REVS DUR: 0.11 SEC
BH CV ECHO MEAS - PULM A REVS VEL: 33.7 CM/SEC
BH CV ECHO MEAS - PULM DIAS VEL: 23 CM/SEC
BH CV ECHO MEAS - PULM S/D: 2.1
BH CV ECHO MEAS - PULM SYS VEL: 47.9 CM/SEC
BH CV ECHO MEAS - PVA(V,A): 3.6 CM^2
BH CV ECHO MEAS - PVA(V,D): 3.6 CM^2
BH CV ECHO MEAS - QP/QS: 1.1
BH CV ECHO MEAS - RV MAX PG: 2.2 MMHG
BH CV ECHO MEAS - RV MEAN PG: 1 MMHG
BH CV ECHO MEAS - RV V1 MAX: 74.6 CM/SEC
BH CV ECHO MEAS - RV V1 MEAN: 53.8 CM/SEC
BH CV ECHO MEAS - RV V1 VTI: 13.5 CM
BH CV ECHO MEAS - RVOT AREA: 4.9 CM^2
BH CV ECHO MEAS - RVOT DIAM: 2.5 CM
BH CV ECHO MEAS - SI(AO): 60.9 ML/M^2
BH CV ECHO MEAS - SI(CUBED): 10.8 ML/M^2
BH CV ECHO MEAS - SI(LVOT): 26.5 ML/M^2
BH CV ECHO MEAS - SI(MOD-SP2): 22.3 ML/M^2
BH CV ECHO MEAS - SI(MOD-SP4): 23.3 ML/M^2
BH CV ECHO MEAS - SI(TEICH): 12.1 ML/M^2
BH CV ECHO MEAS - SV(AO): 135.7 ML
BH CV ECHO MEAS - SV(CUBED): 24.1 ML
BH CV ECHO MEAS - SV(LVOT): 59 ML
BH CV ECHO MEAS - SV(MOD-SP2): 49.6 ML
BH CV ECHO MEAS - SV(MOD-SP4): 51.9 ML
BH CV ECHO MEAS - SV(RVOT): 66.3 ML
BH CV ECHO MEAS - SV(TEICH): 26.9 ML
BH CV ECHO MEAS - TAPSE (>1.6): 2.4 CM2
BH CV ECHO MEASUREMENTS AVERAGE E/E' RATIO: 8.76
BH CV VAS BP RIGHT ARM: NORMAL MMHG
BH CV XLRA - TDI S': 11 CM/SEC
LEFT ATRIUM VOLUME INDEX: 17 ML/M2
LV EF 2D ECHO EST: 56 %
MAXIMAL PREDICTED HEART RATE: 186 BPM
STRESS TARGET HR: 158 BPM

## 2019-01-21 PROCEDURE — 93306 TTE W/DOPPLER COMPLETE: CPT

## 2019-01-21 PROCEDURE — 25010000002 ENOXAPARIN PER 10 MG: Performed by: SURGERY

## 2019-01-21 PROCEDURE — 99254 IP/OBS CNSLTJ NEW/EST MOD 60: CPT | Performed by: NURSE PRACTITIONER

## 2019-01-21 PROCEDURE — 99231 SBSQ HOSP IP/OBS SF/LOW 25: CPT | Performed by: SURGERY

## 2019-01-21 PROCEDURE — 25010000002 HYDROMORPHONE 1 MG/ML SOLUTION: Performed by: SURGERY

## 2019-01-21 PROCEDURE — 63710000001 DIPHENHYDRAMINE PER 50 MG: Performed by: SURGERY

## 2019-01-21 PROCEDURE — 25010000002 PERFLUTREN (DEFINITY) 8.476 MG IN SODIUM CHLORIDE 0.9 % 10 ML INJECTION: Performed by: SURGERY

## 2019-01-21 PROCEDURE — 25010000002 CEFTRIAXONE SODIUM-DEXTROSE 1-3.74 GM-%(50ML) RECONSTITUTED SOLUTION: Performed by: SURGERY

## 2019-01-21 PROCEDURE — 93306 TTE W/DOPPLER COMPLETE: CPT | Performed by: INTERNAL MEDICINE

## 2019-01-21 RX ORDER — SODIUM HYPOCHLORITE 2.5 MG/ML
SOLUTION TOPICAL 2 TIMES DAILY
Status: DISCONTINUED | OUTPATIENT
Start: 2019-01-21 | End: 2019-01-25 | Stop reason: HOSPADM

## 2019-01-21 RX ORDER — SUMATRIPTAN 25 MG/1
25 TABLET, FILM COATED ORAL
Status: DISCONTINUED | OUTPATIENT
Start: 2019-01-21 | End: 2019-01-21

## 2019-01-21 RX ORDER — SUMATRIPTAN 25 MG/1
100 TABLET, FILM COATED ORAL DAILY PRN
Status: DISCONTINUED | OUTPATIENT
Start: 2019-01-21 | End: 2019-01-25 | Stop reason: HOSPADM

## 2019-01-21 RX ADMIN — FAMOTIDINE 20 MG: 20 TABLET, FILM COATED ORAL at 20:13

## 2019-01-21 RX ADMIN — ENOXAPARIN SODIUM 40 MG: 100 INJECTION SUBCUTANEOUS at 08:42

## 2019-01-21 RX ADMIN — OXYCODONE AND ACETAMINOPHEN 1 TABLET: 5; 325 TABLET ORAL at 18:34

## 2019-01-21 RX ADMIN — OXYCODONE AND ACETAMINOPHEN 1 TABLET: 5; 325 TABLET ORAL at 22:21

## 2019-01-21 RX ADMIN — VITAMIN D, TAB 1000IU (100/BT) 2000 UNITS: 25 TAB at 08:41

## 2019-01-21 RX ADMIN — DIPHENHYDRAMINE HYDROCHLORIDE 50 MG: 50 CAPSULE ORAL at 04:54

## 2019-01-21 RX ADMIN — DIPHENHYDRAMINE HYDROCHLORIDE 50 MG: 50 CAPSULE ORAL at 11:01

## 2019-01-21 RX ADMIN — CEFTRIAXONE 1 G: 1 INJECTION, SOLUTION INTRAVENOUS at 20:13

## 2019-01-21 RX ADMIN — PERFLUTREN 2 ML: 6.52 INJECTION, SUSPENSION INTRAVENOUS at 11:16

## 2019-01-21 RX ADMIN — DIPHENHYDRAMINE HYDROCHLORIDE 50 MG: 50 CAPSULE ORAL at 18:34

## 2019-01-21 RX ADMIN — HYDROMORPHONE HYDROCHLORIDE 1 MG: 1 INJECTION, SOLUTION INTRAMUSCULAR; INTRAVENOUS; SUBCUTANEOUS at 23:10

## 2019-01-21 RX ADMIN — SUMATRIPTAN SUCCINATE 100 MG: 25 TABLET, FILM COATED ORAL at 10:56

## 2019-01-21 RX ADMIN — HYOSCYAMINE SULFATE 473 ML: 16 SOLUTION at 08:41

## 2019-01-21 RX ADMIN — CEFTRIAXONE 1 G: 1 INJECTION, SOLUTION INTRAVENOUS at 09:42

## 2019-01-21 RX ADMIN — OXYCODONE AND ACETAMINOPHEN 1 TABLET: 5; 325 TABLET ORAL at 12:52

## 2019-01-21 RX ADMIN — CETIRIZINE HYDROCHLORIDE 10 MG: 10 TABLET, FILM COATED ORAL at 20:13

## 2019-01-21 RX ADMIN — HYDROMORPHONE HYDROCHLORIDE 1 MG: 1 INJECTION, SOLUTION INTRAMUSCULAR; INTRAVENOUS; SUBCUTANEOUS at 08:41

## 2019-01-21 RX ADMIN — NORTRIPTYLINE HYDROCHLORIDE 100 MG: 25 CAPSULE ORAL at 20:13

## 2019-01-21 RX ADMIN — HYDROMORPHONE HYDROCHLORIDE 1 MG: 1 INJECTION, SOLUTION INTRAMUSCULAR; INTRAVENOUS; SUBCUTANEOUS at 14:52

## 2019-01-21 RX ADMIN — OXYCODONE AND ACETAMINOPHEN 1 TABLET: 5; 325 TABLET ORAL at 04:53

## 2019-01-22 LAB
ANION GAP SERPL CALCULATED.3IONS-SCNC: 9.3 MMOL/L
BASOPHILS # BLD AUTO: 0.02 10*3/MM3 (ref 0–0.2)
BASOPHILS NFR BLD AUTO: 0.3 % (ref 0–2)
BUN BLD-MCNC: 6 MG/DL (ref 6–20)
BUN/CREAT SERPL: 10.2 (ref 7–25)
CALCIUM SPEC-SCNC: 8.5 MG/DL (ref 8.6–10.5)
CHLORIDE SERPL-SCNC: 101 MMOL/L (ref 98–107)
CO2 SERPL-SCNC: 26.7 MMOL/L (ref 22–29)
CREAT BLD-MCNC: 0.59 MG/DL (ref 0.57–1)
DEPRECATED RDW RBC AUTO: 45 FL (ref 37–54)
EOSINOPHIL # BLD AUTO: 0.31 10*3/MM3 (ref 0.1–0.3)
EOSINOPHIL NFR BLD AUTO: 5.3 % (ref 0–4)
ERYTHROCYTE [DISTWIDTH] IN BLOOD BY AUTOMATED COUNT: 16.6 % (ref 11.5–14.5)
GFR SERPL CREATININE-BSD FRML MDRD: 117 ML/MIN/1.73
GLUCOSE BLD-MCNC: 112 MG/DL (ref 65–99)
HCT VFR BLD AUTO: 30.9 % (ref 37–47)
HGB BLD-MCNC: 9.2 G/DL (ref 12–16)
IMM GRANULOCYTES # BLD AUTO: 0.03 10*3/MM3 (ref 0–0.03)
IMM GRANULOCYTES NFR BLD AUTO: 0.5 % (ref 0–0.5)
LYMPHOCYTES # BLD AUTO: 2.12 10*3/MM3 (ref 0.6–4.8)
LYMPHOCYTES NFR BLD AUTO: 36.5 % (ref 20–45)
MCH RBC QN AUTO: 22.5 PG (ref 27–31)
MCHC RBC AUTO-ENTMCNC: 29.8 G/DL (ref 31–37)
MCV RBC AUTO: 75.6 FL (ref 81–99)
MONOCYTES # BLD AUTO: 0.62 10*3/MM3 (ref 0–1)
MONOCYTES NFR BLD AUTO: 10.7 % (ref 3–8)
NEUTROPHILS # BLD AUTO: 2.71 10*3/MM3 (ref 1.5–8.3)
NEUTROPHILS NFR BLD AUTO: 46.7 % (ref 45–70)
NRBC BLD AUTO-RTO: 0 /100 WBC (ref 0–0)
PLATELET # BLD AUTO: 322 10*3/MM3 (ref 140–500)
PMV BLD AUTO: 9.9 FL (ref 7.4–10.4)
POTASSIUM BLD-SCNC: 3.9 MMOL/L (ref 3.5–5.2)
RBC # BLD AUTO: 4.09 10*6/MM3 (ref 4.2–5.4)
SODIUM BLD-SCNC: 137 MMOL/L (ref 136–145)
WBC NRBC COR # BLD: 5.81 10*3/MM3 (ref 4.8–10.8)

## 2019-01-22 PROCEDURE — 25010000002 HYDROMORPHONE 1 MG/ML SOLUTION: Performed by: SURGERY

## 2019-01-22 PROCEDURE — 80048 BASIC METABOLIC PNL TOTAL CA: CPT | Performed by: SURGERY

## 2019-01-22 PROCEDURE — 85025 COMPLETE CBC W/AUTO DIFF WBC: CPT | Performed by: SURGERY

## 2019-01-22 PROCEDURE — 25010000002 ENOXAPARIN PER 10 MG: Performed by: SURGERY

## 2019-01-22 PROCEDURE — 99232 SBSQ HOSP IP/OBS MODERATE 35: CPT | Performed by: INTERNAL MEDICINE

## 2019-01-22 PROCEDURE — 99024 POSTOP FOLLOW-UP VISIT: CPT | Performed by: SURGERY

## 2019-01-22 PROCEDURE — 63710000001 DIPHENHYDRAMINE PER 50 MG: Performed by: SURGERY

## 2019-01-22 PROCEDURE — 25010000002 CEFTRIAXONE SODIUM-DEXTROSE 1-3.74 GM-%(50ML) RECONSTITUTED SOLUTION: Performed by: SURGERY

## 2019-01-22 RX ORDER — METFORMIN HYDROCHLORIDE 500 MG/1
1000 TABLET, EXTENDED RELEASE ORAL
Status: DISCONTINUED | OUTPATIENT
Start: 2019-01-23 | End: 2019-01-25 | Stop reason: HOSPADM

## 2019-01-22 RX ADMIN — HYDROMORPHONE HYDROCHLORIDE 1 MG: 1 INJECTION, SOLUTION INTRAMUSCULAR; INTRAVENOUS; SUBCUTANEOUS at 11:28

## 2019-01-22 RX ADMIN — OXYCODONE AND ACETAMINOPHEN 1 TABLET: 5; 325 TABLET ORAL at 20:36

## 2019-01-22 RX ADMIN — OXYCODONE AND ACETAMINOPHEN 1 TABLET: 5; 325 TABLET ORAL at 14:01

## 2019-01-22 RX ADMIN — DIPHENHYDRAMINE HYDROCHLORIDE 50 MG: 50 CAPSULE ORAL at 14:45

## 2019-01-22 RX ADMIN — DIPHENHYDRAMINE HYDROCHLORIDE 50 MG: 50 CAPSULE ORAL at 08:42

## 2019-01-22 RX ADMIN — HYDROMORPHONE HYDROCHLORIDE 1 MG: 1 INJECTION, SOLUTION INTRAMUSCULAR; INTRAVENOUS; SUBCUTANEOUS at 16:43

## 2019-01-22 RX ADMIN — CETIRIZINE HYDROCHLORIDE 10 MG: 10 TABLET, FILM COATED ORAL at 20:40

## 2019-01-22 RX ADMIN — NORTRIPTYLINE HYDROCHLORIDE 100 MG: 25 CAPSULE ORAL at 20:39

## 2019-01-22 RX ADMIN — VITAMIN D, TAB 1000IU (100/BT) 2000 UNITS: 25 TAB at 08:31

## 2019-01-22 RX ADMIN — CEFTRIAXONE 1 G: 1 INJECTION, SOLUTION INTRAVENOUS at 20:36

## 2019-01-22 RX ADMIN — ENOXAPARIN SODIUM 40 MG: 100 INJECTION SUBCUTANEOUS at 08:30

## 2019-01-22 RX ADMIN — CEFTRIAXONE 1 G: 1 INJECTION, SOLUTION INTRAVENOUS at 08:31

## 2019-01-22 RX ADMIN — DIPHENHYDRAMINE HYDROCHLORIDE 50 MG: 50 CAPSULE ORAL at 00:15

## 2019-01-22 RX ADMIN — OXYCODONE AND ACETAMINOPHEN 1 TABLET: 5; 325 TABLET ORAL at 08:39

## 2019-01-22 RX ADMIN — FAMOTIDINE 20 MG: 20 TABLET, FILM COATED ORAL at 20:40

## 2019-01-22 RX ADMIN — HYDROMORPHONE HYDROCHLORIDE 1 MG: 1 INJECTION, SOLUTION INTRAMUSCULAR; INTRAVENOUS; SUBCUTANEOUS at 23:07

## 2019-01-22 RX ADMIN — DIPHENHYDRAMINE HYDROCHLORIDE 50 MG: 50 CAPSULE ORAL at 20:36

## 2019-01-23 LAB
BACTERIA SPEC AEROBE CULT: ABNORMAL
GRAM STN SPEC: ABNORMAL
GRAM STN SPEC: ABNORMAL

## 2019-01-23 PROCEDURE — 25010000002 HYDROMORPHONE 1 MG/ML SOLUTION: Performed by: SURGERY

## 2019-01-23 PROCEDURE — 99231 SBSQ HOSP IP/OBS SF/LOW 25: CPT | Performed by: SURGERY

## 2019-01-23 PROCEDURE — 25010000002 CEFTRIAXONE SODIUM-DEXTROSE 1-3.74 GM-%(50ML) RECONSTITUTED SOLUTION: Performed by: SURGERY

## 2019-01-23 PROCEDURE — 63710000001 DIPHENHYDRAMINE PER 50 MG: Performed by: SURGERY

## 2019-01-23 PROCEDURE — 25010000002 ENOXAPARIN PER 10 MG: Performed by: SURGERY

## 2019-01-23 RX ADMIN — CETIRIZINE HYDROCHLORIDE 10 MG: 10 TABLET, FILM COATED ORAL at 21:07

## 2019-01-23 RX ADMIN — OXYCODONE AND ACETAMINOPHEN 1 TABLET: 5; 325 TABLET ORAL at 10:56

## 2019-01-23 RX ADMIN — DIPHENHYDRAMINE HYDROCHLORIDE 50 MG: 50 CAPSULE ORAL at 03:00

## 2019-01-23 RX ADMIN — CEFTRIAXONE 1 G: 1 INJECTION, SOLUTION INTRAVENOUS at 21:00

## 2019-01-23 RX ADMIN — ENOXAPARIN SODIUM 40 MG: 100 INJECTION SUBCUTANEOUS at 08:53

## 2019-01-23 RX ADMIN — VITAMIN D, TAB 1000IU (100/BT) 2000 UNITS: 25 TAB at 08:54

## 2019-01-23 RX ADMIN — OXYCODONE AND ACETAMINOPHEN 1 TABLET: 5; 325 TABLET ORAL at 18:04

## 2019-01-23 RX ADMIN — OXYCODONE AND ACETAMINOPHEN 1 TABLET: 5; 325 TABLET ORAL at 00:45

## 2019-01-23 RX ADMIN — HYDROMORPHONE HYDROCHLORIDE 1 MG: 1 INJECTION, SOLUTION INTRAMUSCULAR; INTRAVENOUS; SUBCUTANEOUS at 18:57

## 2019-01-23 RX ADMIN — DIPHENHYDRAMINE HYDROCHLORIDE 50 MG: 50 CAPSULE ORAL at 15:42

## 2019-01-23 RX ADMIN — CEFTRIAXONE 1 G: 1 INJECTION, SOLUTION INTRAVENOUS at 08:53

## 2019-01-23 RX ADMIN — METFORMIN HYDROCHLORIDE 1000 MG: 500 TABLET, EXTENDED RELEASE ORAL at 08:54

## 2019-01-23 RX ADMIN — DIPHENHYDRAMINE HYDROCHLORIDE 50 MG: 50 CAPSULE ORAL at 22:18

## 2019-01-23 RX ADMIN — OXYCODONE AND ACETAMINOPHEN 1 TABLET: 5; 325 TABLET ORAL at 06:39

## 2019-01-23 RX ADMIN — HYDROMORPHONE HYDROCHLORIDE 1 MG: 1 INJECTION, SOLUTION INTRAMUSCULAR; INTRAVENOUS; SUBCUTANEOUS at 15:54

## 2019-01-23 RX ADMIN — DIPHENHYDRAMINE HYDROCHLORIDE 50 MG: 50 CAPSULE ORAL at 09:08

## 2019-01-23 RX ADMIN — NORTRIPTYLINE HYDROCHLORIDE 100 MG: 25 CAPSULE ORAL at 21:07

## 2019-01-23 RX ADMIN — HYOSCYAMINE SULFATE 473 ML: 16 SOLUTION at 08:54

## 2019-01-23 RX ADMIN — FAMOTIDINE 20 MG: 20 TABLET, FILM COATED ORAL at 21:07

## 2019-01-23 RX ADMIN — HYDROMORPHONE HYDROCHLORIDE 1 MG: 1 INJECTION, SOLUTION INTRAMUSCULAR; INTRAVENOUS; SUBCUTANEOUS at 22:18

## 2019-01-23 RX ADMIN — HYDROMORPHONE HYDROCHLORIDE 1 MG: 1 INJECTION, SOLUTION INTRAMUSCULAR; INTRAVENOUS; SUBCUTANEOUS at 12:59

## 2019-01-24 LAB
BACTERIA SPEC AEROBE CULT: NORMAL
BACTERIA SPEC AEROBE CULT: NORMAL

## 2019-01-24 PROCEDURE — 25010000002 ENOXAPARIN PER 10 MG: Performed by: SURGERY

## 2019-01-24 PROCEDURE — 99231 SBSQ HOSP IP/OBS SF/LOW 25: CPT | Performed by: SURGERY

## 2019-01-24 PROCEDURE — 25010000002 ONDANSETRON PER 1 MG: Performed by: SURGERY

## 2019-01-24 PROCEDURE — 25010000002 CEFTRIAXONE SODIUM-DEXTROSE 1-3.74 GM-%(50ML) RECONSTITUTED SOLUTION: Performed by: SURGERY

## 2019-01-24 PROCEDURE — 63710000001 DIPHENHYDRAMINE PER 50 MG: Performed by: SURGERY

## 2019-01-24 PROCEDURE — 25010000002 HYDROMORPHONE 1 MG/ML SOLUTION: Performed by: SURGERY

## 2019-01-24 RX ORDER — LEVOFLOXACIN 750 MG/1
750 TABLET ORAL EVERY 24 HOURS
Status: DISCONTINUED | OUTPATIENT
Start: 2019-01-24 | End: 2019-01-25 | Stop reason: HOSPADM

## 2019-01-24 RX ADMIN — NORTRIPTYLINE HYDROCHLORIDE 100 MG: 25 CAPSULE ORAL at 20:56

## 2019-01-24 RX ADMIN — ONDANSETRON 4 MG: 2 INJECTION, SOLUTION INTRAMUSCULAR; INTRAVENOUS at 00:09

## 2019-01-24 RX ADMIN — HYDROMORPHONE HYDROCHLORIDE 1 MG: 1 INJECTION, SOLUTION INTRAMUSCULAR; INTRAVENOUS; SUBCUTANEOUS at 17:35

## 2019-01-24 RX ADMIN — HYDROMORPHONE HYDROCHLORIDE 1 MG: 1 INJECTION, SOLUTION INTRAMUSCULAR; INTRAVENOUS; SUBCUTANEOUS at 10:37

## 2019-01-24 RX ADMIN — LEVOFLOXACIN 750 MG: 750 TABLET, FILM COATED ORAL at 15:07

## 2019-01-24 RX ADMIN — DIPHENHYDRAMINE HYDROCHLORIDE 50 MG: 50 CAPSULE ORAL at 10:37

## 2019-01-24 RX ADMIN — OXYCODONE AND ACETAMINOPHEN 1 TABLET: 5; 325 TABLET ORAL at 15:07

## 2019-01-24 RX ADMIN — OXYCODONE AND ACETAMINOPHEN 1 TABLET: 5; 325 TABLET ORAL at 08:24

## 2019-01-24 RX ADMIN — HYDROMORPHONE HYDROCHLORIDE 1 MG: 1 INJECTION, SOLUTION INTRAMUSCULAR; INTRAVENOUS; SUBCUTANEOUS at 23:56

## 2019-01-24 RX ADMIN — METFORMIN HYDROCHLORIDE 1000 MG: 500 TABLET, EXTENDED RELEASE ORAL at 08:41

## 2019-01-24 RX ADMIN — OXYCODONE AND ACETAMINOPHEN 1 TABLET: 5; 325 TABLET ORAL at 04:23

## 2019-01-24 RX ADMIN — FAMOTIDINE 20 MG: 20 TABLET, FILM COATED ORAL at 20:56

## 2019-01-24 RX ADMIN — DIPHENHYDRAMINE HYDROCHLORIDE 50 MG: 50 CAPSULE ORAL at 23:55

## 2019-01-24 RX ADMIN — VITAMIN D, TAB 1000IU (100/BT) 2000 UNITS: 25 TAB at 08:41

## 2019-01-24 RX ADMIN — CEFTRIAXONE 1 G: 1 INJECTION, SOLUTION INTRAVENOUS at 08:41

## 2019-01-24 RX ADMIN — DIPHENHYDRAMINE HYDROCHLORIDE 50 MG: 50 CAPSULE ORAL at 04:23

## 2019-01-24 RX ADMIN — ENOXAPARIN SODIUM 40 MG: 100 INJECTION SUBCUTANEOUS at 08:40

## 2019-01-24 RX ADMIN — CETIRIZINE HYDROCHLORIDE 10 MG: 10 TABLET, FILM COATED ORAL at 20:56

## 2019-01-24 RX ADMIN — HYDROMORPHONE HYDROCHLORIDE 1 MG: 1 INJECTION, SOLUTION INTRAMUSCULAR; INTRAVENOUS; SUBCUTANEOUS at 20:56

## 2019-01-24 RX ADMIN — OXYCODONE AND ACETAMINOPHEN 1 TABLET: 5; 325 TABLET ORAL at 00:09

## 2019-01-24 RX ADMIN — HYDROMORPHONE HYDROCHLORIDE 1 MG: 1 INJECTION, SOLUTION INTRAMUSCULAR; INTRAVENOUS; SUBCUTANEOUS at 14:20

## 2019-01-24 RX ADMIN — HYOSCYAMINE SULFATE 473 ML: 16 SOLUTION at 08:41

## 2019-01-24 RX ADMIN — DIPHENHYDRAMINE HYDROCHLORIDE 50 MG: 50 CAPSULE ORAL at 17:36

## 2019-01-25 VITALS
SYSTOLIC BLOOD PRESSURE: 112 MMHG | BODY MASS INDEX: 46.29 KG/M2 | HEART RATE: 104 BPM | OXYGEN SATURATION: 97 % | RESPIRATION RATE: 18 BRPM | DIASTOLIC BLOOD PRESSURE: 72 MMHG | WEIGHT: 271.17 LBS | HEIGHT: 64 IN | TEMPERATURE: 97.7 F

## 2019-01-25 LAB
ANION GAP SERPL CALCULATED.3IONS-SCNC: 11.3 MMOL/L
BACTERIA SPEC ANAEROBE CULT: NORMAL
BASOPHILS # BLD AUTO: 0.04 10*3/MM3 (ref 0–0.2)
BASOPHILS NFR BLD AUTO: 0.6 % (ref 0–2)
BUN BLD-MCNC: 9 MG/DL (ref 6–20)
BUN/CREAT SERPL: 15 (ref 7–25)
CALCIUM SPEC-SCNC: 8.8 MG/DL (ref 8.6–10.5)
CHLORIDE SERPL-SCNC: 99 MMOL/L (ref 98–107)
CO2 SERPL-SCNC: 25.7 MMOL/L (ref 22–29)
CREAT BLD-MCNC: 0.6 MG/DL (ref 0.57–1)
DEPRECATED RDW RBC AUTO: 45.6 FL (ref 37–54)
EOSINOPHIL # BLD AUTO: 0.37 10*3/MM3 (ref 0.1–0.3)
EOSINOPHIL NFR BLD AUTO: 5.5 % (ref 0–4)
ERYTHROCYTE [DISTWIDTH] IN BLOOD BY AUTOMATED COUNT: 16.9 % (ref 11.5–14.5)
GFR SERPL CREATININE-BSD FRML MDRD: 114 ML/MIN/1.73
GLUCOSE BLD-MCNC: 103 MG/DL (ref 65–99)
HCT VFR BLD AUTO: 32.7 % (ref 37–47)
HGB BLD-MCNC: 9.6 G/DL (ref 12–16)
IMM GRANULOCYTES # BLD AUTO: 0.15 10*3/MM3 (ref 0–0.03)
IMM GRANULOCYTES NFR BLD AUTO: 2.2 % (ref 0–0.5)
LYMPHOCYTES # BLD AUTO: 2.6 10*3/MM3 (ref 0.6–4.8)
LYMPHOCYTES NFR BLD AUTO: 39 % (ref 20–45)
MCH RBC QN AUTO: 22.2 PG (ref 27–31)
MCHC RBC AUTO-ENTMCNC: 29.4 G/DL (ref 31–37)
MCV RBC AUTO: 75.5 FL (ref 81–99)
MONOCYTES # BLD AUTO: 0.55 10*3/MM3 (ref 0–1)
MONOCYTES NFR BLD AUTO: 8.2 % (ref 3–8)
NEUTROPHILS # BLD AUTO: 2.96 10*3/MM3 (ref 1.5–8.3)
NEUTROPHILS NFR BLD AUTO: 44.5 % (ref 45–70)
NRBC BLD AUTO-RTO: 0 /100 WBC (ref 0–0)
PLATELET # BLD AUTO: 394 10*3/MM3 (ref 140–500)
PMV BLD AUTO: 9.9 FL (ref 7.4–10.4)
POTASSIUM BLD-SCNC: 4.1 MMOL/L (ref 3.5–5.2)
RBC # BLD AUTO: 4.33 10*6/MM3 (ref 4.2–5.4)
SODIUM BLD-SCNC: 136 MMOL/L (ref 136–145)
WBC NRBC COR # BLD: 6.67 10*3/MM3 (ref 4.8–10.8)

## 2019-01-25 PROCEDURE — 25010000002 HYDROMORPHONE 1 MG/ML SOLUTION: Performed by: SURGERY

## 2019-01-25 PROCEDURE — 63710000001 DIPHENHYDRAMINE PER 50 MG: Performed by: SURGERY

## 2019-01-25 PROCEDURE — 85025 COMPLETE CBC W/AUTO DIFF WBC: CPT | Performed by: SURGERY

## 2019-01-25 PROCEDURE — 25010000002 ENOXAPARIN PER 10 MG: Performed by: SURGERY

## 2019-01-25 PROCEDURE — 80048 BASIC METABOLIC PNL TOTAL CA: CPT | Performed by: SURGERY

## 2019-01-25 PROCEDURE — 25010000002 ONDANSETRON PER 1 MG: Performed by: SURGERY

## 2019-01-25 PROCEDURE — 99231 SBSQ HOSP IP/OBS SF/LOW 25: CPT | Performed by: SURGERY

## 2019-01-25 RX ORDER — LEVOFLOXACIN 750 MG/1
750 TABLET ORAL DAILY
Qty: 10 TABLET | Refills: 0 | Status: SHIPPED | OUTPATIENT
Start: 2019-01-25 | End: 2019-02-04

## 2019-01-25 RX ORDER — OXYCODONE HYDROCHLORIDE AND ACETAMINOPHEN 5; 325 MG/1; MG/1
1 TABLET ORAL EVERY 4 HOURS PRN
Qty: 50 TABLET | Refills: 0 | Status: SHIPPED | OUTPATIENT
Start: 2019-01-25 | End: 2019-02-04

## 2019-01-25 RX ORDER — GABAPENTIN 300 MG/1
300 CAPSULE ORAL 3 TIMES DAILY
Qty: 90 CAPSULE | Refills: 2 | Status: SHIPPED | OUTPATIENT
Start: 2019-01-25 | End: 2019-02-24

## 2019-01-25 RX ADMIN — MENTHOL, CAMPHOR: 1.11; 1.11 LOTION TOPICAL at 11:45

## 2019-01-25 RX ADMIN — VITAMIN D, TAB 1000IU (100/BT) 2000 UNITS: 25 TAB at 08:43

## 2019-01-25 RX ADMIN — HYOSCYAMINE SULFATE 473 ML: 16 SOLUTION at 08:43

## 2019-01-25 RX ADMIN — OXYCODONE AND ACETAMINOPHEN 1 TABLET: 5; 325 TABLET ORAL at 15:44

## 2019-01-25 RX ADMIN — HYDROMORPHONE HYDROCHLORIDE 1 MG: 1 INJECTION, SOLUTION INTRAMUSCULAR; INTRAVENOUS; SUBCUTANEOUS at 10:04

## 2019-01-25 RX ADMIN — HYDROMORPHONE HYDROCHLORIDE 1 MG: 1 INJECTION, SOLUTION INTRAMUSCULAR; INTRAVENOUS; SUBCUTANEOUS at 17:11

## 2019-01-25 RX ADMIN — DIPHENHYDRAMINE HYDROCHLORIDE 50 MG: 50 CAPSULE ORAL at 08:43

## 2019-01-25 RX ADMIN — ENOXAPARIN SODIUM 40 MG: 100 INJECTION SUBCUTANEOUS at 08:43

## 2019-01-25 RX ADMIN — OXYCODONE AND ACETAMINOPHEN 1 TABLET: 5; 325 TABLET ORAL at 08:43

## 2019-01-25 RX ADMIN — METFORMIN HYDROCHLORIDE 1000 MG: 500 TABLET, EXTENDED RELEASE ORAL at 08:43

## 2019-01-25 RX ADMIN — LEVOFLOXACIN 750 MG: 750 TABLET, FILM COATED ORAL at 15:56

## 2019-01-25 RX ADMIN — ONDANSETRON 4 MG: 2 INJECTION, SOLUTION INTRAMUSCULAR; INTRAVENOUS at 13:15

## 2019-01-25 RX ADMIN — OXYCODONE AND ACETAMINOPHEN 1 TABLET: 5; 325 TABLET ORAL at 00:56

## 2019-01-25 RX ADMIN — DIPHENHYDRAMINE HYDROCHLORIDE 50 MG: 50 CAPSULE ORAL at 15:44

## 2019-01-25 RX ADMIN — HYDROMORPHONE HYDROCHLORIDE 1 MG: 1 INJECTION, SOLUTION INTRAMUSCULAR; INTRAVENOUS; SUBCUTANEOUS at 13:15

## 2019-01-26 ENCOUNTER — READMISSION MANAGEMENT (OUTPATIENT)
Dept: CALL CENTER | Facility: HOSPITAL | Age: 34
End: 2019-01-26

## 2019-01-26 NOTE — OUTREACH NOTE
Prep Survey      Responses   Facility patient discharged from?  [facility]   Is LACE score < 7 ?  No   Is patient eligible?  Yes   Discharge diagnosis  Status post multiple debridements of large abdominal wall wound, wound vac   Does the patient have one of the following disease processes/diagnoses(primary or secondary)?  General Surgery   Does the patient have Home health ordered?  Yes   What is the Home health agency?   [facility]   Is there a DME ordered?  No   Prep survey completed?  Yes          Sylvia Campos RN

## 2019-01-28 ENCOUNTER — READMISSION MANAGEMENT (OUTPATIENT)
Dept: CALL CENTER | Facility: HOSPITAL | Age: 34
End: 2019-01-28

## 2019-01-28 NOTE — OUTREACH NOTE
General Surgery Week 1 Survey      Responses   Facility patient discharged from?  [facility]   Does the patient have one of the following disease processes/diagnoses(primary or secondary)?  General Surgery   Is there a successful TCM telephone encounter documented?  No   Week 1 attempt successful?  Yes   Call start time  0945   Call end time  0948   Discharge diagnosis  Status post multiple debridements of large abdominal wall wound, wound vac   Meds reviewed with patient/caregiver?  Yes   Is the patient having any side effects they believe may be caused by any medication additions or changes?  No   Does the patient have all medications related to this admission filled (includes all antibiotics, pain medications, etc.)  Yes   Is the patient taking all medications as directed (includes completed medication regime)?  Yes   Does the patient have a follow up appointment scheduled with their surgeon?  Yes   Has the patient kept scheduled appointments due by today?  Yes   What is the Home health agency?   [facility] and [facility] wound vac   Has home health visited the patient within 72 hours of discharge?  Yes   Has all DME been delivered?  No   Psychosocial issues?  No   Did the patient receive a copy of their discharge instructions?  Yes   Nursing interventions  Reviewed instructions with patient   What is the patient's perception of their health status since discharge?  Improving   Nursing interventions  Nurse provided patient education   Is the patient /caregiver able to teach back basic post-op care?  Continue use of incentive spirometry at least 1 week post discharge, Practice 'cough and deep breath', Lifting as instructed by MD in discharge instructions, Do not remove steri-strips   Is the patient/caregiver able to teach back signs and symptoms of incisional infection?  Increased redness, swelling or pain at the incisonal site, Incisional warmth, Fever, Pus or odor from incision, Increased drainage or bleeding   Is  the patient/caregiver able to teach back steps to recovery at home?  Set small, achievable goals for return to baseline health, Make a list of questions for surgeon's appointment, Weigh daily   Is the patient/caregiver able to teach back the hierarchy of who to call/visit for symptoms/problems? PCP, Specialist, Home health nurse, Urgent Care, ED, 911  Yes   Week 1 call completed?  Yes          Kristina Camacho, RN

## 2019-02-05 ENCOUNTER — READMISSION MANAGEMENT (OUTPATIENT)
Dept: CALL CENTER | Facility: HOSPITAL | Age: 34
End: 2019-02-05

## 2019-02-05 NOTE — OUTREACH NOTE
General Surgery Week 2 Survey      Responses   Facility patient discharged from?  [facility]   Does the patient have one of the following disease processes/diagnoses(primary or secondary)?  General Surgery   Week 2 attempt successful?  Yes   Call start time  0931   Call end time  0938   Discharge diagnosis  Status post multiple debridements of large abdominal wall wound, wound vac   Meds reviewed with patient/caregiver?  Yes   Is the patient taking all medications as directed (includes completed medication regime)?  Yes   Medication comments  No PICC line.  Completed oral antibiotics yesterday.   Has the patient kept scheduled appointments due by today?  N/A   Comments  Surgeon tomorrow 02/06   What is the Home health agency?   [facility]    Home health comments  KARAN GODWIN comes M W F for wound evaluation and changing of wound vac.   Has all DME been delivered?  Yes   Nursing interventions  Educated on MyChart   What is the patient's perception of their health status since discharge?  Improving   Is the patient/caregiver able to teach back signs and symptoms of incisional infection?  -- [Patient says KARAN GODWIN is pleased with wound healing.  Has MD appt tomorrow and she is anxious to hear what MD says about wound healing.]   Additional teach back comments  NO packing by jose alejandro holguin family.  KARAN GODWIN changes wound vac and wound evaluations.   Week 2 call completed?  Yes          Kori Salazar RN

## 2019-02-06 ENCOUNTER — OFFICE VISIT (OUTPATIENT)
Dept: SURGERY | Facility: CLINIC | Age: 34
End: 2019-02-06

## 2019-02-06 VITALS
SYSTOLIC BLOOD PRESSURE: 112 MMHG | DIASTOLIC BLOOD PRESSURE: 72 MMHG | WEIGHT: 270 LBS | OXYGEN SATURATION: 97 % | HEIGHT: 64 IN | BODY MASS INDEX: 46.1 KG/M2 | HEART RATE: 109 BPM

## 2019-02-06 DIAGNOSIS — Z09 FOLLOW UP: Primary | ICD-10-CM

## 2019-02-06 PROCEDURE — 99024 POSTOP FOLLOW-UP VISIT: CPT | Performed by: SURGERY

## 2019-02-06 RX ORDER — OXYCODONE HYDROCHLORIDE AND ACETAMINOPHEN 5; 325 MG/1; MG/1
1 TABLET ORAL EVERY 4 HOURS PRN
Qty: 30 TABLET | Refills: 0 | Status: SHIPPED | OUTPATIENT
Start: 2019-02-06 | End: 2019-02-16

## 2019-02-06 NOTE — PROGRESS NOTES
"Chief complaint:  Post-op  Follow up    History of Present Illness    This is Valery Aguilar 33 y.o. is presenting today for follow-up of a chronic abdominal wound.  Patient denies fever, chills, nausea or vomiting.  Patient's pain is well-controlled.      The following portions of the patient's history were reviewed and updated as appropriate: allergies, current medications, past family history, past medical history, past social history, past surgical history and problem list.    Vitals:    02/06/19 1150   BP: 112/72   Pulse: 109   SpO2: 97%   Weight: 122 kg (270 lb)   Height: 162.6 cm (64\")       Physical Exam  Gen.: Patient is alert and oriented ×3, no acute distress  HEENT: Normal cephalic, atraumatic, moist mucous membranes, anicteric  Wound with good granulation tissue and roni appropriately, midline upper incision has completely filled in and there is some excessive granulation tissue that I have applied some silver nitrate to    Assessment/plan:  The wound looks good and I will see her back in 2 weeks for possible consideration of closing the wound.       Rachel Dolan MD  General, Minimally Invasive and Endoscopic Surgery  [facility]    [address] [facility] [address] [facility] [address]    [address]  [address] [address]    P: [phone]  F: [phone]    Cc:  Zandra Rios MD  "

## 2019-02-12 ENCOUNTER — READMISSION MANAGEMENT (OUTPATIENT)
Dept: CALL CENTER | Facility: HOSPITAL | Age: 34
End: 2019-02-12

## 2019-02-12 NOTE — OUTREACH NOTE
General Surgery Week 3 Survey      Responses   Facility patient discharged from?  [facility]   Does the patient have one of the following disease processes/diagnoses(primary or secondary)?  General Surgery   Week 3 attempt successful?  Yes   Call start time  1627   Call end time  1630   Discharge diagnosis  Status post multiple debridements of large abdominal wall wound, wound vac   Meds reviewed with patient/caregiver?  Yes   Is the patient having any side effects they believe may be caused by any medication additions or changes?  No   Does the patient have all medications related to this admission filled (includes all antibiotics, pain medications, etc.)  Yes   Is the patient taking all medications as directed (includes completed medication regime)?  Yes   Does the patient have a follow up appointment scheduled with their surgeon?  Yes   Has the patient kept scheduled appointments due by today?  Yes   Comments  Reviewed appts.   What is the Home health agency?   [facility]    Has home health visited the patient within 72 hours of discharge?  Yes   Home health comments  RN is packing her wound.   Psychosocial issues?  No   Did the patient receive a copy of their discharge instructions?  Yes   Nursing interventions  Reviewed instructions with patient   What is the patient's perception of their health status since discharge?  Improving   Nursing interventions  Nurse provided patient education   Is the patient /caregiver able to teach back basic post-op care?  Continue use of incentive spirometry at least 1 week post discharge, Practice 'cough and deep breath', Lifting as instructed by MD in discharge instructions, Do not remove steri-strips   Is the patient/caregiver able to teach back signs and symptoms of incisional infection?  Increased redness, swelling or pain at the incisonal site, Increased drainage or bleeding, Pus or odor from incision, Fever   Is the patient/caregiver able to teach back steps to recovery at home?   Health Maintenance Summary     Topic Due On Due Status Completed On    IMMUNIZATION - HPV Apr 27, 2005 Overdue     PAP SMEAR - CERVICAL CANCER SCREENING Apr 27, 2015 Overdue Feb 7, 2017    Immunization - Td/Tdap Jul 26, 2016 Overdue Jul 26, 2006    Immunization - TDAP Pregnancy  Hidden     Immunization-Influenza Sep 1, 2016 Overdue Nov 24, 2014            tdap today  Declines flu shot       Set small, achievable goals for return to baseline health, Make a list of questions for surgeon's appointment, Weigh daily, Rest and rebuild strength, gradually increase activity   Is the patient/caregiver able to teach back the hierarchy of who to call/visit for symptoms/problems? PCP, Specialist, Home health nurse, Urgent Care, ED, 911  Yes   Week 3 call completed?  Yes          Jesus Pittman RN

## 2019-02-20 ENCOUNTER — ANESTHESIA EVENT (OUTPATIENT)
Dept: PERIOP | Facility: HOSPITAL | Age: 34
End: 2019-02-20

## 2019-02-20 ENCOUNTER — OFFICE VISIT (OUTPATIENT)
Dept: SURGERY | Facility: CLINIC | Age: 34
End: 2019-02-20

## 2019-02-20 ENCOUNTER — READMISSION MANAGEMENT (OUTPATIENT)
Dept: CALL CENTER | Facility: HOSPITAL | Age: 34
End: 2019-02-20

## 2019-02-20 VITALS
DIASTOLIC BLOOD PRESSURE: 78 MMHG | SYSTOLIC BLOOD PRESSURE: 122 MMHG | BODY MASS INDEX: 45.72 KG/M2 | WEIGHT: 267.8 LBS | HEART RATE: 113 BPM | HEIGHT: 64 IN | OXYGEN SATURATION: 99 %

## 2019-02-20 DIAGNOSIS — S31.109D CHRONIC WOUND INFECTION OF ABDOMEN, SUBSEQUENT ENCOUNTER: ICD-10-CM

## 2019-02-20 DIAGNOSIS — Z09 FOLLOW UP: Primary | ICD-10-CM

## 2019-02-20 DIAGNOSIS — L08.9 CHRONIC WOUND INFECTION OF ABDOMEN, SUBSEQUENT ENCOUNTER: ICD-10-CM

## 2019-02-20 PROBLEM — S31.109A CHRONIC WOUND INFECTION OF ABDOMEN: Status: ACTIVE | Noted: 2019-02-20

## 2019-02-20 PROCEDURE — 99024 POSTOP FOLLOW-UP VISIT: CPT | Performed by: SURGERY

## 2019-02-20 NOTE — PROGRESS NOTES
"Chief complaint:  Post-op  Follow up    History of Present Illness    This is Valery Aguilar 33 y.o. presenting for a wound check. Pt denies F/C/N/V    The following portions of the patient's history were reviewed and updated as appropriate: allergies, current medications, past family history, past medical history, past social history, past surgical history and problem list.    Vitals:    02/20/19 1130   BP: 122/78   Pulse: 113   SpO2: 99%   Weight: 121 kg (267 lb 12.8 oz)   Height: 162.6 cm (64\")       Physical Exam  Gen.: Patient is alert and oriented ×3, no acute distress  Wound is healthy with no tunneling and good granulation tissue    Assessment/plan:  Schedule patient for wound closure     Rachel Dolan MD  General, Minimally Invasive and Endoscopic Surgery  [facility]    [facility] [address]    [address]  [address] [address]    P: [phone]  F: [phone]    Cc:  Zandra Rios MD  "

## 2019-02-20 NOTE — OUTREACH NOTE
General Surgery Week 4 Survey      Responses   Facility patient discharged from?  [facility]   Does the patient have one of the following disease processes/diagnoses(primary or secondary)?  General Surgery   Week 4 attempt successful?  Yes   Call start time  1410   Call end time  1412   Is the patient taking all medications as directed (includes completed medication regime)?  Yes   Has the patient kept scheduled appointments due by today?  Yes   Is the patient still receiving Home Health Services?  No   What is the patient's perception of their health status since discharge?  New symptoms unrelated to diagnosis   Week 4 call completed?  Yes   Would the patient like one additional call?  No   Wrap up additional comments   having op surgery in the morning and will be her last surgery          Jess Fofana RN

## 2019-02-21 ENCOUNTER — ANESTHESIA (OUTPATIENT)
Dept: PERIOP | Facility: HOSPITAL | Age: 34
End: 2019-02-21

## 2019-02-21 ENCOUNTER — HOSPITAL ENCOUNTER (OUTPATIENT)
Facility: HOSPITAL | Age: 34
Setting detail: HOSPITAL OUTPATIENT SURGERY
Discharge: HOME | End: 2019-02-21
Attending: SURGERY | Admitting: SURGERY

## 2019-02-21 VITALS
TEMPERATURE: 98 F | BODY MASS INDEX: 45.58 KG/M2 | HEIGHT: 64 IN | WEIGHT: 267 LBS | RESPIRATION RATE: 18 BRPM | HEART RATE: 105 BPM | OXYGEN SATURATION: 93 % | DIASTOLIC BLOOD PRESSURE: 65 MMHG | SYSTOLIC BLOOD PRESSURE: 108 MMHG

## 2019-02-21 DIAGNOSIS — L08.9 CHRONIC WOUND INFECTION OF ABDOMEN, SUBSEQUENT ENCOUNTER: ICD-10-CM

## 2019-02-21 DIAGNOSIS — S31.109D CHRONIC WOUND INFECTION OF ABDOMEN, SUBSEQUENT ENCOUNTER: ICD-10-CM

## 2019-02-21 LAB — HCG SERPL QL: NEGATIVE

## 2019-02-21 PROCEDURE — 25010000002 KETOROLAC TROMETHAMINE PER 15 MG: Performed by: NURSE ANESTHETIST, CERTIFIED REGISTERED

## 2019-02-21 PROCEDURE — 25010000002 MIDAZOLAM PER 1 MG: Performed by: NURSE ANESTHETIST, CERTIFIED REGISTERED

## 2019-02-21 PROCEDURE — 25010000002 PROMETHAZINE PER 50 MG: Performed by: NURSE ANESTHETIST, CERTIFIED REGISTERED

## 2019-02-21 PROCEDURE — 25010000002 HYDROMORPHONE 1 MG/ML SOLUTION: Performed by: NURSE ANESTHETIST, CERTIFIED REGISTERED

## 2019-02-21 PROCEDURE — 12035 INTMD RPR S/A/T/EXT 12.6-20: CPT | Performed by: SURGERY

## 2019-02-21 PROCEDURE — 84703 CHORIONIC GONADOTROPIN ASSAY: CPT | Performed by: NURSE ANESTHETIST, CERTIFIED REGISTERED

## 2019-02-21 PROCEDURE — S0260 H&P FOR SURGERY: HCPCS | Performed by: SURGERY

## 2019-02-21 PROCEDURE — 25010000002 PROPOFOL 10 MG/ML EMULSION: Performed by: NURSE ANESTHETIST, CERTIFIED REGISTERED

## 2019-02-21 PROCEDURE — 25010000002 DEXAMETHASONE PER 1 MG: Performed by: NURSE ANESTHETIST, CERTIFIED REGISTERED

## 2019-02-21 PROCEDURE — 25010000002 FENTANYL CITRATE (PF) 100 MCG/2ML SOLUTION: Performed by: NURSE ANESTHETIST, CERTIFIED REGISTERED

## 2019-02-21 PROCEDURE — 25010000002 ONDANSETRON PER 1 MG: Performed by: NURSE ANESTHETIST, CERTIFIED REGISTERED

## 2019-02-21 RX ORDER — SODIUM CHLORIDE 0.9 % (FLUSH) 0.9 %
3 SYRINGE (ML) INJECTION EVERY 12 HOURS SCHEDULED
Status: DISCONTINUED | OUTPATIENT
Start: 2019-02-21 | End: 2019-02-21 | Stop reason: HOSPADM

## 2019-02-21 RX ORDER — MIDAZOLAM HYDROCHLORIDE 1 MG/ML
2 INJECTION INTRAMUSCULAR; INTRAVENOUS
Status: DISCONTINUED | OUTPATIENT
Start: 2019-02-21 | End: 2019-02-21 | Stop reason: HOSPADM

## 2019-02-21 RX ORDER — ONDANSETRON 2 MG/ML
4 INJECTION INTRAMUSCULAR; INTRAVENOUS ONCE AS NEEDED
Status: COMPLETED | OUTPATIENT
Start: 2019-02-21 | End: 2019-02-21

## 2019-02-21 RX ORDER — SODIUM CHLORIDE 0.9 % (FLUSH) 0.9 %
1-10 SYRINGE (ML) INJECTION AS NEEDED
Status: DISCONTINUED | OUTPATIENT
Start: 2019-02-21 | End: 2019-02-21 | Stop reason: HOSPADM

## 2019-02-21 RX ORDER — KETOROLAC TROMETHAMINE 30 MG/ML
INJECTION, SOLUTION INTRAMUSCULAR; INTRAVENOUS AS NEEDED
Status: DISCONTINUED | OUTPATIENT
Start: 2019-02-21 | End: 2019-02-21 | Stop reason: SURG

## 2019-02-21 RX ORDER — LIDOCAINE HYDROCHLORIDE 20 MG/ML
INJECTION, SOLUTION INFILTRATION; PERINEURAL AS NEEDED
Status: DISCONTINUED | OUTPATIENT
Start: 2019-02-21 | End: 2019-02-21 | Stop reason: SURG

## 2019-02-21 RX ORDER — LIDOCAINE HYDROCHLORIDE 10 MG/ML
0.5 INJECTION, SOLUTION EPIDURAL; INFILTRATION; INTRACAUDAL; PERINEURAL ONCE AS NEEDED
Status: DISCONTINUED | OUTPATIENT
Start: 2019-02-21 | End: 2019-02-21 | Stop reason: HOSPADM

## 2019-02-21 RX ORDER — FAMOTIDINE 20 MG/1
20 TABLET, FILM COATED ORAL EVERY 12 HOURS SCHEDULED
Status: DISCONTINUED | OUTPATIENT
Start: 2019-02-21 | End: 2019-02-21 | Stop reason: HOSPADM

## 2019-02-21 RX ORDER — GLYCOPYRROLATE 0.2 MG/ML
0.2 INJECTION INTRAMUSCULAR; INTRAVENOUS
Status: DISCONTINUED | OUTPATIENT
Start: 2019-02-21 | End: 2019-02-21 | Stop reason: HOSPADM

## 2019-02-21 RX ORDER — CLINDAMYCIN PHOSPHATE 900 MG/50ML
900 INJECTION INTRAVENOUS ONCE
Status: COMPLETED | OUTPATIENT
Start: 2019-02-21 | End: 2019-02-21

## 2019-02-21 RX ORDER — SODIUM CHLORIDE, SODIUM LACTATE, POTASSIUM CHLORIDE, CALCIUM CHLORIDE 600; 310; 30; 20 MG/100ML; MG/100ML; MG/100ML; MG/100ML
9 INJECTION, SOLUTION INTRAVENOUS CONTINUOUS PRN
Status: DISCONTINUED | OUTPATIENT
Start: 2019-02-21 | End: 2019-02-21 | Stop reason: HOSPADM

## 2019-02-21 RX ORDER — SODIUM CHLORIDE 9 MG/ML
40 INJECTION, SOLUTION INTRAVENOUS AS NEEDED
Status: DISCONTINUED | OUTPATIENT
Start: 2019-02-21 | End: 2019-02-21 | Stop reason: HOSPADM

## 2019-02-21 RX ORDER — MAGNESIUM HYDROXIDE 1200 MG/15ML
LIQUID ORAL AS NEEDED
Status: DISCONTINUED | OUTPATIENT
Start: 2019-02-21 | End: 2019-02-21 | Stop reason: HOSPADM

## 2019-02-21 RX ORDER — SODIUM CHLORIDE, SODIUM LACTATE, POTASSIUM CHLORIDE, CALCIUM CHLORIDE 600; 310; 30; 20 MG/100ML; MG/100ML; MG/100ML; MG/100ML
100 INJECTION, SOLUTION INTRAVENOUS CONTINUOUS
Status: DISCONTINUED | OUTPATIENT
Start: 2019-02-21 | End: 2019-02-21 | Stop reason: HOSPADM

## 2019-02-21 RX ORDER — MIDAZOLAM HYDROCHLORIDE 1 MG/ML
1 INJECTION INTRAMUSCULAR; INTRAVENOUS
Status: DISCONTINUED | OUTPATIENT
Start: 2019-02-21 | End: 2019-02-21 | Stop reason: HOSPADM

## 2019-02-21 RX ORDER — KETAMINE HYDROCHLORIDE 100 MG/ML
INJECTION INTRAMUSCULAR; INTRAVENOUS AS NEEDED
Status: DISCONTINUED | OUTPATIENT
Start: 2019-02-21 | End: 2019-02-21 | Stop reason: SURG

## 2019-02-21 RX ORDER — ROCURONIUM BROMIDE 10 MG/ML
INJECTION, SOLUTION INTRAVENOUS AS NEEDED
Status: DISCONTINUED | OUTPATIENT
Start: 2019-02-21 | End: 2019-02-21 | Stop reason: SURG

## 2019-02-21 RX ORDER — LEVOFLOXACIN 5 MG/ML
500 INJECTION, SOLUTION INTRAVENOUS EVERY 24 HOURS
Status: DISCONTINUED | OUTPATIENT
Start: 2019-02-21 | End: 2019-02-21 | Stop reason: HOSPADM

## 2019-02-21 RX ORDER — SCOLOPAMINE TRANSDERMAL SYSTEM 1 MG/1
1 PATCH, EXTENDED RELEASE TRANSDERMAL CONTINUOUS
Status: DISCONTINUED | OUTPATIENT
Start: 2019-02-21 | End: 2019-02-21 | Stop reason: HOSPADM

## 2019-02-21 RX ORDER — ONDANSETRON 2 MG/ML
4 INJECTION INTRAMUSCULAR; INTRAVENOUS ONCE AS NEEDED
Status: DISCONTINUED | OUTPATIENT
Start: 2019-02-21 | End: 2019-02-21 | Stop reason: HOSPADM

## 2019-02-21 RX ORDER — OXYCODONE HYDROCHLORIDE AND ACETAMINOPHEN 5; 325 MG/1; MG/1
1 TABLET ORAL EVERY 4 HOURS PRN
Qty: 50 TABLET | Refills: 0 | Status: SHIPPED | OUTPATIENT
Start: 2019-02-21 | End: 2019-02-26

## 2019-02-21 RX ORDER — FENTANYL CITRATE 50 UG/ML
INJECTION, SOLUTION INTRAMUSCULAR; INTRAVENOUS AS NEEDED
Status: DISCONTINUED | OUTPATIENT
Start: 2019-02-21 | End: 2019-02-21 | Stop reason: SURG

## 2019-02-21 RX ORDER — PROMETHAZINE HYDROCHLORIDE 25 MG/ML
INJECTION, SOLUTION INTRAMUSCULAR; INTRAVENOUS AS NEEDED
Status: DISCONTINUED | OUTPATIENT
Start: 2019-02-21 | End: 2019-02-21 | Stop reason: SURG

## 2019-02-21 RX ORDER — PROPOFOL 10 MG/ML
VIAL (ML) INTRAVENOUS AS NEEDED
Status: DISCONTINUED | OUTPATIENT
Start: 2019-02-21 | End: 2019-02-21 | Stop reason: SURG

## 2019-02-21 RX ORDER — DEXAMETHASONE SODIUM PHOSPHATE 4 MG/ML
8 INJECTION, SOLUTION INTRA-ARTICULAR; INTRALESIONAL; INTRAMUSCULAR; INTRAVENOUS; SOFT TISSUE ONCE AS NEEDED
Status: COMPLETED | OUTPATIENT
Start: 2019-02-21 | End: 2019-02-21

## 2019-02-21 RX ADMIN — ONDANSETRON 4 MG: 2 INJECTION, SOLUTION INTRAMUSCULAR; INTRAVENOUS at 09:26

## 2019-02-21 RX ADMIN — LIDOCAINE HYDROCHLORIDE 100 MG: 20 INJECTION, SOLUTION INFILTRATION; PERINEURAL at 10:34

## 2019-02-21 RX ADMIN — SCOPALAMINE 1 PATCH: 1 PATCH, EXTENDED RELEASE TRANSDERMAL at 09:29

## 2019-02-21 RX ADMIN — MIDAZOLAM HYDROCHLORIDE 1 MG: 1 INJECTION, SOLUTION INTRAMUSCULAR; INTRAVENOUS at 09:27

## 2019-02-21 RX ADMIN — SODIUM CHLORIDE, POTASSIUM CHLORIDE, SODIUM LACTATE AND CALCIUM CHLORIDE: 600; 310; 30; 20 INJECTION, SOLUTION INTRAVENOUS at 10:33

## 2019-02-21 RX ADMIN — SUGAMMADEX 200 MG: 100 INJECTION, SOLUTION INTRAVENOUS at 11:55

## 2019-02-21 RX ADMIN — FAMOTIDINE 20 MG: 10 INJECTION, SOLUTION INTRAVENOUS at 09:26

## 2019-02-21 RX ADMIN — FENTANYL CITRATE 50 MCG: 50 INJECTION, SOLUTION INTRAMUSCULAR; INTRAVENOUS at 11:38

## 2019-02-21 RX ADMIN — KETOROLAC TROMETHAMINE 30 MG: 30 INJECTION INTRAMUSCULAR; INTRAVENOUS at 11:38

## 2019-02-21 RX ADMIN — HYDROMORPHONE HYDROCHLORIDE 1 MG: 1 INJECTION, SOLUTION INTRAMUSCULAR; INTRAVENOUS; SUBCUTANEOUS at 13:45

## 2019-02-21 RX ADMIN — PROPOFOL 200 MG: 10 INJECTION, EMULSION INTRAVENOUS at 10:38

## 2019-02-21 RX ADMIN — KETAMINE HYDROCHLORIDE 10 MG: 100 INJECTION INTRAMUSCULAR; INTRAVENOUS at 11:38

## 2019-02-21 RX ADMIN — ROCURONIUM BROMIDE 50 MG: 10 INJECTION INTRAVENOUS at 10:38

## 2019-02-21 RX ADMIN — FENTANYL CITRATE 100 MCG: 50 INJECTION, SOLUTION INTRAMUSCULAR; INTRAVENOUS at 10:34

## 2019-02-21 RX ADMIN — MIDAZOLAM HYDROCHLORIDE 1 MG: 1 INJECTION, SOLUTION INTRAMUSCULAR; INTRAVENOUS at 10:27

## 2019-02-21 RX ADMIN — CLINDAMYCIN PHOSPHATE 900 MG: 900 INJECTION, SOLUTION INTRAVENOUS at 10:45

## 2019-02-21 RX ADMIN — PROMETHAZINE HYDROCHLORIDE 12.5 MG: 25 INJECTION INTRAMUSCULAR; INTRAVENOUS at 10:40

## 2019-02-21 RX ADMIN — KETAMINE HYDROCHLORIDE 40 MG: 100 INJECTION INTRAMUSCULAR; INTRAVENOUS at 10:40

## 2019-02-21 RX ADMIN — GLYCOPYRROLATE 0.2 MG: 0.2 INJECTION INTRAMUSCULAR; INTRAVENOUS at 09:26

## 2019-02-21 RX ADMIN — LIDOCAINE HYDROCHLORIDE 100 MG: 20 INJECTION, SOLUTION INFILTRATION; PERINEURAL at 11:38

## 2019-02-21 RX ADMIN — DEXAMETHASONE SODIUM PHOSPHATE 8 MG: 4 INJECTION, SOLUTION INTRAMUSCULAR; INTRAVENOUS at 09:26

## 2019-02-21 NOTE — H&P
Chief complaint: Chronic open wound    Patient is a 33 y.o. female established patient with a chronic open wound from previous hernia repair.  Patient is presenting today for closure of the wound.  Patient denies fever, chills, nausea or vomiting.    Past Medical History:   Diagnosis Date   • Abdominal pain    • Anxiety    • Arthritis     KNEE   • Depression    • GERD (gastroesophageal reflux disease)    • Headache, tension-type    • Hypertension     PRE-ECLAMPSIA WITH BOTH CHILDREN   • Incisional hernia     SCHEDULED FOR REPAIR   • Insulin resistance     D/T PCOS   • Migraine    • MRSA (methicillin resistant Staphylococcus aureus) infection 2019    Abdominal wound   • Panic attack    • PCOS (polycystic ovarian syndrome)    • Pneumonia 2018   • PONV (postoperative nausea and vomiting)    • Seasonal allergies    • Spinal headache      Past Surgical History:   Procedure Laterality Date   • ABDOMINAL WALL ABSCESS INCISION AND DRAINAGE N/A 2018    Procedure: Debridement of abdominal wall;  Surgeon: Brigido Navarrete MD;  Location: [facility] OR;  Service: General   • ADENOIDECTOMY     •  SECTION      X2   • CHOLECYSTECTOMY      LAP   • HERNIA REPAIR     • HX OVARIAN CYSTECTOMY     • INCISION AND DRAINAGE TRUNK N/A 2018    Procedure: wound debridement, abdomen;  Surgeon: Rachel Dolan MD;  Location: [facility] OR;  Service: General   • KNEE ACL RECONSTRUCTION     • TONSILLECTOMY     • TRUNK DEBRIDEMENT N/A 2018    Procedure: Abdominal wound debridement;  Surgeon: Rachel Dolan MD;  Location: [facility] OR;  Service: General   • VENTRAL/INCISIONAL HERNIA REPAIR N/A 11/15/2018    Procedure: Lateral Component Separation Herniorrhapy Repair with Mesh, Lysis of adhesions, and skin lesion excision of Right Side;  Surgeon: Rachel Dolan MD;  Location: [facility] OR;  Service: General     Family History   Problem Relation Age of Onset   • Stroke Mother    • Heart disease Mother    • Hypertension  Mother    • Heart disease Father    • Hypertension Father    • Diabetes Father    • Dementia Maternal Grandmother    • Stroke Maternal Grandmother    • Diabetes Maternal Grandmother    • Stroke Paternal Grandmother    • Hypertension Paternal Grandmother    • Cancer Paternal Grandmother    • Heart disease Paternal Grandfather    • Diabetes Paternal Grandfather      Social History     Tobacco Use   • Smoking status: Former Smoker     Packs/day: 0.50     Years: 1.00     Pack years: 0.50     Last attempt to quit: 2004     Years since quitting: 15.1   • Smokeless tobacco: Never Used   Substance Use Topics   • Alcohol use: Yes     Comment: occasional   • Drug use: No     No Known Allergies    Current Facility-Administered Medications:   •  levoFLOXacin (LEVAQUIN) 500 mg/100 mL D5W (premix) (LEVAQUIN) 500 mg, 500 mg, Intravenous, Q24H **AND** clindamycin (CLEOCIN) 900 mg in dextrose 5% 50 mL IVPB (premix), 900 mg, Intravenous, Once, Rachel Dolan MD  •  lactated ringers infusion, 9 mL/hr, Intravenous, Continuous PRN, Adarsh Mensah CRNA  •  lidocaine PF 1% (XYLOCAINE) injection 0.5 mL, 0.5 mL, Injection, Once PRN, Adarsh Mensah CRNA  •  sodium chloride 0.9 % flush 1-10 mL, 1-10 mL, Intravenous, PRN, Adarsh Mensah CRNA  •  sodium chloride 0.9 % flush 3 mL, 3 mL, Intravenous, Q12H, Adarsh Mensah CRNA  •  sodium chloride 0.9 % infusion 40 mL, 40 mL, Intravenous, PRN, Adarsh Mensah CRNA    Review of Systems  General: Patient reports during good health  Eyes: No eye problems  Ears, nose, mouth and throat: No rhinitis, no hearing problems, no chronic cough  Cardiovascular/heart: Denies palpitations, syncope or chest pain  Respiratory/lung: Denies shortness of breath, hemoptysis, dyspnea on exertion   Genital/urinary: No frequency, hematuria or dysuria  Hematological/lymphatic: Denies anemia or other problems  Musculoskeletal: No joint pain, no defects  Skin: No psoriasis or other skin  "issues  Neurological: Chronic headaches  Psychiatric: None  Endocrine: Negative  Gastro-intestinal: No constipation, no diarrhea, no melena, no hematochezia  Vitals:    02/21/19 0758 02/21/19 0819   BP:  141/91   BP Location:  Left arm   Patient Position:  Lying   Pulse:  111   Resp:  16   Temp: 98.3 °F (36.8 °C)    TempSrc:  Oral   SpO2:  94%   Weight: 121 kg (267 lb)    Height: 162.6 cm (64\")        Physical Exam  General/physcological:   Alert and oriented x3, in no acute distress  HEENT: Normal cephalic, atraumatic, PERRLA, EOMI, sclera anicteric, moist mucous membranes, neck is supple, no JVD, no carotid bruits, no thyromegaly no adenopathy  Respiratory: CTA and percussion  CVA: RRR, normal S1-S2, no murmurs, no gallops or rubs  GI: Positive BS, soft, nondistended, nontender, no rebound, no guarding, chronic lower abdominal open wound with good granulation tissue and clean  Musculoskeletal: Full range of motion, no clubbing, no cyanosis or edema  Neurovascular: Grossly intact  Debilities: none  Emotional behavior: appropriate     Patient does not use tobacco products currently.     Assessment:  Chronic wound  Plan:  Patient has healthy tissue and is now ready for wound closure.  I have discussed this procedure in detail with the patient.  I have discussed the risks, benefits and alternatives.  I have discussed the risk of possible wound infection, bleeding and anesthesia.  Patient understands these risks, benefits and alternatives and wishes to proceed.    Rachel Dolan MD  General, Minimally Invasive and Endoscopic Surgery  [facility]      [facility] [address] [place] [address] [address]    [address]  [place], [place] [address] [place] [address]    P: [phone]  F: [phone]    Cc:  Zandra Rios MD              "

## 2019-02-21 NOTE — ANESTHESIA PREPROCEDURE EVALUATION
Anesthesia Evaluation     Patient summary reviewed and Nursing notes reviewed   history of anesthetic complications: PONV  NPO Solid Status: > 8 hours  NPO Liquid Status: > 8 hours           Airway   Mallampati: II  Possible difficult intubation and Large neck circumference  Dental - normal exam     Pulmonary - normal exam    breath sounds clear to auscultation  (+) sleep apnea (not tested, snore),   (-) pneumonia  Cardiovascular - normal exam  Exercise tolerance: good (4-7 METS)    ECG reviewed  Rhythm: regular  Rate: normal    (+) hypertension (no meds currently), [place],     ROS comment: Sinus tachycardia  No Change from Prior Tracing    Neuro/Psych  (+) headaches, psychiatric history Anxiety,     GI/Hepatic/Renal/Endo    (+) morbid obesity, GERD well controlled,      Musculoskeletal     Abdominal   (+) obese,    Substance History - negative use     OB/GYN negative ob/gyn ROS     Comment: PCOS, Rx metformin      Other   (+) arthritis                   Anesthesia Plan    ASA 3     general     intravenous induction   Anesthetic plan, all risks, benefits, and alternatives have been provided, discussed and informed consent has been obtained with: patient.  Use of blood products discussed with patient  Consented to blood products.

## 2019-02-21 NOTE — ANESTHESIA PROCEDURE NOTES
Airway  Urgency: elective    Date/Time: 2/21/2019 10:39 AM  End Time:2/21/2019 10:39 AM  Airway not difficult    General Information and Staff    Patient location during procedure: OR  CRNA: Jacqueline Ruiz CRNA    Indications and Patient Condition  Indications for airway management: airway protection    Preoxygenated: yes  MILS maintained throughout  Mask difficulty assessment: 0 - not attempted    Final Airway Details  Final airway type: endotracheal airway      Successful airway: ETT  Cuffed: yes   Successful intubation technique: direct laryngoscopy  Facilitating devices/methods: intubating stylet  Endotracheal tube insertion site: oral  Blade: Queen  Blade size: 2  ETT size (mm): 7.5  Cormack-Lehane Classification: grade I - full view of glottis  Placement verified by: chest auscultation and capnometry   Measured from: lips  ETT to lips (cm): 21  Number of attempts at approach: 1    Additional Comments  BEBS, +ETCO2, VSS. TEETH AND GUMS AS PRE-OP.

## 2019-02-21 NOTE — PLAN OF CARE
Problem: Patient Care Overview  Goal: Plan of Care Review  Outcome: Ongoing (interventions implemented as appropriate)   11/17/18 0632   Coping/Psychosocial   Plan of Care Reviewed With patient   Plan of Care Review   Progress improving   OTHER   Outcome Summary POD 2, abd binder and surgical drsg in place; 2 SANDEEP drains in place; IVF infusing; Dilaudid PCA pump in use; no nausea reported          Scribe Attestation (For Scribes USE Only)... Scribe Attestation (For Scribes USE Only).../Attending Attestation (For Attendings USE Only)...

## 2019-02-21 NOTE — OP NOTE
Preop diagnosis: Chronic wound    Postop diagnosis: Same    Procedure: Wound closure    Surgeon: Rachel Dolan MD    Anesthesia: General    Specimens: None    EBL: Less than 20 cc    Complications: None    Findings: Good healthy granulation tissue with no tunneling    Indications: This is a pleasant 33-year-old female patient who had a lateral component separation hernia repair approximately 3-1/2 months ago with chronic wound infection that has been managed thus far with wound VAC and is ready to be closed.    Procedure:  After general endotracheal anesthesia, patient was prepped and draped in the usual sterile fashion.  The wound was measured to be 19 cm wide and 6 cm long x 4 cm deep.  The skin edges were excised using cautery and elevated superiorly inferior medial and lateral.  The subcutaneous deep layer was closed using figure-of-eight 2-0 Vicryl sutures interrupted.  The wound was copiously irrigated.  The next layer of the wound was closed in the subcutaneous tissue right below the skin with 3-0 Vicryl.  Again irrigation was applied.  The skin then was reapproximated using 4-0 Vicryl subcuticular running stitch.  Skin affix was applied and a sterile dressing was placed.  All needles and sponge counts were correct x2.  The patient was transferred to recovery room in stable condition.    Rachel Dolan MD  General, Minimally Invasive and Endoscopic Surgery  [facility]    [facility] [address] [place] [address] [address]    [address]  [place] [address]               [place] [address]    P: [phone]  F: [phone]

## 2019-02-21 NOTE — ANESTHESIA POSTPROCEDURE EVALUATION
Patient: Valery Aguilar    Procedure Summary     Date:  02/21/19 Room / Location:  [facility] OR 4 /  [facility] OR    Anesthesia Start:  1033 Anesthesia Stop:  1213    Procedure:  WOUND CLOSURE ABDOMINAL (N/A ) Diagnosis:       Chronic wound infection of abdomen, subsequent encounter      (Chronic wound infection of abdomen, subsequent encounter [S31.109D, L08.9])    Surgeon:  Rachel Dolan MD Provider:  Jacqueline Ruiz CRNA    Anesthesia Type:  general ASA Status:  3          Anesthesia Type: general  Last vitals  BP   108/65 (02/21/19 1350)   Temp   98 °F (36.7 °C) (02/21/19 1310)   Pulse   105 (02/21/19 1350)   Resp   18 (02/21/19 1350)     SpO2   93 % (02/21/19 1350)     Post Anesthesia Care and Evaluation    Patient location during evaluation: bedside  Patient participation: complete - patient participated  Level of consciousness: awake and alert  Pain score: 0  Pain management: adequate  Airway patency: patent  Anesthetic complications: No anesthetic complications    Cardiovascular status: acceptable  Respiratory status: acceptable  Hydration status: acceptable

## 2019-02-21 NOTE — DISCHARGE INSTRUCTIONS
DISCHARGE INSTRUCTIONS FOR MINOR SURGERY  DR. RAYMUNDO  [phone]      Post-Op Minor Surgery    1. If you had any sedation, expect to rest much of the day of surgery, but do get up several times to reduce the risk of getting a clot in your legs.     2. If you had any sedation, eat and drink lightly at first. For example: jello, ginger ale, chicken noodle soup, crackers and other bland food types on the day of surgery. Do not take pain pills on an empty stomach.     3. Your clear “tegaderm” dressing is water resistant but not water proof. You can take a shower with it in place but do not submerge in water for about one week.     4. The clear dressing can be left on for 2 days if desired and will keep the incision sterile. This also acts nicely as a bandage or safeguard of the area from a friction standpoint. If however the area under the clear dressing begins to itch or get red, you can remove it sooner. Also, remove it if the gauze under it is saturated with water or blood, and then you can use a band-aid or dressing of your choice. It is not unusual for your dressing to be spotted dark brown the day after surgery and does not require changing unless it looks wet. If you change the tegaderm, still leave the steri-strips in place until they begin to roll up, usually about two weeks.     5. Do not drive while taking pain medications.     6. No strenuous activity for 1 week.     7. In general, if you have a sedentary job, you can return to work in 1-4 days. If heavy lifting is required, 5-7 days.     8. It is not unusual to be constipated by the narcotics given during and after surgery. If needed, common over the counter laxatives can be used temporarily.     9. Call for your pathology report on the tissue removed one day next week. No follow up visit is needed unless there is a problem. If your incision becomes red, swollen, tender, or warm, please call the office at [phone] between 9AM-5PM.     Rachel CRUZ  MD [place]  General, Minimally Invasive and Endoscopic Surgery  [facility]    [address]    [address]  [address] [address]    P: [phone]  F: [phone]    Cc:  Zandra Rios MD

## 2019-03-06 ENCOUNTER — OFFICE VISIT (OUTPATIENT)
Dept: SURGERY | Facility: CLINIC | Age: 34
End: 2019-03-06

## 2019-03-06 VITALS
WEIGHT: 269.4 LBS | DIASTOLIC BLOOD PRESSURE: 90 MMHG | BODY MASS INDEX: 45.99 KG/M2 | HEIGHT: 64 IN | OXYGEN SATURATION: 98 % | HEART RATE: 106 BPM | SYSTOLIC BLOOD PRESSURE: 140 MMHG

## 2019-03-06 DIAGNOSIS — Z09 FOLLOW UP: Primary | ICD-10-CM

## 2019-03-06 PROCEDURE — 99024 POSTOP FOLLOW-UP VISIT: CPT | Performed by: SURGERY

## 2019-03-06 RX ORDER — OXYCODONE HYDROCHLORIDE AND ACETAMINOPHEN 5; 325 MG/1; MG/1
1 TABLET ORAL EVERY 4 HOURS PRN
Qty: 30 TABLET | Refills: 0 | Status: SHIPPED | OUTPATIENT
Start: 2019-03-06 | End: 2019-03-16

## 2019-03-06 NOTE — PROGRESS NOTES
"Chief complaint:  Post-op  Follow up    History of Present Illness    This is Valery Aguilar 33 y.o. status post wound closure. Suture broke and incision slightly open.  Patient denies fever, chills, nausea or vomiting.  Patient's pain is well-controlled.      The following portions of the patient's history were reviewed and updated as appropriate: allergies, current medications, past family history, past medical history, past social history, past surgical history and problem list.    Vitals:    03/06/19 1122   BP: 140/90   Pulse: 106   SpO2: 98%   Weight: 122 kg (269 lb 6.4 oz)   Height: 162.6 cm (64\")       Physical Exam  Gen.: Patient is alert and oriented ×3, no acute distress  HEENT: Normal cephalic, atraumatic, moist mucous membranes, anicteric  Suture broke and incision slightly open. I have reclosed incision.   Assessment/plan:  F/u in 2 weeks    Rachel Dolan MD  General, Minimally Invasive and Endoscopic Surgery  [facility]    [facility] [address]    [address]  [place], [place] [address] [place] [address]    P: [phone]  F: [phone]    Cc:  Zandra Rios MD  "

## 2019-03-20 ENCOUNTER — OFFICE VISIT (OUTPATIENT)
Dept: SURGERY | Facility: CLINIC | Age: 34
End: 2019-03-20

## 2019-03-20 VITALS
WEIGHT: 272.8 LBS | BODY MASS INDEX: 46.57 KG/M2 | OXYGEN SATURATION: 97 % | HEIGHT: 64 IN | SYSTOLIC BLOOD PRESSURE: 144 MMHG | HEART RATE: 109 BPM | DIASTOLIC BLOOD PRESSURE: 90 MMHG

## 2019-03-20 DIAGNOSIS — Z09 FOLLOW UP: Primary | ICD-10-CM

## 2019-03-20 PROCEDURE — 99212 OFFICE O/P EST SF 10 MIN: CPT | Performed by: SURGERY

## 2019-03-20 NOTE — PROGRESS NOTES
"Chief complaint:  Post-op  Follow up    History of Present Illness    Presenting for a wound check.  Patient denies fever, chills, nausea or vomiting.  Patient's pain is well-controlled.      The following portions of the patient's history were reviewed and updated as appropriate: allergies, current medications, past family history, past medical history, past social history, past surgical history and problem list.    Vitals:    03/20/19 1131   BP: 144/90   Pulse: 109   SpO2: 97%   Weight: 124 kg (272 lb 12.8 oz)   Height: 162.6 cm (64\")       Physical Exam  Gen.: Patient is alert and oriented ×3, no acute distress  HEENT: Normal cephalic, atraumatic, moist mucous membranes, anicteric  Incision is well-healed without evidence of infection. Will remove stitches next week     Assessment/plan:  Wound closure - remove stitches next week     Rachel Dolan MD  General, Minimally Invasive and Endoscopic Surgery  [facility]    [address] [facility] [address] [place] [address] [address]    [address]  [address] [address]    P: [phone]  F: [phone]    Cc:  Zandra Rios MD  "

## 2019-03-27 ENCOUNTER — OFFICE VISIT (OUTPATIENT)
Dept: SURGERY | Facility: CLINIC | Age: 34
End: 2019-03-27

## 2019-03-27 VITALS
HEART RATE: 101 BPM | WEIGHT: 270.4 LBS | DIASTOLIC BLOOD PRESSURE: 80 MMHG | BODY MASS INDEX: 46.16 KG/M2 | OXYGEN SATURATION: 99 % | SYSTOLIC BLOOD PRESSURE: 140 MMHG | HEIGHT: 64 IN

## 2019-03-27 DIAGNOSIS — Z09 FOLLOW UP: Primary | ICD-10-CM

## 2019-03-27 PROCEDURE — 99212 OFFICE O/P EST SF 10 MIN: CPT | Performed by: SURGERY

## 2019-03-27 NOTE — PROGRESS NOTES
"Chief complaint:  Post-op  Follow up    History of Present Illness  Patient is presenting for a wound check, patient denies fever, chills, nausea or vomiting and pain is well controlled.    The following portions of the patient's history were reviewed and updated as appropriate: allergies, current medications, past family history, past medical history, past social history, past surgical history and problem list.    Vitals:    03/27/19 1223   BP: 140/80   Pulse: 101   SpO2: 99%   Weight: 123 kg (270 lb 6.4 oz)   Height: 162.6 cm (64\")       Physical Exam  Gen.: Patient is alert and oriented ×3, no acute distress  HEENT: Normal cephalic, atraumatic, moist mucous membranes, anicteric  Wound has some Vicryl sutures which was removed and the wound was clean and closed with nylon sutures    Assessment/plan:  Patient will follow-up in 2 weeks for suture removal    Rachel Dolan MD  General, Minimally Invasive and Endoscopic Surgery  [facility]    [facility] [place] [address]    [address]  [place], [place] [address] [place] [address]    P: [phone]  F: [phone]    Cc:  Zandra Rios MD  "

## 2019-04-10 ENCOUNTER — OFFICE VISIT (OUTPATIENT)
Dept: SURGERY | Facility: CLINIC | Age: 34
End: 2019-04-10

## 2019-04-10 VITALS
HEIGHT: 64 IN | BODY MASS INDEX: 46.51 KG/M2 | HEART RATE: 102 BPM | WEIGHT: 272.4 LBS | OXYGEN SATURATION: 98 % | DIASTOLIC BLOOD PRESSURE: 80 MMHG | SYSTOLIC BLOOD PRESSURE: 140 MMHG

## 2019-04-10 DIAGNOSIS — Z09 FOLLOW UP: Primary | ICD-10-CM

## 2019-04-10 PROCEDURE — 99212 OFFICE O/P EST SF 10 MIN: CPT | Performed by: SURGERY

## 2019-04-10 NOTE — PROGRESS NOTES
"Chief complaint:  Post-op  Follow up    History of Present Illness    This is Valery Aguilar 33 y.o. presenting for a wound check.  Patient denies fever, chills, nausea or vomiting.  Patient's pain is well-controlled.      The following portions of the patient's history were reviewed and updated as appropriate: allergies, current medications, past family history, past medical history, past social history, past surgical history and problem list.    Vitals:    04/10/19 1202   BP: 140/80   Pulse: 102   SpO2: 98%   Weight: 124 kg (272 lb 6.4 oz)   Height: 162.6 cm (64\")       Physical Exam  Gen.: Patient is alert and oriented ×3, no acute distress  HEENT: Normal cephalic, atraumatic, moist mucous membranes, anicteric  Sutures removed and silver nitrate applied     Assessment/plan:  I have instructed the patient follow-up as needed.    Rachel Dolan MD  General, Minimally Invasive and Endoscopic Surgery  [facility]    [address]    [address]  [place], [place] [address] [place] [address]    P: [phone]  F: [phone]    Cc:  Zandra Rios MD  "

## 2020-06-12 ENCOUNTER — OFFICE VISIT (OUTPATIENT)
Dept: OBSTETRICS AND GYNECOLOGY | Facility: CLINIC | Age: 35
End: 2020-06-12

## 2020-06-12 VITALS
SYSTOLIC BLOOD PRESSURE: 140 MMHG | DIASTOLIC BLOOD PRESSURE: 80 MMHG | WEIGHT: 282 LBS | HEIGHT: 64 IN | BODY MASS INDEX: 48.14 KG/M2

## 2020-06-12 DIAGNOSIS — Z01.419 ROUTINE GYNECOLOGICAL EXAMINATION: Primary | ICD-10-CM

## 2020-06-12 DIAGNOSIS — Z01.419 PAP SMEAR, LOW-RISK: ICD-10-CM

## 2020-06-12 PROCEDURE — 99395 PREV VISIT EST AGE 18-39: CPT | Performed by: OBSTETRICS & GYNECOLOGY

## 2020-06-12 NOTE — PROGRESS NOTES
GYN Annual Exam     CC- Here for annual exam.     Valery Aguilar is a 34 y.o. female who presents for annual well woman exam. Pt has a Mirena IUD in place since 10/2018. Pt has a hx of PCOS with oligomenorrhea that is controlled with IUD. Pt had a ventral hernia repair in 2018 that had to be healed by secondary intention with Wound vac and 2 debridements due to post op infection. Pt reports her mother  last year due to heart attack. Pt started on Lexapro and Wellbutrin but it wasn't helping. She is now on Zoloft and Ativan.    OB History        2    Para   2    Term   2            AB        Living   2       SAB        TAB        Ectopic        Molar        Multiple        Live Births   2                  Current contraception: none  History of abnormal Pap smear: no  History of abnormal mammogram: no  Family history of uterine, colon or ovarian cancer: no  Family history of breast cancer: yes - paternal aunt with breast cancer, paternal GM    Health Maintenance   Topic Date Due   • ANNUAL PHYSICAL  1987   • TDAP/TD VACCINES (1 - Tdap) 1995   • PNEUMOCOCCAL VACCINE (19-64 MEDIUM RISK) (1 of 1 - PPSV23) 2003   • HEPATITIS C SCREENING  2017   • Annual Gynecologic Pelvic and Breast Exam  2019   • INFLUENZA VACCINE  2020   • PAP SMEAR  08/10/2021       Past Medical History:   Diagnosis Date   • Abdominal pain    • Anxiety    • Arthritis     KNEE   • Depression    • GERD (gastroesophageal reflux disease)    • Headache, tension-type    • Hypertension     PRE-ECLAMPSIA WITH BOTH CHILDREN   • Incisional hernia     SCHEDULED FOR REPAIR   • Insulin resistance     D/T PCOS   • Migraine    • MRSA (methicillin resistant Staphylococcus aureus) infection 2019    Abdominal wound   • Panic attack    • PCOS (polycystic ovarian syndrome)    • Pneumonia 2018   • PONV (postoperative nausea and vomiting)    • Seasonal allergies    • Spinal headache        Past Surgical  History:   Procedure Laterality Date   • ABDOMINAL WALL ABSCESS INCISION AND DRAINAGE N/A 2018    Procedure: Debridement of abdominal wall;  Surgeon: Brigido Navarrete MD;  Location: [facility] OR;  Service: General   • ADENOIDECTOMY     •  SECTION      X2   • CHOLECYSTECTOMY      LAP   • HERNIA REPAIR     • HX OVARIAN CYSTECTOMY     • INCISION AND DRAINAGE TRUNK N/A 2018    Procedure: wound debridement, abdomen;  Surgeon: Rachel Dolan MD;  Location:  [facility] OR;  Service: General   • INCISION AND DRAINAGE TRUNK N/A 2019    Procedure: WOUND CLOSURE ABDOMINAL;  Surgeon: Rachel Dolan MD;  Location: [facility] OR;  Service: General   • KNEE ACL RECONSTRUCTION     • TONSILLECTOMY     • TRUNK DEBRIDEMENT N/A 2018    Procedure: Abdominal wound debridement;  Surgeon: Rachel Dolan MD;  Location: [facility] OR;  Service: General   • VENTRAL/INCISIONAL HERNIA REPAIR N/A 11/15/2018    Procedure: Lateral Component Separation Herniorrhapy Repair with Mesh, Lysis of adhesions, and skin lesion excision of Right Side;  Surgeon: Rachel Dolan MD;  Location: [facility] OR;  Service: General         Current Outpatient Medications:   •  cetirizine (zyrTEC) 10 MG tablet, Take 10 mg by mouth Every Night., Disp: , Rfl:   •  cholecalciferol (VITAMIN D3) 1000 units tablet, Take 2,000 Units by mouth Daily., Disp: , Rfl:   •  diphenhydrAMINE (BENADRYL) 50 MG capsule, Take 50 mg by mouth Every 6 (Six) Hours As Needed for Itching., Disp: , Rfl:   •  EPINEPHrine (EPIPEN) 0.3 MG/0.3ML solution auto-injector injection, , Disp: , Rfl:   •  metFORMIN (GLUCOPHAGE) 500 MG tablet, Take 1,000 mg by mouth Every Evening., Disp: , Rfl:   •  naproxen sodium (ALEVE) 220 MG tablet, Take 220 mg by mouth 2 (Two) Times a Day As Needed., Disp: , Rfl:   •  nortriptyline (PAMELOR) 50 MG capsule, Take 100 mg by mouth Every Night., Disp: , Rfl:   •  raNITIdine (ZANTAC) 150 MG tablet, Take 150 mg by mouth Every Night., Disp: , Rfl:  "  •  rizatriptan MLT (MAXALT-MLT) 10 MG disintegrating tablet, Take 10 mg by mouth 1 (One) Time As Needed for Migraine. May repeat in 2 hours if needed, Disp: , Rfl:     No Known Allergies    Social History     Tobacco Use   • Smoking status: Former Smoker     Packs/day: 0.50     Years: 1.00     Pack years: 0.50     Last attempt to quit:      Years since quittin.4   • Smokeless tobacco: Never Used   Substance Use Topics   • Alcohol use: Yes     Comment: occasional   • Drug use: No       Family History   Problem Relation Age of Onset   • Stroke Mother    • Heart disease Mother    • Hypertension Mother    • Heart disease Father    • Hypertension Father    • Diabetes Father    • Dementia Maternal Grandmother    • Stroke Maternal Grandmother    • Diabetes Maternal Grandmother    • Stroke Paternal Grandmother    • Hypertension Paternal Grandmother    • Cancer Paternal Grandmother    • Heart disease Paternal Grandfather    • Diabetes Paternal Grandfather        Review of Systems   Constitutional: Negative for appetite change, chills, fatigue, fever and unexpected weight change.   Gastrointestinal: Negative for abdominal distention, abdominal pain, anal bleeding, blood in stool, constipation, diarrhea, nausea and vomiting.   Genitourinary: Negative for dyspareunia, dysuria, menstrual problem, pelvic pain, vaginal bleeding, vaginal discharge and vaginal pain.       /80   Ht 162.6 cm (64\")   Wt 128 kg (282 lb)   LMP  (LMP Unknown)   Breastfeeding No   BMI 48.41 kg/m²     Physical Exam   Constitutional: She is oriented to person, place, and time. She appears well-developed and well-nourished.   HENT:   Mouth/Throat: Normal dentition. No dental caries.   Cardiovascular: Normal rate, regular rhythm and normal heart sounds.   Pulmonary/Chest: Effort normal and breath sounds normal. No stridor. No respiratory distress. She has no wheezes. Right breast exhibits no inverted nipple, no mass, no nipple discharge, " no skin change and no tenderness. Left breast exhibits no inverted nipple, no mass, no nipple discharge, no skin change and no tenderness.   Abdominal: Soft. She exhibits no distension and no mass. There is no tenderness.   Multiple abdominal incisions noted- t shape   Genitourinary: There is no rash, tenderness or lesion on the right labia. There is no rash, tenderness or lesion on the left labia. Uterus is not deviated, not enlarged, not fixed and not tender. Cervix exhibits no motion tenderness, no discharge and no friability. Right adnexum displays no mass, no tenderness and no fullness. Left adnexum displays no mass, no tenderness and no fullness. No tenderness or bleeding in the vagina.   There is a foreign body in the vagina. No vaginal discharge found.   Genitourinary Comments: IUD string in place   Musculoskeletal: Normal range of motion. She exhibits no edema or tenderness.   Neurological: She is alert and oriented to person, place, and time. No cranial nerve deficit. Coordination normal.   Skin: Skin is warm. No rash noted. No erythema.   Psychiatric: She has a normal mood and affect. Her behavior is normal. Judgment and thought content normal.   Vitals reviewed.         Assessment/Plan    1) GYN HM: Check pap smear. SBE demonstrated and encouraged. MMG at age 39.  2) PCOS: Being managed by primary care physician. Pt is on metformin XR 1,000mg daily.  3) Contraception: none. Mirena IUD 10/2018.  4) Family Planning: .  5) Diet and Exercise discussed  6) Smoking Status: nonsmoker  7) Hx of Oligomenorrhea: Pt is at high risk for endometrial hyperplasia. Has Mirena IUD in place 10/2018  8) Follow up prn and 1 year.       Diagnoses and all orders for this visit:    Routine gynecological examination  -     Pap IG, HPV-hr    Pap smear, low-risk  -     Pap IG, HPV-hr          Karen Marvin DO  2020  13:04

## 2020-06-17 LAB
CYTOLOGIST CVX/VAG CYTO: NORMAL
CYTOLOGY CVX/VAG DOC CYTO: NORMAL
CYTOLOGY CVX/VAG DOC THIN PREP: NORMAL
DX ICD CODE: NORMAL
HIV 1 & 2 AB SER-IMP: NORMAL
HPV I/H RISK 1 DNA CVX QL PROBE+SIG AMP: NEGATIVE
OTHER STN SPEC: NORMAL
STAT OF ADQ CVX/VAG CYTO-IMP: NORMAL

## 2020-10-10 ENCOUNTER — APPOINTMENT (OUTPATIENT)
Dept: GENERAL RADIOLOGY | Facility: HOSPITAL | Age: 35
End: 2020-10-10

## 2020-10-10 ENCOUNTER — HOSPITAL ENCOUNTER (EMERGENCY)
Facility: HOSPITAL | Age: 35
Discharge: HOME | End: 2020-10-10
Attending: EMERGENCY MEDICINE | Admitting: EMERGENCY MEDICINE

## 2020-10-10 VITALS
SYSTOLIC BLOOD PRESSURE: 150 MMHG | BODY MASS INDEX: 46.95 KG/M2 | OXYGEN SATURATION: 97 % | RESPIRATION RATE: 22 BRPM | TEMPERATURE: 97.3 F | HEART RATE: 82 BPM | HEIGHT: 64 IN | DIASTOLIC BLOOD PRESSURE: 104 MMHG | WEIGHT: 275 LBS

## 2020-10-10 DIAGNOSIS — R07.89 MUSCULOSKELETAL CHEST PAIN: ICD-10-CM

## 2020-10-10 DIAGNOSIS — U07.1 COVID-19 VIRUS INFECTION: Primary | ICD-10-CM

## 2020-10-10 LAB
ALBUMIN SERPL-MCNC: 4.5 G/DL (ref 3.5–5.2)
ALBUMIN/GLOB SERPL: 1.4 G/DL
ALP SERPL-CCNC: 69 U/L (ref 39–117)
ALT SERPL W P-5'-P-CCNC: 38 U/L (ref 1–33)
ANION GAP SERPL CALCULATED.3IONS-SCNC: 9.3 MMOL/L (ref 5–15)
AST SERPL-CCNC: 26 U/L (ref 1–32)
BASOPHILS # BLD AUTO: 0.01 10*3/MM3 (ref 0–0.2)
BASOPHILS NFR BLD AUTO: 0.2 % (ref 0–1.5)
BILIRUB SERPL-MCNC: 0.4 MG/DL (ref 0–1.2)
BUN SERPL-MCNC: 11 MG/DL (ref 6–20)
BUN/CREAT SERPL: 15.5 (ref 7–25)
CALCIUM SPEC-SCNC: 9.3 MG/DL (ref 8.6–10.5)
CHLORIDE SERPL-SCNC: 107 MMOL/L (ref 98–107)
CO2 SERPL-SCNC: 24.7 MMOL/L (ref 22–29)
CREAT SERPL-MCNC: 0.71 MG/DL (ref 0.57–1)
D DIMER PPP FEU-MCNC: <0.27 MCGFEU/ML (ref 0–0.49)
D-LACTATE SERPL-SCNC: 1.3 MMOL/L (ref 0.5–2)
DEPRECATED RDW RBC AUTO: 39 FL (ref 37–54)
EOSINOPHIL # BLD AUTO: 0.05 10*3/MM3 (ref 0–0.4)
EOSINOPHIL NFR BLD AUTO: 1.2 % (ref 0.3–6.2)
ERYTHROCYTE [DISTWIDTH] IN BLOOD BY AUTOMATED COUNT: 13.8 % (ref 12.3–15.4)
GFR SERPL CREATININE-BSD FRML MDRD: 93 ML/MIN/1.73
GLOBULIN UR ELPH-MCNC: 3.2 GM/DL
GLUCOSE SERPL-MCNC: 93 MG/DL (ref 65–99)
HCT VFR BLD AUTO: 46.6 % (ref 34–46.6)
HGB BLD-MCNC: 15.2 G/DL (ref 12–15.9)
HOLD SPECIMEN: NORMAL
HOLD SPECIMEN: NORMAL
IMM GRANULOCYTES # BLD AUTO: 0.01 10*3/MM3 (ref 0–0.05)
IMM GRANULOCYTES NFR BLD AUTO: 0.2 % (ref 0–0.5)
LYMPHOCYTES # BLD AUTO: 1.54 10*3/MM3 (ref 0.7–3.1)
LYMPHOCYTES NFR BLD AUTO: 38.3 % (ref 19.6–45.3)
MCH RBC QN AUTO: 25.7 PG (ref 26.6–33)
MCHC RBC AUTO-ENTMCNC: 32.6 G/DL (ref 31.5–35.7)
MCV RBC AUTO: 78.8 FL (ref 79–97)
MONOCYTES # BLD AUTO: 0.29 10*3/MM3 (ref 0.1–0.9)
MONOCYTES NFR BLD AUTO: 7.2 % (ref 5–12)
NEUTROPHILS NFR BLD AUTO: 2.12 10*3/MM3 (ref 1.7–7)
NEUTROPHILS NFR BLD AUTO: 52.9 % (ref 42.7–76)
NRBC BLD AUTO-RTO: 0 /100 WBC (ref 0–0.2)
NT-PROBNP SERPL-MCNC: 26.8 PG/ML (ref 0–450)
PLATELET # BLD AUTO: 180 10*3/MM3 (ref 140–450)
PMV BLD AUTO: 10 FL (ref 6–12)
POTASSIUM SERPL-SCNC: 4 MMOL/L (ref 3.5–5.2)
PROCALCITONIN SERPL-MCNC: 0.06 NG/ML (ref 0–0.25)
PROT SERPL-MCNC: 7.7 G/DL (ref 6–8.5)
RBC # BLD AUTO: 5.91 10*6/MM3 (ref 3.77–5.28)
SODIUM SERPL-SCNC: 141 MMOL/L (ref 136–145)
TROPONIN T SERPL-MCNC: <0.01 NG/ML (ref 0–0.03)
WBC # BLD AUTO: 4.02 10*3/MM3 (ref 3.4–10.8)
WHOLE BLOOD HOLD SPECIMEN: NORMAL
WHOLE BLOOD HOLD SPECIMEN: NORMAL

## 2020-10-10 PROCEDURE — 93010 ELECTROCARDIOGRAM REPORT: CPT | Performed by: INTERNAL MEDICINE

## 2020-10-10 PROCEDURE — 83880 ASSAY OF NATRIURETIC PEPTIDE: CPT | Performed by: EMERGENCY MEDICINE

## 2020-10-10 PROCEDURE — 71045 X-RAY EXAM CHEST 1 VIEW: CPT

## 2020-10-10 PROCEDURE — 85025 COMPLETE CBC W/AUTO DIFF WBC: CPT | Performed by: NURSE PRACTITIONER

## 2020-10-10 PROCEDURE — 80053 COMPREHEN METABOLIC PANEL: CPT | Performed by: NURSE PRACTITIONER

## 2020-10-10 PROCEDURE — 87040 BLOOD CULTURE FOR BACTERIA: CPT | Performed by: EMERGENCY MEDICINE

## 2020-10-10 PROCEDURE — 83605 ASSAY OF LACTIC ACID: CPT | Performed by: EMERGENCY MEDICINE

## 2020-10-10 PROCEDURE — 85379 FIBRIN DEGRADATION QUANT: CPT | Performed by: EMERGENCY MEDICINE

## 2020-10-10 PROCEDURE — 93005 ELECTROCARDIOGRAM TRACING: CPT | Performed by: EMERGENCY MEDICINE

## 2020-10-10 PROCEDURE — 99284 EMERGENCY DEPT VISIT MOD MDM: CPT

## 2020-10-10 PROCEDURE — 84145 PROCALCITONIN (PCT): CPT | Performed by: EMERGENCY MEDICINE

## 2020-10-10 PROCEDURE — 93005 ELECTROCARDIOGRAM TRACING: CPT

## 2020-10-10 PROCEDURE — 84484 ASSAY OF TROPONIN QUANT: CPT | Performed by: EMERGENCY MEDICINE

## 2020-10-10 RX ORDER — SODIUM CHLORIDE 0.9 % (FLUSH) 0.9 %
10 SYRINGE (ML) INJECTION AS NEEDED
Status: DISCONTINUED | OUTPATIENT
Start: 2020-10-10 | End: 2020-10-10 | Stop reason: HOSPADM

## 2020-10-10 NOTE — ED PROVIDER NOTES
EMERGENCY DEPARTMENT ENCOUNTER    Room Number:  26/26  Date of encounter:  10/10/2020  PCP: Zandra Rios MD  Historian: Patient      HPI:  Chief Complaint: Cough chest pain, increased shortness of breath and covert infection  A complete HPI/ROS/PMH/PSH/SH/FH are unobtainable due to: Not applicable  Context: Valery Aguilar is a 35 y.o. female who presents to the ED c/o patient's initial symptoms started approximately 1 week ago when she had what she thought was the beginnings of allergies she had a runny nose a cough that was nonproductive and then that progressed with some body aches and increasing tiredness and fatigue.  Then 5 days ago she had a test for COVID and had the results 2 days ago which showed that she was positive.  She has had some development of some increasing cough.  Some increasing shortness of breath with exertion.  She states that she is never really had any fever.  She has had some weakness and fatigue.  She had a couple bouts of vomiting.  A little bit of change in the taste of food.  She is drinking liquids.  No diarrhea.  Her pain in her chest is below her breasts bilaterally and then Wrap around to her back.  It is worse with coughing, sneezing, laughing, deep breathing, movement.  She describes it as a muscle cramp or spasm.  This pain has been going on for 2 to 3 days.  She does not smoke or drink.  She reports no history of lung problems or heart problems.  She had a cardiac work-up approximately a year and a half ago which was unremarkable.  No risk factors for DVT or PE.  She has no pain or swelling in her lower extremities.        Previous Episodes: She states this is somewhat like pleurisy but not entirely the same  Current Symptoms: See above    MEDICAL HISTORY REVIEWED    Patient had an echo January 2019.  It was essentially unremarkable please see interpretation summary in epic.    PAST MEDICAL HISTORY  Active Ambulatory Problems     Diagnosis Date Noted   • Intractable  migraine without aura and without status migrainosus 2016   • Cervicogenic headache 2016   • Secondary oligomenorrhea 2018   • Family history of breast cancer 10/09/2018   • Incisional hernia, without obstruction or gangrene 10/24/2018   • Incisional hernia 11/15/2018   • Obstructive sleep apnea, adult 2018   • GERD with apnea 2018   • Wound infection 2018   • Right lower quadrant abdominal pain 2019   • Chronic wound infection of abdomen 2019     Resolved Ambulatory Problems     Diagnosis Date Noted   • No Resolved Ambulatory Problems     Past Medical History:   Diagnosis Date   • Abdominal pain    • Anxiety    • Arthritis    • Depression    • GERD (gastroesophageal reflux disease)    • Headache, tension-type    • Hypertension    • Insulin resistance    • Migraine    • MRSA (methicillin resistant Staphylococcus aureus) infection 2019   • Panic attack    • PCOS (polycystic ovarian syndrome)    • Pneumonia 2018   • PONV (postoperative nausea and vomiting)    • Seasonal allergies    • Spinal headache          PAST SURGICAL HISTORY  Past Surgical History:   Procedure Laterality Date   • ABDOMINAL WALL ABSCESS INCISION AND DRAINAGE N/A 2018    Procedure: Debridement of abdominal wall;  Surgeon: Brigido Navarrete MD;  Location: [facility] OR;  Service: General   • ADENOIDECTOMY     •  SECTION      X2   • CHOLECYSTECTOMY      LAP   • HERNIA REPAIR     • HX OVARIAN CYSTECTOMY     • INCISION AND DRAINAGE TRUNK N/A 2018    Procedure: wound debridement, abdomen;  Surgeon: Rachel Dolan MD;  Location: [facility] OR;  Service: General   • INCISION AND DRAINAGE TRUNK N/A 2019    Procedure: WOUND CLOSURE ABDOMINAL;  Surgeon: Rachel Dolan MD;  Location: [facility] OR;  Service: General   • KNEE ACL RECONSTRUCTION     • TONSILLECTOMY     • TRUNK DEBRIDEMENT N/A 2018    Procedure: Abdominal wound debridement;  Surgeon: Rachel Dolan MD;   Location:  [facility] OR;  Service: General   • VENTRAL/INCISIONAL HERNIA REPAIR N/A 11/15/2018    Procedure: Lateral Component Separation Herniorrhapy Repair with Mesh, Lysis of adhesions, and skin lesion excision of Right Side;  Surgeon: Rachel Dolan MD;  Location:  [facility] OR;  Service: General         FAMILY HISTORY  Family History   Problem Relation Age of Onset   • Stroke Mother    • Heart disease Mother    • Hypertension Mother    • Heart disease Father    • Hypertension Father    • Diabetes Father    • Dementia Maternal Grandmother    • Stroke Maternal Grandmother    • Diabetes Maternal Grandmother    • Stroke Paternal Grandmother    • Hypertension Paternal Grandmother    • Cancer Paternal Grandmother    • Heart disease Paternal Grandfather    • Diabetes Paternal Grandfather          SOCIAL HISTORY  Social History     Socioeconomic History   • Marital status:      Spouse name: Not on file   • Number of children: Not on file   • Years of education: Not on file   • Highest education level: Not on file   Tobacco Use   • Smoking status: Former Smoker     Packs/day: 0.50     Years: 1.00     Pack years: 0.50     Quit date:      Years since quittin.7   • Smokeless tobacco: Never Used   Substance and Sexual Activity   • Alcohol use: Yes     Comment: occasional   • Drug use: No   • Sexual activity: Defer     Partners: Male     Birth control/protection: OCP     Comment: LNMP irregular         ALLERGIES  Patient has no known allergies.        REVIEW OF SYSTEMS  Review of Systems     All systems reviewed and negative except for those discussed in HPI.       PHYSICAL EXAM    I have reviewed the triage vital signs and nursing notes.    ED Triage Vitals   Temp Heart Rate Resp BP SpO2   10/10/20 1053 10/10/20 1053 10/10/20 1053 10/10/20 1130 10/10/20 1053   97.3 °F (36.3 °C) 96 22 (!) 159/107 99 %      Temp src Heart Rate Source Patient Position BP Location FiO2 (%)   10/10/20 1053 10/10/20 1053 -- -- --    Tympanic Monitor          GENERAL: Pleasant female no acute distress.Vital signs on my initial evaluation unremarkable.  O2 sat on my evaluation is 98% on room air  HENT: nares patent  Head/neck/ face are symmetric without gross deformity, signs of trauma, or swelling  EYES: no scleral icterus, no conjunctival pallor.  NECK: Supple, no meningismus  CV: regular rhythm, regular rate with intact distal pulses.  No murmur rub  RESPIRATORY: normal effort and no respiratory distress.  Lungs were clear to auscultation.  Her discomfort is reproducible with movement, coughing, and palpation.  It is underneath her breasts bilaterally and wraps around her back  ABDOMEN: soft and non-tender.  Morbidly obese  MUSCULOSKELETAL: no deformity.  No edema.  Good strong intact distal pulses to upper and lower extremities that are equal and symmetric.  NEURO: alert and appropriate, moves all extremities, follows commands.  Alert and oriented x3.  No focal motor or sensory changes.  SKIN: warm, dry    Vital signs and nursing notes reviewed.        LAB RESULTS  Recent Results (from the past 24 hour(s))   Comprehensive Metabolic Panel    Collection Time: 10/10/20 11:38 AM    Specimen: Blood   Result Value Ref Range    Glucose 93 65 - 99 mg/dL    BUN 11 6 - 20 mg/dL    Creatinine 0.71 0.57 - 1.00 mg/dL    Sodium 141 136 - 145 mmol/L    Potassium 4.0 3.5 - 5.2 mmol/L    Chloride 107 98 - 107 mmol/L    CO2 24.7 22.0 - 29.0 mmol/L    Calcium 9.3 8.6 - 10.5 mg/dL    Total Protein 7.7 6.0 - 8.5 g/dL    Albumin 4.50 3.50 - 5.20 g/dL    ALT (SGPT) 38 (H) 1 - 33 U/L    AST (SGOT) 26 1 - 32 U/L    Alkaline Phosphatase 69 39 - 117 U/L    Total Bilirubin 0.4 0.0 - 1.2 mg/dL    eGFR Non African Amer 93 >60 mL/min/1.73    Globulin 3.2 gm/dL    A/G Ratio 1.4 g/dL    BUN/Creatinine Ratio 15.5 7.0 - 25.0    Anion Gap 9.3 5.0 - 15.0 mmol/L   CBC Auto Differential    Collection Time: 10/10/20 11:38 AM    Specimen: Blood   Result Value Ref Range    WBC 4.02  3.40 - 10.80 10*3/mm3    RBC 5.91 (H) 3.77 - 5.28 10*6/mm3    Hemoglobin 15.2 12.0 - 15.9 g/dL    Hematocrit 46.6 34.0 - 46.6 %    MCV 78.8 (L) 79.0 - 97.0 fL    MCH 25.7 (L) 26.6 - 33.0 pg    MCHC 32.6 31.5 - 35.7 g/dL    RDW 13.8 12.3 - 15.4 %    RDW-SD 39.0 37.0 - 54.0 fl    MPV 10.0 6.0 - 12.0 fL    Platelets 180 140 - 450 10*3/mm3    Neutrophil % 52.9 42.7 - 76.0 %    Lymphocyte % 38.3 19.6 - 45.3 %    Monocyte % 7.2 5.0 - 12.0 %    Eosinophil % 1.2 0.3 - 6.2 %    Basophil % 0.2 0.0 - 1.5 %    Immature Grans % 0.2 0.0 - 0.5 %    Neutrophils, Absolute 2.12 1.70 - 7.00 10*3/mm3    Lymphocytes, Absolute 1.54 0.70 - 3.10 10*3/mm3    Monocytes, Absolute 0.29 0.10 - 0.90 10*3/mm3    Eosinophils, Absolute 0.05 0.00 - 0.40 10*3/mm3    Basophils, Absolute 0.01 0.00 - 0.20 10*3/mm3    Immature Grans, Absolute 0.01 0.00 - 0.05 10*3/mm3    nRBC 0.0 0.0 - 0.2 /100 WBC   Light Blue Top    Collection Time: 10/10/20 11:38 AM   Result Value Ref Range    Extra Tube hold for add-on    Green Top (Gel)    Collection Time: 10/10/20 11:38 AM   Result Value Ref Range    Extra Tube Hold for add-ons.    Lavender Top    Collection Time: 10/10/20 11:38 AM   Result Value Ref Range    Extra Tube hold for add-on    Gold Top - SST    Collection Time: 10/10/20 11:38 AM   Result Value Ref Range    Extra Tube Hold for add-ons.    BNP    Collection Time: 10/10/20 11:38 AM    Specimen: Blood   Result Value Ref Range    proBNP 26.8 0.0 - 450.0 pg/mL   Troponin    Collection Time: 10/10/20 11:38 AM    Specimen: Blood   Result Value Ref Range    Troponin T <0.010 0.000 - 0.030 ng/mL   Procalcitonin    Collection Time: 10/10/20 11:38 AM    Specimen: Blood   Result Value Ref Range    Procalcitonin 0.06 0.00 - 0.25 ng/mL   D-dimer, Quantitative    Collection Time: 10/10/20 11:38 AM    Specimen: Blood   Result Value Ref Range    D-Dimer, Quantitative <0.27 0.00 - 0.49 MCGFEU/mL       Ordered the above labs and independently reviewed the  results.        RADIOLOGY  Xr Chest 1 View    Result Date: 10/10/2020  XR CHEST 1 VW-  HISTORY: Female who is 35 years-old,  Covid positive, short of breath  TECHNIQUE: Frontal view of the chest  COMPARISON: None available  FINDINGS: Heart, mediastinum and pulmonary vasculature are unremarkable. No focal pulmonary consolidation, pleural effusion, or pneumothorax. No acute osseous process.      No focal pulmonary consolidation. Follow-up as clinical indications persist.  This report was finalized on 10/10/2020 12:39 PM by Dr. Joon Lay M.D.        I ordered the above noted radiological studies. Reviewed by me and discussed with radiologist.  See dictation for official radiology interpretation.      PROCEDURES    Procedures      MEDICATIONS GIVEN IN ER    Medications   sodium chloride 0.9 % flush 10 mL (has no administration in time range)         PROGRESS, DATA ANALYSIS, CONSULTS, AND MEDICAL DECISION MAKING    Sounds like she has some symptoms of a covert infection.  She is right at day 7 where symptoms generally can get worse.  Go ahead and check some lab work on her.  We will check a dimer on her as there is some increased coagulation that occurs with a covert infection.  All questions answered at this time.  We are currently under a pandemic from the COVID19 infection.  The patient presented to the emergency department by ambulance or personal vehicle. I followed the current protocols required by Infection Control at [facility] in my evaluation and treatment of the patient. The patient was wearing a face mask during my evaluation and throughout my encounter. During my whole encounter with this patient I used appropriate personal protective equipment.  This equipment consisted of eye protection, facemask, gown, and gloves.  I applied this equipment before entering the room.      All labs have been independently reviewed by me.  All radiology studies have been reviewed by me and discussed  with radiologist dictating the report.   EKG's independently viewed and interpreted by me.  Discussion below represents my analysis of pertinent findings related to patient's condition, differential diagnosis, treatment plan and final disposition.      ED Course as of Oct 10 1421   Sat Oct 10, 2020   1209 EKG readingEKG was done at 1128Rate of 83Is normal sinus rhythmNarrow complexNormal axisI not see any obvious acute abnormality is some nonspecific T wave changes borderline left atrial enlargement.  QT looks normalI compared to an EKG on January 20, 2019 and this EKG looked similar.    [MM]   1414 On reevaluation.  Patient looks great.  Pressure is unremarkable.  Patient has a normal heart rate.  Patient's O2 sat is 97 to 98% on room air.  There is no respiratory distress.  I think her symptoms are very likely from a covert infection.  I do not think she has any pulmonary embolism her dimer is normal.  Her symptoms and description sounds like muscle cramps as if this is musculoskeletal.  He is 35 years of age and no risk factors for heart disease other than obesity.  I talked with the CCP nurse Ashley Ramesh and we are going to arrange this patient to go on a pulse oximeter.  Instructed to have the patient check her pulse ox every few hours.  To return if worsening of symptoms or any concerns.  All questions answered.  Patient agrees with this plan.    [MM]      ED Course User Index  [MM] Maximiliano Lee MD       AS OF 14:21 EDT VITALS:    BP - 132/88  HR - 82  TEMP - 97.3 °F (36.3 °C) (Tympanic)  02 SATS - 96%        DIAGNOSIS  Final diagnoses:   COVID-19 virus infection   Musculoskeletal chest pain         DISPOSITION  DISCHARGE    Patient discharged in stable condition.    Reviewed implications of results, diagnosis, meds, responsibility to follow up, warning signs and symptoms of possible worsening, potential complications and reasons to return to ER, including worsening of symptoms, worsening of pain, worsening  of shortness of breath, not tolerating liquids or solids, any concerns.    Patient/Family voiced understanding of above instructions.    Discussed plan for discharge, as there is no emergent indication for admission. Pt/family is agreeable and understands need for follow up and repeat testing.  Pt is aware that discharge does not mean that nothing is wrong but it indicates no emergency is present that requires admission and they must continue care with follow-up as given below or physician of their choice.     FOLLOW-UP  Zandra Rios MD  [address] [address] [phone]    In 1 week  Return if pain worsens, If symptoms worsen, shortness of breath, fever, any concerns         Medication List      No changes were made to your prescriptions during this visit.                  Maximiliano Lee MD  10/10/20 [id number]

## 2020-10-10 NOTE — DISCHARGE INSTRUCTIONS
As we discussed please check pulse oximetry every 3-4 hours.  Check it more frequently if you have worsening of symptoms such as worsening shortness of breath or worsening chest pain.  Take Tylenol or Motrin for pain and discomfort.  Return if worsening of symptoms, not tolerating liquids or solids, or any concerns.

## 2020-10-10 NOTE — ED TRIAGE NOTES
All triage performed with this RN wearing appropriate PPE.  Pt placed in mask upon arrival to ED.  Patient to ED with c/o CP/SOA since yesterday. She tested positive for civid on 10/6/2020 at the [facility].

## 2020-10-10 NOTE — ED NOTES
I am wearing mask, goggles, and gloves. When designated too; I am also wearing an apron and a face shield. The patient is wearing a surgical mask.      Rad Gonzalez RN  10/10/20 [id number]

## 2020-10-12 NOTE — PROGRESS NOTES
Late entry- pulse oximetry monitor provided to primary RN Kristian who instructed patient on proper use- patient signed consent form and received copy with number to contact for any concerns. Ashley Motta RN

## 2020-10-15 LAB
BACTERIA SPEC AEROBE CULT: NORMAL
BACTERIA SPEC AEROBE CULT: NORMAL

## 2021-03-08 ENCOUNTER — OFFICE VISIT (OUTPATIENT)
Dept: INTERNAL MEDICINE | Facility: CLINIC | Age: 36
End: 2021-03-08

## 2021-03-08 VITALS
BODY MASS INDEX: 50.02 KG/M2 | WEIGHT: 293 LBS | TEMPERATURE: 96.9 F | HEIGHT: 64 IN | SYSTOLIC BLOOD PRESSURE: 130 MMHG | OXYGEN SATURATION: 96 % | DIASTOLIC BLOOD PRESSURE: 80 MMHG | HEART RATE: 84 BPM | RESPIRATION RATE: 16 BRPM

## 2021-03-08 DIAGNOSIS — F32.A ANXIETY AND DEPRESSION: Chronic | ICD-10-CM

## 2021-03-08 DIAGNOSIS — F41.9 ANXIETY AND DEPRESSION: Chronic | ICD-10-CM

## 2021-03-08 DIAGNOSIS — G43.019 INTRACTABLE MIGRAINE WITHOUT AURA AND WITHOUT STATUS MIGRAINOSUS: Primary | ICD-10-CM

## 2021-03-08 PROBLEM — T14.8XXA WOUND INFECTION: Status: RESOLVED | Noted: 2018-12-02 | Resolved: 2021-03-08

## 2021-03-08 PROBLEM — L08.9 CHRONIC WOUND INFECTION OF ABDOMEN: Status: RESOLVED | Noted: 2019-02-20 | Resolved: 2021-03-08

## 2021-03-08 PROBLEM — L08.9 WOUND INFECTION: Status: RESOLVED | Noted: 2018-12-02 | Resolved: 2021-03-08

## 2021-03-08 PROBLEM — K43.2 INCISIONAL HERNIA: Status: RESOLVED | Noted: 2018-11-15 | Resolved: 2021-03-08

## 2021-03-08 PROBLEM — K43.2 INCISIONAL HERNIA, WITHOUT OBSTRUCTION OR GANGRENE: Status: RESOLVED | Noted: 2018-10-24 | Resolved: 2021-03-08

## 2021-03-08 PROBLEM — R10.31 RIGHT LOWER QUADRANT ABDOMINAL PAIN: Status: RESOLVED | Noted: 2019-01-20 | Resolved: 2021-03-08

## 2021-03-08 PROBLEM — S31.109A CHRONIC WOUND INFECTION OF ABDOMEN: Status: RESOLVED | Noted: 2019-02-20 | Resolved: 2021-03-08

## 2021-03-08 PROCEDURE — 96372 THER/PROPH/DIAG INJ SC/IM: CPT | Performed by: INTERNAL MEDICINE

## 2021-03-08 PROCEDURE — 99214 OFFICE O/P EST MOD 30 MIN: CPT | Performed by: INTERNAL MEDICINE

## 2021-03-08 RX ORDER — RIZATRIPTAN BENZOATE 10 MG/1
10 TABLET, ORALLY DISINTEGRATING ORAL ONCE AS NEEDED
Qty: 15 TABLET | Refills: 3 | Status: SHIPPED | OUTPATIENT
Start: 2021-03-08 | End: 2021-08-27

## 2021-03-08 RX ORDER — PROMETHAZINE HYDROCHLORIDE 25 MG/ML
25 INJECTION, SOLUTION INTRAMUSCULAR; INTRAVENOUS ONCE
Status: COMPLETED | OUTPATIENT
Start: 2021-03-08 | End: 2021-03-08

## 2021-03-08 RX ORDER — LORAZEPAM 0.5 MG/1
0.5 TABLET ORAL EVERY 12 HOURS PRN
Qty: 30 TABLET | Refills: 0 | Status: SHIPPED | OUTPATIENT
Start: 2021-03-08 | End: 2021-08-30 | Stop reason: SDUPTHER

## 2021-03-08 RX ORDER — KETOROLAC TROMETHAMINE 30 MG/ML
60 INJECTION, SOLUTION INTRAMUSCULAR; INTRAVENOUS ONCE
Status: COMPLETED | OUTPATIENT
Start: 2021-03-08 | End: 2021-03-08

## 2021-03-08 RX ORDER — LORAZEPAM 0.5 MG/1
0.5 TABLET ORAL EVERY 12 HOURS PRN
COMMUNITY
End: 2021-03-08 | Stop reason: SDUPTHER

## 2021-03-08 RX ADMIN — PROMETHAZINE HYDROCHLORIDE 25 MG: 25 INJECTION, SOLUTION INTRAMUSCULAR; INTRAVENOUS at 10:54

## 2021-03-08 RX ADMIN — KETOROLAC TROMETHAMINE 60 MG: 30 INJECTION, SOLUTION INTRAMUSCULAR; INTRAVENOUS at 10:53

## 2021-03-08 NOTE — PROGRESS NOTES
"Chief Complaint  Headache, Back Pain, Chest Pressure, Shortness of Breath, Tendonitis, Anxiety, and Depression    Subjective          Valery Aguilar presents to [facility] for ongoing and more frequent migraines. She has had migraine almost daily for over a week now.   Her anxiety is also worse, she has been needing her lorazepam more frequently again which had been controlled over past couple months.   Saw urgent care this weekend related to calf pain and was diagnosed with achilles tendonitis, currently on steroid pack.     Objective   Vital Signs:     /80   Pulse 84   Temp 96.9 °F (36.1 °C)   Resp 16   Ht 162.6 cm (64\")   Wt 133 kg (294 lb)   SpO2 96%   BMI 50.46 kg/m²     Physical Exam  Vitals and nursing note reviewed.   Constitutional:       General: She is not in acute distress.     Appearance: Normal appearance.   Cardiovascular:      Rate and Rhythm: Normal rate and regular rhythm.      Pulses: Normal pulses.      Heart sounds: Normal heart sounds. No murmur.   Pulmonary:      Effort: Pulmonary effort is normal. No respiratory distress.      Breath sounds: Normal breath sounds.   Abdominal:      General: Abdomen is flat. Bowel sounds are normal.      Palpations: Abdomen is soft.      Tenderness: There is no abdominal tenderness.   Musculoskeletal:      Right lower leg: No edema.      Left lower leg: No edema.   Neurological:      General: No focal deficit present.      Mental Status: She is alert and oriented to person, place, and time. Mental status is at baseline.      Cranial Nerves: No cranial nerve deficit.      Gait: Gait normal.   Psychiatric:         Attention and Perception: Attention normal.         Mood and Affect: Mood is anxious. Affect is tearful. Affect is not labile.         Speech: Speech normal.         Behavior: Behavior normal.         Cognition and Memory: Cognition normal.         Judgment: Judgment normal.          Result " Review :   The following data was reviewed by: Zandra Rios MD on 03/08/2021:  Labs: CBC, CMP, FLP, A1C, TSH, Vit D 8/2020 in practice [facility]         Assessment and Plan      Diagnoses and all orders for this visit:    1. Intractable migraine without aura and without status migrainosus (Primary)  Assessment & Plan:  UNCONTROLLED  - will give migraine cocktail in office today with toradol and phenergen, advised to go home and go to bed related to anticipated drowsiness to follow  - will give sample of ubrelvy today for patient to try at home and see if this is more effective than her current triptan  - refill sent on triptan so she has that on hand if ubrevly not effective  - cont nortriptlyine nightly for now, may need to adjust this if migraine frequency continues to be too high        Orders:  -     ketorolac (TORADOL) injection 60 mg  -     promethazine (PHENERGAN) injection 25 mg  -     Discontinue: ubrogepant (ubrogepant) 50 MG tablet; Take one tab by mouth at onset of headache, can repeat dose in 2 hours if headache not resolved  Dispense: 30 tablet; Refill: 0  -     rizatriptan MLT (MAXALT-MLT) 10 MG disintegrating tablet; Place 1 tablet on the tongue 1 (One) Time As Needed for Migraine. May repeat in 2 hours if needed  Dispense: 15 tablet; Refill: 3  -     ubrogepant (ubrogepant) 50 MG tablet; Take 1 tab at onset of headache, can repeat dose in 2 hours if headache not resolved  Dispense: 10 tablet; Refill: 3    2. Anxiety and depression  Assessment & Plan:  UNCONTROLLED  - cont on sertraline 150 mg daily  - currently taking buspar 10 mg at TID, was previously taking BID but recently went back to TID due to increased anxiety  - will refill lorazepam today for intermittent use, hopeful that this is a temporary hiccup and things will settle back down without any other med changes  - hamida checked and appropriate, has not filled this medication in almost 6 mos at this time      Orders:  -     LORazepam  (ATIVAN) 0.5 MG tablet; Take 1 tablet by mouth Every 12 (Twelve) Hours As Needed for Anxiety.  Dispense: 30 tablet; Refill: 0    Follow Up   Return in about 4 weeks (around 4/5/2021) for follow up anxiety.    Patient was given instructions and counseling regarding her condition or for health maintenance advice. Please see specific information pulled into the AVS if appropriate.     Yessy Rios MD  [facility] [place]  Phone: [phone]  Fax: [phone]

## 2021-03-08 NOTE — ASSESSMENT & PLAN NOTE
UNCONTROLLED  - cont on sertraline 150 mg daily  - currently taking buspar 10 mg at TID, was previously taking BID but recently went back to TID due to increased anxiety  - will refill lorazepam today for intermittent use, hopeful that this is a temporary hiccup and things will settle back down without any other med changes  - hamida checked and appropriate, has not filled this medication in almost 6 mos at this time

## 2021-03-08 NOTE — ASSESSMENT & PLAN NOTE
UNCONTROLLED  - will give migraine cocktail in office today with toradol and phenergen, advised to go home and go to bed related to anticipated drowsiness to follow  - will give sample of ubrelvy today for patient to try at home and see if this is more effective than her current triptan  - refill sent on triptan so she has that on hand if ubrevly not effective  - cont nortriptlyine nightly for now, may need to adjust this if migraine frequency continues to be too high

## 2021-03-29 ENCOUNTER — OFFICE VISIT (OUTPATIENT)
Dept: INTERNAL MEDICINE | Facility: CLINIC | Age: 36
End: 2021-03-29

## 2021-03-29 VITALS
BODY MASS INDEX: 49.85 KG/M2 | OXYGEN SATURATION: 98 % | DIASTOLIC BLOOD PRESSURE: 98 MMHG | RESPIRATION RATE: 16 BRPM | SYSTOLIC BLOOD PRESSURE: 162 MMHG | TEMPERATURE: 96.1 F | WEIGHT: 292 LBS | HEIGHT: 64 IN | HEART RATE: 120 BPM

## 2021-03-29 DIAGNOSIS — G43.011 INTRACTABLE MIGRAINE WITHOUT AURA AND WITH STATUS MIGRAINOSUS: Primary | Chronic | ICD-10-CM

## 2021-03-29 PROCEDURE — 99214 OFFICE O/P EST MOD 30 MIN: CPT | Performed by: INTERNAL MEDICINE

## 2021-03-29 RX ORDER — KETOROLAC TROMETHAMINE 30 MG/ML
60 INJECTION, SOLUTION INTRAMUSCULAR; INTRAVENOUS ONCE
Status: COMPLETED | OUTPATIENT
Start: 2021-03-29 | End: 2021-03-29

## 2021-03-29 RX ORDER — PROMETHAZINE HYDROCHLORIDE 25 MG/ML
25 INJECTION, SOLUTION INTRAMUSCULAR; INTRAVENOUS ONCE
Status: COMPLETED | OUTPATIENT
Start: 2021-03-29 | End: 2021-03-29

## 2021-03-29 RX ORDER — GABAPENTIN 300 MG/1
300 CAPSULE ORAL 3 TIMES DAILY
Qty: 90 CAPSULE | Refills: 1 | Status: SHIPPED | OUTPATIENT
Start: 2021-03-29 | End: 2021-04-12 | Stop reason: SDUPTHER

## 2021-03-29 RX ADMIN — KETOROLAC TROMETHAMINE 60 MG: 30 INJECTION, SOLUTION INTRAMUSCULAR; INTRAVENOUS at 14:02

## 2021-03-29 RX ADMIN — PROMETHAZINE HYDROCHLORIDE 25 MG: 25 INJECTION, SOLUTION INTRAMUSCULAR; INTRAVENOUS at 14:03

## 2021-03-29 NOTE — PROGRESS NOTES
"Chief Complaint  Migraine    Subjective          Valery Aguilar presents to [facility] for ongoing migraine. Was seen 2 weeks ago and given migraine cocktail in office. Sent Ubrelvy in and she has been trying this at home but seems to work less than her maxalt. She has used her maxalt almost daily now since her first dose of COVID vaccine. headaches are a mixture of her typical migraines and new headaches in back of her head.     Objective   Vital Signs:     /98   Pulse 120   Temp 96.1 °F (35.6 °C)   Resp 16   Ht 162.6 cm (64\")   Wt 132 kg (292 lb)   SpO2 98%   BMI 50.12 kg/m²     Physical Exam  Vitals and nursing note reviewed.   Constitutional:       General: She is not in acute distress.     Appearance: Normal appearance.   Cardiovascular:      Rate and Rhythm: Normal rate and regular rhythm.      Pulses: Normal pulses.      Heart sounds: Normal heart sounds. No murmur heard.     Pulmonary:      Effort: Pulmonary effort is normal. No respiratory distress.      Breath sounds: Normal breath sounds.   Abdominal:      General: Abdomen is flat. Bowel sounds are normal.      Palpations: Abdomen is soft.      Tenderness: There is no abdominal tenderness.   Musculoskeletal:      Right lower leg: No edema.      Left lower leg: No edema.   Neurological:      Mental Status: She is alert and oriented to person, place, and time. Mental status is at baseline.   Psychiatric:         Mood and Affect: Mood normal.         Behavior: Behavior normal.          Result Review : : None     Assessment and Plan      Diagnoses and all orders for this visit:    1. Intractable migraine without aura and with status migrainosus (Primary)  Assessment & Plan:  UNCONTROLLED  - Toradol and phenergan cocktail done today in office  - cont use of maxalt and ubrelvy prn for acute migraine treatment  - cont use of nortriptyline nightly for migraine prevention  - will add gabapentin for " daily use for additional migraine therapy and also coverage for any component of occipital neuralgia given description of headaches  - reviewed potential side effects, has taken before and tolerated well without any issues  - hamida checked and appropriate        Orders:  -     promethazine (PHENERGAN) injection 25 mg  -     ketorolac (TORADOL) injection 60 mg  -     gabapentin (NEURONTIN) 300 MG capsule; Take 1 capsule by mouth 3 (Three) Times a Day.  Dispense: 90 capsule; Refill: 1    Follow Up   Return in about 4 weeks (around 4/26/2021) for follow up migraines.    Patient was given instructions and counseling regarding her condition or for health maintenance advice. Please see specific information pulled into the AVS if appropriate.     Yessy Rios MD  [facility] [place] [facility] [place] [facility]  Phone: [phone]  Fax: [phone]

## 2021-03-29 NOTE — ASSESSMENT & PLAN NOTE
UNCONTROLLED  - Toradol and phenergan cocktail done today in office  - cont use of maxalt and ubrelvy prn for acute migraine treatment  - cont use of nortriptyline nightly for migraine prevention  - will add gabapentin for daily use for additional migraine therapy and also coverage for any component of occipital neuralgia given description of headaches  - reviewed potential side effects, has taken before and tolerated well without any issues  - hamida checked and appropriate

## 2021-03-31 RX ORDER — BUSPIRONE HYDROCHLORIDE 10 MG/1
TABLET ORAL
Qty: 270 TABLET | Refills: 1 | Status: SHIPPED | OUTPATIENT
Start: 2021-03-31 | End: 2021-08-30 | Stop reason: SDUPTHER

## 2021-04-12 ENCOUNTER — OFFICE VISIT (OUTPATIENT)
Dept: INTERNAL MEDICINE | Facility: CLINIC | Age: 36
End: 2021-04-12

## 2021-04-12 VITALS
RESPIRATION RATE: 16 BRPM | OXYGEN SATURATION: 99 % | DIASTOLIC BLOOD PRESSURE: 88 MMHG | HEART RATE: 96 BPM | HEIGHT: 64 IN | TEMPERATURE: 97.5 F | SYSTOLIC BLOOD PRESSURE: 146 MMHG | WEIGHT: 293 LBS | BODY MASS INDEX: 50.02 KG/M2

## 2021-04-12 DIAGNOSIS — F41.9 ANXIETY AND DEPRESSION: Chronic | ICD-10-CM

## 2021-04-12 DIAGNOSIS — F32.A ANXIETY AND DEPRESSION: Chronic | ICD-10-CM

## 2021-04-12 DIAGNOSIS — G43.011 INTRACTABLE MIGRAINE WITHOUT AURA AND WITH STATUS MIGRAINOSUS: Primary | Chronic | ICD-10-CM

## 2021-04-12 PROCEDURE — 99215 OFFICE O/P EST HI 40 MIN: CPT | Performed by: INTERNAL MEDICINE

## 2021-04-12 RX ORDER — GABAPENTIN 300 MG/1
300 CAPSULE ORAL 2 TIMES DAILY
Qty: 90 CAPSULE | Refills: 1
Start: 2021-04-12 | End: 2021-08-17

## 2021-04-12 RX ORDER — GABAPENTIN 600 MG/1
600 TABLET ORAL 2 TIMES DAILY
Qty: 60 TABLET | Refills: 1 | Status: SHIPPED | OUTPATIENT
Start: 2021-04-12 | End: 2021-06-29

## 2021-04-12 NOTE — PROGRESS NOTES
"Chief Complaint  Anxiety (MEDICATION REFILL F/U)    Subjective          Valery Aguilar presents to [facility] for follow up on anxiety and migraines. Gabapentin added to migraine preventative regimen due to increased frequency and since that time she has continued to have migraines, but has had some headache free days. Currently taking gabapentin 300 mg TID without any side effects other than dry mouth.   Pt also continues to have flares in her anxiety. Thinks this is related to worsened migraines over past 1-2 months, but having to use ativan more than before. She is really trying to focus on weight loss but having trouble keeping herself motivated and holding herself accountable to exercise.     Objective   Vital Signs:     /88 (BP Location: Left arm, Patient Position: Sitting, Cuff Size: Large Adult)   Pulse 96   Temp 97.5 °F (36.4 °C)   Resp 16   Ht 162.6 cm (64\")   Wt 134 kg (295 lb)   SpO2 99%   BMI 50.64 kg/m²     Physical Exam  Vitals and nursing note reviewed.   Constitutional:       General: She is not in acute distress.     Appearance: Normal appearance.   Cardiovascular:      Rate and Rhythm: Normal rate and regular rhythm.      Pulses: Normal pulses.      Heart sounds: Normal heart sounds. No murmur heard.     Pulmonary:      Effort: Pulmonary effort is normal. No respiratory distress.      Breath sounds: Normal breath sounds.   Abdominal:      General: Abdomen is flat. Bowel sounds are normal.      Palpations: Abdomen is soft.      Tenderness: There is no abdominal tenderness.   Musculoskeletal:      Right lower leg: No edema.      Left lower leg: No edema.   Neurological:      Mental Status: She is alert and oriented to person, place, and time. Mental status is at baseline.   Psychiatric:         Mood and Affect: Mood normal.         Behavior: Behavior normal.          Result Review : : none     Assessment and Plan      Diagnoses and all " orders for this visit:    1. Intractable migraine without aura and with status migrainosus (Primary)  Assessment & Plan:  IMPROVING BUT UNCONTROLLED  - cont nortripyline nightly  - will increase dose of gabapentin to 600 mg nightly and cont at 300 mg morning and afternoon for now, can slowly increase all doses to 600 mg as needed to gain adequate control of headaches, goal to drop midday dose as tolerated to simplify dosing regimen as long as headache control achieved  - cont with maxalt and ubrelvy for abortive medication  - avoid migraine triggers, get enough sleep, improve hydration    Orders:  -     gabapentin (NEURONTIN) 300 MG capsule; Take 1 capsule by mouth 2 (two) times a day.  Dispense: 90 capsule; Refill: 1  -     gabapentin (NEURONTIN) 600 MG tablet; Take 1 tablet by mouth 2 (two) times a day.  Dispense: 60 tablet; Refill: 1    2. Anxiety and depression  Assessment & Plan:  STABLE BUT UNCONTROLLED  - cont on current medication for now with sertraline 150 mg and buspar 10 mg TID for now, pt not perfectly controlled but also has been worse in the past so will hold on any med chagnes for now and see if we can make some non-pharm changes that may help  - strongly encoruage pt to find ways to motivate herself and create accountability for exercise, this has numerous possible benefits for her, but do believe it would greatly improve her mental health  - cont use of ativan on prn basis, good measure of her overall anxiety control in objective way, has used more frequently in past 2-3 weeks but still only a couple times per week and has prevoiusly been multiple times per day  - call back in 2-3 weeks if not improvnig or worsening and will bump up sertraline to 200 mg daily      50 mins spent in care of this patient preparing for visit, reviewing chart, performing appropriate history and medical exam, counseling, ordering prescriptions, documenting in chart.     Follow Up   Return in about 4 weeks (around  5/10/2021) for follow up migraines and anxiety.    Patient was given instructions and counseling regarding her condition or for health maintenance advice. Please see specific information pulled into the AVS if appropriate.     Yessy Rios MD  [facility] [place] [facility] [place] [facility]  Phone: [phone]  Fax: [phone]

## 2021-04-12 NOTE — ASSESSMENT & PLAN NOTE
IMPROVING BUT UNCONTROLLED  - cont nortripyline nightly  - will increase dose of gabapentin to 600 mg nightly and cont at 300 mg morning and afternoon for now, can slowly increase all doses to 600 mg as needed to gain adequate control of headaches, goal to drop midday dose as tolerated to simplify dosing regimen as long as headache control achieved  - cont with maxalt and ubrelvy for abortive medication  - avoid migraine triggers, get enough sleep, improve hydration

## 2021-05-10 ENCOUNTER — OFFICE VISIT (OUTPATIENT)
Dept: INTERNAL MEDICINE | Facility: CLINIC | Age: 36
End: 2021-05-10

## 2021-05-10 VITALS
DIASTOLIC BLOOD PRESSURE: 86 MMHG | BODY MASS INDEX: 48.83 KG/M2 | HEIGHT: 64 IN | SYSTOLIC BLOOD PRESSURE: 128 MMHG | OXYGEN SATURATION: 99 % | RESPIRATION RATE: 16 BRPM | WEIGHT: 286 LBS | TEMPERATURE: 96.6 F | HEART RATE: 99 BPM

## 2021-05-10 DIAGNOSIS — F32.A ANXIETY AND DEPRESSION: Primary | Chronic | ICD-10-CM

## 2021-05-10 DIAGNOSIS — G43.709 CHRONIC MIGRAINE WITHOUT AURA WITHOUT STATUS MIGRAINOSUS, NOT INTRACTABLE: Chronic | ICD-10-CM

## 2021-05-10 DIAGNOSIS — F41.9 ANXIETY AND DEPRESSION: Primary | Chronic | ICD-10-CM

## 2021-05-10 DIAGNOSIS — M76.62 TENDONITIS, ACHILLES, LEFT: ICD-10-CM

## 2021-05-10 PROCEDURE — 99214 OFFICE O/P EST MOD 30 MIN: CPT | Performed by: INTERNAL MEDICINE

## 2021-05-10 NOTE — PROGRESS NOTES
"Chief Complaint  Follow-up and Migraine    Subjective          Valery Aguilar presents to [facility] for FOLLOW UP.   Anxiety still not quite at goal but does think it is improving. Trying to cope and move on to pent up feelings and emotions which has provided some source of relief. She has been trying to walk and exercise more- having more achilles pains, some pains in her knees and hips but not sure if this is bc she is getting more active or if something is wrong.   She has also cut out sodas altogether and trying to make positive diet changes.   Migraines have improved a ton, only one headache since she was last seen on her gabapentin and notriptyline. Has actually backed down to BID dosing on gabapentin.     Objective   Vital Signs:     /86   Pulse 99   Temp 96.6 °F (35.9 °C)   Resp 16   Ht 162.6 cm (64\")   Wt 130 kg (286 lb)   SpO2 99%   BMI 49.09 kg/m²     Physical Exam  Vitals and nursing note reviewed.   Constitutional:       General: She is not in acute distress.     Appearance: Normal appearance.   Cardiovascular:      Rate and Rhythm: Normal rate and regular rhythm.      Pulses: Normal pulses.      Heart sounds: Normal heart sounds. No murmur heard.     Pulmonary:      Effort: Pulmonary effort is normal. No respiratory distress.      Breath sounds: Normal breath sounds.   Abdominal:      General: Abdomen is flat. Bowel sounds are normal.      Palpations: Abdomen is soft.      Tenderness: There is no abdominal tenderness.   Musculoskeletal:      Right lower leg: No edema.      Left lower leg: No edema.   Neurological:      Mental Status: She is alert and oriented to person, place, and time. Mental status is at baseline.   Psychiatric:         Mood and Affect: Mood normal.         Behavior: Behavior normal.          Result Review : : None     Assessment and Plan      Diagnoses and all orders for this visit:    1. Anxiety and depression " (Primary)  Assessment & Plan:  STABLE, SLIGHTLY IMPROVED  - cont on current medication for now with sertraline 150 mg and buspar 10 mg TID   - making good progress with non-pharm changes including walking and dietary changes that has helped her from a mood standpoint  - cont use of ativan on prn basis, good measure of her overall anxiety control in objective way, has used more frequently in past 2-3 weeks but still only a couple times per week and has prevoiusly been multiple times per day  - call back in 2-3 weeks if not improvnig or worsening and will bump up sertraline to 200 mg daily        2. Chronic migraine without aura without status migrainosus, not intractable  Assessment & Plan:  CONTROLLED  - cont nortripyline nightly  - cont gabapentin 600 mg BID  - cont with maxalt and ubrelvy for abortive medication  - avoid migraine triggers, get enough sleep, improve hydration        3. Tendonitis, Achilles, left  Assessment & Plan:  UNCONTROLLED  - cont NSAID use prn for pain control  - order given for PT, encouraged pt to be proactive before her pain prevents her from exercising and walking like she has been doing, has made good progress and don't want her to go backwards on her goals      Follow Up   Return in about 3 months (around 8/10/2021) for Annual physical.    Patient was given instructions and counseling regarding her condition or for health maintenance advice. Please see specific information pulled into the AVS if appropriate.     Yessy Rios MD  [facility] [place] [facility]  Phone: [phone]  Fax: [phone]

## 2021-05-10 NOTE — ASSESSMENT & PLAN NOTE
UNCONTROLLED  - cont NSAID use prn for pain control  - order given for PT, encouraged pt to be proactive before her pain prevents her from exercising and walking like she has been doing, has made good progress and don't want her to go backwards on her goals

## 2021-05-10 NOTE — ASSESSMENT & PLAN NOTE
CONTROLLED  - cont nortripyline nightly  - cont gabapentin 600 mg BID  - cont with maxalt and ubrelvy for abortive medication  - avoid migraine triggers, get enough sleep, improve hydration

## 2021-05-10 NOTE — ASSESSMENT & PLAN NOTE
STABLE, SLIGHTLY IMPROVED  - cont on current medication for now with sertraline 150 mg and buspar 10 mg TID   - making good progress with non-pharm changes including walking and dietary changes that has helped her from a mood standpoint  - cont use of ativan on prn basis, good measure of her overall anxiety control in objective way, has used more frequently in past 2-3 weeks but still only a couple times per week and has prevoiusly been multiple times per day  - call back in 2-3 weeks if not improvnig or worsening and will bump up sertraline to 200 mg daily

## 2021-05-17 ENCOUNTER — TELEPHONE (OUTPATIENT)
Dept: INTERNAL MEDICINE | Facility: CLINIC | Age: 36
End: 2021-05-17

## 2021-05-24 ENCOUNTER — DOCUMENTATION (OUTPATIENT)
Dept: INTERNAL MEDICINE | Facility: CLINIC | Age: 36
End: 2021-05-24

## 2021-05-24 RX ORDER — ONDANSETRON 8 MG/1
8 TABLET, ORALLY DISINTEGRATING ORAL EVERY 8 HOURS PRN
Qty: 15 TABLET | Refills: 0
Start: 2021-05-24 | End: 2021-05-29

## 2021-06-01 ENCOUNTER — TELEPHONE (OUTPATIENT)
Dept: INTERNAL MEDICINE | Facility: CLINIC | Age: 36
End: 2021-06-01

## 2021-06-01 DIAGNOSIS — M76.62 TENDONITIS, ACHILLES, LEFT: Primary | ICD-10-CM

## 2021-06-01 RX ORDER — SERTRALINE HYDROCHLORIDE 100 MG/1
TABLET, FILM COATED ORAL
Qty: 135 TABLET | Refills: 1 | Status: SHIPPED | OUTPATIENT
Start: 2021-06-01 | End: 2021-08-30

## 2021-06-01 NOTE — TELEPHONE ENCOUNTER
PATIENT IS WAITING FOR REFERRAL TO PHYSICAL THERAPY FOR HER TENDONITIS.  SHE IS ON HER WAY TO WORK SO IT IS OKAY TO LEAVE A MESSAGE    PLEASE ADVISE  [phone]

## 2021-06-01 NOTE — TELEPHONE ENCOUNTER
Pt states that the physical therapy office requested [facility]

## 2021-06-01 NOTE — TELEPHONE ENCOUNTER
So according to my note I gave her an order in the office, but I could have definitely forgotten to do this before she left, does she know where she wants to go so we can fax an order over to them?

## 2021-06-07 ENCOUNTER — HOSPITAL ENCOUNTER (OUTPATIENT)
Dept: PHYSICAL THERAPY | Facility: HOSPITAL | Age: 36
Setting detail: THERAPIES SERIES
Discharge: HOME | End: 2021-06-07

## 2021-06-07 DIAGNOSIS — M76.62 TENDONITIS, ACHILLES, LEFT: Primary | ICD-10-CM

## 2021-06-07 PROCEDURE — 97161 PT EVAL LOW COMPLEX 20 MIN: CPT | Performed by: PHYSICAL THERAPIST

## 2021-06-07 NOTE — THERAPY EVALUATION
Outpatient Physical Therapy Ortho Initial Evaluation   Angelina Werner     Patient Name: Valery Aguilar  : 1985  MRN: [id number]  Today's Date: 2021      Visit Date: 2021    Patient Active Problem List   Diagnosis   • Chronic migraine without aura without status migrainosus, not intractable   • Cervicogenic headache   • Secondary oligomenorrhea   • Family history of breast cancer   • Obstructive sleep apnea, adult   • GERD with apnea   • Vitamin D deficiency   • PCOS (polycystic ovarian syndrome)   • Panic disorder   • Insulin resistance   • Essential hypertension   • GERD (gastroesophageal reflux disease)   • Anxiety and depression   • Tendonitis, Achilles, left        Past Medical History:   Diagnosis Date   • Abdominal pain    • Anxiety    • Anxiety and depression 3/8/2021   • Arthritis     KNEE   • Chronic sinusitis, unspecified    • Chronic wound infection of abdomen 2019    Added automatically from request for surgery [id number]   • Depression    • GERD (gastroesophageal reflux disease)    • H/O echocardiogram      EF 56% NORMAL DIASTOLIC FUNCTION NO VALVULAR DISEASE   • H/O exercise stress test     REPORTEDLY NORMAL DONE FOR CHEST PAIN DX WITH ANXIETY   • Headache, tension-type    • History of Holter monitoring     REPORTEDLY NORMAL DONE FOR CHEST PAIN DX WITH ANXIETY   • History of MRI of brain and brain stem 2017    NORMAL DONE FOR MIGRAINES   • Hypertension     PRE-ECLAMPSIA WITH BOTH CHILDREN   • Incisional hernia     SCHEDULED FOR REPAIR   • Incisional hernia, without obstruction or gangrene 10/24/2018    Added automatically from request for surgery [id number]   • Infection following a procedure, unspecified, initial encounter    • Insulin resistance     D/T PCOS   • Lobar pneumonia, unspecified organism (CMS/HCC)    • Meralgia paresthetica, left lower limb    • Metabolic syndrome    • Migraine    • MRSA (methicillin resistant Staphylococcus aureus) infection 2019     Abdominal wound   • Nasal congestion    • Other injury of unspecified body region, initial encounter    • Panic attack    • Panic disorder    • Papanicolaou smear 2020    NORMAL WITH NEG HPV NEVER ABN   • PCOS (polycystic ovarian syndrome)    • Pneumonia 2018   • PONV (postoperative nausea and vomiting)    • Right lower quadrant abdominal pain 2019   • Seasonal allergies    • Spinal headache    • Unspecified contact dermatitis, unspecified cause    • Viral infection     UNSPECIFIED   • Vitamin D deficiency    • Wound infection 2018        Past Surgical History:   Procedure Laterality Date   • ABDOMINAL WALL ABSCESS INCISION AND DRAINAGE N/A 2018    Procedure: Debridement of abdominal wall;  Surgeon: Brigido Navarrete MD;  Location: [facility] OR;  Service: General   • ADENOIDECTOMY     •  SECTION      X2   • CHOLECYSTECTOMY      LAP   • HERNIA REPAIR     • HX OVARIAN CYSTECTOMY     • INCISION AND DRAINAGE TRUNK N/A 2018    Procedure: wound debridement, abdomen;  Surgeon: Rachel Dolan MD;  Location: [facility] OR;  Service: General   • INCISION AND DRAINAGE TRUNK N/A 2019    Procedure: WOUND CLOSURE ABDOMINAL;  Surgeon: Rachel Dolan MD;  Location: [facility] OR;  Service: General   • KNEE ACL RECONSTRUCTION     • OVARIAN CYST REMOVAL      EX LAP WITH OVARIAN CYST REMOVAL HERNIA REPAIR    • TONSILLECTOMY      T&A   • TRUNK DEBRIDEMENT N/A 2018    Procedure: Abdominal wound debridement;  Surgeon: Rachel Dolan MD;  Location: [facility] OR;  Service: General   • VENTRAL/INCISIONAL HERNIA REPAIR N/A 11/15/2018    Procedure: Lateral Component Separation Herniorrhapy Repair with Mesh, Lysis of adhesions, and skin lesion excision of Right Side;  Surgeon: Rachel Dolan MD;  Location: [facility] OR;  Service: General       Visit Dx:     ICD-10-CM ICD-9-CM   1. Tendonitis, Achilles, left  M76.62 726.71         Patient History     Row Name 21 0710              History    Chief Complaint  Difficulty Walking;Difficulty with daily activities;Pain  -GC      Type of Pain  Ankle pain left Achilles insertion  -GC      Brief Description of Current Complaint  Pt reports an approximate 3 month history of left Achilles pain. She was intially seen at an urgent care where she states she was told she had Achilles tendonitits. No imaging was done. She began to where a compression sock. She continues to have pain with walking and when she sits with her legs under her chair. She followed up with Dr. Rios who has referred her to therapy.  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  Pt c/o pain with walking  -      What Performance Factors Make the Current Problem(s) BETTER?  Pt feels best if she can get off her feet and rests  -      Difficulties at work?  Pt has pain with walking, being on her feet at work  -      Difficulties with ADL's?  Pt has pain with walking, being on her feet at work  -         Daily Activities    Primary Language  English  -GC      Are you able to read  Yes  -GC      Are you able to write  Yes  -GC      How does patient learn best?  Listening  -      Teaching needs identified  Home Exercise Program;Management of Condition  -      Patient is concerned about/has problems with  Flexibility;Performing home management (household chores, shopping, care of dependents);Performing job  responsibilities/community activities (work, school,;Performing sports, recreation, and play activities;Walking;Standing  -GC      Does patient have problems with the following?  Depression;Anxiety;Panic Attack;Other (comment) emotional problems  -GC      Barriers to learning  None  -GC      Functional Status  mobility issues preventing performance of daily activities  -GC      Pt Participated in POC and Goals  Yes  -GC         Safety    Are you being hurt, hit, or frightened by anyone at home or in your life?  No  -GC      Are you being neglected by a caregiver  No  -GC        User Key  (r) = Recorded By, (t) = Taken By, (c) = Cosigned By    Initials Name Provider Type    GC Johan Cox, PT Physical Therapist          PT Ortho     Row Name 06/07/21 0793       Posture/Observations    Posture/Observations Comments  Pt has mild edema at left Achilles insertion  -       Foot/Ankle Palpation    Achilles' Tendon  Left:;Tender at insertion  -       Ankle/Foot Special Tests    Anterior drawer (ATFL lesion)  Left:;Negative  -GC    Nuñez test (Achilles’ tendon rupture)  Left:;Negative  -GC    Felicia’s sign (DVT)  Left:;Negative  -GC       Left Lower Ext    Lt Ankle Dorsiflexion AROM  4 degrees  -GC    Lt Ankle Plantarflexion AROM  46 degrees  -GC    Lt Ankle Inversion AROM  27 degrees  -GC    Lt Ankle Eversion AROM  14 degrees  -GC       MMT Left Lower Ext    Lt Ankle Plantarflexion MMT, Gross Movement  (4/5) good with pain  -    Lt Ankle Dorsiflexion MMT, Gross Movement  (5/5) normal  -    Lt Ankle Subtalar Inversion MMT, Gross Movement  (5/5) normal  -GC    Lt Ankle Subtalar Eversion MMT, Gross Movement  (5/5) normal  -       Sensation    Light Touch  No apparent deficits  -       Lower Extremity Flexibility    Gastrocnemius  Left:;Moderately limited  -    Soleus  Left:;Moderately limited  -       Transfers    Comment (Transfers)  Pt is independent with all bed mobility and transfers  -        Gait/Stairs (Locomotion)    Comment (Gait/Stairs)  Pt ambulates with mild antalgic gait demonstrating decreased toe off ability and decreased stride length on left  -      User Key  (r) = Recorded By, (t) = Taken By, (c) = Cosigned By    Initials Name Provider Type    GC Johan Cox, PT Physical Therapist                      Therapy Education  Given: HEP, Symptoms/condition management, Pain management  Program: New  How Provided: Verbal, Demonstration  Provided to: Patient  Level of Understanding: Teach back education performed, Verbalized, Demonstrated     PT OP Goals     Row Name 06/07/21 0710          PT Short Term Goals    STG Date to Achieve  06/21/21  -     STG 1  Decrease left Achilles pain to 3-4/10 with activity.  -     STG 2  Decrease edema at left Achilles insertion to WFL with testing.  -     STG 3  Increase left ankle DF AROM to 15 degrees with testing.  -     STG 4  Increase left gastroc flexibility to WFL with testing.  -     STG 5  Pt will be independent with her HEP issued by this therapist.  -        Long Term Goals    LTG Date to Achieve  07/05/21  -     LTG 1  Decrease left Achilles pain to 0-1/10 with activity.  -     LTG 2  Increase left gastroc strength to 5/5 with testing.  -     LTG 3  Pt will ambulate normally on levels and stairs without pain.  -     LTG 4  Pt will be independent with all ADLs and have a LEFS score > 65.  -        Time Calculation    PT Goal Re-Cert Due Date  07/05/21  -       User Key  (r) = Recorded By, (t) = Taken By, (c) = Cosigned By    Initials Name Provider Type     Johan Cox, PT Physical Therapist          PT Assessment/Plan     Row Name 06/07/21 0710          PT Assessment    Functional Limitations  Impaired gait;Limitation in home management;Limitations in community activities;Limitations in functional capacity and performance;Performance in leisure activities;Performance in work activities  -     Impairments   Edema;Gait;Impaired flexibility;Range of motion;Pain;Muscle strength  -GC     Assessment Comments  Pt presents with a several month history of left Achilles tendon pain that she rates up to 6/10 with activity. She has minimal edema at Achilles insertion, decreased ankle DF ROM, decreased ankle PF strength, decreased ambulatory status, and decreased function secodnary to the above.  -GC     Rehab Potential  Good  -GC     Patient/caregiver participated in establishment of treatment plan and goals  Yes  -GC     Patient would benefit from skilled therapy intervention  Yes  -GC        PT Plan    PT Frequency  1x/week;2x/week  -GC     Predicted Duration of Therapy Intervention (PT)  4 weeks  -GC     Planned CPT's?  PT EVAL LOW COMPLEXITY: 15222;PT THER PROC EA 15 MIN: 29668;PT ULTRASOUND EA 15 MIN: 43513;PT IONTOPHORESIS EA 15 MIN: 20565  -GC     PT Plan Comments  Pt is to continue her HEP 2x daily   -GC       User Key  (r) = Recorded By, (t) = Taken By, (c) = Cosigned By    Initials Name Provider Type     Johan Cox, PT Physical Therapist          Modalities     Row Name 06/07/21 0710             Ultrasound 89026    Location  left Achilles insertion  -GC      Duty Cycle  50  -GC      Frequency  3.0 MHz  -GC      Intensity - Wts/cm  1.5  -GC      30315 - PT Ultrasound Minutes  8  -GC         Iontophoresis 86980    Milliamps  3  -GC      MA/Min  40  -GC      Dexamethasone used  Yes  -GC      Patch Type  Medium left Achilles insertion  -GC      46226 - PT Iontophoresis Minutes  13  -GC        User Key  (r) = Recorded By, (t) = Taken By, (c) = Cosigned By    Initials Name Provider Type    GC Johan Cox, PT Physical Therapist        OP Exercises     Row Name 06/07/21 0710             Exercise 1    Exercise Name 1  NWBing gastroc stretch  -GC      Cueing 1  Verbal;Tactile  -GC      Reps 1  15  -GC      Time 1  10 secs  -GC         Exercise 2    Exercise Name 2  PF vs theraband  -GC      Cueing 2  Verbal;Tactile  -GC       Reps 2  30  -GC      Time 2  black  -GC        User Key  (r) = Recorded By, (t) = Taken By, (c) = Cosigned By    Initials Name Provider Type    GC Johan Cox PT Physical Therapist                        Outcome Measure Options: Lower Extremity Functional Scale (LEFS)  Lower Extremity Functional Index  Any of your usual work, housework or school activities: A little bit of difficulty  Your usual hobbies, recreational or sporting activities: No difficulty  Getting into or out of the bath: A little bit of difficulty  Walking between rooms: A little bit of difficulty  Putting on your shoes or socks: A little bit of difficulty  Squatting: A little bit of difficulty  Lifting an object, like a bag of groceries from the floor: A little bit of difficulty  Performing light activities around your home: A little bit of difficulty  Performing heavy activities around your home: Moderate difficulty  Getting into or out of a car: A little bit of difficulty  Walking 2 blocks: Moderate difficulty  Walking a mile: Moderate difficulty  Going up or down 10 stairs (about 1 flight of stairs): Quite a bit of difficulty  Standing for 1 hour: Quite a bit of difficulty  Sitting for 1 hour: Moderate difficulty  Running on even ground: Quite a bit of difficulty  Running on uneven ground: Quite a bit of difficulty  Making sharp turns while running fast: Quite a bit of difficulty  Hopping: Quite a bit of difficulty  Rolling over in bed: No difficulty  Total: 46      Time Calculation:     Start Time: 0710  Stop Time: 0814  Time Calculation (min): 64 min  Timed Charges  27327 - PT Iontophoresis Minutes: 13  61214 - PT Ultrasound Minutes: 8  Total Minutes  Timed Charges Total Minutes: 21   Total Minutes: 21     Therapy Charges for Today     Code Description Service Date Service Provider Modifiers Qty    23185869463  PT EVAL LOW COMPLEXITY 3 6/7/2021 Johan Cox, PT GP 1          PT G-Codes  Outcome Measure Options: Lower Extremity  Functional Scale (LEFS)  Total: 46         Johan Cox, PT  6/7/2021

## 2021-06-10 ENCOUNTER — HOSPITAL ENCOUNTER (OUTPATIENT)
Dept: PHYSICAL THERAPY | Facility: HOSPITAL | Age: 36
Setting detail: THERAPIES SERIES
Discharge: HOME | End: 2021-06-10

## 2021-06-10 PROCEDURE — 97035 APP MDLTY 1+ULTRASOUND EA 15: CPT

## 2021-06-10 PROCEDURE — 97033 APP MDLTY 1+IONTPHRSIS EA 15: CPT

## 2021-06-10 NOTE — THERAPY TREATMENT NOTE
Outpatient Physical Therapy Ortho Treatment Note  HEBER Casillas     Patient Name: Valery Aguilar  : 1985  MRN: [id number]  Today's Date: 6/10/2021      Visit Date: 06/10/2021    Visit Dx:  No diagnosis found.    Patient Active Problem List   Diagnosis   • Chronic migraine without aura without status migrainosus, not intractable   • Cervicogenic headache   • Secondary oligomenorrhea   • Family history of breast cancer   • Obstructive sleep apnea, adult   • GERD with apnea   • Vitamin D deficiency   • PCOS (polycystic ovarian syndrome)   • Panic disorder   • Insulin resistance   • Essential hypertension   • GERD (gastroesophageal reflux disease)   • Anxiety and depression   • Tendonitis, Achilles, left        Past Medical History:   Diagnosis Date   • Abdominal pain    • Anxiety    • Anxiety and depression 3/8/2021   • Arthritis     KNEE   • Chronic sinusitis, unspecified    • Chronic wound infection of abdomen 2019    Added automatically from request for surgery [id number]   • Depression    • GERD (gastroesophageal reflux disease)    • H/O echocardiogram      EF 56% NORMAL DIASTOLIC FUNCTION NO VALVULAR DISEASE   • H/O exercise stress test     REPORTEDLY NORMAL DONE FOR CHEST PAIN DX WITH ANXIETY   • Headache, tension-type    • History of Holter monitoring     REPORTEDLY NORMAL DONE FOR CHEST PAIN DX WITH ANXIETY   • History of MRI of brain and brain stem 2017    NORMAL DONE FOR MIGRAINES   • Hypertension     PRE-ECLAMPSIA WITH BOTH CHILDREN   • Incisional hernia     SCHEDULED FOR REPAIR   • Incisional hernia, without obstruction or gangrene 10/24/2018    Added automatically from request for surgery [id number]   • Infection following a procedure, unspecified, initial encounter    • Insulin resistance     D/T PCOS   • Lobar pneumonia, unspecified organism (CMS/HCC)    • Meralgia paresthetica, left lower limb    • Metabolic syndrome    • Migraine    • MRSA (methicillin resistant Staphylococcus  aureus) infection 2019    Abdominal wound   • Nasal congestion    • Other injury of unspecified body region, initial encounter    • Panic attack    • Panic disorder    • Papanicolaou smear 2020    NORMAL WITH NEG HPV NEVER ABN   • PCOS (polycystic ovarian syndrome)    • Pneumonia 2018   • PONV (postoperative nausea and vomiting)    • Right lower quadrant abdominal pain 2019   • Seasonal allergies    • Spinal headache    • Unspecified contact dermatitis, unspecified cause    • Viral infection     UNSPECIFIED   • Vitamin D deficiency    • Wound infection 2018        Past Surgical History:   Procedure Laterality Date   • ABDOMINAL WALL ABSCESS INCISION AND DRAINAGE N/A 2018    Procedure: Debridement of abdominal wall;  Surgeon: Brigido Navarrete MD;  Location: [facility] OR;  Service: General   • ADENOIDECTOMY     •  SECTION      X2   • CHOLECYSTECTOMY      LAP   • HERNIA REPAIR     • HX OVARIAN CYSTECTOMY     • INCISION AND DRAINAGE TRUNK N/A 2018    Procedure: wound debridement, abdomen;  Surgeon: Rachel Dolan MD;  Location: [facility] OR;  Service: General   • INCISION AND DRAINAGE TRUNK N/A 2019    Procedure: WOUND CLOSURE ABDOMINAL;  Surgeon: Rachel Dolan MD;  Location: [facility] OR;  Service: General   • KNEE ACL RECONSTRUCTION     • OVARIAN CYST REMOVAL      EX LAP WITH OVARIAN CYST REMOVAL HERNIA REPAIR    • TONSILLECTOMY      T&A   • TRUNK DEBRIDEMENT N/A 2018    Procedure: Abdominal wound debridement;  Surgeon: Rachel Dolan MD;  Location: [facility] OR;  Service: General   • VENTRAL/INCISIONAL HERNIA REPAIR N/A 11/15/2018    Procedure: Lateral Component Separation Herniorrhapy Repair with Mesh, Lysis of adhesions, and skin lesion excision of Right Side;  Surgeon: Rachel Dolan MD;  Location: [facility] OR;  Service: General                       PT Assessment/Plan     Row Name 06/10/21 8573          PT Assessment    Assessment Comments  Maintained  current treatment plan and pt tolerated well.   -KM        PT Plan    PT Plan Comments  Continue per POC  -KM       User Key  (r) = Recorded By, (t) = Taken By, (c) = Cosigned By    Initials Name Provider Type    Katty Martinez PTA Physical Therapy Assistant          Modalities     Row Name 06/10/21 0630             Ultrasound 26381    Location  left Achilles insertion  -KM      Duty Cycle  50  -KM      Frequency  3.0 MHz  -KM      Intensity - Wts/cm  1.5  -KM         Iontophoresis 82098    Milliamps  3  -KM      MA/Min  40  -KM      Dexamethasone used  Yes  -KM      Patch Type  Medium left Achilles insertion  -KM         Functional Testing    Outcome Measure Options  Lower Extremity Functional Scale (LEFS)  -KM        User Key  (r) = Recorded By, (t) = Taken By, (c) = Cosigned By    Initials Name Provider Type    Katty Martinez PTA Physical Therapy Assistant        OP Exercises     Row Name 06/10/21 0630             Subjective Comments    Subjective Comments  Pt states she had improved symptoms right after intial session and has been compliant with HEP. States she is experience increasing pain this morning   -KM         Exercise 1    Exercise Name 1  NWBing gastroc stretch  -KM      Cueing 1  Verbal;Tactile  -KM      Reps 1  15  -KM      Time 1  10 secs  -KM         Exercise 2    Exercise Name 2  PF vs theraband  -KM      Cueing 2  Verbal;Tactile  -KM      Reps 2  30  -KM      Time 2  black  -KM        User Key  (r) = Recorded By, (t) = Taken By, (c) = Cosigned By    Initials Name Provider Type    Katty Martinez PTA Physical Therapy Assistant                                Outcome Measure Options: Lower Extremity Functional Scale (LEFS)         Time Calculation:   Start Time: 0630  Stop Time: 0710  Time Calculation (min): 40 min  Therapy Charges for Today     Code Description Service Date Service Provider Modifiers Qty    78052282783 HC PT ULTRASOUND EA 15 MIN 6/10/2021 Katty Collier PTA GP 1     45751694306 HC PT IONTOPHORESIS EA 15 MIN 6/10/2021 Katty Collier, PTA GP 1          PT G-Codes  Outcome Measure Options: Lower Extremity Functional Scale (LEFS)         Katty Collier, BRET  6/10/2021

## 2021-06-14 ENCOUNTER — HOSPITAL ENCOUNTER (OUTPATIENT)
Dept: PHYSICAL THERAPY | Facility: HOSPITAL | Age: 36
Setting detail: THERAPIES SERIES
Discharge: HOME | End: 2021-06-14

## 2021-06-14 DIAGNOSIS — M76.62 TENDONITIS, ACHILLES, LEFT: Primary | ICD-10-CM

## 2021-06-14 PROCEDURE — 97033 APP MDLTY 1+IONTPHRSIS EA 15: CPT

## 2021-06-14 PROCEDURE — 97035 APP MDLTY 1+ULTRASOUND EA 15: CPT

## 2021-06-14 NOTE — THERAPY TREATMENT NOTE
Outpatient Physical Therapy Ortho Treatment Note  HEBER Casillas     Patient Name: Valery Aguilar  : 1985  MRN: [id number]  Today's Date: 2021      Visit Date: 2021    Visit Dx:    ICD-10-CM ICD-9-CM   1. Tendonitis, Achilles, left  M76.62 726.71       Patient Active Problem List   Diagnosis   • Chronic migraine without aura without status migrainosus, not intractable   • Cervicogenic headache   • Secondary oligomenorrhea   • Family history of breast cancer   • Obstructive sleep apnea, adult   • GERD with apnea   • Vitamin D deficiency   • PCOS (polycystic ovarian syndrome)   • Panic disorder   • Insulin resistance   • Essential hypertension   • GERD (gastroesophageal reflux disease)   • Anxiety and depression   • Tendonitis, Achilles, left        Past Medical History:   Diagnosis Date   • Abdominal pain    • Anxiety    • Anxiety and depression 3/8/2021   • Arthritis     KNEE   • Chronic sinusitis, unspecified    • Chronic wound infection of abdomen 2019    Added automatically from request for surgery [id number]   • Depression    • GERD (gastroesophageal reflux disease)    • H/O echocardiogram      EF 56% NORMAL DIASTOLIC FUNCTION NO VALVULAR DISEASE   • H/O exercise stress test     REPORTEDLY NORMAL DONE FOR CHEST PAIN DX WITH ANXIETY   • Headache, tension-type    • History of Holter monitoring     REPORTEDLY NORMAL DONE FOR CHEST PAIN DX WITH ANXIETY   • History of MRI of brain and brain stem 2017    NORMAL DONE FOR MIGRAINES   • Hypertension     PRE-ECLAMPSIA WITH BOTH CHILDREN   • Incisional hernia     SCHEDULED FOR REPAIR   • Incisional hernia, without obstruction or gangrene 10/24/2018    Added automatically from request for surgery [id number]   • Infection following a procedure, unspecified, initial encounter    • Insulin resistance     D/T PCOS   • Lobar pneumonia, unspecified organism (CMS/HCC)    • Meralgia paresthetica, left lower limb    • Metabolic syndrome    • Migraine     • MRSA (methicillin resistant Staphylococcus aureus) infection 2019    Abdominal wound   • Nasal congestion    • Other injury of unspecified body region, initial encounter    • Panic attack    • Panic disorder    • Papanicolaou smear 2020    NORMAL WITH NEG HPV NEVER ABN   • PCOS (polycystic ovarian syndrome)    • Pneumonia 2018   • PONV (postoperative nausea and vomiting)    • Right lower quadrant abdominal pain 2019   • Seasonal allergies    • Spinal headache    • Unspecified contact dermatitis, unspecified cause    • Viral infection     UNSPECIFIED   • Vitamin D deficiency    • Wound infection 2018        Past Surgical History:   Procedure Laterality Date   • ABDOMINAL WALL ABSCESS INCISION AND DRAINAGE N/A 2018    Procedure: Debridement of abdominal wall;  Surgeon: Brigido Navarrete MD;  Location: [facility] OR;  Service: General   • ADENOIDECTOMY     •  SECTION      X2   • CHOLECYSTECTOMY      LAP   • HERNIA REPAIR     • HX OVARIAN CYSTECTOMY     • INCISION AND DRAINAGE TRUNK N/A 2018    Procedure: wound debridement, abdomen;  Surgeon: Rachel Dolan MD;  Location: [facility] OR;  Service: General   • INCISION AND DRAINAGE TRUNK N/A 2019    Procedure: WOUND CLOSURE ABDOMINAL;  Surgeon: Rachel Dolan MD;  Location: [facility] OR;  Service: General   • KNEE ACL RECONSTRUCTION     • OVARIAN CYST REMOVAL      EX LAP WITH OVARIAN CYST REMOVAL HERNIA REPAIR    • TONSILLECTOMY      T&A   • TRUNK DEBRIDEMENT N/A 2018    Procedure: Abdominal wound debridement;  Surgeon: Rachel Dolan MD;  Location: [facility] OR;  Service: General   • VENTRAL/INCISIONAL HERNIA REPAIR N/A 11/15/2018    Procedure: Lateral Component Separation Herniorrhapy Repair with Mesh, Lysis of adhesions, and skin lesion excision of Right Side;  Surgeon: Rachel Dolan MD;  Location: [facility] OR;  Service: General                       PT Assessment/Plan     Row Name 21 0605          PT  Assessment    Assessment Comments  Pt with tenderness at insertion achilles tendon with very minimal bruising. Pt tolerated progression of ther ex well and use of modalities.  -KM        PT Plan    PT Plan Comments  Continue per POC  -KM       User Key  (r) = Recorded By, (t) = Taken By, (c) = Cosigned By    Initials Name Provider Type    Katty Martinez PTA Physical Therapy Assistant          Modalities     Row Name 06/14/21 0605             Ultrasound 09966    Location  left Achilles insertion  -KM      Duty Cycle  50  -KM      Frequency  3.0 MHz  -KM      Intensity - Wts/cm  1.5  -KM         Iontophoresis 16685    Milliamps  3  -KM      MA/Min  40  -KM      Dexamethasone used  Yes  -KM      Patch Type  Medium left Achilles insertion  -KM         Functional Testing    Outcome Measure Options  Lower Extremity Functional Scale (LEFS)  -KM        User Key  (r) = Recorded By, (t) = Taken By, (c) = Cosigned By    Initials Name Provider Type    Katty Martinez PTA Physical Therapy Assistant        OP Exercises     Row Name 06/14/21 0605             Subjective Comments    Subjective Comments  Pts her achilles tendon is sore; states she hit the back of her heel on the stool at work causing a bruise  -KM         Exercise 1    Exercise Name 1  NWBing gastroc stretch  -KM      Cueing 1  Verbal;Tactile  -KM      Reps 1  15  -KM      Time 1  10 secs  -KM         Exercise 2    Exercise Name 2  PF vs theraband  -KM      Cueing 2  Verbal;Tactile  -KM      Reps 2  30  -KM      Time 2  silver  -KM        User Key  (r) = Recorded By, (t) = Taken By, (c) = Cosigned By    Initials Name Provider Type    Katty Martinez PTA Physical Therapy Assistant                                Outcome Measure Options: Lower Extremity Functional Scale (LEFS)         Time Calculation:   Start Time: 0605  Stop Time: 0640  Time Calculation (min): 35 min  Therapy Charges for Today     Code Description Service Date Service Provider Modifiers Qty     27274462423 HC PT ULTRASOUND EA 15 MIN 6/14/2021 Katty Collier, PTA GP 1    82563929998 HC PT IONTOPHORESIS EA 15 MIN 6/14/2021 Katty Collier, PTA GP 1          PT G-Codes  Outcome Measure Options: Lower Extremity Functional Scale (LEFS)         Katyt Collier PTA  6/14/2021

## 2021-06-17 ENCOUNTER — HOSPITAL ENCOUNTER (OUTPATIENT)
Dept: PHYSICAL THERAPY | Facility: HOSPITAL | Age: 36
Setting detail: THERAPIES SERIES
Discharge: HOME | End: 2021-06-17

## 2021-06-17 DIAGNOSIS — M76.62 TENDONITIS, ACHILLES, LEFT: Primary | ICD-10-CM

## 2021-06-17 PROCEDURE — 97035 APP MDLTY 1+ULTRASOUND EA 15: CPT

## 2021-06-17 PROCEDURE — 97033 APP MDLTY 1+IONTPHRSIS EA 15: CPT

## 2021-06-17 NOTE — THERAPY TREATMENT NOTE
Outpatient Physical Therapy Ortho Treatment Note  HEBER Casillas     Patient Name: Valery Aguilar  : 1985  MRN: [id number]  Today's Date: 2021      Visit Date: 2021    Visit Dx:    ICD-10-CM ICD-9-CM   1. Tendonitis, Achilles, left  M76.62 726.71       Patient Active Problem List   Diagnosis   • Chronic migraine without aura without status migrainosus, not intractable   • Cervicogenic headache   • Secondary oligomenorrhea   • Family history of breast cancer   • Obstructive sleep apnea, adult   • GERD with apnea   • Vitamin D deficiency   • PCOS (polycystic ovarian syndrome)   • Panic disorder   • Insulin resistance   • Essential hypertension   • GERD (gastroesophageal reflux disease)   • Anxiety and depression   • Tendonitis, Achilles, left        Past Medical History:   Diagnosis Date   • Abdominal pain    • Anxiety    • Anxiety and depression 3/8/2021   • Arthritis     KNEE   • Chronic sinusitis, unspecified    • Chronic wound infection of abdomen 2019    Added automatically from request for surgery [id number]   • Depression    • GERD (gastroesophageal reflux disease)    • H/O echocardiogram      EF 56% NORMAL DIASTOLIC FUNCTION NO VALVULAR DISEASE   • H/O exercise stress test     REPORTEDLY NORMAL DONE FOR CHEST PAIN DX WITH ANXIETY   • Headache, tension-type    • History of Holter monitoring     REPORTEDLY NORMAL DONE FOR CHEST PAIN DX WITH ANXIETY   • History of MRI of brain and brain stem 2017    NORMAL DONE FOR MIGRAINES   • Hypertension     PRE-ECLAMPSIA WITH BOTH CHILDREN   • Incisional hernia     SCHEDULED FOR REPAIR   • Incisional hernia, without obstruction or gangrene 10/24/2018    Added automatically from request for surgery [id number]   • Infection following a procedure, unspecified, initial encounter    • Insulin resistance     D/T PCOS   • Lobar pneumonia, unspecified organism (CMS/HCC)    • Meralgia paresthetica, left lower limb    • Metabolic syndrome    • Migraine     • MRSA (methicillin resistant Staphylococcus aureus) infection 2019    Abdominal wound   • Nasal congestion    • Other injury of unspecified body region, initial encounter    • Panic attack    • Panic disorder    • Papanicolaou smear 2020    NORMAL WITH NEG HPV NEVER ABN   • PCOS (polycystic ovarian syndrome)    • Pneumonia 2018   • PONV (postoperative nausea and vomiting)    • Right lower quadrant abdominal pain 2019   • Seasonal allergies    • Spinal headache    • Unspecified contact dermatitis, unspecified cause    • Viral infection     UNSPECIFIED   • Vitamin D deficiency    • Wound infection 2018        Past Surgical History:   Procedure Laterality Date   • ABDOMINAL WALL ABSCESS INCISION AND DRAINAGE N/A 2018    Procedure: Debridement of abdominal wall;  Surgeon: Brigido Navarrete MD;  Location: [facility] OR;  Service: General   • ADENOIDECTOMY     •  SECTION      X2   • CHOLECYSTECTOMY      LAP   • HERNIA REPAIR     • HX OVARIAN CYSTECTOMY     • INCISION AND DRAINAGE TRUNK N/A 2018    Procedure: wound debridement, abdomen;  Surgeon: Rachel Dolan MD;  Location: [facility] OR;  Service: General   • INCISION AND DRAINAGE TRUNK N/A 2019    Procedure: WOUND CLOSURE ABDOMINAL;  Surgeon: Rachel Dolan MD;  Location: [facility] OR;  Service: General   • KNEE ACL RECONSTRUCTION     • OVARIAN CYST REMOVAL      EX LAP WITH OVARIAN CYST REMOVAL HERNIA REPAIR    • TONSILLECTOMY      T&A   • TRUNK DEBRIDEMENT N/A 2018    Procedure: Abdominal wound debridement;  Surgeon: Rachel Dolan MD;  Location: [facility] OR;  Service: General   • VENTRAL/INCISIONAL HERNIA REPAIR N/A 11/15/2018    Procedure: Lateral Component Separation Herniorrhapy Repair with Mesh, Lysis of adhesions, and skin lesion excision of Right Side;  Surgeon: Rachel Dolan MD;  Location: [facility] OR;  Service: General                       PT Assessment/Plan     Row Name 21 0602          PT  Assessment    Assessment Comments  Pt with improved tolerance to ther ex and continues to respond well to modalities  -KM        PT Plan    PT Plan Comments  Pt to continue with HEP. Continue per POC  -KM       User Key  (r) = Recorded By, (t) = Taken By, (c) = Cosigned By    Initials Name Provider Type    Katty Martinez PTA Physical Therapy Assistant          Modalities     Row Name 06/17/21 0602             Ultrasound 47422    Location  left Achilles insertion  -KM      Duty Cycle  50  -KM      Frequency  3.0 MHz  -KM      Intensity - Wts/cm  1.5  -KM         Iontophoresis 54495    Milliamps  3  -KM      MA/Min  40  -KM      Dexamethasone used  Yes  -KM      Patch Type  Medium left Achilles insertion  -KM         Functional Testing    Outcome Measure Options  Lower Extremity Functional Scale (LEFS)  -KM        User Key  (r) = Recorded By, (t) = Taken By, (c) = Cosigned By    Initials Name Provider Type    Katty Martinez PTA Physical Therapy Assistant        OP Exercises     Row Name 06/17/21 0602             Subjective Comments    Subjective Comments  Pt states she feels better compared to previous visit.   -KM         Exercise 1    Exercise Name 1  NWBing gastroc stretch  -KM      Cueing 1  Verbal;Tactile  -KM      Reps 1  15  -KM      Time 1  10 secs  -KM         Exercise 2    Exercise Name 2  PF vs theraband  -KM      Cueing 2  Verbal;Tactile  -KM      Reps 2  30  -KM      Time 2  silver  -KM        User Key  (r) = Recorded By, (t) = Taken By, (c) = Cosigned By    Initials Name Provider Type    Katty Martinez PTA Physical Therapy Assistant                                Outcome Measure Options: Lower Extremity Functional Scale (LEFS)         Time Calculation:   Start Time: 0602  Stop Time: 0637  Time Calculation (min): 35 min  Therapy Charges for Today     Code Description Service Date Service Provider Modifiers Qty    85031071655 HC PT ULTRASOUND EA 15 MIN 6/17/2021 Katty Collier PTA GP 1     99431351055 HC PT IONTOPHORESIS EA 15 MIN 6/17/2021 Katty Collier, PTA GP 1          PT G-Codes  Outcome Measure Options: Lower Extremity Functional Scale (LEFS)         Katty Collier, BRET  6/17/2021

## 2021-06-21 ENCOUNTER — HOSPITAL ENCOUNTER (OUTPATIENT)
Dept: PHYSICAL THERAPY | Facility: HOSPITAL | Age: 36
Setting detail: THERAPIES SERIES
Discharge: HOME | End: 2021-06-21

## 2021-06-21 DIAGNOSIS — M76.62 TENDONITIS, ACHILLES, LEFT: Primary | ICD-10-CM

## 2021-06-21 PROCEDURE — 97035 APP MDLTY 1+ULTRASOUND EA 15: CPT

## 2021-06-21 PROCEDURE — 97033 APP MDLTY 1+IONTPHRSIS EA 15: CPT

## 2021-06-21 RX ORDER — METFORMIN HYDROCHLORIDE 500 MG/1
TABLET, EXTENDED RELEASE ORAL
Qty: 180 TABLET | Refills: 1 | Status: SHIPPED | OUTPATIENT
Start: 2021-06-21 | End: 2021-12-22

## 2021-06-21 NOTE — THERAPY TREATMENT NOTE
Outpatient Physical Therapy Ortho Treatment Note  HEBER Casillas     Patient Name: Valery Aguilar  : 1985  MRN: [id number]  Today's Date: 2021      Visit Date: 2021    Visit Dx:    ICD-10-CM ICD-9-CM   1. Tendonitis, Achilles, left  M76.62 726.71       Patient Active Problem List   Diagnosis   • Chronic migraine without aura without status migrainosus, not intractable   • Cervicogenic headache   • Secondary oligomenorrhea   • Family history of breast cancer   • Obstructive sleep apnea, adult   • GERD with apnea   • Vitamin D deficiency   • PCOS (polycystic ovarian syndrome)   • Panic disorder   • Insulin resistance   • Essential hypertension   • GERD (gastroesophageal reflux disease)   • Anxiety and depression   • Tendonitis, Achilles, left        Past Medical History:   Diagnosis Date   • Abdominal pain    • Anxiety    • Anxiety and depression 3/8/2021   • Arthritis     KNEE   • Chronic sinusitis, unspecified    • Chronic wound infection of abdomen 2019    Added automatically from request for surgery [id number]   • Depression    • GERD (gastroesophageal reflux disease)    • H/O echocardiogram      EF 56% NORMAL DIASTOLIC FUNCTION NO VALVULAR DISEASE   • H/O exercise stress test     REPORTEDLY NORMAL DONE FOR CHEST PAIN DX WITH ANXIETY   • Headache, tension-type    • History of Holter monitoring     REPORTEDLY NORMAL DONE FOR CHEST PAIN DX WITH ANXIETY   • History of MRI of brain and brain stem 2017    NORMAL DONE FOR MIGRAINES   • Hypertension     PRE-ECLAMPSIA WITH BOTH CHILDREN   • Incisional hernia     SCHEDULED FOR REPAIR   • Incisional hernia, without obstruction or gangrene 10/24/2018    Added automatically from request for surgery [id number]   • Infection following a procedure, unspecified, initial encounter    • Insulin resistance     D/T PCOS   • Lobar pneumonia, unspecified organism (CMS/HCC)    • Meralgia paresthetica, left lower limb    • Metabolic syndrome    • Migraine     • MRSA (methicillin resistant Staphylococcus aureus) infection 2019    Abdominal wound   • Nasal congestion    • Other injury of unspecified body region, initial encounter    • Panic attack    • Panic disorder    • Papanicolaou smear 2020    NORMAL WITH NEG HPV NEVER ABN   • PCOS (polycystic ovarian syndrome)    • Pneumonia 2018   • PONV (postoperative nausea and vomiting)    • Right lower quadrant abdominal pain 2019   • Seasonal allergies    • Spinal headache    • Unspecified contact dermatitis, unspecified cause    • Viral infection     UNSPECIFIED   • Vitamin D deficiency    • Wound infection 2018        Past Surgical History:   Procedure Laterality Date   • ABDOMINAL WALL ABSCESS INCISION AND DRAINAGE N/A 2018    Procedure: Debridement of abdominal wall;  Surgeon: Brigido Navarrete MD;  Location: [facility] OR;  Service: General   • ADENOIDECTOMY     •  SECTION      X2   • CHOLECYSTECTOMY      LAP   • HERNIA REPAIR     • HX OVARIAN CYSTECTOMY     • INCISION AND DRAINAGE TRUNK N/A 2018    Procedure: wound debridement, abdomen;  Surgeon: Rachel Dolan MD;  Location: [facility] OR;  Service: General   • INCISION AND DRAINAGE TRUNK N/A 2019    Procedure: WOUND CLOSURE ABDOMINAL;  Surgeon: Rachel Dolan MD;  Location: [facility] OR;  Service: General   • KNEE ACL RECONSTRUCTION     • OVARIAN CYST REMOVAL      EX LAP WITH OVARIAN CYST REMOVAL HERNIA REPAIR    • TONSILLECTOMY      T&A   • TRUNK DEBRIDEMENT N/A 2018    Procedure: Abdominal wound debridement;  Surgeon: Rachel Dolan MD;  Location: [facility] OR;  Service: General   • VENTRAL/INCISIONAL HERNIA REPAIR N/A 11/15/2018    Procedure: Lateral Component Separation Herniorrhapy Repair with Mesh, Lysis of adhesions, and skin lesion excision of Right Side;  Surgeon: Rachel Dolan MD;  Location: [facility] OR;  Service: General                       PT Assessment/Plan     Row Name 21 0605          PT  Assessment    Assessment Comments  Pt continues to tolerate treatment session well with improved symptoms.  -KM        PT Plan    PT Plan Comments  Continue per POC  -KM       User Key  (r) = Recorded By, (t) = Taken By, (c) = Cosigned By    Initials Name Provider Type    Katty Martinez PTA Physical Therapy Assistant          Modalities     Row Name 06/21/21 0605             Subjective Comments    Subjective Comments  Pt states her achilles is a little better; states she has increased symptoms primarily in the morning   -KM         Ultrasound 03057    Location  left Achilles insertion  -KM      Duty Cycle  50  -KM      Frequency  3.0 MHz  -KM      Intensity - Wts/cm  1.5  -KM         Iontophoresis 61391    Milliamps  3  -KM      MA/Min  40  -KM      Dexamethasone used  Yes  -KM      Patch Type  Medium left Achilles insertion  -KM         Functional Testing    Outcome Measure Options  Lower Extremity Functional Scale (LEFS)  -KM        User Key  (r) = Recorded By, (t) = Taken By, (c) = Cosigned By    Initials Name Provider Type    Katty Martinez PTA Physical Therapy Assistant        OP Exercises     Row Name 06/21/21 0605             Subjective Comments    Subjective Comments  Pt states her achilles is a little better; states she has increased symptoms primarily in the morning   -KM         Exercise 1    Exercise Name 1  NWBing gastroc stretch  -KM      Cueing 1  Verbal;Tactile  -KM      Reps 1  15  -KM      Time 1  10 secs  -KM         Exercise 2    Exercise Name 2  PF vs theraband  -KM      Cueing 2  Verbal;Tactile  -KM      Reps 2  30  -KM      Time 2  silver  -KM        User Key  (r) = Recorded By, (t) = Taken By, (c) = Cosigned By    Initials Name Provider Type    Katyt Martinez PTA Physical Therapy Assistant                                Outcome Measure Options: Lower Extremity Functional Scale (LEFS)         Time Calculation:   Start Time: 0605  Stop Time: 0640  Time Calculation (min): 35  min  Therapy Charges for Today     Code Description Service Date Service Provider Modifiers Qty    19014465394 HC PT ULTRASOUND EA 15 MIN 6/21/2021 Katty Collier, PTA GP 1    04449051306 HC PT IONTOPHORESIS EA 15 MIN 6/21/2021 Katty Collier, PTA GP 1          PT G-Codes  Outcome Measure Options: Lower Extremity Functional Scale (LEFS)         Katty Collier PTA  6/21/2021

## 2021-06-24 ENCOUNTER — HOSPITAL ENCOUNTER (OUTPATIENT)
Dept: PHYSICAL THERAPY | Facility: HOSPITAL | Age: 36
Setting detail: THERAPIES SERIES
Discharge: HOME | End: 2021-06-24

## 2021-06-24 DIAGNOSIS — M76.62 TENDONITIS, ACHILLES, LEFT: Primary | ICD-10-CM

## 2021-06-24 PROCEDURE — 97035 APP MDLTY 1+ULTRASOUND EA 15: CPT

## 2021-06-24 PROCEDURE — 97033 APP MDLTY 1+IONTPHRSIS EA 15: CPT

## 2021-06-24 NOTE — THERAPY TREATMENT NOTE
Outpatient Physical Therapy Ortho Treatment Note  HEBER Casillas     Patient Name: Valery Aguilar  : 1985  MRN: [id number]  Today's Date: 2021      Visit Date: 2021    Visit Dx:    ICD-10-CM ICD-9-CM   1. Tendonitis, Achilles, left  M76.62 726.71       Patient Active Problem List   Diagnosis   • Chronic migraine without aura without status migrainosus, not intractable   • Cervicogenic headache   • Secondary oligomenorrhea   • Family history of breast cancer   • Obstructive sleep apnea, adult   • GERD with apnea   • Vitamin D deficiency   • PCOS (polycystic ovarian syndrome)   • Panic disorder   • Insulin resistance   • Essential hypertension   • GERD (gastroesophageal reflux disease)   • Anxiety and depression   • Tendonitis, Achilles, left        Past Medical History:   Diagnosis Date   • Abdominal pain    • Anxiety    • Anxiety and depression 3/8/2021   • Arthritis     KNEE   • Chronic sinusitis, unspecified    • Chronic wound infection of abdomen 2019    Added automatically from request for surgery [id number]   • Depression    • GERD (gastroesophageal reflux disease)    • H/O echocardiogram      EF 56% NORMAL DIASTOLIC FUNCTION NO VALVULAR DISEASE   • H/O exercise stress test     REPORTEDLY NORMAL DONE FOR CHEST PAIN DX WITH ANXIETY   • Headache, tension-type    • History of Holter monitoring     REPORTEDLY NORMAL DONE FOR CHEST PAIN DX WITH ANXIETY   • History of MRI of brain and brain stem 2017    NORMAL DONE FOR MIGRAINES   • Hypertension     PRE-ECLAMPSIA WITH BOTH CHILDREN   • Incisional hernia     SCHEDULED FOR REPAIR   • Incisional hernia, without obstruction or gangrene 10/24/2018    Added automatically from request for surgery [id number]   • Infection following a procedure, unspecified, initial encounter    • Insulin resistance     D/T PCOS   • Lobar pneumonia, unspecified organism (CMS/HCC)    • Meralgia paresthetica, left lower limb    • Metabolic syndrome    • Migraine     • MRSA (methicillin resistant Staphylococcus aureus) infection 2019    Abdominal wound   • Nasal congestion    • Other injury of unspecified body region, initial encounter    • Panic attack    • Panic disorder    • Papanicolaou smear 2020    NORMAL WITH NEG HPV NEVER ABN   • PCOS (polycystic ovarian syndrome)    • Pneumonia 2018   • PONV (postoperative nausea and vomiting)    • Right lower quadrant abdominal pain 2019   • Seasonal allergies    • Spinal headache    • Unspecified contact dermatitis, unspecified cause    • Viral infection     UNSPECIFIED   • Vitamin D deficiency    • Wound infection 2018        Past Surgical History:   Procedure Laterality Date   • ABDOMINAL WALL ABSCESS INCISION AND DRAINAGE N/A 2018    Procedure: Debridement of abdominal wall;  Surgeon: Brigido Navarrete MD;  Location: [facility] OR;  Service: General   • ADENOIDECTOMY     •  SECTION      X2   • CHOLECYSTECTOMY      LAP   • HERNIA REPAIR     • HX OVARIAN CYSTECTOMY     • INCISION AND DRAINAGE TRUNK N/A 2018    Procedure: wound debridement, abdomen;  Surgeon: Rachel Dolan MD;  Location: [facility] OR;  Service: General   • INCISION AND DRAINAGE TRUNK N/A 2019    Procedure: WOUND CLOSURE ABDOMINAL;  Surgeon: Rachel Dolan MD;  Location: [facility] OR;  Service: General   • KNEE ACL RECONSTRUCTION     • OVARIAN CYST REMOVAL      EX LAP WITH OVARIAN CYST REMOVAL HERNIA REPAIR    • TONSILLECTOMY      T&A   • TRUNK DEBRIDEMENT N/A 2018    Procedure: Abdominal wound debridement;  Surgeon: Rachel Dolan MD;  Location: [facility] OR;  Service: General   • VENTRAL/INCISIONAL HERNIA REPAIR N/A 11/15/2018    Procedure: Lateral Component Separation Herniorrhapy Repair with Mesh, Lysis of adhesions, and skin lesion excision of Right Side;  Surgeon: Rachel Dolan MD;  Location: [facility] OR;  Service: General                       PT Assessment/Plan     Row Name 21 0600          PT  Assessment    Assessment Comments  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; good tolerance to modalities  -KM        PT Plan    PT Plan Comments  Continue per POC  -KM       User Key  (r) = Recorded By, (t) = Taken By, (c) = Cosigned By    Initials Name Provider Type    Katty Martinez PTA Physical Therapy Assistant          Modalities     Row Name 06/24/21 0600             Subjective Comments    Subjective Comments  Pt states achilles is doing better  -KM         Ultrasound 20018    Location  left Achilles insertion  -KM      Duty Cycle  50  -KM      Frequency  3.0 MHz  -KM      Intensity - Wts/cm  1.5  -KM         Iontophoresis 00154    Milliamps  3  -KM      MA/Min  40  -KM      Dexamethasone used  Yes  -KM      Patch Type  Medium left Achilles insertion  -KM         Functional Testing    Outcome Measure Options  Lower Extremity Functional Scale (LEFS)  -KM        User Key  (r) = Recorded By, (t) = Taken By, (c) = Cosigned By    Initials Name Provider Type    Katty Martinez PTA Physical Therapy Assistant        OP Exercises     Row Name 06/24/21 0600             Subjective Comments    Subjective Comments  Pt states achilles is doing better  -KM         Exercise 1    Exercise Name 1  NWBing gastroc stretch  -KM      Cueing 1  Verbal;Tactile  -KM      Reps 1  15  -KM      Time 1  10 secs  -KM         Exercise 2    Exercise Name 2  PF vs theraband  -KM      Cueing 2  Verbal;Tactile  -KM      Reps 2  30  -KM      Time 2  Gold  -KM        User Key  (r) = Recorded By, (t) = Taken By, (c) = Cosigned By    Initials Name Provider Type    Katty Martinez PTA Physical Therapy Assistant                                Outcome Measure Options: Lower Extremity Functional Scale (LEFS)         Time Calculation:   Start Time: 0600  Stop Time: 0633  Time Calculation (min): 33 min  Therapy Charges for Today     Code Description Service Date Service Provider Modifiers Qty    91522284926 HC PT ULTRASOUND EA 15 MIN 6/24/2021  Katty Collier, PTA GP 1    70125403492 HC PT IONTOPHORESIS EA 15 MIN 6/24/2021 Katty Collier, BRET GP 1          PT G-Codes  Outcome Measure Options: Lower Extremity Functional Scale (LEFS)         Katty Collier PTA  6/24/2021

## 2021-06-25 RX ORDER — NORTRIPTYLINE HYDROCHLORIDE 50 MG/1
CAPSULE ORAL
Qty: 180 CAPSULE | Refills: 1 | Status: SHIPPED | OUTPATIENT
Start: 2021-06-25 | End: 2021-12-22

## 2021-06-29 DIAGNOSIS — G43.011 INTRACTABLE MIGRAINE WITHOUT AURA AND WITH STATUS MIGRAINOSUS: Chronic | ICD-10-CM

## 2021-06-29 RX ORDER — GABAPENTIN 600 MG/1
TABLET ORAL
Qty: 180 TABLET | Refills: 0 | Status: SHIPPED | OUTPATIENT
Start: 2021-06-29 | End: 2021-09-27

## 2021-07-01 ENCOUNTER — APPOINTMENT (OUTPATIENT)
Dept: PHYSICAL THERAPY | Facility: HOSPITAL | Age: 36
End: 2021-07-01

## 2021-07-06 ENCOUNTER — HOSPITAL ENCOUNTER (OUTPATIENT)
Dept: PHYSICAL THERAPY | Facility: HOSPITAL | Age: 36
Setting detail: THERAPIES SERIES
Discharge: HOME | End: 2021-07-06

## 2021-07-06 ENCOUNTER — APPOINTMENT (OUTPATIENT)
Dept: GENERAL RADIOLOGY | Facility: HOSPITAL | Age: 36
End: 2021-07-06

## 2021-07-06 ENCOUNTER — HOSPITAL ENCOUNTER (EMERGENCY)
Facility: HOSPITAL | Age: 36
Discharge: HOME | End: 2021-07-06
Attending: EMERGENCY MEDICINE | Admitting: EMERGENCY MEDICINE

## 2021-07-06 ENCOUNTER — OFFICE VISIT (OUTPATIENT)
Dept: OBSTETRICS AND GYNECOLOGY | Facility: CLINIC | Age: 36
End: 2021-07-06

## 2021-07-06 VITALS
OXYGEN SATURATION: 98 % | BODY MASS INDEX: 48.49 KG/M2 | TEMPERATURE: 96.1 F | RESPIRATION RATE: 18 BRPM | HEIGHT: 64 IN | WEIGHT: 284 LBS | HEART RATE: 99 BPM | SYSTOLIC BLOOD PRESSURE: 168 MMHG | DIASTOLIC BLOOD PRESSURE: 104 MMHG

## 2021-07-06 VITALS
WEIGHT: 284 LBS | DIASTOLIC BLOOD PRESSURE: 86 MMHG | SYSTOLIC BLOOD PRESSURE: 118 MMHG | HEIGHT: 64 IN | BODY MASS INDEX: 48.49 KG/M2

## 2021-07-06 DIAGNOSIS — M25.562 ACUTE PAIN OF LEFT KNEE: Primary | ICD-10-CM

## 2021-07-06 DIAGNOSIS — Z11.51 SPECIAL SCREENING EXAMINATION FOR HUMAN PAPILLOMAVIRUS (HPV): ICD-10-CM

## 2021-07-06 DIAGNOSIS — Z13.9 SCREENING FOR CONDITION: ICD-10-CM

## 2021-07-06 DIAGNOSIS — Z01.419 ROUTINE GYNECOLOGICAL EXAMINATION: Primary | ICD-10-CM

## 2021-07-06 DIAGNOSIS — M76.62 TENDONITIS, ACHILLES, LEFT: Primary | ICD-10-CM

## 2021-07-06 DIAGNOSIS — Z01.419 PAP SMEAR, LOW-RISK: ICD-10-CM

## 2021-07-06 LAB
B-HCG UR QL: NEGATIVE
BILIRUB BLD-MCNC: NEGATIVE MG/DL
CLARITY, POC: CLEAR
COLOR UR: YELLOW
GLUCOSE UR STRIP-MCNC: NEGATIVE MG/DL
INTERNAL NEGATIVE CONTROL: NORMAL
INTERNAL POSITIVE CONTROL: NORMAL
KETONES UR QL: NEGATIVE
LEUKOCYTE EST, POC: NEGATIVE
Lab: NORMAL
NITRITE UR-MCNC: NEGATIVE MG/ML
PH UR: 5 [PH] (ref 5–8)
PROT UR STRIP-MCNC: NEGATIVE MG/DL
RBC # UR STRIP: NEGATIVE /UL
SP GR UR: 1.01 (ref 1–1.03)
UROBILINOGEN UR QL: NORMAL

## 2021-07-06 PROCEDURE — 96372 THER/PROPH/DIAG INJ SC/IM: CPT

## 2021-07-06 PROCEDURE — 73562 X-RAY EXAM OF KNEE 3: CPT

## 2021-07-06 PROCEDURE — 99395 PREV VISIT EST AGE 18-39: CPT | Performed by: OBSTETRICS & GYNECOLOGY

## 2021-07-06 PROCEDURE — 81025 URINE PREGNANCY TEST: CPT | Performed by: OBSTETRICS & GYNECOLOGY

## 2021-07-06 PROCEDURE — 97035 APP MDLTY 1+ULTRASOUND EA 15: CPT

## 2021-07-06 PROCEDURE — 97033 APP MDLTY 1+IONTPHRSIS EA 15: CPT

## 2021-07-06 PROCEDURE — 25010000002 KETOROLAC TROMETHAMINE PER 15 MG: Performed by: PHYSICIAN ASSISTANT

## 2021-07-06 PROCEDURE — 99283 EMERGENCY DEPT VISIT LOW MDM: CPT

## 2021-07-06 PROCEDURE — 81002 URINALYSIS NONAUTO W/O SCOPE: CPT | Performed by: OBSTETRICS & GYNECOLOGY

## 2021-07-06 RX ORDER — KETOROLAC TROMETHAMINE 30 MG/ML
30 INJECTION, SOLUTION INTRAMUSCULAR; INTRAVENOUS EVERY 6 HOURS PRN
Status: DISCONTINUED | OUTPATIENT
Start: 2021-07-06 | End: 2021-07-06 | Stop reason: HOSPADM

## 2021-07-06 RX ADMIN — KETOROLAC TROMETHAMINE 30 MG: 30 INJECTION, SOLUTION INTRAMUSCULAR; INTRAVENOUS at 20:30

## 2021-07-06 NOTE — ED NOTES
"Pt presents with c/o L knee pain x 2 days after getting up out of bed and hearing a \"pop\" to the knee. Pt says the last time she had an MRI she was told she had \"stage 4 arthritis\" to the L knee. She says she is limping however no overt deficits are seen when ambulating to room. She is pleasant, A&Ox4, and ambulates with a steady gait. There is no significant swelling or signs of trauma. She says she has ruptured her ACL in that knee as well, and is currently in PT for other injuries which she was there today for. She told them about her knee and they instructed her to do ice and elevation until seen by her PCP or ortho doctor. Distal pulses palpable.      Inge Varma, RN  07/06/21 1948    "

## 2021-07-06 NOTE — PROGRESS NOTES
GYN Annual Exam     CC- Here for annual exam.     Valery Aguilar is a 36 y.o. female who presents for annual well woman exam. Pt has a Mirena IUD in place since 10/2018. Pt has a hx of PCOS with oligomenorrhea that is controlled with IUD. Pt has no bleeding. Pt had a ventral hernia repair in 2018 that had to be healed by secondary intention with Wound vac and 2 debridements due to post op infection. Pt reports her mother   due to heart attack. Pt is on Zoloft and Buspar and Ativan prn. Started on gabapentin for occipital migraines and it is helping.     OB History        2    Para   2    Term   2            AB        Living   2       SAB        TAB        Ectopic        Molar        Multiple        Live Births   2                  Current contraception: none  History of abnormal Pap smear: no  History of abnormal mammogram: no  Family history of uterine, colon or ovarian cancer: no  Family history of breast cancer: yes - paternal aunt with breast cancer, paternal GM    Health Maintenance   Topic Date Due   • ANNUAL PHYSICAL  Never done   • HEPATITIS C SCREENING  Never done   • Annual Gynecologic Pelvic and Breast Exam  2021   • INFLUENZA VACCINE  2021   • PAP SMEAR  2023   • TDAP/TD VACCINES (2 - Td or Tdap) 2028   • COVID-19 Vaccine  Completed   • Pneumococcal Vaccine 0-64  Aged Out       Past Medical History:   Diagnosis Date   • Abdominal pain    • Anxiety    • Anxiety and depression 3/8/2021   • Arthritis     KNEE   • Chronic sinusitis, unspecified    • Chronic wound infection of abdomen 2019    Added automatically from request for surgery [id number]   • Depression    • GERD (gastroesophageal reflux disease)    • H/O echocardiogram      EF 56% NORMAL DIASTOLIC FUNCTION NO VALVULAR DISEASE   • H/O exercise stress test     REPORTEDLY NORMAL DONE FOR CHEST PAIN DX WITH ANXIETY   • Headache, tension-type    • History of Holter monitoring     REPORTEDLY  NORMAL DONE FOR CHEST PAIN DX WITH ANXIETY   • History of MRI of brain and brain stem 2017    NORMAL DONE FOR MIGRAINES   • Hypertension     PRE-ECLAMPSIA WITH BOTH CHILDREN   • Incisional hernia     SCHEDULED FOR REPAIR   • Incisional hernia, without obstruction or gangrene 10/24/2018    Added automatically from request for surgery [id number]   • Infection following a procedure, unspecified, initial encounter    • Insulin resistance     D/T PCOS   • Lobar pneumonia, unspecified organism (CMS/HCC)    • Meralgia paresthetica, left lower limb    • Metabolic syndrome    • Migraine    • MRSA (methicillin resistant Staphylococcus aureus) infection 2019    Abdominal wound   • Nasal congestion    • Other injury of unspecified body region, initial encounter    • Panic attack    • Panic disorder    • Papanicolaou smear 2020    NORMAL WITH NEG HPV NEVER ABN   • PCOS (polycystic ovarian syndrome)    • Pneumonia 2018   • PONV (postoperative nausea and vomiting)    • Right lower quadrant abdominal pain 2019   • Seasonal allergies    • Spinal headache    • Unspecified contact dermatitis, unspecified cause    • Viral infection     UNSPECIFIED   • Vitamin D deficiency    • Wound infection 2018       Past Surgical History:   Procedure Laterality Date   • ABDOMINAL WALL ABSCESS INCISION AND DRAINAGE N/A 2018    Procedure: Debridement of abdominal wall;  Surgeon: Brigido Navarrete MD;  Location: [facility] OR;  Service: General   • ADENOIDECTOMY     •  SECTION      X2   • CHOLECYSTECTOMY      LAP   • HERNIA REPAIR     • HX OVARIAN CYSTECTOMY     • INCISION AND DRAINAGE TRUNK N/A 2018    Procedure: wound debridement, abdomen;  Surgeon: Rachel Dolan MD;  Location: [facility] OR;  Service: General   • INCISION AND DRAINAGE TRUNK N/A 2019    Procedure: WOUND CLOSURE ABDOMINAL;  Surgeon: Rachel Dolan MD;  Location: [facility] OR;  Service: General   • KNEE ACL RECONSTRUCTION     • OVARIAN  CYST REMOVAL      EX LAP WITH OVARIAN CYST REMOVAL HERNIA REPAIR 2014   • TONSILLECTOMY      T&A   • TRUNK DEBRIDEMENT N/A 12/5/2018    Procedure: Abdominal wound debridement;  Surgeon: Rachel Dolan MD;  Location: [facility] OR;  Service: General   • VENTRAL/INCISIONAL HERNIA REPAIR N/A 11/15/2018    Procedure: Lateral Component Separation Herniorrhapy Repair with Mesh, Lysis of adhesions, and skin lesion excision of Right Side;  Surgeon: Rachel Dolan MD;  Location: [facility] OR;  Service: General         Current Outpatient Medications:   •  busPIRone (BUSPAR) 10 MG tablet, TAKE ONE TABLET BY MOUTH TWO TO THREE TIMES A DAY, Disp: 270 tablet, Rfl: 1  •  cetirizine (zyrTEC) 10 MG tablet, Take 10 mg by mouth Every Night., Disp: , Rfl:   •  cholecalciferol (VITAMIN D3) 1000 units tablet, Take 2,000 Units by mouth Daily., Disp: , Rfl:   •  gabapentin (NEURONTIN) 300 MG capsule, Take 1 capsule by mouth 2 (two) times a day., Disp: 90 capsule, Rfl: 1  •  gabapentin (NEURONTIN) 600 MG tablet, TAKE ONE TABLET BY MOUTH TWICE A DAY, Disp: 180 tablet, Rfl: 0  •  LORazepam (ATIVAN) 0.5 MG tablet, Take 1 tablet by mouth Every 12 (Twelve) Hours As Needed for Anxiety., Disp: 30 tablet, Rfl: 0  •  metFORMIN ER (GLUCOPHAGE-XR) 500 MG 24 hr tablet, TAKE TWO TABLETS BY MOUTH EVERY MORNING, Disp: 180 tablet, Rfl: 1  •  naproxen sodium (ALEVE) 220 MG tablet, Take 220 mg by mouth 2 (Two) Times a Day As Needed., Disp: , Rfl:   •  nortriptyline (PAMELOR) 50 MG capsule, TAKE TWO CAPSULES BY MOUTH ONCE NIGHTLY, Disp: 180 capsule, Rfl: 1  •  rizatriptan MLT (MAXALT-MLT) 10 MG disintegrating tablet, Place 1 tablet on the tongue 1 (One) Time As Needed for Migraine. May repeat in 2 hours if needed, Disp: 15 tablet, Rfl: 3  •  sertraline (ZOLOFT) 100 MG tablet, TAKE 1 AND 1/2 TABLET BY MOUTH DAILY, Disp: 135 tablet, Rfl: 1    No Known Allergies    Social History     Tobacco Use   • Smoking status: Former Smoker     Packs/day: 0.50     Years:  "1.00     Pack years: 0.50     Quit date:      Years since quittin.5   • Smokeless tobacco: Never Used   Vaping Use   • Vaping Use: Never used   Substance Use Topics   • Alcohol use: Yes     Comment: occasional   • Drug use: No       Family History   Problem Relation Age of Onset   • Stroke Mother    • Heart disease Mother    • Hypertension Mother    • Heart disease Father    • Hypertension Father    • Diabetes Father    • Dementia Maternal Grandmother    • Stroke Maternal Grandmother    • Diabetes Maternal Grandmother    • Stroke Paternal Grandmother    • Hypertension Paternal Grandmother    • Cancer Paternal Grandmother    • Heart disease Paternal Grandfather    • Diabetes Paternal Grandfather        Review of Systems   Constitutional: Negative for appetite change, chills, fatigue, fever and unexpected weight change.   Gastrointestinal: Negative for abdominal distention, abdominal pain, anal bleeding, blood in stool, constipation, diarrhea, nausea and vomiting.   Genitourinary: Negative for dyspareunia, dysuria, menstrual problem, pelvic pain, vaginal bleeding, vaginal discharge and vaginal pain.       /86   Ht 162.6 cm (64\")   Wt 129 kg (284 lb)   BMI 48.75 kg/m²     Physical Exam  Vitals reviewed.   Constitutional:       General: She is not in acute distress.     Appearance: She is well-developed. She is obese. She is not ill-appearing, toxic-appearing or diaphoretic.   HENT:      Mouth/Throat:      Dentition: Normal dentition. No dental caries.   Cardiovascular:      Rate and Rhythm: Normal rate and regular rhythm.      Heart sounds: Normal heart sounds.   Pulmonary:      Effort: Pulmonary effort is normal. No respiratory distress.      Breath sounds: Normal breath sounds. No stridor. No wheezing.   Chest:      Breasts:         Right: No inverted nipple, mass, nipple discharge, skin change or tenderness.         Left: No inverted nipple, mass, nipple discharge, skin change or tenderness. "   Abdominal:      General: There is no distension.      Palpations: Abdomen is soft. There is no mass.      Tenderness: There is no abdominal tenderness.   Genitourinary:     Labia:         Right: No rash, tenderness or lesion.         Left: No rash, tenderness or lesion.       Vagina: Foreign body present. No vaginal discharge, tenderness or bleeding.      Cervix: No cervical motion tenderness, discharge or friability.      Uterus: Not deviated, not enlarged, not fixed and not tender.       Adnexa:         Right: No mass, tenderness or fullness.          Left: No mass, tenderness or fullness.     Musculoskeletal:         General: No tenderness. Normal range of motion.   Skin:     General: Skin is warm.      Findings: No erythema or rash.   Neurological:      General: No focal deficit present.      Mental Status: She is alert and oriented to person, place, and time.      Cranial Nerves: No cranial nerve deficit.      Coordination: Coordination normal.   Psychiatric:         Mood and Affect: Mood normal.         Behavior: Behavior normal.         Thought Content: Thought content normal.         Judgment: Judgment normal.            Assessment/Plan    1) GYN HM: Check pap smear. SBE demonstrated and encouraged. MMG at age 39.  2) PCOS: Being managed by primary care physician. Pt is on metformin XR 1,000mg daily.  3) Contraception: none. Mirena IUD 10/2018.  4) Family Planning: .  5) Diet and Exercise discussed. Pt has achilles tendonitis and getting PT. Pt has lost 11 pounds since 2021. Pt has cut out sodas and bread. Pt has a goal of 250. She plans to get a nose piercing when she gets to 89. When she gets to 210 she will get a tatoo  6) Smoking Status: nonsmoker  7) Hx of Oligomenorrhea: Pt is at high risk for endometrial hyperplasia. Has Mirena IUD in place 10/2018  8) Follow up prn and 1 year.       Diagnoses and all orders for this visit:    Routine gynecological examination  -     IgP, Aptima HPV    Pap  smear, low-risk  -     IgP, Aptima HPV    Special screening examination for human papillomavirus (HPV)  -     IgP, Aptima HPV    Screening for condition  -     POC Pregnancy, Urine  -     POC Urinalysis Dipstick          Karen Marvin DO  7/6/2021  11:57 EDT

## 2021-07-06 NOTE — THERAPY TREATMENT NOTE
Outpatient Physical Therapy Ortho Treatment Note  HEBER Casillas     Patient Name: Valery Aguilar  : 1985  MRN: [id number]  Today's Date: 2021      Visit Date: 2021    Visit Dx:    ICD-10-CM ICD-9-CM   1. Tendonitis, Achilles, left  M76.62 726.71       Patient Active Problem List   Diagnosis   • Chronic migraine without aura without status migrainosus, not intractable   • Cervicogenic headache   • Secondary oligomenorrhea   • Family history of breast cancer   • Obstructive sleep apnea, adult   • GERD with apnea   • Vitamin D deficiency   • PCOS (polycystic ovarian syndrome)   • Panic disorder   • Insulin resistance   • Essential hypertension   • GERD (gastroesophageal reflux disease)   • Anxiety and depression   • Tendonitis, Achilles, left        Past Medical History:   Diagnosis Date   • Abdominal pain    • Anxiety    • Anxiety and depression 3/8/2021   • Arthritis     KNEE   • Chronic sinusitis, unspecified    • Chronic wound infection of abdomen 2019    Added automatically from request for surgery [id number]   • Depression    • GERD (gastroesophageal reflux disease)    • H/O echocardiogram      EF 56% NORMAL DIASTOLIC FUNCTION NO VALVULAR DISEASE   • H/O exercise stress test     REPORTEDLY NORMAL DONE FOR CHEST PAIN DX WITH ANXIETY   • Headache, tension-type    • History of Holter monitoring     REPORTEDLY NORMAL DONE FOR CHEST PAIN DX WITH ANXIETY   • History of MRI of brain and brain stem 2017    NORMAL DONE FOR MIGRAINES   • Hypertension     PRE-ECLAMPSIA WITH BOTH CHILDREN   • Incisional hernia     SCHEDULED FOR REPAIR   • Incisional hernia, without obstruction or gangrene 10/24/2018    Added automatically from request for surgery [id number]   • Infection following a procedure, unspecified, initial encounter    • Insulin resistance     D/T PCOS   • Lobar pneumonia, unspecified organism (CMS/HCC)    • Meralgia paresthetica, left lower limb    • Metabolic syndrome    • Migraine     • MRSA (methicillin resistant Staphylococcus aureus) infection 2019    Abdominal wound   • Nasal congestion    • Other injury of unspecified body region, initial encounter    • Panic attack    • Panic disorder    • Papanicolaou smear 2020    NORMAL WITH NEG HPV NEVER ABN   • PCOS (polycystic ovarian syndrome)    • Pneumonia 2018   • PONV (postoperative nausea and vomiting)    • Right lower quadrant abdominal pain 2019   • Seasonal allergies    • Spinal headache    • Unspecified contact dermatitis, unspecified cause    • Viral infection     UNSPECIFIED   • Vitamin D deficiency    • Wound infection 2018        Past Surgical History:   Procedure Laterality Date   • ABDOMINAL WALL ABSCESS INCISION AND DRAINAGE N/A 2018    Procedure: Debridement of abdominal wall;  Surgeon: Brigido Navarrete MD;  Location: [facility] OR;  Service: General   • ADENOIDECTOMY     •  SECTION      X2   • CHOLECYSTECTOMY      LAP   • HERNIA REPAIR     • HX OVARIAN CYSTECTOMY     • INCISION AND DRAINAGE TRUNK N/A 2018    Procedure: wound debridement, abdomen;  Surgeon: Rachel Dolan MD;  Location: [facility] OR;  Service: General   • INCISION AND DRAINAGE TRUNK N/A 2019    Procedure: WOUND CLOSURE ABDOMINAL;  Surgeon: Rachel Dolan MD;  Location: [facility] OR;  Service: General   • KNEE ACL RECONSTRUCTION     • OVARIAN CYST REMOVAL      EX LAP WITH OVARIAN CYST REMOVAL HERNIA REPAIR    • TONSILLECTOMY      T&A   • TRUNK DEBRIDEMENT N/A 2018    Procedure: Abdominal wound debridement;  Surgeon: Rachel Dolan MD;  Location: [facility] OR;  Service: General   • VENTRAL/INCISIONAL HERNIA REPAIR N/A 11/15/2018    Procedure: Lateral Component Separation Herniorrhapy Repair with Mesh, Lysis of adhesions, and skin lesion excision of Right Side;  Surgeon: Rachel Dolan MD;  Location: [facility] OR;  Service: General                       PT Assessment/Plan     Row Name 21 0054          PT  Assessment    Assessment Comments  Pt tolerated treatment session well with  decreased symptoms.   -KM        PT Plan    PT Plan Comments  Continue per POC  -KM       User Key  (r) = Recorded By, (t) = Taken By, (c) = Cosigned By    Initials Name Provider Type    Katty Martinez PTA Physical Therapy Assistant          Modalities     Row Name 07/06/21 0830             Ultrasound [id number]    Location  left Achilles insertion  -KM      Duty Cycle  50  -KM      Frequency  3.0 MHz  -KM      Intensity - Wts/cm  1.5  -KM         Iontophoresis 03318    Milliamps  3  -KM      MA/Min  40  -KM      Dexamethasone used  Yes  -KM      Patch Type  Medium left Achilles insertion  -KM         Functional Testing    Outcome Measure Options  Lower Extremity Functional Scale (LEFS)  -KM        User Key  (r) = Recorded By, (t) = Taken By, (c) = Cosigned By    Initials Name Provider Type    Katty Martinez PTA Physical Therapy Assistant        OP Exercises     Row Name 07/06/21 0830             Subjective Comments    Subjective Comments  Pt states she has experience increased pain since not having therapy for a week. States she had injured her (L) knee this morning.   -KM         Exercise 1    Exercise Name 1  NWBing gastroc & soleus stretch  -KM      Cueing 1  Verbal;Tactile  -KM      Reps 1  15  -KM      Time 1  10 secs  -KM         Exercise 2    Exercise Name 2  PF vs theraband  -KM      Cueing 2  Verbal;Tactile  -KM      Reps 2  30  -KM      Time 2  Gold  -KM        User Key  (r) = Recorded By, (t) = Taken By, (c) = Cosigned By    Initials Name Provider Type    Katty Martinez PTA Physical Therapy Assistant                                Outcome Measure Options: Lower Extremity Functional Scale (LEFS)         Time Calculation:   Start Time: 0830  Stop Time: 0910  Time Calculation (min): 40 min  Therapy Charges for Today     Code Description Service Date Service Provider Modifiers Qty    44280226481 HC PT ULTRASOUND EA 15 MIN  7/6/2021 Katty Collier, PTA GP 1    60903590626 HC PT IONTOPHORESIS EA 15 MIN 7/6/2021 Katty Collier, PTA GP 1          PT G-Codes  Outcome Measure Options: Lower Extremity Functional Scale (LEFS)         Katty Collier PTA  7/6/2021

## 2021-07-07 NOTE — ED PROVIDER NOTES
EMERGENCY DEPARTMENT ENCOUNTER      Room Number: 13/13    History is provided by the patient, no translation services needed    HPI:    Chief complaint: Knee pain    Location: Left    Quality/Severity: 6 out of 10/aching    Timing/Duration: 2 days    Modifying Factors: Worse with movement and ambulation    Associated Symptoms: Swelling    Narrative: Pt is a 36 y.o. female who presents complaining of left knee pain x2 days.  Patient is that she got up out of bed and heard a pop in her knee.  Patient states that since then she has had increasing pain with movement and ambulation.  Patient states that she has a history of an ACL repair in that knee.      PMD: Zandra Rios MD    REVIEW OF SYSTEMS  Review of Systems   Constitutional: Negative for chills and fever.   Eyes: Negative for pain and visual disturbance.   Respiratory: Negative for cough and shortness of breath.    Cardiovascular: Negative for chest pain and leg swelling.   Gastrointestinal: Negative for abdominal pain, constipation, diarrhea, nausea and vomiting.   Genitourinary: Negative for dysuria and flank pain.   Musculoskeletal: Positive for arthralgias, gait problem and joint swelling. Negative for myalgias.   Skin: Negative for rash and wound.   Neurological: Negative for dizziness, syncope and headaches.   Psychiatric/Behavioral: Negative for suicidal ideas. The patient is not nervous/anxious.          PAST MEDICAL HISTORY  Active Ambulatory Problems     Diagnosis Date Noted   • Chronic migraine without aura without status migrainosus, not intractable 08/31/2016   • Cervicogenic headache 08/31/2016   • Secondary oligomenorrhea 08/20/2018   • Family history of breast cancer 10/09/2018   • Obstructive sleep apnea, adult 11/27/2018   • GERD with apnea 11/27/2018   • Vitamin D deficiency    • PCOS (polycystic ovarian syndrome)    • Panic disorder    • Insulin resistance    • Essential hypertension    • GERD (gastroesophageal reflux disease)    •  Anxiety and depression 2021   • Tendonitis, Achilles, left 05/10/2021     Resolved Ambulatory Problems     Diagnosis Date Noted   • Incisional hernia, without obstruction or gangrene 10/24/2018   • Incisional hernia 11/15/2018   • Wound infection 2018   • Right lower quadrant abdominal pain 2019   • Chronic wound infection of abdomen 2019     Past Medical History:   Diagnosis Date   • Abdominal pain    • Anxiety    • Arthritis    • Chronic sinusitis, unspecified    • Depression    • H/O echocardiogram    • H/O exercise stress test    • Headache, tension-type    • History of Holter monitoring    • History of MRI of brain and brain stem 2017   • Hypertension    • Infection following a procedure, unspecified, initial encounter    • Lobar pneumonia, unspecified organism (CMS/HCC)    • Meralgia paresthetica, left lower limb    • Metabolic syndrome    • Migraine    • MRSA (methicillin resistant Staphylococcus aureus) infection 2019   • Nasal congestion    • Other injury of unspecified body region, initial encounter    • Panic attack    • Papanicolaou smear 2020   • Pneumonia 2018   • PONV (postoperative nausea and vomiting)    • Seasonal allergies    • Spinal headache    • Unspecified contact dermatitis, unspecified cause    • Viral infection        PAST SURGICAL HISTORY  Past Surgical History:   Procedure Laterality Date   • ABDOMINAL WALL ABSCESS INCISION AND DRAINAGE N/A 2018    Procedure: Debridement of abdominal wall;  Surgeon: Brigido Navarrete MD;  Location: [facility] OR;  Service: General   • ADENOIDECTOMY     •  SECTION      X2   • CHOLECYSTECTOMY      LAP   • HERNIA REPAIR     • HX OVARIAN CYSTECTOMY     • INCISION AND DRAINAGE TRUNK N/A 2018    Procedure: wound debridement, abdomen;  Surgeon: Rachel Dolan MD;  Location: [facility] OR;  Service: General   • INCISION AND DRAINAGE TRUNK N/A 2019    Procedure: WOUND CLOSURE ABDOMINAL;  Surgeon:  Rachel Dolan MD;  Location: [facility] OR;  Service: General   • KNEE ACL RECONSTRUCTION     • OVARIAN CYST REMOVAL      EX LAP WITH OVARIAN CYST REMOVAL HERNIA REPAIR    • TONSILLECTOMY      T&A   • TRUNK DEBRIDEMENT N/A 2018    Procedure: Abdominal wound debridement;  Surgeon: Rachel Dolan MD;  Location: [facility] OR;  Service: General   • VENTRAL/INCISIONAL HERNIA REPAIR N/A 11/15/2018    Procedure: Lateral Component Separation Herniorrhapy Repair with Mesh, Lysis of adhesions, and skin lesion excision of Right Side;  Surgeon: Rachel Dolan MD;  Location: [facility] OR;  Service: General       FAMILY HISTORY  Family History   Problem Relation Age of Onset   • Stroke Mother    • Heart disease Mother    • Hypertension Mother    • Heart disease Father    • Hypertension Father    • Diabetes Father    • Dementia Maternal Grandmother    • Stroke Maternal Grandmother    • Diabetes Maternal Grandmother    • Stroke Paternal Grandmother    • Hypertension Paternal Grandmother    • Cancer Paternal Grandmother    • Heart disease Paternal Grandfather    • Diabetes Paternal Grandfather        SOCIAL HISTORY  Social History     Socioeconomic History   • Marital status:      Spouse name: Not on file   • Number of children: Not on file   • Years of education: Not on file   • Highest education level: Not on file   Tobacco Use   • Smoking status: Former Smoker     Packs/day: 0.50     Years: 1.00     Pack years: 0.50     Quit date:      Years since quittin.5   • Smokeless tobacco: Never Used   Vaping Use   • Vaping Use: Never used   Substance and Sexual Activity   • Alcohol use: Yes     Comment: occasional   • Drug use: No   • Sexual activity: Defer     Partners: Male     Birth control/protection: OCP     Comment: LNMP irregular       ALLERGIES  Patient has no known allergies.      Current Facility-Administered Medications:   •  ketorolac (TORADOL) injection 30 mg, 30 mg, Intramuscular, Q6H PRN,  Vanessa Nuñez PA-C, 30 mg at 07/06/21 2030    Current Outpatient Medications:   •  busPIRone (BUSPAR) 10 MG tablet, TAKE ONE TABLET BY MOUTH TWO TO THREE TIMES A DAY, Disp: 270 tablet, Rfl: 1  •  cetirizine (zyrTEC) 10 MG tablet, Take 10 mg by mouth Every Night., Disp: , Rfl:   •  cholecalciferol (VITAMIN D3) 1000 units tablet, Take 2,000 Units by mouth Daily., Disp: , Rfl:   •  gabapentin (NEURONTIN) 300 MG capsule, Take 1 capsule by mouth 2 (two) times a day., Disp: 90 capsule, Rfl: 1  •  gabapentin (NEURONTIN) 600 MG tablet, TAKE ONE TABLET BY MOUTH TWICE A DAY, Disp: 180 tablet, Rfl: 0  •  LORazepam (ATIVAN) 0.5 MG tablet, Take 1 tablet by mouth Every 12 (Twelve) Hours As Needed for Anxiety., Disp: 30 tablet, Rfl: 0  •  metFORMIN ER (GLUCOPHAGE-XR) 500 MG 24 hr tablet, TAKE TWO TABLETS BY MOUTH EVERY MORNING, Disp: 180 tablet, Rfl: 1  •  naproxen sodium (ALEVE) 220 MG tablet, Take 220 mg by mouth 2 (Two) Times a Day As Needed., Disp: , Rfl:   •  nortriptyline (PAMELOR) 50 MG capsule, TAKE TWO CAPSULES BY MOUTH ONCE NIGHTLY, Disp: 180 capsule, Rfl: 1  •  rizatriptan MLT (MAXALT-MLT) 10 MG disintegrating tablet, Place 1 tablet on the tongue 1 (One) Time As Needed for Migraine. May repeat in 2 hours if needed, Disp: 15 tablet, Rfl: 3  •  sertraline (ZOLOFT) 100 MG tablet, TAKE 1 AND 1/2 TABLET BY MOUTH DAILY, Disp: 135 tablet, Rfl: 1    PHYSICAL EXAM  ED Triage Vitals [07/06/21 1942]   Temp Heart Rate Resp BP SpO2   96.1 °F (35.6 °C) 99 18 (!) 168/104 98 %      Temp src Heart Rate Source Patient Position BP Location FiO2 (%)   Temporal -- -- -- --       Physical Exam  Vitals and nursing note reviewed.   Constitutional:       Appearance: She is obese.   HENT:      Head: Normocephalic and atraumatic.   Eyes:      Conjunctiva/sclera: Conjunctivae normal.   Cardiovascular:      Rate and Rhythm: Normal rate and regular rhythm.      Heart sounds: Normal heart sounds.   Pulmonary:      Effort: Pulmonary effort is  normal. No respiratory distress.      Breath sounds: Normal breath sounds.   Abdominal:      General: Bowel sounds are normal. There is no distension.      Palpations: Abdomen is soft.      Tenderness: There is no abdominal tenderness.   Musculoskeletal:         General: Normal range of motion.      Cervical back: Normal range of motion and neck supple.      Left knee: Swelling present. No deformity, effusion, erythema, ecchymosis, lacerations, bony tenderness or crepitus. Normal range of motion. Normal alignment.   Skin:     General: Skin is warm and dry.   Neurological:      Mental Status: She is alert and oriented to person, place, and time.   Psychiatric:         Mood and Affect: Mood and affect normal.           LAB RESULTS  Lab Results (last 24 hours)     Procedure Component Value Units Date/Time    POC Pregnancy, Urine [[id number]]  (Normal) Collected: 07/06/21 1124    Specimen: Urine Updated: 07/06/21 1124     HCG, Urine, QL Negative     Lot Number [id number]     Internal Positive Control Passed     Internal Negative Control Passed    POC Urinalysis Dipstick [[id number]]  (Normal) Collected: 07/06/21 1124    Specimen: Urine Updated: 07/06/21 1125     Color Yellow     Clarity, UA Clear     Glucose, UA Negative mg/dL      Bilirubin Negative     Ketones, UA Negative     Specific Gravity  1.015     Blood, UA Negative     pH, Urine 5.0     Protein, POC Negative mg/dL      Urobilinogen, UA Normal     Leukocytes Negative     Nitrite, UA Negative            I ordered the above labs and reviewed the results    RADIOLOGY  XR Knee 3 View Left    Result Date: 7/6/2021  CR Knee 3 Vws LT INDICATION: Acute left knee pain. Patient felt pop in the knee this morning, previous ACL surgery 10 years ago COMPARISON: Knee series December 2013 FINDINGS: 3 view(s) of the left knee. In the interval, patient has had ACL repair. The hardware appears intact. Moderate osteophytosis and tibiofemoral joint space narrowing seen. True lateral  not obtained though patellar spurring and mild lateral tibiofemoral compartment joint space narrowing seen. Moderate osteophytosis is noted in the patellofemoral compartment. No acute fracture or dislocation.     Significant degenerative change and evidence of previous ACL surgery. No acute bony abnormality. Signer Name: Olena Dinh MD  Signed: 7/6/2021 8:29 PM  Workstation Name: [facility]-  [facility]      I ordered the above radiologic testing and reviewed the results    PROCEDURES  Procedures      PROGRESS AND CONSULTS  ED Course as of Jul 06 2050   Tue Jul 06, 2021 2043 36-year-old female presents the ED with complaints of left knee pain x2 days.  Patient states that she got out of bed and felt her knee pop.  Patient states that since then she has had swelling and difficulty with ambulation.  After history and physical exam patient was noted to have tenderness with palpation and tenderness with range of motion.  Patient was also noted to have crepitus.  X-rays were done showing osteoarthritis.  Discussed the results with patient.  Discussed with the patient that she would need to follow-up with orthopedics.  While in the ED patient was placed in an Ace wrap and given crutches.  Patient will be discharged home.  Instructed patient to return to ED if symptoms worsen.    [GT]      ED Course User Index  [GT] Vanessa Nuñez, LORETO           MEDICAL DECISION MAKING    MDM       DIAGNOSIS  Final diagnoses:   Acute pain of left knee       Latest Documented Vital Signs:  As of 20:50 EDT  BP- (!) 168/104 HR- 99 Temp- 96.1 °F (35.6 °C) (Temporal) O2 sat- 98%    DISPOSITION  Discharged home        Discussed pertinent findings with the patient/family.  Patient/Family voiced understanding of need to follow-up for recheck and further testing as needed.  Return to the Emergency Department warnings were given.         Medication List      No changes were made to your prescriptions during this  visit.             Follow-up Information     Rayshawn Boykin MD. Call in 1 day.    Specialty: Orthopedic Surgery  Why: To schedule a follow up appointment  Contact information:  [address]  [address]  [place] [address] [phone]                     Dictated utilizing Dragon dictation     Vanessa Nuñez PAErwinC  07/06/21 2050

## 2021-07-08 ENCOUNTER — HOSPITAL ENCOUNTER (OUTPATIENT)
Dept: PHYSICAL THERAPY | Facility: HOSPITAL | Age: 36
Setting detail: THERAPIES SERIES
Discharge: HOME | End: 2021-07-08

## 2021-07-08 DIAGNOSIS — M76.62 TENDONITIS, ACHILLES, LEFT: Primary | ICD-10-CM

## 2021-07-08 LAB
CYTOLOGIST CVX/VAG CYTO: NORMAL
CYTOLOGY CVX/VAG DOC CYTO: NORMAL
CYTOLOGY CVX/VAG DOC THIN PREP: NORMAL
DX ICD CODE: NORMAL
HIV 1 & 2 AB SER-IMP: NORMAL
HPV I/H RISK 4 DNA CVX QL PROBE+SIG AMP: NEGATIVE
OTHER STN SPEC: NORMAL
STAT OF ADQ CVX/VAG CYTO-IMP: NORMAL

## 2021-07-08 PROCEDURE — 97035 APP MDLTY 1+ULTRASOUND EA 15: CPT

## 2021-07-08 NOTE — THERAPY TREATMENT NOTE
Outpatient Physical Therapy Ortho Treatment Note  HEBER Casillas     Patient Name: Valery Aguilar  : 1985  MRN: [id number]  Today's Date: 2021      Visit Date: 2021    Visit Dx:    ICD-10-CM ICD-9-CM   1. Tendonitis, Achilles, left  M76.62 726.71       Patient Active Problem List   Diagnosis   • Chronic migraine without aura without status migrainosus, not intractable   • Cervicogenic headache   • Secondary oligomenorrhea   • Family history of breast cancer   • Obstructive sleep apnea, adult   • GERD with apnea   • Vitamin D deficiency   • PCOS (polycystic ovarian syndrome)   • Panic disorder   • Insulin resistance   • Essential hypertension   • GERD (gastroesophageal reflux disease)   • Anxiety and depression   • Tendonitis, Achilles, left        Past Medical History:   Diagnosis Date   • Abdominal pain    • Anxiety    • Anxiety and depression 3/8/2021   • Arthritis     KNEE   • Chronic sinusitis, unspecified    • Chronic wound infection of abdomen 2019    Added automatically from request for surgery [id number]   • Depression    • GERD (gastroesophageal reflux disease)    • H/O echocardiogram      EF 56% NORMAL DIASTOLIC FUNCTION NO VALVULAR DISEASE   • H/O exercise stress test     REPORTEDLY NORMAL DONE FOR CHEST PAIN DX WITH ANXIETY   • Headache, tension-type    • History of Holter monitoring     REPORTEDLY NORMAL DONE FOR CHEST PAIN DX WITH ANXIETY   • History of MRI of brain and brain stem 2017    NORMAL DONE FOR MIGRAINES   • Hypertension     PRE-ECLAMPSIA WITH BOTH CHILDREN   • Incisional hernia     SCHEDULED FOR REPAIR   • Incisional hernia, without obstruction or gangrene 10/24/2018    Added automatically from request for surgery [id number]   • Infection following a procedure, unspecified, initial encounter    • Insulin resistance     D/T PCOS   • Lobar pneumonia, unspecified organism (CMS/HCC)    • Meralgia paresthetica, left lower limb    • Metabolic syndrome    • Migraine     • MRSA (methicillin resistant Staphylococcus aureus) infection 2019    Abdominal wound   • Nasal congestion    • Other injury of unspecified body region, initial encounter    • Panic attack    • Panic disorder    • Papanicolaou smear 2020    NORMAL WITH NEG HPV NEVER ABN   • PCOS (polycystic ovarian syndrome)    • Pneumonia 2018   • PONV (postoperative nausea and vomiting)    • Right lower quadrant abdominal pain 2019   • Seasonal allergies    • Spinal headache    • Unspecified contact dermatitis, unspecified cause    • Viral infection     UNSPECIFIED   • Vitamin D deficiency    • Wound infection 2018        Past Surgical History:   Procedure Laterality Date   • ABDOMINAL WALL ABSCESS INCISION AND DRAINAGE N/A 2018    Procedure: Debridement of abdominal wall;  Surgeon: Brigido Navarrete MD;  Location: [facility] OR;  Service: General   • ADENOIDECTOMY     •  SECTION      X2   • CHOLECYSTECTOMY      LAP   • HERNIA REPAIR     • HX OVARIAN CYSTECTOMY     • INCISION AND DRAINAGE TRUNK N/A 2018    Procedure: wound debridement, abdomen;  Surgeon: Rachel Dolan MD;  Location: [facility] OR;  Service: General   • INCISION AND DRAINAGE TRUNK N/A 2019    Procedure: WOUND CLOSURE ABDOMINAL;  Surgeon: Rachel Dolan MD;  Location: [facility] OR;  Service: General   • KNEE ACL RECONSTRUCTION     • OVARIAN CYST REMOVAL      EX LAP WITH OVARIAN CYST REMOVAL HERNIA REPAIR    • TONSILLECTOMY      T&A   • TRUNK DEBRIDEMENT N/A 2018    Procedure: Abdominal wound debridement;  Surgeon: Rachel Dolan MD;  Location: [facility] OR;  Service: General   • VENTRAL/INCISIONAL HERNIA REPAIR N/A 11/15/2018    Procedure: Lateral Component Separation Herniorrhapy Repair with Mesh, Lysis of adhesions, and skin lesion excision of Right Side;  Surgeon: Rachel Dolan MD;  Location: [facility] OR;  Service: General                       PT Assessment/Plan     Row Name 21 0900          PT  Assessment    Assessment Comments  Pt ambulates with use of crutches due to injury to her knee. Pt continues to tolerate current treatment plan  -KM        PT Plan    PT Plan Comments  Continue per POC  -KM       User Key  (r) = Recorded By, (t) = Taken By, (c) = Cosigned By    Initials Name Provider Type    Katty Martinez PTA Physical Therapy Assistant          Modalities     Row Name 07/08/21 0900             Ultrasound [id number]    Location  left Achilles insertion  -KM      Duty Cycle  50  -KM      Frequency  3.0 MHz  -KM      Intensity - Wts/cm  1.5  -KM         Iontophoresis 01903    Milliamps  3  -KM      MA/Min  40  -KM      Dexamethasone used  Yes  -KM      Patch Type  Medium left Achilles insertion  -KM         Functional Testing    Outcome Measure Options  Lower Extremity Functional Scale (LEFS)  -KM        User Key  (r) = Recorded By, (t) = Taken By, (c) = Cosigned By    Initials Name Provider Type    Katty Martinez PTA Physical Therapy Assistant        OP Exercises     Row Name 07/08/21 0900             Subjective Comments    Subjective Comments  Pt states her ankle has felt good; states she has ortho appointment for her knee  -KM         Exercise 1    Exercise Name 1  NWBing gastroc & soleus stretch  -KM      Cueing 1  Verbal;Tactile  -KM      Reps 1  15  -KM      Time 1  10 secs  -KM         Exercise 2    Exercise Name 2  PF vs theraband  -KM      Cueing 2  Verbal;Tactile  -KM      Reps 2  30  -KM      Time 2  Gold  -KM        User Key  (r) = Recorded By, (t) = Taken By, (c) = Cosigned By    Initials Name Provider Type    Katty Martinez PTA Physical Therapy Assistant                                Outcome Measure Options: Lower Extremity Functional Scale (LEFS)         Time Calculation:   Start Time: 0900  Stop Time: 0950  Time Calculation (min): 50 min  Therapy Charges for Today     Code Description Service Date Service Provider Modifiers Qty    83583392178 HC PT ULTRASOUND EA 15 MIN 7/8/2021  Katty Collier, PTA GP 1    91080375995 HC PT ULTRASOUND EA 15 MIN 7/8/2021 Katty Collier, PTA GP 1          PT G-Codes  Outcome Measure Options: Lower Extremity Functional Scale (LEFS)         Katty Collier PTA  7/8/2021

## 2021-07-12 ENCOUNTER — HOSPITAL ENCOUNTER (OUTPATIENT)
Dept: PHYSICAL THERAPY | Facility: HOSPITAL | Age: 36
Setting detail: THERAPIES SERIES
Discharge: HOME | End: 2021-07-12

## 2021-07-12 DIAGNOSIS — M76.62 TENDONITIS, ACHILLES, LEFT: Primary | ICD-10-CM

## 2021-07-12 PROCEDURE — 97033 APP MDLTY 1+IONTPHRSIS EA 15: CPT

## 2021-07-12 PROCEDURE — 97035 APP MDLTY 1+ULTRASOUND EA 15: CPT

## 2021-07-12 NOTE — THERAPY TREATMENT NOTE
Outpatient Physical Therapy Ortho Treatment Note  HEBER Casillas     Patient Name: Valery Aguilar  : 1985  MRN: [id number]  Today's Date: 2021      Visit Date: 2021    Visit Dx:    ICD-10-CM ICD-9-CM   1. Tendonitis, Achilles, left  M76.62 726.71       Patient Active Problem List   Diagnosis   • Chronic migraine without aura without status migrainosus, not intractable   • Cervicogenic headache   • Secondary oligomenorrhea   • Family history of breast cancer   • Obstructive sleep apnea, adult   • GERD with apnea   • Vitamin D deficiency   • PCOS (polycystic ovarian syndrome)   • Panic disorder   • Insulin resistance   • Essential hypertension   • GERD (gastroesophageal reflux disease)   • Anxiety and depression   • Tendonitis, Achilles, left        Past Medical History:   Diagnosis Date   • Abdominal pain    • Anxiety    • Anxiety and depression 3/8/2021   • Arthritis     KNEE   • Chronic sinusitis, unspecified    • Chronic wound infection of abdomen 2019    Added automatically from request for surgery [id number]   • Depression    • GERD (gastroesophageal reflux disease)    • H/O echocardiogram      EF 56% NORMAL DIASTOLIC FUNCTION NO VALVULAR DISEASE   • H/O exercise stress test     REPORTEDLY NORMAL DONE FOR CHEST PAIN DX WITH ANXIETY   • Headache, tension-type    • History of Holter monitoring     REPORTEDLY NORMAL DONE FOR CHEST PAIN DX WITH ANXIETY   • History of MRI of brain and brain stem 2017    NORMAL DONE FOR MIGRAINES   • Hypertension     PRE-ECLAMPSIA WITH BOTH CHILDREN   • Incisional hernia     SCHEDULED FOR REPAIR   • Incisional hernia, without obstruction or gangrene 10/24/2018    Added automatically from request for surgery [id number]   • Infection following a procedure, unspecified, initial encounter    • Insulin resistance     D/T PCOS   • Lobar pneumonia, unspecified organism (CMS/HCC)    • Meralgia paresthetica, left lower limb    • Metabolic syndrome    • Migraine     • MRSA (methicillin resistant Staphylococcus aureus) infection 2019    Abdominal wound   • Nasal congestion    • Other injury of unspecified body region, initial encounter    • Panic attack    • Panic disorder    • Papanicolaou smear 2020    NORMAL WITH NEG HPV NEVER ABN   • PCOS (polycystic ovarian syndrome)    • Pneumonia 2018   • PONV (postoperative nausea and vomiting)    • Right lower quadrant abdominal pain 2019   • Seasonal allergies    • Spinal headache    • Unspecified contact dermatitis, unspecified cause    • Viral infection     UNSPECIFIED   • Vitamin D deficiency    • Wound infection 2018        Past Surgical History:   Procedure Laterality Date   • ABDOMINAL WALL ABSCESS INCISION AND DRAINAGE N/A 2018    Procedure: Debridement of abdominal wall;  Surgeon: Brigido Navarrete MD;  Location: [facility] OR;  Service: General   • ADENOIDECTOMY     •  SECTION      X2   • CHOLECYSTECTOMY      LAP   • HERNIA REPAIR     • HX OVARIAN CYSTECTOMY     • INCISION AND DRAINAGE TRUNK N/A 2018    Procedure: wound debridement, abdomen;  Surgeon: Rachel Dolan MD;  Location: [facility] OR;  Service: General   • INCISION AND DRAINAGE TRUNK N/A 2019    Procedure: WOUND CLOSURE ABDOMINAL;  Surgeon: Rachel Dolan MD;  Location: [facility] OR;  Service: General   • KNEE ACL RECONSTRUCTION     • OVARIAN CYST REMOVAL      EX LAP WITH OVARIAN CYST REMOVAL HERNIA REPAIR    • TONSILLECTOMY      T&A   • TRUNK DEBRIDEMENT N/A 2018    Procedure: Abdominal wound debridement;  Surgeon: Rachel Dolan MD;  Location: [facility] OR;  Service: General   • VENTRAL/INCISIONAL HERNIA REPAIR N/A 11/15/2018    Procedure: Lateral Component Separation Herniorrhapy Repair with Mesh, Lysis of adhesions, and skin lesion excision of Right Side;  Surgeon: Rachel Dolan MD;  Location: [facility] OR;  Service: General                       PT Assessment/Plan     Row Name 21 0605          PT  Assessment    Assessment Comments  Pt continues to tolerate treatment session well.   -KM        PT Plan    PT Plan Comments  Continue per POC  -KM       User Key  (r) = Recorded By, (t) = Taken By, (c) = Cosigned By    Initials Name Provider Type    Katty Martinez PTA Physical Therapy Assistant          Modalities     Row Name 07/12/21 0605             Subjective Comments    Subjective Comments  Pt states her ankle is feeling good  -KM         Ultrasound 48769    Location  left Achilles insertion  -KM      Duty Cycle  50  -KM      Frequency  3.0 MHz  -KM      Intensity - Wts/cm  1.5  -KM         Iontophoresis 28319    Milliamps  3  -KM      MA/Min  40  -KM      Dexamethasone used  Yes  -KM      Patch Type  Medium left Achilles insertion  -KM         Functional Testing    Outcome Measure Options  Lower Extremity Functional Scale (LEFS)  -KM        User Key  (r) = Recorded By, (t) = Taken By, (c) = Cosigned By    Initials Name Provider Type    Katty Martinez PTA Physical Therapy Assistant        OP Exercises     Row Name 07/12/21 0605             Subjective Comments    Subjective Comments  Pt states her ankle is feeling good  -KM         Exercise 1    Exercise Name 1  NWBing gastroc & soleus stretch  -KM      Cueing 1  Verbal;Tactile  -KM      Reps 1  15  -KM      Time 1  10 secs  -KM         Exercise 2    Exercise Name 2  PF vs theraband  -KM      Cueing 2  Verbal;Tactile  -KM      Reps 2  30  -KM      Time 2  Gold  -KM        User Key  (r) = Recorded By, (t) = Taken By, (c) = Cosigned By    Initials Name Provider Type    Katty Martinez PTA Physical Therapy Assistant                                Outcome Measure Options: Lower Extremity Functional Scale (LEFS)         Time Calculation:   Start Time: 0605  Stop Time: 0640  Time Calculation (min): 35 min  Therapy Charges for Today     Code Description Service Date Service Provider Modifiers Qty    70891959208 HC PT ULTRASOUND EA 15 MIN 7/12/2021  Katty Collier, PTA GP 1    18892329643 HC PT IONTOPHORESIS EA 15 MIN 7/12/2021 Katty Collier, PTA GP 1          PT G-Codes  Outcome Measure Options: Lower Extremity Functional Scale (LEFS)         Katty Collier PTA  7/12/2021

## 2021-07-15 ENCOUNTER — HOSPITAL ENCOUNTER (OUTPATIENT)
Dept: PHYSICAL THERAPY | Facility: HOSPITAL | Age: 36
Setting detail: THERAPIES SERIES
Discharge: HOME | End: 2021-07-15

## 2021-07-15 DIAGNOSIS — M76.62 TENDONITIS, ACHILLES, LEFT: Primary | ICD-10-CM

## 2021-07-15 PROCEDURE — 97035 APP MDLTY 1+ULTRASOUND EA 15: CPT

## 2021-07-15 PROCEDURE — 97033 APP MDLTY 1+IONTPHRSIS EA 15: CPT

## 2021-07-15 NOTE — THERAPY TREATMENT NOTE
Outpatient Physical Therapy Ortho Treatment Note  HEBER Casillas     Patient Name: Valery Aguilar  : 1985  MRN: [id number]  Today's Date: 7/15/2021      Visit Date: 07/15/2021    Visit Dx:    ICD-10-CM ICD-9-CM   1. Tendonitis, Achilles, left  M76.62 726.71       Patient Active Problem List   Diagnosis   • Chronic migraine without aura without status migrainosus, not intractable   • Cervicogenic headache   • Secondary oligomenorrhea   • Family history of breast cancer   • Obstructive sleep apnea, adult   • GERD with apnea   • Vitamin D deficiency   • PCOS (polycystic ovarian syndrome)   • Panic disorder   • Insulin resistance   • Essential hypertension   • GERD (gastroesophageal reflux disease)   • Anxiety and depression   • Tendonitis, Achilles, left        Past Medical History:   Diagnosis Date   • Abdominal pain    • Anxiety    • Anxiety and depression 3/8/2021   • Arthritis     KNEE   • Chronic sinusitis, unspecified    • Chronic wound infection of abdomen 2019    Added automatically from request for surgery [id number]   • Depression    • GERD (gastroesophageal reflux disease)    • H/O echocardiogram      EF 56% NORMAL DIASTOLIC FUNCTION NO VALVULAR DISEASE   • H/O exercise stress test     REPORTEDLY NORMAL DONE FOR CHEST PAIN DX WITH ANXIETY   • Headache, tension-type    • History of Holter monitoring     REPORTEDLY NORMAL DONE FOR CHEST PAIN DX WITH ANXIETY   • History of MRI of brain and brain stem 2017    NORMAL DONE FOR MIGRAINES   • Hypertension     PRE-ECLAMPSIA WITH BOTH CHILDREN   • Incisional hernia     SCHEDULED FOR REPAIR   • Incisional hernia, without obstruction or gangrene 10/24/2018    Added automatically from request for surgery [id number]   • Infection following a procedure, unspecified, initial encounter    • Insulin resistance     D/T PCOS   • Lobar pneumonia, unspecified organism (CMS/HCC)    • Meralgia paresthetica, left lower limb    • Metabolic syndrome    • Migraine     • MRSA (methicillin resistant Staphylococcus aureus) infection 2019    Abdominal wound   • Nasal congestion    • Other injury of unspecified body region, initial encounter    • Panic attack    • Panic disorder    • Papanicolaou smear 2020    NORMAL WITH NEG HPV NEVER ABN   • PCOS (polycystic ovarian syndrome)    • Pneumonia 2018   • PONV (postoperative nausea and vomiting)    • Right lower quadrant abdominal pain 2019   • Seasonal allergies    • Spinal headache    • Unspecified contact dermatitis, unspecified cause    • Viral infection     UNSPECIFIED   • Vitamin D deficiency    • Wound infection 2018        Past Surgical History:   Procedure Laterality Date   • ABDOMINAL WALL ABSCESS INCISION AND DRAINAGE N/A 2018    Procedure: Debridement of abdominal wall;  Surgeon: Brigido Navarrete MD;  Location: [facility] OR;  Service: General   • ADENOIDECTOMY     •  SECTION      X2   • CHOLECYSTECTOMY      LAP   • HERNIA REPAIR     • HX OVARIAN CYSTECTOMY     • INCISION AND DRAINAGE TRUNK N/A 2018    Procedure: wound debridement, abdomen;  Surgeon: Rachel Dolan MD;  Location: [facility] OR;  Service: General   • INCISION AND DRAINAGE TRUNK N/A 2019    Procedure: WOUND CLOSURE ABDOMINAL;  Surgeon: Rachel Dolan MD;  Location: [facility] OR;  Service: General   • KNEE ACL RECONSTRUCTION     • OVARIAN CYST REMOVAL      EX LAP WITH OVARIAN CYST REMOVAL HERNIA REPAIR    • TONSILLECTOMY      T&A   • TRUNK DEBRIDEMENT N/A 2018    Procedure: Abdominal wound debridement;  Surgeon: Rachel Dolan MD;  Location: [facility] OR;  Service: General   • VENTRAL/INCISIONAL HERNIA REPAIR N/A 11/15/2018    Procedure: Lateral Component Separation Herniorrhapy Repair with Mesh, Lysis of adhesions, and skin lesion excision of Right Side;  Surgeon: Rachel Dolan MD;  Location: [facility] OR;  Service: General                       PT Assessment/Plan     Row Name 07/15/21 0605          PT  Assessment    Assessment Comments  Pt with increased sensitivity to inonto today.    -KM        PT Plan    PT Plan Comments  Continue per POC  -KM       User Key  (r) = Recorded By, (t) = Taken By, (c) = Cosigned By    Initials Name Provider Type    Katty Martinez PTA Physical Therapy Assistant          Modalities     Row Name 07/15/21 0605             Ultrasound 14516    Location  left Achilles insertion  -KM      Duty Cycle  50  -KM      Frequency  3.0 MHz  -KM      Intensity - Wts/cm  1.5  -KM         Iontophoresis 32337    Milliamps  3  -KM      MA/Min  40  -KM      Dexamethasone used  Yes  -KM      Patch Type  Medium left Achilles insertion  -KM         Functional Testing    Outcome Measure Options  Lower Extremity Functional Scale (LEFS)  -KM        User Key  (r) = Recorded By, (t) = Taken By, (c) = Cosigned By    Initials Name Provider Type    Katty Martinez PTA Physical Therapy Assistant        OP Exercises     Row Name 07/15/21 0605             Subjective Comments    Subjective Comments  Pt states her achilles is a little sore after additional walking, but feels she is better overall.   -KM         Exercise 1    Exercise Name 1  NWBing gastroc & soleus stretch  -KM      Cueing 1  Verbal;Tactile  -KM      Reps 1  15  -KM      Time 1  10 secs  -KM         Exercise 2    Exercise Name 2  PF vs theraband  -KM      Cueing 2  Verbal;Tactile  -KM      Reps 2  30  -KM      Time 2  Gold  -KM        User Key  (r) = Recorded By, (t) = Taken By, (c) = Cosigned By    Initials Name Provider Type    Katty Martinez PTA Physical Therapy Assistant                                Outcome Measure Options: Lower Extremity Functional Scale (LEFS)         Time Calculation:   Start Time: 0605  Stop Time: 0650  Time Calculation (min): 45 min  Therapy Charges for Today     Code Description Service Date Service Provider Modifiers Qty    69726879003 HC PT ULTRASOUND EA 15 MIN 7/15/2021 Katty Collier PTA GP 1    [id number]  HC PT IONTOPHORESIS EA 15 MIN 7/15/2021 Katty Collier, PTA GP 1          PT G-Codes  Outcome Measure Options: Lower Extremity Functional Scale (LEFS)         Katty Collier PTA  7/15/2021

## 2021-07-19 ENCOUNTER — APPOINTMENT (OUTPATIENT)
Dept: PHYSICAL THERAPY | Facility: HOSPITAL | Age: 36
End: 2021-07-19

## 2021-07-20 ENCOUNTER — OFFICE VISIT (OUTPATIENT)
Dept: ORTHOPEDIC SURGERY | Facility: CLINIC | Age: 36
End: 2021-07-20

## 2021-07-20 VITALS
SYSTOLIC BLOOD PRESSURE: 156 MMHG | WEIGHT: 285 LBS | DIASTOLIC BLOOD PRESSURE: 116 MMHG | HEIGHT: 64 IN | BODY MASS INDEX: 48.65 KG/M2 | HEART RATE: 92 BPM

## 2021-07-20 DIAGNOSIS — Z98.890 S/P ACL RECONSTRUCTION: Primary | ICD-10-CM

## 2021-07-20 DIAGNOSIS — M25.562 MECHANICAL KNEE PAIN, LEFT: ICD-10-CM

## 2021-07-20 DIAGNOSIS — M17.12 PRIMARY OSTEOARTHRITIS OF LEFT KNEE: ICD-10-CM

## 2021-07-20 PROCEDURE — 99203 OFFICE O/P NEW LOW 30 MIN: CPT | Performed by: ORTHOPAEDIC SURGERY

## 2021-07-20 RX ORDER — MELOXICAM 15 MG/1
15 TABLET ORAL DAILY
Qty: 30 TABLET | Refills: 0 | Status: SHIPPED | OUTPATIENT
Start: 2021-07-20 | End: 2021-08-16

## 2021-07-20 NOTE — PROGRESS NOTES
Subjective:     Patient ID: Valery Aguilar is a 36 y.o. female.    Chief Complaint:  Left knee pain, new patient    History of Present Illness  Valery Aguilar presents to clinic today for evaluation of the left knee. She reports a history of left knee ACL surgery performed by Dr. Cheng 10 years ago. Her last MRI was in , and she recalls it showed meniscus tear. She reports she has had physical therapy, and that she has stage 4 arthritis to the knee. She takes Aleve daily for her arthritis pain. Her arthritis pain is exacerbated by ascending and descending stairs.     The patient reports that on 2021 she was getting out of bed from a prone supine position. When she extended the left leg, she heard the knee pop followed by immediate pain, tension, and tightness. She almost lost her balance. The patient notes 2 days later she visited the ER, where she was provided crutches. She rates her pain a 4 to 5/10 and describes throbbing and stabbing pain. She localizes her pain primarily to the posterior knee. She has been icing, resting, and elevating the knee. Aleve does provide some improvement. She states the knee feels bruised and tender. She has used a knee sleeve. She notes the pain has improved some. The patient notes she falls on occasion.    She takes gabapentin for occipital neuralgia.     She has Achilles tendinitis to the left foot. The patient states she is hesitant with regard to surgery. She reports a recent history of hernia surgery performed by Dr. Dolan with blood clot and abscess complication that required removal of most of the stomach. She also has MRSA and E. coli that she has to be treated for.     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    • Occupation:  [employer]   Tobacco Use   • Smoking status: Former Smoker     Packs/day: 0.50     Years: 1.00     Pack years: 0.50     Quit date:      Years since quittin.5   • Smokeless  tobacco: Never Used   Vaping Use   • Vaping Use: Never used   Substance and Sexual Activity   • Alcohol use: Yes     Comment: occasional   • Drug use: No   • Sexual activity: Defer     Partners: Male     Birth control/protection: OCP     Comment: LNMP irregular      Past Medical History:   Diagnosis Date   • Abdominal pain    • Anxiety    • Anxiety and depression 3/8/2021   • Arthritis     KNEE   • Chronic sinusitis, unspecified    • Chronic wound infection of abdomen 2/20/2019    Added automatically from request for surgery [id number]   • Depression    • GERD (gastroesophageal reflux disease)    • H/O echocardiogram     2019 EF 56% NORMAL DIASTOLIC FUNCTION NO VALVULAR DISEASE   • H/O exercise stress test     REPORTEDLY NORMAL DONE FOR CHEST PAIN DX WITH ANXIETY   • Headache, tension-type    • History of Holter monitoring     REPORTEDLY NORMAL DONE FOR CHEST PAIN DX WITH ANXIETY   • History of MRI of brain and brain stem 01/01/2017    NORMAL DONE FOR MIGRAINES   • Hypertension     PRE-ECLAMPSIA WITH BOTH CHILDREN   • Incisional hernia     SCHEDULED FOR REPAIR   • Incisional hernia, without obstruction or gangrene 10/24/2018    Added automatically from request for surgery [id number]   • Infection following a procedure, unspecified, initial encounter    • Insulin resistance     D/T PCOS   • Lobar pneumonia, unspecified organism (CMS/HCC)    • Meralgia paresthetica, left lower limb    • Metabolic syndrome    • Migraine    • MRSA (methicillin resistant Staphylococcus aureus) infection 01/2019    Abdominal wound   • Nasal congestion    • Other injury of unspecified body region, initial encounter    • Panic attack    • Panic disorder    • Papanicolaou smear 06/01/2020    NORMAL WITH NEG HPV NEVER ABN   • PCOS (polycystic ovarian syndrome)    • Pneumonia 07/2018   • PONV (postoperative nausea and vomiting)    • Right lower quadrant abdominal pain 1/20/2019   • Seasonal allergies    • Spinal headache    • Unspecified contact  dermatitis, unspecified cause    • Viral infection     UNSPECIFIED   • Vitamin D deficiency    • Wound infection 2018     Past Surgical History:   Procedure Laterality Date   • ABDOMINAL WALL ABSCESS INCISION AND DRAINAGE N/A 2018    Procedure: Debridement of abdominal wall;  Surgeon: Brigido Navarrete MD;  Location: [facility] OR;  Service: General   • ADENOIDECTOMY     •  SECTION      X2   • CHOLECYSTECTOMY      LAP   • HERNIA REPAIR     • HX OVARIAN CYSTECTOMY     • INCISION AND DRAINAGE TRUNK N/A 2018    Procedure: wound debridement, abdomen;  Surgeon: Rachel Dolan MD;  Location: [facility] OR;  Service: General   • INCISION AND DRAINAGE TRUNK N/A 2019    Procedure: WOUND CLOSURE ABDOMINAL;  Surgeon: Rachel Dolan MD;  Location: [facility] OR;  Service: General   • KNEE ACL RECONSTRUCTION Left     DR. CARRASCO   • OVARIAN CYST REMOVAL      EX LAP WITH OVARIAN CYST REMOVAL HERNIA REPAIR    • TONSILLECTOMY      T&A   • TRUNK DEBRIDEMENT N/A 2018    Procedure: Abdominal wound debridement;  Surgeon: Rachel Dolan MD;  Location: [facility] OR;  Service: General   • VENTRAL/INCISIONAL HERNIA REPAIR N/A 11/15/2018    Procedure: Lateral Component Separation Herniorrhapy Repair with Mesh, Lysis of adhesions, and skin lesion excision of Right Side;  Surgeon: Rachel Dolan MD;  Location: [facility];  Service: General       Family History   Problem Relation Age of Onset   • Stroke Mother    • Heart disease Mother    • Hypertension Mother    • Heart disease Father    • Hypertension Father    • Diabetes Father    • Dementia Maternal Grandmother    • Stroke Maternal Grandmother    • Diabetes Maternal Grandmother    • Stroke Paternal Grandmother    • Hypertension Paternal Grandmother    • Cancer Paternal Grandmother    • Heart disease Paternal Grandfather    • Diabetes Paternal Grandfather          Review of Systems   Constitutional: Negative for chills, diaphoresis, fever and  "unexpected weight change.   HENT: Negative for hearing loss, nosebleeds, sore throat and tinnitus.    Eyes: Negative for pain and visual disturbance.   Respiratory: Negative for cough, shortness of breath and wheezing.    Cardiovascular: Negative for chest pain and palpitations.   Gastrointestinal: Negative for abdominal pain, diarrhea, nausea and vomiting.   Endocrine: Negative for cold intolerance, heat intolerance and polydipsia.   Genitourinary: Negative for difficulty urinating, dysuria and hematuria.   Musculoskeletal: Positive for arthralgias, joint swelling and myalgias.   Skin: Negative for rash and wound.   Allergic/Immunologic: Negative for environmental allergies.   Neurological: Negative for dizziness, syncope and numbness.   Hematological: Does not bruise/bleed easily.   Psychiatric/Behavioral: Negative for dysphoric mood and sleep disturbance. The patient is nervous/anxious.            Objective:  Vitals:    07/20/21 1400   BP: (!) 156/116   BP Location: Left arm   Pulse: 92   Weight: 129 kg (285 lb)   Height: 162.6 cm (64\")         07/20/21  1400   Weight: 129 kg (285 lb)     Body mass index is 48.92 kg/m².  Physical Exam    Vital signs reviewed.   General: No acute distress, alert and oriented  Eyes: conjunctiva clear; pupils equally round and reactive  ENT: external ears and nose atraumatic; oropharynx clear  CV: no peripheral edema  Resp: normal respiratory effort  Skin: no rashes or wounds; normal turgor  Psych: mood and affect appropriate; recent and remote memory intact          Ortho Exam       Left Knee-     Overall valgus alignment.  Prior incisions are well healed.  ROM 0 to 125 degrees  4+/5 on flexion  4+/5 on extension  Maximal tenderness to palpation is to posteromedial and posterolateral aspects of the knee.     Effusion- Moderate  Grade 2A Lachman  Anterior drawer- Positive 1+, moderate end point  Posterior drawer- Negative  Stable opening on varus and valgus stress at 0 and " 30  Active patellar compression test- Positive with significant patellofemoral crepitus    Halie- Positive, both medial and lateral joint line with associated pain and click     Positive sensation light touch all distributions symmetric to contralateral side  Brisk cap refill all digits  1+ dorsalis pedis pulse    Imaging:  XR Knee 3 View Left    Result Date: 7/6/2021  Impression: Significant degenerative change and evidence of previous ACL surgery. No acute bony abnormality. Signer Name: Olena Dinh MD  Signed: 7/6/2021 8:29 PM  Workstation Name: [facility]  [facility]    Review of outside x-rays left knee indicate moderate tricompartmental arthritic change of the left knee with osteophyte formation noted medial lateral and patellofemoral joint and moderate valgus alignment, retained screws from prior ACL reconstruction are noted.  No evidence of acute fracture.      Assessment:        1. S/P ACL reconstruction    2. Primary osteoarthritis of left knee    3. Mechanical knee pain, left           Plan:          1. Discussed treatment options at length with patient at today's visit.  2. We will order MRI of the left knee to assess her meniscus and the status of her cartilage.  3. She is referred to physical therapy for the left knee with Johan Cox PT.  4. We will prescribe meloxicam once a day. She will stop an other anti-inflammatories, such as Aleve.  5. She can continue with her current knee sleeve.   6. The patient will follow up after the MRI is complete. We will consider whether injection or other bracing options are appropriate once we review results.       Valery Aguilar was in agreement with plan and had all questions answered.     Orders:  Orders Placed This Encounter   Procedures   • MRI Knee Left Without Contrast   • Ambulatory Referral to Physical Therapy Evaluate and treat, Ortho       Medications:  New Medications Ordered This Visit   Medications   • meloxicam  (MOBIC) 15 MG tablet     Sig: Take 1 tablet by mouth Daily.     Dispense:  30 tablet     Refill:  0       Followup:  Return for review of MRI results.    Diagnoses and all orders for this visit:    1. S/P ACL reconstruction (Primary)  -     MRI Knee Left Without Contrast; Future  -     Ambulatory Referral to Physical Therapy Evaluate and treat, Ortho    2. Primary osteoarthritis of left knee  -     MRI Knee Left Without Contrast; Future  -     Ambulatory Referral to Physical Therapy Evaluate and treat, Ortho    3. Mechanical knee pain, left  -     MRI Knee Left Without Contrast; Future  -     Ambulatory Referral to Physical Therapy Evaluate and treat, Ortho    Other orders  -     meloxicam (MOBIC) 15 MG tablet; Take 1 tablet by mouth Daily.  Dispense: 30 tablet; Refill: 0          Dictated utilizing Dragon dictation     Scribed for Patrick Napier MD by Swati El.  07/20/21   17:34 EDT    I have personally performed the services described in this document as scribed by the above individual, and it is both accurate and complete.  Patrick Napier MD  7/21/2021  17:21 EDT

## 2021-07-22 ENCOUNTER — HOSPITAL ENCOUNTER (OUTPATIENT)
Dept: PHYSICAL THERAPY | Facility: HOSPITAL | Age: 36
Setting detail: THERAPIES SERIES
Discharge: HOME | End: 2021-07-22

## 2021-07-22 DIAGNOSIS — M76.62 TENDONITIS, ACHILLES, LEFT: Primary | ICD-10-CM

## 2021-07-22 PROCEDURE — 97035 APP MDLTY 1+ULTRASOUND EA 15: CPT

## 2021-07-22 PROCEDURE — 97033 APP MDLTY 1+IONTPHRSIS EA 15: CPT

## 2021-07-22 NOTE — THERAPY TREATMENT NOTE
Outpatient Physical Therapy Ortho Treatment Note  HEBER Casillas     Patient Name: Valery Aguilar  : 1985  MRN: [id number]  Today's Date: 2021      Visit Date: 2021    Visit Dx:    ICD-10-CM ICD-9-CM   1. Tendonitis, Achilles, left  M76.62 726.71       Patient Active Problem List   Diagnosis   • Chronic migraine without aura without status migrainosus, not intractable   • Cervicogenic headache   • Secondary oligomenorrhea   • Family history of breast cancer   • Obstructive sleep apnea, adult   • GERD with apnea   • Vitamin D deficiency   • PCOS (polycystic ovarian syndrome)   • Panic disorder   • Insulin resistance   • Essential hypertension   • GERD (gastroesophageal reflux disease)   • Anxiety and depression   • Tendonitis, Achilles, left   • S/P ACL reconstruction   • Primary osteoarthritis of left knee   • Mechanical knee pain, left        Past Medical History:   Diagnosis Date   • Abdominal pain    • Anxiety    • Anxiety and depression 3/8/2021   • Arthritis     KNEE   • Chronic sinusitis, unspecified    • Chronic wound infection of abdomen 2019    Added automatically from request for surgery [id number]   • Depression    • GERD (gastroesophageal reflux disease)    • H/O echocardiogram      EF 56% NORMAL DIASTOLIC FUNCTION NO VALVULAR DISEASE   • H/O exercise stress test     REPORTEDLY NORMAL DONE FOR CHEST PAIN DX WITH ANXIETY   • Headache, tension-type    • History of Holter monitoring     REPORTEDLY NORMAL DONE FOR CHEST PAIN DX WITH ANXIETY   • History of MRI of brain and brain stem 2017    NORMAL DONE FOR MIGRAINES   • Hypertension     PRE-ECLAMPSIA WITH BOTH CHILDREN   • Incisional hernia     SCHEDULED FOR REPAIR   • Incisional hernia, without obstruction or gangrene 10/24/2018    Added automatically from request for surgery [id number]   • Infection following a procedure, unspecified, initial encounter    • Insulin resistance     D/T PCOS   • Lobar pneumonia, unspecified  organism (CMS/HCC)    • Meralgia paresthetica, left lower limb    • Metabolic syndrome    • Migraine    • MRSA (methicillin resistant Staphylococcus aureus) infection 2019    Abdominal wound   • Nasal congestion    • Other injury of unspecified body region, initial encounter    • Panic attack    • Panic disorder    • Papanicolaou smear 2020    NORMAL WITH NEG HPV NEVER ABN   • PCOS (polycystic ovarian syndrome)    • Pneumonia 2018   • PONV (postoperative nausea and vomiting)    • Right lower quadrant abdominal pain 2019   • Seasonal allergies    • Spinal headache    • Unspecified contact dermatitis, unspecified cause    • Viral infection     UNSPECIFIED   • Vitamin D deficiency    • Wound infection 2018        Past Surgical History:   Procedure Laterality Date   • ABDOMINAL WALL ABSCESS INCISION AND DRAINAGE N/A 2018    Procedure: Debridement of abdominal wall;  Surgeon: Brigido Navarrete MD;  Location: [facility] OR;  Service: General   • ADENOIDECTOMY     •  SECTION      X2   • CHOLECYSTECTOMY      LAP   • HERNIA REPAIR     • HX OVARIAN CYSTECTOMY     • INCISION AND DRAINAGE TRUNK N/A 2018    Procedure: wound debridement, abdomen;  Surgeon: Rachel Dolan MD;  Location: [facility] OR;  Service: General   • INCISION AND DRAINAGE TRUNK N/A 2019    Procedure: WOUND CLOSURE ABDOMINAL;  Surgeon: Rachel Dolan MD;  Location: [facility] OR;  Service: General   • KNEE ACL RECONSTRUCTION Left     DR. CARRASCO   • OVARIAN CYST REMOVAL      EX LAP WITH OVARIAN CYST REMOVAL HERNIA REPAIR    • TONSILLECTOMY      T&A   • TRUNK DEBRIDEMENT N/A 2018    Procedure: Abdominal wound debridement;  Surgeon: Rachel Dolan MD;  Location: [facility] OR;  Service: General   • VENTRAL/INCISIONAL HERNIA REPAIR N/A 11/15/2018    Procedure: Lateral Component Separation Herniorrhapy Repair with Mesh, Lysis of adhesions, and skin lesion excision of Right Side;  Surgeon: Rachel Dolan  MD BERNARDO;  Location: [facility] OR;  Service: General                       PT Assessment/Plan     Row Name 07/22/21 0700          PT Assessment    Assessment Comments  Pt with improved tolerance to ionto.   -KM        PT Plan    PT Plan Comments  Continue per POC  -KM       User Key  (r) = Recorded By, (t) = Taken By, (c) = Cosigned By    Initials Name Provider Type    Katty Martinez PTA Physical Therapy Assistant          Modalities     Row Name 07/22/21 0700             Ultrasound 32425    Location  left Achilles insertion  -KM      Duty Cycle  50  -KM      Frequency  3.0 MHz  -KM      Intensity - Wts/cm  1.5  -KM         Iontophoresis 80803    Milliamps  3  -KM      MA/Min  40  -KM      Dexamethasone used  Yes  -KM      Patch Type  Medium left Achilles insertion  -KM         Functional Testing    Outcome Measure Options  Lower Extremity Functional Scale (LEFS)  -KM        User Key  (r) = Recorded By, (t) = Taken By, (c) = Cosigned By    Initials Name Provider Type    Katty Martinez PTA Physical Therapy Assistant        OP Exercises     Row Name 07/22/21 0700             Subjective Comments    Subjective Comments  Pt states her ankle continues to improve; states she is waiting to schedule an MRI for her knee and will have orders for PT.  -KM         Exercise 1    Exercise Name 1  NWBing gastroc & soleus stretch  -KM      Cueing 1  Verbal;Tactile  -KM      Reps 1  15  -KM      Time 1  10 secs  -KM         Exercise 2    Exercise Name 2  PF vs theraband  -KM      Cueing 2  Verbal;Tactile  -KM      Reps 2  30  -KM      Time 2  Gold  -KM        User Key  (r) = Recorded By, (t) = Taken By, (c) = Cosigned By    Initials Name Provider Type    Katty Martinez PTA Physical Therapy Assistant                                Outcome Measure Options: Lower Extremity Functional Scale (LEFS)         Time Calculation:   Start Time: 0700  Stop Time: 0740  Time Calculation (min): 40 min  Therapy Charges for Today     Code  Description Service Date Service Provider Modifiers Qty    10250216912 HC PT ULTRASOUND EA 15 MIN 7/22/2021 Katty Collier, BRET GP 1    97759119619 HC PT IONTOPHORESIS EA 15 MIN 7/22/2021 Katty Collier PTA GP 1          PT G-Codes  Outcome Measure Options: Lower Extremity Functional Scale (LEFS)         Katty Collier PTA  7/22/2021

## 2021-07-27 ENCOUNTER — HOSPITAL ENCOUNTER (OUTPATIENT)
Dept: PHYSICAL THERAPY | Facility: HOSPITAL | Age: 36
Setting detail: THERAPIES SERIES
Discharge: HOME | End: 2021-07-27

## 2021-07-27 DIAGNOSIS — M17.12 PRIMARY OSTEOARTHRITIS OF LEFT KNEE: Primary | ICD-10-CM

## 2021-07-27 DIAGNOSIS — M25.562 MECHANICAL KNEE PAIN, LEFT: ICD-10-CM

## 2021-07-27 PROCEDURE — 97161 PT EVAL LOW COMPLEX 20 MIN: CPT | Performed by: PHYSICAL THERAPIST

## 2021-07-27 NOTE — THERAPY EVALUATION
Outpatient Physical Therapy Ortho Initial Evaluation   Angelina Werner     Patient Name: Valery Aguilar  : 1985  MRN: [id number]  Today's Date: 2021      Visit Date: 2021    Patient Active Problem List   Diagnosis   • Chronic migraine without aura without status migrainosus, not intractable   • Cervicogenic headache   • Secondary oligomenorrhea   • Family history of breast cancer   • Obstructive sleep apnea, adult   • GERD with apnea   • Vitamin D deficiency   • PCOS (polycystic ovarian syndrome)   • Panic disorder   • Insulin resistance   • Essential hypertension   • GERD (gastroesophageal reflux disease)   • Anxiety and depression   • Tendonitis, Achilles, left   • S/P ACL reconstruction   • Primary osteoarthritis of left knee   • Mechanical knee pain, left        Past Medical History:   Diagnosis Date   • Abdominal pain    • Anxiety    • Anxiety and depression 3/8/2021   • Arthritis     KNEE   • Chronic sinusitis, unspecified    • Chronic wound infection of abdomen 2019    Added automatically from request for surgery [id number]   • Depression    • GERD (gastroesophageal reflux disease)    • H/O echocardiogram      EF 56% NORMAL DIASTOLIC FUNCTION NO VALVULAR DISEASE   • H/O exercise stress test     REPORTEDLY NORMAL DONE FOR CHEST PAIN DX WITH ANXIETY   • Headache, tension-type    • History of Holter monitoring     REPORTEDLY NORMAL DONE FOR CHEST PAIN DX WITH ANXIETY   • History of MRI of brain and brain stem 2017    NORMAL DONE FOR MIGRAINES   • Hypertension     PRE-ECLAMPSIA WITH BOTH CHILDREN   • Incisional hernia     SCHEDULED FOR REPAIR   • Incisional hernia, without obstruction or gangrene 10/24/2018    Added automatically from request for surgery [id number]   • Infection following a procedure, unspecified, initial encounter    • Insulin resistance     D/T PCOS   • Lobar pneumonia, unspecified organism (CMS/HCC)    • Meralgia paresthetica, left lower limb    • Metabolic syndrome     • Migraine    • MRSA (methicillin resistant Staphylococcus aureus) infection 2019    Abdominal wound   • Nasal congestion    • Other injury of unspecified body region, initial encounter    • Panic attack    • Panic disorder    • Papanicolaou smear 2020    NORMAL WITH NEG HPV NEVER ABN   • PCOS (polycystic ovarian syndrome)    • Pneumonia 2018   • PONV (postoperative nausea and vomiting)    • Right lower quadrant abdominal pain 2019   • Seasonal allergies    • Spinal headache    • Unspecified contact dermatitis, unspecified cause    • Viral infection     UNSPECIFIED   • Vitamin D deficiency    • Wound infection 2018        Past Surgical History:   Procedure Laterality Date   • ABDOMINAL WALL ABSCESS INCISION AND DRAINAGE N/A 2018    Procedure: Debridement of abdominal wall;  Surgeon: Brigido Navarrete MD;  Location: [facility] OR;  Service: General   • ADENOIDECTOMY     •  SECTION      X2   • CHOLECYSTECTOMY      LAP   • HERNIA REPAIR     • HX OVARIAN CYSTECTOMY     • INCISION AND DRAINAGE TRUNK N/A 2018    Procedure: wound debridement, abdomen;  Surgeon: Rachel Dolan MD;  Location: [facility] OR;  Service: General   • INCISION AND DRAINAGE TRUNK N/A 2019    Procedure: WOUND CLOSURE ABDOMINAL;  Surgeon: Rachel Dolan MD;  Location: [facility] OR;  Service: General   • KNEE ACL RECONSTRUCTION Left     DR. CARRASCO   • OVARIAN CYST REMOVAL      EX LAP WITH OVARIAN CYST REMOVAL HERNIA REPAIR    • TONSILLECTOMY      T&A   • TRUNK DEBRIDEMENT N/A 2018    Procedure: Abdominal wound debridement;  Surgeon: Rachel Dolan MD;  Location: [facility] OR;  Service: General   • VENTRAL/INCISIONAL HERNIA REPAIR N/A 11/15/2018    Procedure: Lateral Component Separation Herniorrhapy Repair with Mesh, Lysis of adhesions, and skin lesion excision of Right Side;  Surgeon: Rachel Dolan MD;  Location: [facility] OR;  Service: General       Visit Dx:     ICD-10-CM ICD-9-CM  "  1. Primary osteoarthritis of left knee  M17.12 715.16   2. Mechanical knee pain, left  M25.562 719.46         Patient History     Row Name 07/27/21 0700             History    Chief Complaint  Difficulty Walking;Difficulty with daily activities;Pain;Muscle weakness  -      Type of Pain  Knee pain left  -      Brief Description of Current Complaint  Pt states her knee \"popped\" several weeks ago when getting out of bed. She had several days where she used crutches due to pain with weight bearing. She was seen by Dr. Napier who took x-rays and found them to be normal. She is scheduled for MRI 8/10. She was placed in a PSO and referred for therapy.  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  Pt c/o pain going up/down stairs, going sit to stand after sitting for long periods  -      What Performance Factors Make the Current Problem(s) BETTER?  Pt has no real pain at rest  -      Difficulties at work?  Pt has difficulty being on her feet for long periods  -      Difficulties with ADL's?  Pt has pain with going up/down stairs  -         Daily Activities    Primary Language  English  -      Are you able to read  Yes  -      Are you able to write  Yes  -      How does patient learn best?  Listening  -      Teaching needs identified  Home Exercise Program;Management of Condition  -      Patient is concerned about/has problems with  " Flexibility;Performing home management (household chores, shopping, care of dependents);Performing job responsibilities/community activities (work, school,;Performing sports, recreation, and play activities;Walking;Standing  -GC      Does patient have problems with the following?  Depression;Anxiety;Panic Attack;Other (comment) emotional problems  -GC      Barriers to learning  None  -GC      Functional Status  mobility issues preventing performance of daily activities  -GC      Pt Participated in POC and Goals  Yes  -GC         Safety    Are you being hurt, hit, or frightened by anyone at home or in your life?  No  -GC      Are you being neglected by a caregiver  No  -GC        User Key  (r) = Recorded By, (t) = Taken By, (c) = Cosigned By    Initials Name Provider Type    GC Johan Cox, PT Physical Therapist          PT Ortho     Row Name 07/27/21 0700       Posture/Observations    Posture/Observations Comments  Pt initially seen wearing PSO on left knee. She has mild effusion left knee.  -GC       Knee Palpation    Patella  Left:;Tender  -GC    Medial Joint Line  Left:;Tender  -GC       Patellar Accessory Motions    Superior glide  Left:;WNL  -GC    Inferior glide  Left:;WNL  -GC    Medial glide  Left:;WNL  -GC    Lateral glide  Left:;WNL  -GC       Knee Special Tests    Anterior drawer (ACL lesion)  Left:;Negative  -GC    Valgus stress (MCL lesion)  Left:;Negative  -GC    Varus stress (LCL lesion)  Left:;Negative  -GC    Halie’s test (meniscal lesion)  Left:;Positive  -GC    Bounce home test (meniscal lesion)  Left:;Positive  -GC    Patellar grind test (chondromalacia patella)  Left:;Positive  -GC       Left Lower Ext    Lt Knee Extension/Flexion AROM  0-5-124 degrees  -GC       MMT Left Lower Ext    Lt Hip Flexion MMT, Gross Movement  (4/5) good  -GC    Lt Hip Extension MMT, Gross Movement  (4+/5) good plus  -GC    Lt Hip ABduction MMT, Gross Movement  (4+/5) good plus  -GC    Lt Hip ADduction MMT, Gross  Movement  (4/5) good  -GC    Lt Knee Extension MMT, Gross Movement  (4/5) good  -GC    Lt Knee Flexion MMT, Gross Movement  (4/5) good  -GC       Sensation    Light Touch  No apparent deficits  -       Lower Extremity Flexibility    Hamstrings  Left:;Moderately limited  -GC    Hip Flexors  Left:;Mildly limited  -GC    Quadriceps  Left:;Mildly limited  -GC    ITB  Left:;WNL  -GC       Transfers    Comment (Transfers)  Pt is independent with all bed mobility and transfers  -       Gait/Stairs (Locomotion)    Comment (Gait/Stairs)  Pt ambulates with mild antalgic gait left LE wearing a PSO on her left knee  -      User Key  (r) = Recorded By, (t) = Taken By, (c) = Cosigned By    Initials Name Provider Type    Johan Enrique, PT Physical Therapist                      Therapy Education  Given: HEP, Symptoms/condition management, Pain management  Program: New  How Provided: Verbal, Demonstration  Provided to: Patient  Level of Understanding: Teach back education performed, Verbalized, Demonstrated     PT OP Goals     Row Name 07/27/21 0700          PT Short Term Goals    STG Date to Achieve  08/10/21  -     STG 1  Decrease left knee pain to 3-4/10 with activity.  -     STG 2  Increase left knee AROM to 0-130 degrees with testing.  -     STG 3  Increase left LE strength to at least 4+/5 all planes with testing.  -     STG 4  Pt will be independent with her HEP issued by this therapist.  -        Long Term Goals    LTG Date to Achieve  08/24/21  -     LTG 1  Decrease left knee pain to 0-1/10 with activity.  -     LTG 2  Increase left LE strength to 5/5 all planes with testing.  -     LTG 3  Pt will ambulate normally on levels and stairs.  -     LTG 4  Pt will be independent with all ADLs and have a LEFS score > 65.  -        Time Calculation    PT Goal Re-Cert Due Date  08/24/21  -       User Key  (r) = Recorded By, (t) = Taken By, (c) = Cosigned By    Initials Name Provider Type      "Johan Cox, PT Physical Therapist          PT Assessment/Plan     Row Name 07/27/21 0700          PT Assessment    Functional Limitations  Impaired gait;Limitation in home management;Limitations in community activities;Limitations in functional capacity and performance;Performance in leisure activities;Performance in work activities  -     Impairments  Gait;Impaired flexibility;Range of motion;Peripheral nerve integrity;Muscle strength  -     Assessment Comments  Pt presents freya a several week history of left knee pain following a \"pop\" in her knee. She has decreased left knee ROM, decreased left LE strength, decreased left LE flexibility, decreased ambulatory status, and decreased function secondary to the above.  -     Rehab Potential  Good  -GC     Patient/caregiver participated in establishment of treatment plan and goals  Yes  -     Patient would benefit from skilled therapy intervention  Yes  -GC        PT Plan    PT Frequency  1x/week;2x/week  -     Predicted Duration of Therapy Intervention (PT)  4 weeks  -     Planned CPT's?  PT EVAL LOW COMPLEXITY: 09814;PT THER PROC EA 15 MIN: 46118;PT HOT OR COLD PACK TREAT MCARE;PT ELECTRICAL STIM UNATTEND:   -     PT Plan Comments  Pt is to continue her HEP daily.  -       User Key  (r) = Recorded By, (t) = Taken By, (c) = Cosigned By    Initials Name Provider Type     Johan Cox, PT Physical Therapist            OP Exercises     Row Name 07/27/21 0700             Exercise 3    Exercise Name 3  Hamstring stretch  -GC      Cueing 3  Verbal;Tactile  -GC      Reps 3  15  -GC      Time 3  10 secs  -GC         Exercise 4    Exercise Name 4  QS with Russian Stim  -GC      Cueing 4  Verbal;Tactile  -GC      Time 4  10 min 10/10  -GC         Exercise 5    Exercise Name 5  SLR  -GC      Cueing 5  Verbal;Tactile  -GC      Reps 5  25  -GC         Exercise 6    Exercise Name 6  Hip ADD  -GC      Cueing 6  Verbal;Tactile  -GC      Reps 6  25  -GC   "       Exercise 7    Exercise Name 7  Hip ABD  -GC      Cueing 7  Verbal;Tactile  -GC      Reps 7  25  -GC         Exercise 8    Exercise Name 8  SAQ  -GC      Cueing 8  Verbal;Tactile  -GC      Reps 8  50  -GC         Exercise 9    Exercise Name 9  LAQ/ball squeeze  -GC      Cueing 9  Verbal;Tactile  -GC      Reps 9  50  -GC        User Key  (r) = Recorded By, (t) = Taken By, (c) = Cosigned By    Initials Name Provider Type    Johan Enrique PT Physical Therapist                        Outcome Measure Options: Lower Extremity Functional Scale (LEFS) (left knee)  Lower Extremity Functional Index  Any of your usual work, housework or school activities: A little bit of difficulty  Your usual hobbies, recreational or sporting activities: Quite a bit of difficulty  Getting into or out of the bath: Moderate difficulty  Walking between rooms: A little bit of difficulty  Putting on your shoes or socks: Moderate difficulty  Squatting: Quite a bit of difficulty  Lifting an object, like a bag of groceries from the floor: Quite a bit of difficulty  Performing light activities around your home: A little bit of difficulty  Performing heavy activities around your home: Moderate difficulty  Getting into or out of a car: Moderate difficulty  Walking 2 blocks: Moderate difficulty  Walking a mile: Quite a bit of difficulty  Going up or down 10 stairs (about 1 flight of stairs): Quite a bit of difficulty  Standing for 1 hour: Moderate difficulty  Sitting for 1 hour: Moderate difficulty  Running on even ground: Extreme difficulty or unable to perform activity  Running on uneven ground: Extreme difficulty or unable to perform activity  Making sharp turns while running fast: Extreme difficulty or unable to perform activity  Hopping: Quite a bit of difficulty  Rolling over in bed: A little bit of difficulty  Total: 32      Time Calculation:     Start Time: 0700  Stop Time: 0753  Time Calculation (min): 53 min     Therapy Charges for  Today     Code Description Service Date Service Provider Modifiers Qty    47794136521 HC PT EVAL LOW COMPLEXITY 3 7/27/2021 Johan Cox, PT GP 1          PT G-Codes  Outcome Measure Options: Lower Extremity Functional Scale (LEFS) (left knee)  Total: 32         Johan Cox, PT  7/27/2021

## 2021-07-29 ENCOUNTER — HOSPITAL ENCOUNTER (OUTPATIENT)
Dept: PHYSICAL THERAPY | Facility: HOSPITAL | Age: 36
Setting detail: THERAPIES SERIES
Discharge: HOME | End: 2021-07-29

## 2021-07-29 DIAGNOSIS — M17.12 PRIMARY OSTEOARTHRITIS OF LEFT KNEE: Primary | ICD-10-CM

## 2021-07-29 DIAGNOSIS — M25.562 MECHANICAL KNEE PAIN, LEFT: ICD-10-CM

## 2021-07-29 PROCEDURE — 97110 THERAPEUTIC EXERCISES: CPT | Performed by: PHYSICAL THERAPIST

## 2021-07-29 NOTE — THERAPY TREATMENT NOTE
Outpatient Physical Therapy Ortho Treatment Note  HEBER Casillas     Patient Name: Valery Aguilar  : 1985  MRN: [id number]  Today's Date: 2021      Visit Date: 2021    Visit Dx:    ICD-10-CM ICD-9-CM   1. Primary osteoarthritis of left knee  M17.12 715.16   2. Mechanical knee pain, left  M25.562 719.46       Patient Active Problem List   Diagnosis   • Chronic migraine without aura without status migrainosus, not intractable   • Cervicogenic headache   • Secondary oligomenorrhea   • Family history of breast cancer   • Obstructive sleep apnea, adult   • GERD with apnea   • Vitamin D deficiency   • PCOS (polycystic ovarian syndrome)   • Panic disorder   • Insulin resistance   • Essential hypertension   • GERD (gastroesophageal reflux disease)   • Anxiety and depression   • Tendonitis, Achilles, left   • S/P ACL reconstruction   • Primary osteoarthritis of left knee   • Mechanical knee pain, left        Past Medical History:   Diagnosis Date   • Abdominal pain    • Anxiety    • Anxiety and depression 3/8/2021   • Arthritis     KNEE   • Chronic sinusitis, unspecified    • Chronic wound infection of abdomen 2019    Added automatically from request for surgery [id number]   • Depression    • GERD (gastroesophageal reflux disease)    • H/O echocardiogram      EF 56% NORMAL DIASTOLIC FUNCTION NO VALVULAR DISEASE   • H/O exercise stress test     REPORTEDLY NORMAL DONE FOR CHEST PAIN DX WITH ANXIETY   • Headache, tension-type    • History of Holter monitoring     REPORTEDLY NORMAL DONE FOR CHEST PAIN DX WITH ANXIETY   • History of MRI of brain and brain stem 2017    NORMAL DONE FOR MIGRAINES   • Hypertension     PRE-ECLAMPSIA WITH BOTH CHILDREN   • Incisional hernia     SCHEDULED FOR REPAIR   • Incisional hernia, without obstruction or gangrene 10/24/2018    Added automatically from request for surgery [id number]   • Infection following a procedure, unspecified, initial encounter    • Insulin  resistance     D/T PCOS   • Lobar pneumonia, unspecified organism (CMS/HCC)    • Meralgia paresthetica, left lower limb    • Metabolic syndrome    • Migraine    • MRSA (methicillin resistant Staphylococcus aureus) infection 2019    Abdominal wound   • Nasal congestion    • Other injury of unspecified body region, initial encounter    • Panic attack    • Panic disorder    • Papanicolaou smear 2020    NORMAL WITH NEG HPV NEVER ABN   • PCOS (polycystic ovarian syndrome)    • Pneumonia 2018   • PONV (postoperative nausea and vomiting)    • Right lower quadrant abdominal pain 2019   • Seasonal allergies    • Spinal headache    • Unspecified contact dermatitis, unspecified cause    • Viral infection     UNSPECIFIED   • Vitamin D deficiency    • Wound infection 2018        Past Surgical History:   Procedure Laterality Date   • ABDOMINAL WALL ABSCESS INCISION AND DRAINAGE N/A 2018    Procedure: Debridement of abdominal wall;  Surgeon: Brigido Navarrete MD;  Location: [facility] OR;  Service: General   • ADENOIDECTOMY     •  SECTION      X2   • CHOLECYSTECTOMY      LAP   • HERNIA REPAIR     • HX OVARIAN CYSTECTOMY     • INCISION AND DRAINAGE TRUNK N/A 2018    Procedure: wound debridement, abdomen;  Surgeon: Rachel Dolan MD;  Location: [facility] OR;  Service: General   • INCISION AND DRAINAGE TRUNK N/A 2019    Procedure: WOUND CLOSURE ABDOMINAL;  Surgeon: Rachel Dolan MD;  Location: [facility] OR;  Service: General   • KNEE ACL RECONSTRUCTION Left     DR. CARRASCO   • OVARIAN CYST REMOVAL      EX LAP WITH OVARIAN CYST REMOVAL HERNIA REPAIR    • TONSILLECTOMY      T&A   • TRUNK DEBRIDEMENT N/A 2018    Procedure: Abdominal wound debridement;  Surgeon: Rachel Dolan MD;  Location: [facility] OR;  Service: General   • VENTRAL/INCISIONAL HERNIA REPAIR N/A 11/15/2018    Procedure: Lateral Component Separation Herniorrhapy Repair with Mesh, Lysis of adhesions, and skin  lesion excision of Right Side;  Surgeon: Rachel Dolan MD;  Location: [facility] OR;  Service: General                       PT Assessment/Plan     Row Name 07/29/21 0700          PT Assessment    Assessment Comments  Pt tolerated her exercise progression well.  -GC        PT Plan    PT Plan Comments  Pt is to continue her HEP daily.  -GC       User Key  (r) = Recorded By, (t) = Taken By, (c) = Cosigned By    Initials Name Provider Type    GC Johan Cox, PT Physical Therapist            OP Exercises     Row Name 07/29/21 0700             Subjective Comments    Subjective Comments  Pt states her knee is a little sore.  -GC         Exercise 3    Exercise Name 3  Hamstring stretch  -GC      Cueing 3  Verbal;Tactile  -GC      Reps 3  15  -GC      Time 3  10 secs  -GC         Exercise 4    Exercise Name 4  QS with Russian Stim  -GC      Cueing 4  Verbal;Tactile  -GC      Time 4  10 min 10/10  -GC         Exercise 5    Exercise Name 5  SLR  -GC      Cueing 5  Verbal;Tactile  -GC      Reps 5  25  -GC      Time 5  1#  -GC         Exercise 6    Exercise Name 6  Hip ADD  -GC      Cueing 6  Verbal;Tactile  -GC      Reps 6  25  -GC      Time 6  1#  -GC         Exercise 7    Exercise Name 7  Hip ABD  -GC      Cueing 7  Verbal;Tactile  -GC      Reps 7  25  -GC      Time 7  1#  -GC         Exercise 8    Exercise Name 8  SAQ  -GC      Cueing 8  Verbal;Tactile  -GC      Reps 8  50  -GC      Time 8  1#  -GC         Exercise 9    Exercise Name 9  LAQ/ball squeeze  -GC      Cueing 9  Verbal;Tactile  -GC      Reps 9  50  -GC      Time 9  1#  -GC         Exercise 10    Exercise Name 10  TKE vs theraband  -GC      Cueing 10  Verbal;Demo  -GC      Reps 10  50  -GC      Time 10  silver  -GC        User Key  (r) = Recorded By, (t) = Taken By, (c) = Cosigned By    Initials Name Provider Type    GC Johan Cox, PT Physical Therapist                                          Time Calculation:   Start Time: 0700  Stop Time: 0747  Time  Calculation (min): 47 min  Therapy Charges for Today     Code Description Service Date Service Provider Modifiers Qty    74438199506 HC PT THER PROC EA 15 MIN 7/29/2021 Johan Cox, PT GP 2                    Johan Cox, PT  7/29/2021

## 2021-08-03 ENCOUNTER — HOSPITAL ENCOUNTER (OUTPATIENT)
Dept: PHYSICAL THERAPY | Facility: HOSPITAL | Age: 36
Setting detail: THERAPIES SERIES
Discharge: HOME | End: 2021-08-03

## 2021-08-03 DIAGNOSIS — M17.12 PRIMARY OSTEOARTHRITIS OF LEFT KNEE: Primary | ICD-10-CM

## 2021-08-03 PROCEDURE — 97110 THERAPEUTIC EXERCISES: CPT

## 2021-08-03 NOTE — THERAPY TREATMENT NOTE
Outpatient Physical Therapy Ortho Treatment Note  HEBER Casillas     Patient Name: Valery Aguilar  : 1985  MRN: [id number]  Today's Date: 8/3/2021      Visit Date: 2021    Visit Dx:    ICD-10-CM ICD-9-CM   1. Primary osteoarthritis of left knee  M17.12 715.16       Patient Active Problem List   Diagnosis   • Chronic migraine without aura without status migrainosus, not intractable   • Cervicogenic headache   • Secondary oligomenorrhea   • Family history of breast cancer   • Obstructive sleep apnea, adult   • GERD with apnea   • Vitamin D deficiency   • PCOS (polycystic ovarian syndrome)   • Panic disorder   • Insulin resistance   • Essential hypertension   • GERD (gastroesophageal reflux disease)   • Anxiety and depression   • Tendonitis, Achilles, left   • S/P ACL reconstruction   • Primary osteoarthritis of left knee   • Mechanical knee pain, left        Past Medical History:   Diagnosis Date   • Abdominal pain    • Anxiety    • Anxiety and depression 3/8/2021   • Arthritis     KNEE   • Chronic sinusitis, unspecified    • Chronic wound infection of abdomen 2019    Added automatically from request for surgery [id number]   • Depression    • GERD (gastroesophageal reflux disease)    • H/O echocardiogram      EF 56% NORMAL DIASTOLIC FUNCTION NO VALVULAR DISEASE   • H/O exercise stress test     REPORTEDLY NORMAL DONE FOR CHEST PAIN DX WITH ANXIETY   • Headache, tension-type    • History of Holter monitoring     REPORTEDLY NORMAL DONE FOR CHEST PAIN DX WITH ANXIETY   • History of MRI of brain and brain stem 2017    NORMAL DONE FOR MIGRAINES   • Hypertension     PRE-ECLAMPSIA WITH BOTH CHILDREN   • Incisional hernia     SCHEDULED FOR REPAIR   • Incisional hernia, without obstruction or gangrene 10/24/2018    Added automatically from request for surgery [id number]   • Infection following a procedure, unspecified, initial encounter    • Insulin resistance     D/T PCOS   • Lobar pneumonia,  unspecified organism (CMS/HCC)    • Meralgia paresthetica, left lower limb    • Metabolic syndrome    • Migraine    • MRSA (methicillin resistant Staphylococcus aureus) infection 2019    Abdominal wound   • Nasal congestion    • Other injury of unspecified body region, initial encounter    • Panic attack    • Panic disorder    • Papanicolaou smear 2020    NORMAL WITH NEG HPV NEVER ABN   • PCOS (polycystic ovarian syndrome)    • Pneumonia 2018   • PONV (postoperative nausea and vomiting)    • Right lower quadrant abdominal pain 2019   • Seasonal allergies    • Spinal headache    • Unspecified contact dermatitis, unspecified cause    • Viral infection     UNSPECIFIED   • Vitamin D deficiency    • Wound infection 2018        Past Surgical History:   Procedure Laterality Date   • ABDOMINAL WALL ABSCESS INCISION AND DRAINAGE N/A 2018    Procedure: Debridement of abdominal wall;  Surgeon: Brigido Navarrete MD;  Location: [facility] OR;  Service: General   • ADENOIDECTOMY     •  SECTION      X2   • CHOLECYSTECTOMY      LAP   • HERNIA REPAIR     • HX OVARIAN CYSTECTOMY     • INCISION AND DRAINAGE TRUNK N/A 2018    Procedure: wound debridement, abdomen;  Surgeon: Rachel Dolan MD;  Location: [facility] OR;  Service: General   • INCISION AND DRAINAGE TRUNK N/A 2019    Procedure: WOUND CLOSURE ABDOMINAL;  Surgeon: Rachel Dolan MD;  Location: [facility] OR;  Service: General   • KNEE ACL RECONSTRUCTION Left     DR. CARRASCO   • OVARIAN CYST REMOVAL      EX LAP WITH OVARIAN CYST REMOVAL HERNIA REPAIR    • TONSILLECTOMY      T&A   • TRUNK DEBRIDEMENT N/A 2018    Procedure: Abdominal wound debridement;  Surgeon: Rachel Dolan MD;  Location: [facility] OR;  Service: General   • VENTRAL/INCISIONAL HERNIA REPAIR N/A 11/15/2018    Procedure: Lateral Component Separation Herniorrhapy Repair with Mesh, Lysis of adhesions, and skin lesion excision of Right Side;  Surgeon:  Rachel Dolan MD;  Location: [facility];  Service: General                       PT Assessment/Plan     Row Name 08/03/21 0600          PT Assessment    Assessment Comments  Pt with improved tolerance to treatment plan  -KM        PT Plan    PT Plan Comments  Continue per POC  -KM       User Key  (r) = Recorded By, (t) = Taken By, (c) = Cosigned By    Initials Name Provider Type    Katty Martinez PTA Physical Therapy Assistant            OP Exercises     Row Name 08/03/21 0600             Subjective Comments    Subjective Comments  Pt states she has improved ability to complete steps without pain  -KM         Exercise 3    Exercise Name 3  Hamstring stretch  -KM      Cueing 3  Verbal;Tactile  -KM      Reps 3  15  -KM      Time 3  10 secs  -KM         Exercise 4    Exercise Name 4  QS with Russian Stim  -KM      Cueing 4  Verbal;Tactile  -KM      Time 4  10 min 10/10  -KM         Exercise 5    Exercise Name 5  SLR  -KM      Cueing 5  Verbal;Tactile  -KM      Reps 5  25  -KM      Time 5  1#  -KM         Exercise 6    Exercise Name 6  Hip ADD  -KM      Cueing 6  Verbal;Tactile  -KM      Reps 6  25  -KM      Time 6  1#  -KM         Exercise 7    Exercise Name 7  Hip ABD  -KM      Cueing 7  Verbal;Tactile  -KM      Reps 7  25  -KM      Time 7  1#  -KM         Exercise 8    Exercise Name 8  SAQ  -KM      Cueing 8  Verbal;Tactile  -KM      Reps 8  50  -KM      Time 8  1#  -KM         Exercise 9    Exercise Name 9  LAQ/ball squeeze  -KM      Cueing 9  Verbal;Tactile  -KM      Reps 9  50  -KM      Time 9  1#  -KM         Exercise 10    Exercise Name 10  TKE vs theraband  -KM      Cueing 10  Verbal;Demo  -KM      Reps 10  50  -KM      Time 10  gold  -KM        User Key  (r) = Recorded By, (t) = Taken By, (c) = Cosigned By    Initials Name Provider Type    Katty Martinez PTA Physical Therapy Assistant                                          Time Calculation:   Start Time: 0600  Stop Time: 0640  Time Calculation  (min): 40 min  Therapy Charges for Today     Code Description Service Date Service Provider Modifiers Qty    87030353512 HC PT THER PROC EA 15 MIN 8/3/2021 Katty Collier, PTA GP 2                    Katty Collier, BRET  8/3/2021

## 2021-08-05 ENCOUNTER — APPOINTMENT (OUTPATIENT)
Dept: PHYSICAL THERAPY | Facility: HOSPITAL | Age: 36
End: 2021-08-05

## 2021-08-09 ENCOUNTER — HOSPITAL ENCOUNTER (OUTPATIENT)
Dept: PHYSICAL THERAPY | Facility: HOSPITAL | Age: 36
Setting detail: THERAPIES SERIES
Discharge: HOME | End: 2021-08-09

## 2021-08-09 DIAGNOSIS — M17.12 PRIMARY OSTEOARTHRITIS OF LEFT KNEE: Primary | ICD-10-CM

## 2021-08-09 PROCEDURE — 97110 THERAPEUTIC EXERCISES: CPT

## 2021-08-09 NOTE — THERAPY TREATMENT NOTE
Outpatient Physical Therapy Ortho Treatment Note  HEBER Casillas     Patient Name: Valery Aguilar  : 1985  MRN: [id number]  Today's Date: 2021      Visit Date: 2021    Visit Dx:    ICD-10-CM ICD-9-CM   1. Primary osteoarthritis of left knee  M17.12 715.16       Patient Active Problem List   Diagnosis   • Chronic migraine without aura without status migrainosus, not intractable   • Cervicogenic headache   • Secondary oligomenorrhea   • Family history of breast cancer   • Obstructive sleep apnea, adult   • GERD with apnea   • Vitamin D deficiency   • PCOS (polycystic ovarian syndrome)   • Panic disorder   • Insulin resistance   • Essential hypertension   • GERD (gastroesophageal reflux disease)   • Anxiety and depression   • Tendonitis, Achilles, left   • S/P ACL reconstruction   • Primary osteoarthritis of left knee   • Mechanical knee pain, left        Past Medical History:   Diagnosis Date   • Abdominal pain    • Anxiety    • Anxiety and depression 3/8/2021   • Arthritis     KNEE   • Chronic sinusitis, unspecified    • Chronic wound infection of abdomen 2019    Added automatically from request for surgery [id number]   • Depression    • GERD (gastroesophageal reflux disease)    • H/O echocardiogram      EF 56% NORMAL DIASTOLIC FUNCTION NO VALVULAR DISEASE   • H/O exercise stress test     REPORTEDLY NORMAL DONE FOR CHEST PAIN DX WITH ANXIETY   • Headache, tension-type    • History of Holter monitoring     REPORTEDLY NORMAL DONE FOR CHEST PAIN DX WITH ANXIETY   • History of MRI of brain and brain stem 2017    NORMAL DONE FOR MIGRAINES   • Hypertension     PRE-ECLAMPSIA WITH BOTH CHILDREN   • Incisional hernia     SCHEDULED FOR REPAIR   • Incisional hernia, without obstruction or gangrene 10/24/2018    Added automatically from request for surgery [id number]   • Infection following a procedure, unspecified, initial encounter    • Insulin resistance     D/T PCOS   • Lobar pneumonia,  unspecified organism (CMS/HCC)    • Meralgia paresthetica, left lower limb    • Metabolic syndrome    • Migraine    • MRSA (methicillin resistant Staphylococcus aureus) infection 2019    Abdominal wound   • Nasal congestion    • Other injury of unspecified body region, initial encounter    • Panic attack    • Panic disorder    • Papanicolaou smear 2020    NORMAL WITH NEG HPV NEVER ABN   • PCOS (polycystic ovarian syndrome)    • Pneumonia 2018   • PONV (postoperative nausea and vomiting)    • Right lower quadrant abdominal pain 2019   • Seasonal allergies    • Spinal headache    • Unspecified contact dermatitis, unspecified cause    • Viral infection     UNSPECIFIED   • Vitamin D deficiency    • Wound infection 2018        Past Surgical History:   Procedure Laterality Date   • ABDOMINAL WALL ABSCESS INCISION AND DRAINAGE N/A 2018    Procedure: Debridement of abdominal wall;  Surgeon: Brigido Navarrete MD;  Location: [facility] OR;  Service: General   • ADENOIDECTOMY     •  SECTION      X2   • CHOLECYSTECTOMY      LAP   • HERNIA REPAIR     • HX OVARIAN CYSTECTOMY     • INCISION AND DRAINAGE TRUNK N/A 2018    Procedure: wound debridement, abdomen;  Surgeon: Rachel Dolan MD;  Location: [facility] OR;  Service: General   • INCISION AND DRAINAGE TRUNK N/A 2019    Procedure: WOUND CLOSURE ABDOMINAL;  Surgeon: Rachel Dolan MD;  Location: [facility] OR;  Service: General   • KNEE ACL RECONSTRUCTION Left     DR. CARRASCO   • OVARIAN CYST REMOVAL      EX LAP WITH OVARIAN CYST REMOVAL HERNIA REPAIR    • TONSILLECTOMY      T&A   • TRUNK DEBRIDEMENT N/A 2018    Procedure: Abdominal wound debridement;  Surgeon: Rachel Dolan MD;  Location: [facility] OR;  Service: General   • VENTRAL/INCISIONAL HERNIA REPAIR N/A 11/15/2018    Procedure: Lateral Component Separation Herniorrhapy Repair with Mesh, Lysis of adhesions, and skin lesion excision of Right Side;  Surgeon:  Rachel Dolan MD;  Location: [facility];  Service: General                       PT Assessment/Plan     Row Name 08/09/21 0640          PT Assessment    Assessment Comments  Pt tolerated progression of ther ex well.   -KM        PT Plan    PT Plan Comments  Continue per POC  -KM       User Key  (r) = Recorded By, (t) = Taken By, (c) = Cosigned By    Initials Name Provider Type    Katty Martinez PTA Physical Therapy Assistant            OP Exercises     Row Name 08/09/21 0640             Subjective Comments    Subjective Comments  Pt states she feels PT and medication has helped. States her MRI was denied at this time  -KM         Exercise 3    Exercise Name 3  Hamstring stretch  -KM      Cueing 3  Verbal;Tactile  -KM      Reps 3  15  -KM      Time 3  10 secs  -KM         Exercise 4    Exercise Name 4  QS with Russian Stim  -KM      Cueing 4  Verbal;Tactile  -KM      Time 4  10 min 10/10  -KM         Exercise 5    Exercise Name 5  SLR  -KM      Cueing 5  Verbal;Tactile  -KM      Reps 5  25  -KM      Time 5  1#  -KM         Exercise 6    Exercise Name 6  Hip ADD  -KM      Cueing 6  Verbal;Tactile  -KM      Reps 6  25  -KM      Time 6  1#  -KM         Exercise 7    Exercise Name 7  Hip ABD  -KM      Cueing 7  Verbal;Tactile  -KM      Reps 7  25  -KM      Time 7  1#  -KM         Exercise 8    Exercise Name 8  SAQ  -KM      Cueing 8  Verbal;Tactile  -KM      Reps 8  50  -KM      Time 8  2#  -KM         Exercise 9    Exercise Name 9  LAQ/ball squeeze  -KM      Cueing 9  Verbal;Tactile  -KM      Reps 9  50  -KM      Time 9  2#  -KM         Exercise 10    Exercise Name 10  TKE vs theraband  -KM      Cueing 10  Verbal;Demo  -KM      Reps 10  50  -KM      Time 10  gold  -KM        User Key  (r) = Recorded By, (t) = Taken By, (c) = Cosigned By    Initials Name Provider Type    Katty Martinez PTA Physical Therapy Assistant                                          Time Calculation:   Start Time: 0640  Stop Time:  0710  Time Calculation (min): 30 min  Therapy Charges for Today     Code Description Service Date Service Provider Modifiers Qty    43574460403 HC PT THER PROC EA 15 MIN 8/9/2021 Katty Collier, PTA GP 1                    Katty Collier PTA  8/9/2021

## 2021-08-10 ENCOUNTER — HOSPITAL ENCOUNTER (OUTPATIENT)
Dept: MRI IMAGING | Facility: HOSPITAL | Age: 36
End: 2021-08-10

## 2021-08-12 ENCOUNTER — HOSPITAL ENCOUNTER (OUTPATIENT)
Dept: PHYSICAL THERAPY | Facility: HOSPITAL | Age: 36
Setting detail: THERAPIES SERIES
Discharge: HOME | End: 2021-08-12

## 2021-08-12 DIAGNOSIS — M17.12 PRIMARY OSTEOARTHRITIS OF LEFT KNEE: Primary | ICD-10-CM

## 2021-08-12 DIAGNOSIS — M25.562 MECHANICAL KNEE PAIN, LEFT: ICD-10-CM

## 2021-08-12 PROCEDURE — 97110 THERAPEUTIC EXERCISES: CPT

## 2021-08-12 NOTE — THERAPY TREATMENT NOTE
Outpatient Physical Therapy Ortho Treatment Note  HEBER Casillas     Patient Name: Valery Aguilar  : 1985  MRN: [id number]  Today's Date: 2021      Visit Date: 2021    Visit Dx:    ICD-10-CM ICD-9-CM   1. Primary osteoarthritis of left knee  M17.12 715.16   2. Mechanical knee pain, left  M25.562 719.46       Patient Active Problem List   Diagnosis   • Chronic migraine without aura without status migrainosus, not intractable   • Cervicogenic headache   • Secondary oligomenorrhea   • Family history of breast cancer   • Obstructive sleep apnea, adult   • GERD with apnea   • Vitamin D deficiency   • PCOS (polycystic ovarian syndrome)   • Panic disorder   • Insulin resistance   • Essential hypertension   • GERD (gastroesophageal reflux disease)   • Anxiety and depression   • Tendonitis, Achilles, left   • S/P ACL reconstruction   • Primary osteoarthritis of left knee   • Mechanical knee pain, left        Past Medical History:   Diagnosis Date   • Abdominal pain    • Anxiety    • Anxiety and depression 3/8/2021   • Arthritis     KNEE   • Chronic sinusitis, unspecified    • Chronic wound infection of abdomen 2019    Added automatically from request for surgery [id number]   • Depression    • GERD (gastroesophageal reflux disease)    • H/O echocardiogram      EF 56% NORMAL DIASTOLIC FUNCTION NO VALVULAR DISEASE   • H/O exercise stress test     REPORTEDLY NORMAL DONE FOR CHEST PAIN DX WITH ANXIETY   • Headache, tension-type    • History of Holter monitoring     REPORTEDLY NORMAL DONE FOR CHEST PAIN DX WITH ANXIETY   • History of MRI of brain and brain stem 2017    NORMAL DONE FOR MIGRAINES   • Hypertension     PRE-ECLAMPSIA WITH BOTH CHILDREN   • Incisional hernia     SCHEDULED FOR REPAIR   • Incisional hernia, without obstruction or gangrene 10/24/2018    Added automatically from request for surgery [id number]   • Infection following a procedure, unspecified, initial encounter    • Insulin  resistance     D/T PCOS   • Lobar pneumonia, unspecified organism (CMS/HCC)    • Meralgia paresthetica, left lower limb    • Metabolic syndrome    • Migraine    • MRSA (methicillin resistant Staphylococcus aureus) infection 2019    Abdominal wound   • Nasal congestion    • Other injury of unspecified body region, initial encounter    • Panic attack    • Panic disorder    • Papanicolaou smear 2020    NORMAL WITH NEG HPV NEVER ABN   • PCOS (polycystic ovarian syndrome)    • Pneumonia 2018   • PONV (postoperative nausea and vomiting)    • Right lower quadrant abdominal pain 2019   • Seasonal allergies    • Spinal headache    • Unspecified contact dermatitis, unspecified cause    • Viral infection     UNSPECIFIED   • Vitamin D deficiency    • Wound infection 2018        Past Surgical History:   Procedure Laterality Date   • ABDOMINAL WALL ABSCESS INCISION AND DRAINAGE N/A 2018    Procedure: Debridement of abdominal wall;  Surgeon: Brigido Navarrete MD;  Location: [facility] OR;  Service: General   • ADENOIDECTOMY     •  SECTION      X2   • CHOLECYSTECTOMY      LAP   • HERNIA REPAIR     • HX OVARIAN CYSTECTOMY     • INCISION AND DRAINAGE TRUNK N/A 2018    Procedure: wound debridement, abdomen;  Surgeon: Rachel Dolan MD;  Location: [facility] OR;  Service: General   • INCISION AND DRAINAGE TRUNK N/A 2019    Procedure: WOUND CLOSURE ABDOMINAL;  Surgeon: Rachel Dolan MD;  Location: [facility] OR;  Service: General   • KNEE ACL RECONSTRUCTION Left     DR. CARRASCO   • OVARIAN CYST REMOVAL      EX LAP WITH OVARIAN CYST REMOVAL HERNIA REPAIR    • TONSILLECTOMY      T&A   • TRUNK DEBRIDEMENT N/A 2018    Procedure: Abdominal wound debridement;  Surgeon: Rachel Dolan MD;  Location: [facility] OR;  Service: General   • VENTRAL/INCISIONAL HERNIA REPAIR N/A 11/15/2018    Procedure: Lateral Component Separation Herniorrhapy Repair with Mesh, Lysis of adhesions, and skin  "lesion excision of Right Side;  Surgeon: Rachel Dolan MD;  Location: [facility] OR;  Service: General                       PT Assessment/Plan     Row Name 08/12/21 0700          PT Assessment    Assessment Comments  Patient tolerated progression of ther ex well.   -AS        PT Plan    PT Plan Comments  Continue with current treatment plan.  -AS       User Key  (r) = Recorded By, (t) = Taken By, (c) = Cosigned By    Initials Name Provider Type    AS Franklin Adler, PT Physical Therapist            OP Exercises     Row Name 08/12/21 0700             Subjective Comments    Subjective Comments  Patient states she is doing \"ok\" this morning.  -AS         Exercise 3    Exercise Name 3  Hamstring stretch  -AS      Cueing 3  Verbal;Tactile  -AS      Reps 3  15  -AS      Time 3  10 secs  -AS         Exercise 4    Exercise Name 4  QS with Russian Stim  -AS      Cueing 4  Verbal;Tactile  -AS      Time 4  10 min 10/10  -AS         Exercise 5    Exercise Name 5  SLR  -AS      Cueing 5  Verbal;Tactile  -AS      Reps 5  25  -AS      Time 5  1#  -AS         Exercise 6    Exercise Name 6  Hip ADD  -AS      Cueing 6  Verbal;Tactile  -AS      Reps 6  25  -AS      Time 6  1#  -AS         Exercise 7    Exercise Name 7  Hip ABD  -AS      Cueing 7  Verbal;Tactile  -AS      Reps 7  25  -AS      Time 7  1#  -AS         Exercise 8    Exercise Name 8  SAQ  -AS      Cueing 8  Verbal;Tactile  -AS      Reps 8  50  -AS      Time 8  2#  -AS         Exercise 9    Exercise Name 9  LAQ/ball squeeze  -AS      Cueing 9  Verbal;Tactile  -AS      Reps 9  50  -AS      Time 9  2#  -AS         Exercise 10    Exercise Name 10  TKE vs theraband  -AS      Cueing 10  Verbal;Demo  -AS      Reps 10  50  -AS      Time 10  gold  -AS         Exercise 11    Exercise Name 11  Mini Squats  -AS      Cueing 11  Verbal;Demo  -AS      Reps 11  25  -AS        User Key  (r) = Recorded By, (t) = Taken By, (c) = Cosigned By    Initials Name Provider Type    AS " Franklin Adler, PT Physical Therapist                                          Time Calculation:   Start Time: 0700  Stop Time: 0743  Time Calculation (min): 43 min  Therapy Charges for Today     Code Description Service Date Service Provider Modifiers Qty    [id number]  PT THER PROC EA 15 MIN 8/12/2021 Franklin Adler, PT GP 2                    Franklin Adler, PT  8/12/2021

## 2021-08-16 RX ORDER — MELOXICAM 15 MG/1
TABLET ORAL
Qty: 30 TABLET | Refills: 0 | Status: SHIPPED | OUTPATIENT
Start: 2021-08-16 | End: 2021-09-14

## 2021-08-16 NOTE — TELEPHONE ENCOUNTER
RX Refill request.    1. S/P ACL reconstruction    2. Primary osteoarthritis of left knee    3. Mechanical knee pain, left      Last seen 07.20.2021.

## 2021-08-17 ENCOUNTER — APPOINTMENT (OUTPATIENT)
Dept: PHYSICAL THERAPY | Facility: HOSPITAL | Age: 36
End: 2021-08-17

## 2021-08-18 ENCOUNTER — APPOINTMENT (OUTPATIENT)
Dept: GENERAL RADIOLOGY | Facility: HOSPITAL | Age: 36
End: 2021-08-18

## 2021-08-18 ENCOUNTER — HOSPITAL ENCOUNTER (EMERGENCY)
Facility: HOSPITAL | Age: 36
Discharge: HOME | End: 2021-08-18
Attending: EMERGENCY MEDICINE | Admitting: EMERGENCY MEDICINE

## 2021-08-18 VITALS
HEART RATE: 81 BPM | TEMPERATURE: 98.5 F | RESPIRATION RATE: 16 BRPM | SYSTOLIC BLOOD PRESSURE: 136 MMHG | WEIGHT: 286.4 LBS | BODY MASS INDEX: 48.9 KG/M2 | DIASTOLIC BLOOD PRESSURE: 88 MMHG | HEIGHT: 64 IN | OXYGEN SATURATION: 95 %

## 2021-08-18 DIAGNOSIS — F41.8 ANXIETY ABOUT HEALTH: Primary | ICD-10-CM

## 2021-08-18 DIAGNOSIS — R07.89 ATYPICAL CHEST PAIN: ICD-10-CM

## 2021-08-18 DIAGNOSIS — I15.9 SECONDARY HYPERTENSION: ICD-10-CM

## 2021-08-18 LAB
ALBUMIN SERPL-MCNC: 4 G/DL (ref 3.5–5.2)
ALBUMIN/GLOB SERPL: 1.7 G/DL
ALP SERPL-CCNC: 64 U/L (ref 39–117)
ALT SERPL W P-5'-P-CCNC: 22 U/L (ref 1–33)
ANION GAP SERPL CALCULATED.3IONS-SCNC: 8.5 MMOL/L (ref 5–15)
AST SERPL-CCNC: 20 U/L (ref 1–32)
BASOPHILS # BLD AUTO: 0.03 10*3/MM3 (ref 0–0.2)
BASOPHILS NFR BLD AUTO: 0.4 % (ref 0–1.5)
BILIRUB SERPL-MCNC: 0.6 MG/DL (ref 0–1.2)
BUN SERPL-MCNC: 9 MG/DL (ref 6–20)
BUN/CREAT SERPL: 11.5 (ref 7–25)
CALCIUM SPEC-SCNC: 9.1 MG/DL (ref 8.6–10.5)
CHLORIDE SERPL-SCNC: 103 MMOL/L (ref 98–107)
CO2 SERPL-SCNC: 27.5 MMOL/L (ref 22–29)
CREAT SERPL-MCNC: 0.78 MG/DL (ref 0.57–1)
DEPRECATED RDW RBC AUTO: 40.7 FL (ref 37–54)
EOSINOPHIL # BLD AUTO: 0.08 10*3/MM3 (ref 0–0.4)
EOSINOPHIL NFR BLD AUTO: 1.2 % (ref 0.3–6.2)
ERYTHROCYTE [DISTWIDTH] IN BLOOD BY AUTOMATED COUNT: 13.8 % (ref 12.3–15.4)
FLUAV RNA RESP QL NAA+PROBE: NOT DETECTED
FLUBV RNA RESP QL NAA+PROBE: NOT DETECTED
GFR SERPL CREATININE-BSD FRML MDRD: 83 ML/MIN/1.73
GLOBULIN UR ELPH-MCNC: 2.4 GM/DL
GLUCOSE SERPL-MCNC: 82 MG/DL (ref 65–99)
HCT VFR BLD AUTO: 47.9 % (ref 34–46.6)
HGB BLD-MCNC: 15.1 G/DL (ref 12–15.9)
IMM GRANULOCYTES # BLD AUTO: 0.03 10*3/MM3 (ref 0–0.05)
IMM GRANULOCYTES NFR BLD AUTO: 0.4 % (ref 0–0.5)
LYMPHOCYTES # BLD AUTO: 1.83 10*3/MM3 (ref 0.7–3.1)
LYMPHOCYTES NFR BLD AUTO: 26.8 % (ref 19.6–45.3)
MCH RBC QN AUTO: 25.6 PG (ref 26.6–33)
MCHC RBC AUTO-ENTMCNC: 31.5 G/DL (ref 31.5–35.7)
MCV RBC AUTO: 81.2 FL (ref 79–97)
MONOCYTES # BLD AUTO: 0.45 10*3/MM3 (ref 0.1–0.9)
MONOCYTES NFR BLD AUTO: 6.6 % (ref 5–12)
NEUTROPHILS NFR BLD AUTO: 4.42 10*3/MM3 (ref 1.7–7)
NEUTROPHILS NFR BLD AUTO: 64.6 % (ref 42.7–76)
PLATELET # BLD AUTO: 210 10*3/MM3 (ref 140–450)
PMV BLD AUTO: 10.9 FL (ref 6–12)
POTASSIUM SERPL-SCNC: 3.8 MMOL/L (ref 3.5–5.2)
PROT SERPL-MCNC: 6.4 G/DL (ref 6–8.5)
QT INTERVAL: 352 MS
RBC # BLD AUTO: 5.9 10*6/MM3 (ref 3.77–5.28)
SARS-COV-2 RNA RESP QL NAA+PROBE: NOT DETECTED
SODIUM SERPL-SCNC: 139 MMOL/L (ref 136–145)
TROPONIN T SERPL-MCNC: <0.01 NG/ML (ref 0–0.03)
WBC # BLD AUTO: 6.84 10*3/MM3 (ref 3.4–10.8)

## 2021-08-18 PROCEDURE — 99284 EMERGENCY DEPT VISIT MOD MDM: CPT | Performed by: EMERGENCY MEDICINE

## 2021-08-18 PROCEDURE — 93005 ELECTROCARDIOGRAM TRACING: CPT | Performed by: EMERGENCY MEDICINE

## 2021-08-18 PROCEDURE — 80053 COMPREHEN METABOLIC PANEL: CPT | Performed by: EMERGENCY MEDICINE

## 2021-08-18 PROCEDURE — 85025 COMPLETE CBC W/AUTO DIFF WBC: CPT | Performed by: EMERGENCY MEDICINE

## 2021-08-18 PROCEDURE — 71045 X-RAY EXAM CHEST 1 VIEW: CPT

## 2021-08-18 PROCEDURE — 84484 ASSAY OF TROPONIN QUANT: CPT | Performed by: EMERGENCY MEDICINE

## 2021-08-18 PROCEDURE — 93010 ELECTROCARDIOGRAM REPORT: CPT | Performed by: INTERNAL MEDICINE

## 2021-08-18 PROCEDURE — 87636 SARSCOV2 & INF A&B AMP PRB: CPT | Performed by: EMERGENCY MEDICINE

## 2021-08-18 PROCEDURE — 99284 EMERGENCY DEPT VISIT MOD MDM: CPT

## 2021-08-18 RX ORDER — SODIUM CHLORIDE 0.9 % (FLUSH) 0.9 %
10 SYRINGE (ML) INJECTION AS NEEDED
Status: DISCONTINUED | OUTPATIENT
Start: 2021-08-18 | End: 2021-08-18 | Stop reason: HOSPADM

## 2021-08-18 NOTE — ED PROVIDER NOTES
"Subjective   Valery Aguilar is a 36-year-old white female who presents with multiple complaints.  Patient states she began feeling ill 2 days ago.  Body aches, fatigue, generalized weakness.  She was seen at an immediate care center yesterday.  Her strep swab was positive.  Patient was placed on antibiotics.  Covid swab was obtained but the results would not be available for another 24 hours.  Last night patient had brief episode of chest pain.  Patient felt like this was secondary to her anxiety.  This morning patient states she was still feeling ill.  She states her body feels \"puffy\".  Patient took her blood pressure.  It was 180/116.  Patient states \"I freaked out\" and called 911.  Patient states her family has a history of heart disease and strokes.  Her elevated blood pressure concerned her significantly.  She took her blood pressure multiple times this morning.  Patient denies chest pain today.  Her only current complaints are feeling puffy and \"tingling in my fingertips\".      History provided by:  Patient      Review of Systems   Constitutional: Positive for fatigue. Negative for fever.   HENT: Negative.  Negative for rhinorrhea.    Eyes: Negative.  Negative for redness.   Respiratory: Negative for cough.    Cardiovascular: Positive for chest pain.   Gastrointestinal: Negative for abdominal pain.   Endocrine: Negative.    Genitourinary: Negative.  Negative for difficulty urinating.   Musculoskeletal: Negative.  Negative for back pain.        \"Puffy\"   Skin: Negative.  Negative for color change.   Neurological: Positive for weakness (Generalized) and numbness (Fingertip). Negative for syncope.   Hematological: Negative.    Psychiatric/Behavioral: Negative.    All other systems reviewed and are negative.      Past Medical History:   Diagnosis Date   • Abdominal pain    • Anxiety    • Anxiety and depression 3/8/2021   • Arthritis     KNEE   • Chronic sinusitis, unspecified    • Chronic wound infection of abdomen " "2/20/2019    Added automatically from request for surgery [id number]   • Depression    • GERD (gastroesophageal reflux disease)    • H/O echocardiogram     2019 EF 56% NORMAL DIASTOLIC FUNCTION NO VALVULAR DISEASE   • H/O exercise stress test     REPORTEDLY NORMAL DONE FOR CHEST PAIN DX WITH ANXIETY   • Headache, tension-type    • History of Holter monitoring     REPORTEDLY NORMAL DONE FOR CHEST PAIN DX WITH ANXIETY   • History of MRI of brain and brain stem 01/01/2017    NORMAL DONE FOR MIGRAINES   • Hypertension     PRE-ECLAMPSIA WITH BOTH CHILDREN   • Incisional hernia     SCHEDULED FOR REPAIR   • Incisional hernia, without obstruction or gangrene 10/24/2018    Added automatically from request for surgery [id number]   • Infection following a procedure, unspecified, initial encounter    • Insulin resistance     D/T PCOS   • Lobar pneumonia, unspecified organism (CMS/HCC)    • Meralgia paresthetica, left lower limb    • Metabolic syndrome    • Migraine    • MRSA (methicillin resistant Staphylococcus aureus) infection 01/2019    Abdominal wound   • Nasal congestion    • Other injury of unspecified body region, initial encounter    • Panic attack    • Panic disorder    • Papanicolaou smear 06/01/2020    NORMAL WITH NEG HPV NEVER ABN   • PCOS (polycystic ovarian syndrome)    • Pneumonia 07/2018   • PONV (postoperative nausea and vomiting)    • Right lower quadrant abdominal pain 1/20/2019   • Seasonal allergies    • Spinal headache    • Unspecified contact dermatitis, unspecified cause    • Viral infection     UNSPECIFIED   • Vitamin D deficiency    • Wound infection 12/2/2018       Allergies   Allergen Reactions   • Penicillins Other (See Comments)     \"im resistant to any \"cillin\" drugs\"        Past Surgical History:   Procedure Laterality Date   • ABDOMINAL WALL ABSCESS INCISION AND DRAINAGE N/A 12/2/2018    Procedure: Debridement of abdominal wall;  Surgeon: Brigido Navarrete MD;  [facility];  Service: General "   • ADENOIDECTOMY     •  SECTION      X2   • CHOLECYSTECTOMY      LAP   • HERNIA REPAIR     • HX OVARIAN CYSTECTOMY     • INCISION AND DRAINAGE TRUNK N/A 2018    Procedure: wound debridement, abdomen;  Surgeon: Rachel Dolan MD;  Location:  [facility] OR;  Service: General   • INCISION AND DRAINAGE TRUNK N/A 2019    Procedure: WOUND CLOSURE ABDOMINAL;  Surgeon: Rachel Dolan MD;  Location:  [facility] OR;  Service: General   • KNEE ACL RECONSTRUCTION Left     DR. CARRASCO   • OVARIAN CYST REMOVAL      EX LAP WITH OVARIAN CYST REMOVAL HERNIA REPAIR    • TONSILLECTOMY      T&A   • TRUNK DEBRIDEMENT N/A 2018    Procedure: Abdominal wound debridement;  Surgeon: Rachel Dolan MD;  Location: [facility] OR;  Service: General   • VENTRAL/INCISIONAL HERNIA REPAIR N/A 11/15/2018    Procedure: Lateral Component Separation Herniorrhapy Repair with Mesh, Lysis of adhesions, and skin lesion excision of Right Side;  Surgeon: Rachel Dolan MD;  Location: [facility] OR;  Service: General       Family History   Problem Relation Age of Onset   • Stroke Mother    • Heart disease Mother    • Hypertension Mother    • Heart disease Father    • Hypertension Father    • Diabetes Father    • Dementia Maternal Grandmother    • Stroke Maternal Grandmother    • Diabetes Maternal Grandmother    • Stroke Paternal Grandmother    • Hypertension Paternal Grandmother    • Cancer Paternal Grandmother    • Heart disease Paternal Grandfather    • Diabetes Paternal Grandfather        Social History     Socioeconomic History   • Marital status:      Spouse name: Not on file   • Number of children: Not on file   • Years of education: Not on file   • Highest education level: Not on file   Tobacco Use   • Smoking status: Former Smoker     Packs/day: 0.50     Years: 1.00     Pack years: 0.50     Quit date:      Years since quittin.6   • Smokeless tobacco: Never Used   Vaping Use   • Vaping Use: Never used    Substance and Sexual Activity   • Alcohol use: Yes     Comment: occasional   • Drug use: No   • Sexual activity: Defer     Partners: Male     Birth control/protection: OCP     Comment: LNMP irregular           Objective   Physical Exam  Vitals and nursing note reviewed.   Constitutional:       General: She is not in acute distress.     Appearance: Normal appearance. She is well-developed. She is obese. She is not ill-appearing, toxic-appearing or diaphoretic.      Comments: 36-year-old white female laying in bed.  Patient is morbidly obese.  She otherwise appears in good health.  Vital signs notable for BP of 158/104.  Patient friendly and cooperative.   HENT:      Head: Normocephalic and atraumatic.      Right Ear: Tympanic membrane, ear canal and external ear normal.      Left Ear: Tympanic membrane, ear canal and external ear normal.      Nose: Nose normal.      Mouth/Throat:      Mouth: Mucous membranes are moist.      Pharynx: Oropharynx is clear.   Eyes:      Extraocular Movements: Extraocular movements intact.      Conjunctiva/sclera: Conjunctivae normal.      Pupils: Pupils are equal, round, and reactive to light.   Cardiovascular:      Rate and Rhythm: Normal rate and regular rhythm.      Pulses: Normal pulses.      Heart sounds: Normal heart sounds. No murmur heard.   No friction rub. No gallop.    Pulmonary:      Effort: Pulmonary effort is normal.      Breath sounds: Normal breath sounds.   Abdominal:      General: Bowel sounds are normal. There is no distension.      Palpations: Abdomen is soft. There is no mass.      Tenderness: There is no abdominal tenderness. There is no guarding or rebound.      Hernia: No hernia is present.   Musculoskeletal:         General: Normal range of motion.      Cervical back: Normal range of motion and neck supple.   Skin:     General: Skin is warm and dry.      Capillary Refill: Capillary refill takes less than 2 seconds.   Neurological:      General: No focal deficit  present.      Mental Status: She is alert and oriented to person, place, and time.      Deep Tendon Reflexes: Reflexes are normal and symmetric.   Psychiatric:         Mood and Affect: Mood is anxious (mild).         Behavior: Behavior normal.         Procedures       EKG 12-lead  Date 8/18/2021  Time 12: 19  Normal sinus rhythm.  Normal rate.  Normal axis.  Normal intervals.  Normal ST segments.  Normal T waves.  Normal EKG.      ED Course  ED Course as of Aug 18 1528   Wed Aug 18, 2021   1317 Patient's EKG is unremarkable.  Blood pressure improved symptoms time.  Obtaining CBC, CMP, troponin, flu and Covid swabs.  Also obtaining chest x-ray.  I feel patient symptoms are in part anxiety related and in part secondary to strep pharyngitis.    [SS]   1421 CBC and CMP unremarkable.  Covid and flu swabs negative.  Troponin negative.  Awaiting chest x-ray reading.    [SS]   1517 Chest x-ray is unremarkable.    [SS]   1522 Discussed at length with patient all results, diagnosis and treatment. I do not find any evidence of ACS. Covid negative. I feel patient's chest pain likely due to anxiety. Likewise this is a contributing factor to her elevated blood pressures which have now normalized. Recommend patient continue her antibiotics for strep pharyngitis. Will DC home.    [SS]      ED Course User Index  [SS] Irving Rider MD      Labs Reviewed   CBC WITH AUTO DIFFERENTIAL - Abnormal; Notable for the following components:       Result Value    RBC 5.90 (*)     Hematocrit 47.9 (*)     MCH 25.6 (*)     All other components within normal limits   COVID-19 AND FLU A/B, NP SWAB IN TRANSPORT MEDIA 8-12 HR TAT - Normal    Narrative:     Fact sheet for providers: https://www.fda.gov/media/796246/download    Fact sheet for patients: https://www.fda.gov/media/949503/download    Test performed by PCR.   TROPONIN (IN-HOUSE) - Normal    Narrative:     Troponin T Reference Range:  <= 0.03 ng/mL-   Negative for AMI  >0.03 ng/mL-      Abnormal for myocardial necrosis.  Clinicians would have to utilize clinical acumen, EKG, Troponin and serial changes to determine if it is an Acute Myocardial Infarction or myocardial injury due to an underlying chronic condition.       Results may be falsely decreased if patient taking Biotin.     COMPREHENSIVE METABOLIC PANEL    Narrative:     GFR Normal >60  Chronic Kidney Disease <60  Kidney Failure <15     CBC AND DIFFERENTIAL    Narrative:     The following orders were created for panel order CBC & Differential.  Procedure                               Abnormality         Status                     ---------                               -----------         ------                     CBC Auto Differential[[id number]]        Abnormal            Final result                 Please view results for these tests on the individual orders.     XR Chest 1 View    Result Date: 8/18/2021  Narrative: XR CHEST 1 VW-: 8/18/2021 2:28 PM  INDICATION: Chest pain that began yesterday. Hypertension. HISTORY of anxiety and fatigue. Positive for strep a.  COMPARISON:  01/20/2019.  FINDINGS: Single Portable AP view(s) of the chest. Cardiac silhouette within normal limits for technique. The vascularity is unremarkable. There is asymmetric haziness projecting over the entire right hemithorax that appears to relate to body habitus and superimposed soft tissue scatter artifact. There is no dense consolidation or effusion. Chronic mild eventration of the right hemidiaphragm. No pneumothorax.      Impression:  1. Asymmetric haziness on the right favored to represent artifact related to overlying soft tissue as described. Otherwise negative chest.  This report was finalized on 8/18/2021 3:11 PM by Dr. Kamari Diaz MD.      My differential diagnosis for chest pain includes but is not limited to:  Muscle strain, costochondritis, myositis, pleurisy, rib fracture, intercostal neuritis, herpes zoster, tumor, myocardial infarction, coronary  syndrome, unstable angina, angina, aortic dissection, mitral valve prolapse, pericarditis, palpitations, pulmonary embolus, pneumonia, pneumothorax, lung cancer, GERD, esophagitis, esophageal spasm    My differential diagnosis for hypertension includes but is not limited to essential hypertension, kidney disease, obstructive sleep apnea, thyroid disease, adrenal gland tumors, medications including over-the-counter cold medications and decongestants, antihistamines, cocaine, amphetamines, methamphetamines, other street drugs.                                     HEART Score (for prediction of 6-week risk of major adverse cardiac event) reviewed and/or performed as part of the patient evaluation and treatment planning process.  The result associated with this review/performance is: 2       MDM    Final diagnoses:   Anxiety about health   Secondary hypertension   Atypical chest pain       ED Disposition  ED Disposition     ED Disposition Condition Comment    Discharge Stable           Zandra Rios MD  [address] [id number] [phone]    Schedule an appointment as soon as possible for a visit in 1 week  for continued or worsened symptoms, Sooner if needed         Medication List      Stop    fluconazole 150 MG tablet  Commonly known as: Irving Delgado MD  08/18/21 1527

## 2021-08-18 NOTE — ED NOTES
Pt had no requests at this time, lights dimmed. Pt waiting on results.      Inge Velez, ASHLY  08/18/21 1682

## 2021-08-18 NOTE — DISCHARGE INSTRUCTIONS
Continue current medications. Check blood pressure twice daily and record. Take this record to follow-up with your PCP. Recommend healthy diet and regular exercise for overall improved health. Follow-up with your PCP as above. Return to ED for medical emergencies

## 2021-08-24 ENCOUNTER — APPOINTMENT (OUTPATIENT)
Dept: PHYSICAL THERAPY | Facility: HOSPITAL | Age: 36
End: 2021-08-24

## 2021-08-25 ENCOUNTER — HOSPITAL ENCOUNTER (OUTPATIENT)
Dept: PHYSICAL THERAPY | Facility: HOSPITAL | Age: 36
Setting detail: THERAPIES SERIES
Discharge: HOME | End: 2021-08-25

## 2021-08-25 DIAGNOSIS — M17.12 PRIMARY OSTEOARTHRITIS OF LEFT KNEE: Primary | ICD-10-CM

## 2021-08-25 PROCEDURE — 97110 THERAPEUTIC EXERCISES: CPT

## 2021-08-25 NOTE — THERAPY TREATMENT NOTE
Outpatient Physical Therapy Ortho Treatment Note  HEBER Casillas     Patient Name: Valery Aguilar  : 1985  MRN: [id number]  Today's Date: 2021      Visit Date: 2021    Visit Dx:    ICD-10-CM ICD-9-CM   1. Primary osteoarthritis of left knee  M17.12 715.16       Patient Active Problem List   Diagnosis   • Chronic migraine without aura without status migrainosus, not intractable   • Cervicogenic headache   • Secondary oligomenorrhea   • Family history of breast cancer   • Obstructive sleep apnea, adult   • GERD with apnea   • Vitamin D deficiency   • PCOS (polycystic ovarian syndrome)   • Panic disorder   • Insulin resistance   • Essential hypertension   • GERD (gastroesophageal reflux disease)   • Anxiety and depression   • Tendonitis, Achilles, left   • S/P ACL reconstruction   • Primary osteoarthritis of left knee   • Mechanical knee pain, left        Past Medical History:   Diagnosis Date   • Abdominal pain    • Anxiety    • Anxiety and depression 3/8/2021   • Arthritis     KNEE   • Chronic sinusitis, unspecified    • Chronic wound infection of abdomen 2019    Added automatically from request for surgery [id number]   • Depression    • GERD (gastroesophageal reflux disease)    • H/O echocardiogram      EF 56% NORMAL DIASTOLIC FUNCTION NO VALVULAR DISEASE   • H/O exercise stress test     REPORTEDLY NORMAL DONE FOR CHEST PAIN DX WITH ANXIETY   • Headache, tension-type    • History of Holter monitoring     REPORTEDLY NORMAL DONE FOR CHEST PAIN DX WITH ANXIETY   • History of MRI of brain and brain stem 2017    NORMAL DONE FOR MIGRAINES   • Hypertension     PRE-ECLAMPSIA WITH BOTH CHILDREN   • Incisional hernia     SCHEDULED FOR REPAIR   • Incisional hernia, without obstruction or gangrene 10/24/2018    Added automatically from request for surgery [id number]   • Infection following a procedure, unspecified, initial encounter    • Insulin resistance     D/T PCOS   • Lobar pneumonia,  unspecified organism (CMS/HCC)    • Meralgia paresthetica, left lower limb    • Metabolic syndrome    • Migraine    • MRSA (methicillin resistant Staphylococcus aureus) infection 2019    Abdominal wound   • Nasal congestion    • Other injury of unspecified body region, initial encounter    • Panic attack    • Panic disorder    • Papanicolaou smear 2020    NORMAL WITH NEG HPV NEVER ABN   • PCOS (polycystic ovarian syndrome)    • Pneumonia 2018   • PONV (postoperative nausea and vomiting)    • Right lower quadrant abdominal pain 2019   • Seasonal allergies    • Spinal headache    • Unspecified contact dermatitis, unspecified cause    • Viral infection     UNSPECIFIED   • Vitamin D deficiency    • Wound infection 2018        Past Surgical History:   Procedure Laterality Date   • ABDOMINAL WALL ABSCESS INCISION AND DRAINAGE N/A 2018    Procedure: Debridement of abdominal wall;  Surgeon: Brigido Navarrete MD;  Location: [facility] OR;  Service: General   • ADENOIDECTOMY     •  SECTION      X2   • CHOLECYSTECTOMY      LAP   • HERNIA REPAIR     • HX OVARIAN CYSTECTOMY     • INCISION AND DRAINAGE TRUNK N/A 2018    Procedure: wound debridement, abdomen;  Surgeon: Rachel Dolan MD;  Location: [facility] OR;  Service: General   • INCISION AND DRAINAGE TRUNK N/A 2019    Procedure: WOUND CLOSURE ABDOMINAL;  Surgeon: Rachel Dolan MD;  Location: [facility] OR;  Service: General   • KNEE ACL RECONSTRUCTION Left     DR. CARRASCO   • OVARIAN CYST REMOVAL      EX LAP WITH OVARIAN CYST REMOVAL HERNIA REPAIR    • TONSILLECTOMY      T&A   • TRUNK DEBRIDEMENT N/A 2018    Procedure: Abdominal wound debridement;  Surgeon: Rachel Dolan MD;  Location: [facility] OR;  Service: General   • VENTRAL/INCISIONAL HERNIA REPAIR N/A 11/15/2018    Procedure: Lateral Component Separation Herniorrhapy Repair with Mesh, Lysis of adhesions, and skin lesion excision of Right Side;  Surgeon:  Rachel Dolan MD;  Location: [facility];  Service: General                       PT Assessment/Plan     Row Name 08/25/21 0600          PT Assessment    Assessment Comments  Progressed strengthening with additional weight and pt tolerated well.   -KM        PT Plan    PT Plan Comments  Continue per POC  -KM       User Key  (r) = Recorded By, (t) = Taken By, (c) = Cosigned By    Initials Name Provider Type    Katty Martinez PTA Physical Therapy Assistant            OP Exercises     Row Name 08/25/21 0600             Subjective Comments    Subjective Comments  Pt states her knee is ok and has an MRI scheduled next week.   -KM         Exercise 3    Exercise Name 3  Hamstring stretch  -KM      Cueing 3  Verbal;Tactile  -KM      Reps 3  15  -KM      Time 3  10 secs  -KM         Exercise 4    Exercise Name 4  QS with Russian Stim  -KM      Cueing 4  Verbal;Tactile  -KM      Time 4  10 min 10/10  -KM         Exercise 5    Exercise Name 5  SLR  -KM      Cueing 5  Verbal;Tactile  -KM      Reps 5  25  -KM      Time 5  2#  -KM         Exercise 6    Exercise Name 6  Hip ADD  -KM      Cueing 6  Verbal;Tactile  -KM      Reps 6  25  -KM      Time 6  2#  -KM         Exercise 7    Exercise Name 7  Hip ABD  -KM      Cueing 7  Verbal;Tactile  -KM      Reps 7  25  -KM      Time 7  2#  -KM         Exercise 8    Exercise Name 8  SAQ  -KM      Cueing 8  Verbal;Tactile  -KM      Reps 8  50  -KM      Time 8  3#  -KM         Exercise 9    Exercise Name 9  LAQ/ball squeeze  -KM      Cueing 9  Verbal;Tactile  -KM      Reps 9  50  -KM      Time 9  3#  -KM         Exercise 10    Exercise Name 10  TKE vs theraband  -KM      Cueing 10  Verbal;Demo  -KM      Reps 10  50  -KM      Time 10  gold  -KM         Exercise 11    Exercise Name 11  Mini Squats  -KM      Cueing 11  Verbal;Demo  -KM      Reps 11  25  -KM        User Key  (r) = Recorded By, (t) = Taken By, (c) = Cosigned By    Initials Name Provider Type    Katty Martinez PTA  Physical Therapy Assistant                                          Time Calculation:   Start Time: 0600  Stop Time: 0635  Time Calculation (min): 35 min  Therapy Charges for Today     Code Description Service Date Service Provider Modifiers Qty    60099083394  PT THER PROC EA 15 MIN 8/25/2021 Katty Collier, PTA GP 1                    Katty Collier PTA  8/25/2021

## 2021-08-26 ENCOUNTER — APPOINTMENT (OUTPATIENT)
Dept: PHYSICAL THERAPY | Facility: HOSPITAL | Age: 36
End: 2021-08-26

## 2021-08-26 DIAGNOSIS — G43.019 INTRACTABLE MIGRAINE WITHOUT AURA AND WITHOUT STATUS MIGRAINOSUS: ICD-10-CM

## 2021-08-27 ENCOUNTER — HOSPITAL ENCOUNTER (OUTPATIENT)
Dept: PHYSICAL THERAPY | Facility: HOSPITAL | Age: 36
Setting detail: THERAPIES SERIES
Discharge: HOME | End: 2021-08-27

## 2021-08-27 DIAGNOSIS — M25.562 MECHANICAL KNEE PAIN, LEFT: ICD-10-CM

## 2021-08-27 DIAGNOSIS — M17.12 PRIMARY OSTEOARTHRITIS OF LEFT KNEE: Primary | ICD-10-CM

## 2021-08-27 PROCEDURE — 97110 THERAPEUTIC EXERCISES: CPT | Performed by: PHYSICAL THERAPIST

## 2021-08-27 RX ORDER — RIZATRIPTAN BENZOATE 10 MG/1
TABLET, ORALLY DISINTEGRATING ORAL
Qty: 15 TABLET | Refills: 3 | Status: SHIPPED | OUTPATIENT
Start: 2021-08-27 | End: 2021-08-30 | Stop reason: SDUPTHER

## 2021-08-27 NOTE — TELEPHONE ENCOUNTER
Rx Refill Note  Requested Prescriptions     Pending Prescriptions Disp Refills   • rizatriptan MLT (MAXALT-MLT) 10 MG disintegrating tablet [Pharmacy Med Name: RIZATRIPTAN 10 MG ODT] 15 tablet 3     Sig: PLACE ONE TABLET ON TONGUE ONE TIME AS NEEDED FOR MIGRAINE. MAY REPEAT IN 2 HOURS IF NEEDED      Last office visit with prescribing clinician: 5/10/2021      Next office visit with prescribing clinician: 8/30/2021            Melissa Cortez MA  08/27/21, 08:12 EDT

## 2021-08-27 NOTE — THERAPY TREATMENT NOTE
Outpatient Physical Therapy Ortho Treatment Note  HEBER Casillas     Patient Name: Valery Aguilar  : 1985  MRN: [id number]  Today's Date: 2021      Visit Date: 2021    Visit Dx:    ICD-10-CM ICD-9-CM   1. Primary osteoarthritis of left knee  M17.12 715.16   2. Mechanical knee pain, left  M25.562 719.46       Patient Active Problem List   Diagnosis   • Chronic migraine without aura without status migrainosus, not intractable   • Cervicogenic headache   • Secondary oligomenorrhea   • Family history of breast cancer   • Obstructive sleep apnea, adult   • GERD with apnea   • Vitamin D deficiency   • PCOS (polycystic ovarian syndrome)   • Panic disorder   • Insulin resistance   • Essential hypertension   • GERD (gastroesophageal reflux disease)   • Anxiety and depression   • Tendonitis, Achilles, left   • S/P ACL reconstruction   • Primary osteoarthritis of left knee   • Mechanical knee pain, left        Past Medical History:   Diagnosis Date   • Abdominal pain    • Anxiety    • Anxiety and depression 3/8/2021   • Arthritis     KNEE   • Chronic sinusitis, unspecified    • Chronic wound infection of abdomen 2019    Added automatically from request for surgery [id number]   • Depression    • GERD (gastroesophageal reflux disease)    • H/O echocardiogram      EF 56% NORMAL DIASTOLIC FUNCTION NO VALVULAR DISEASE   • H/O exercise stress test     REPORTEDLY NORMAL DONE FOR CHEST PAIN DX WITH ANXIETY   • Headache, tension-type    • History of Holter monitoring     REPORTEDLY NORMAL DONE FOR CHEST PAIN DX WITH ANXIETY   • History of MRI of brain and brain stem 2017    NORMAL DONE FOR MIGRAINES   • Hypertension     PRE-ECLAMPSIA WITH BOTH CHILDREN   • Incisional hernia     SCHEDULED FOR REPAIR   • Incisional hernia, without obstruction or gangrene 10/24/2018    Added automatically from request for surgery [id number]   • Infection following a procedure, unspecified, initial encounter    • Insulin  resistance     D/T PCOS   • Lobar pneumonia, unspecified organism (CMS/HCC)    • Meralgia paresthetica, left lower limb    • Metabolic syndrome    • Migraine    • MRSA (methicillin resistant Staphylococcus aureus) infection 2019    Abdominal wound   • Nasal congestion    • Other injury of unspecified body region, initial encounter    • Panic attack    • Panic disorder    • Papanicolaou smear 2020    NORMAL WITH NEG HPV NEVER ABN   • PCOS (polycystic ovarian syndrome)    • Pneumonia 2018   • PONV (postoperative nausea and vomiting)    • Right lower quadrant abdominal pain 2019   • Seasonal allergies    • Spinal headache    • Unspecified contact dermatitis, unspecified cause    • Viral infection     UNSPECIFIED   • Vitamin D deficiency    • Wound infection 2018        Past Surgical History:   Procedure Laterality Date   • ABDOMINAL WALL ABSCESS INCISION AND DRAINAGE N/A 2018    Procedure: Debridement of abdominal wall;  Surgeon: Brigido Navarrete MD;  Location: [facility] OR;  Service: General   • ADENOIDECTOMY     •  SECTION      X2   • CHOLECYSTECTOMY      LAP   • HERNIA REPAIR     • HX OVARIAN CYSTECTOMY     • INCISION AND DRAINAGE TRUNK N/A 2018    Procedure: wound debridement, abdomen;  Surgeon: Rachel Dolan MD;  Location: [facility] OR;  Service: General   • INCISION AND DRAINAGE TRUNK N/A 2019    Procedure: WOUND CLOSURE ABDOMINAL;  Surgeon: Rachel Dolan MD;  Location: [facility] OR;  Service: General   • KNEE ACL RECONSTRUCTION Left     DR. CARRASCO   • OVARIAN CYST REMOVAL      EX LAP WITH OVARIAN CYST REMOVAL HERNIA REPAIR    • TONSILLECTOMY      T&A   • TRUNK DEBRIDEMENT N/A 2018    Procedure: Abdominal wound debridement;  Surgeon: Rachel Dolan MD;  Location: [facility] OR;  Service: General   • VENTRAL/INCISIONAL HERNIA REPAIR N/A 11/15/2018    Procedure: Lateral Component Separation Herniorrhapy Repair with Mesh, Lysis of adhesions, and skin  lesion excision of Right Side;  Surgeon: Rachel Dolan MD;  Location: [facility] OR;  Service: General                       PT Assessment/Plan     Row Name 08/27/21 0630          PT Assessment    Assessment Comments  Pt is doing well with decreasing c/o pain and good toelrance to her exercise progression.   -GC        PT Plan    PT Plan Comments  Pt is to continue her HEP daily. She has an MRI scheduled for early next week. Will re-ck in therapy after that.  -GC       User Key  (r) = Recorded By, (t) = Taken By, (c) = Cosigned By    Initials Name Provider Type    Johan Enrique, PT Physical Therapist            OP Exercises     Row Name 08/27/21 0630             Subjective Comments    Subjective Comments  Pt states she still has some pain when she stands for prolonged periods.  -GC         Exercise 3    Exercise Name 3  Hamstring stretch  -GC      Cueing 3  Verbal;Tactile  -GC      Reps 3  15  -GC      Time 3  10 secs  -GC         Exercise 4    Exercise Name 4  QS with Russian Stim  -GC      Cueing 4  Verbal;Tactile  -GC      Time 4  10 min 10/10  -GC         Exercise 5    Exercise Name 5  SLR  -GC      Cueing 5  Verbal;Tactile  -GC      Reps 5  25  -GC      Time 5  3#  -GC         Exercise 6    Exercise Name 6  Hip ADD  -GC      Cueing 6  Verbal;Tactile  -GC      Reps 6  25  -GC      Time 6  3#  -GC         Exercise 7    Exercise Name 7  Hip ABD  -GC      Cueing 7  Verbal;Tactile  -GC      Reps 7  25  -GC      Time 7  3#  -GC         Exercise 8    Exercise Name 8  SAQ  -GC      Cueing 8  Verbal;Tactile  -GC      Reps 8  50  -GC      Time 8  4#  -GC         Exercise 9    Exercise Name 9  LAQ/ball squeeze  -GC      Cueing 9  Verbal;Tactile  -GC      Reps 9  50  -GC      Time 9  4#  -GC         Exercise 10    Exercise Name 10  TKE vs theraband  -GC      Cueing 10  Verbal;Demo  -GC      Reps 10  50  -GC      Time 10  gold  -GC         Exercise 11    Exercise Name 11  Mini Squats  -GC      Cueing 11  Verbal;Demo   "-GC      Reps 11  25  -GC         Exercise 12    Exercise Name 12  Lateral Dips on 2\" step  -      Cueing 12  Verbal;Demo  -      Reps 12  25  -GC        User Key  (r) = Recorded By, (t) = Taken By, (c) = Cosigned By    Initials Name Provider Type    Johan Enrique, PT Physical Therapist                                          Time Calculation:   Start Time: 0630  Stop Time: 0712  Time Calculation (min): 42 min  Therapy Charges for Today     Code Description Service Date Service Provider Modifiers Qty    [id number]  PT THER PROC EA 15 MIN 8/27/2021 Johan Cox, PT GP 2                    Johan Cox, PT  8/27/2021     "

## 2021-08-30 ENCOUNTER — OFFICE VISIT (OUTPATIENT)
Dept: INTERNAL MEDICINE | Facility: CLINIC | Age: 36
End: 2021-08-30

## 2021-08-30 VITALS
DIASTOLIC BLOOD PRESSURE: 84 MMHG | BODY MASS INDEX: 47.97 KG/M2 | HEART RATE: 88 BPM | RESPIRATION RATE: 16 BRPM | SYSTOLIC BLOOD PRESSURE: 146 MMHG | OXYGEN SATURATION: 98 % | HEIGHT: 64 IN | TEMPERATURE: 96.6 F | WEIGHT: 281 LBS

## 2021-08-30 DIAGNOSIS — G43.709 CHRONIC MIGRAINE WITHOUT AURA WITHOUT STATUS MIGRAINOSUS, NOT INTRACTABLE: Chronic | ICD-10-CM

## 2021-08-30 DIAGNOSIS — E28.2 PCOS (POLYCYSTIC OVARIAN SYNDROME): ICD-10-CM

## 2021-08-30 DIAGNOSIS — F41.9 ANXIETY AND DEPRESSION: Chronic | ICD-10-CM

## 2021-08-30 DIAGNOSIS — F32.A ANXIETY AND DEPRESSION: Chronic | ICD-10-CM

## 2021-08-30 DIAGNOSIS — I10 ESSENTIAL HYPERTENSION: Primary | Chronic | ICD-10-CM

## 2021-08-30 PROCEDURE — 99214 OFFICE O/P EST MOD 30 MIN: CPT | Performed by: INTERNAL MEDICINE

## 2021-08-30 RX ORDER — HYDROCHLOROTHIAZIDE 12.5 MG/1
12.5 TABLET ORAL DAILY
Qty: 30 TABLET | Refills: 1 | Status: SHIPPED | OUTPATIENT
Start: 2021-08-30 | End: 2021-09-14 | Stop reason: ALTCHOICE

## 2021-08-30 RX ORDER — BUSPIRONE HYDROCHLORIDE 10 MG/1
10 TABLET ORAL 3 TIMES DAILY
Qty: 270 TABLET | Refills: 1 | Status: SHIPPED | OUTPATIENT
Start: 2021-08-30 | End: 2022-04-11

## 2021-08-30 RX ORDER — RIZATRIPTAN BENZOATE 10 MG/1
TABLET, ORALLY DISINTEGRATING ORAL
Qty: 15 TABLET | Refills: 3 | Status: SHIPPED | OUTPATIENT
Start: 2021-08-30 | End: 2022-08-04 | Stop reason: SDUPTHER

## 2021-08-30 RX ORDER — LORAZEPAM 0.5 MG/1
0.5 TABLET ORAL EVERY 12 HOURS PRN
Qty: 30 TABLET | Refills: 1 | Status: SHIPPED | OUTPATIENT
Start: 2021-08-30 | End: 2022-03-11 | Stop reason: SDUPTHER

## 2021-08-30 RX ORDER — SERTRALINE HYDROCHLORIDE 100 MG/1
200 TABLET, FILM COATED ORAL DAILY
Qty: 180 TABLET | Refills: 1 | Status: SHIPPED | OUTPATIENT
Start: 2021-08-30 | End: 2022-02-22

## 2021-08-30 NOTE — ASSESSMENT & PLAN NOTE
UNCONTROLLED  - will bump sertraline dose from 150 mg to 200 mg today for improved depression control, does feel like anxiety is adequately controlled at this time but feels more fatigued and just down overall--> refills sent  - cont on buspar 10 mg BID, occasionally TID--> refills sent  - Reviewed potential side effects of medication including sexual performance changes, insomnia, fatigue, weight changes. Pt tolerating well without any issues.  - plan to follow up in 1 mos, if not improved, would consider addition of low dose abilify or lamictal to her regimen, pt has tried Wellbutrin in the past without any benefit   - will refill lorazepam for prn use, no red flags for abuse or overuse  - Reviewed controlled nature of substance, potential for abuse/addiction, and possible adverse effects of medication. No red flags for abuse at this time.   - Controlled substances contract signed and in chart.   - Hang checked and appropriate.

## 2021-08-30 NOTE — ASSESSMENT & PLAN NOTE
UNCONTROLLED  - had issues mostly with pregnancy, has not required anti-HTN meds since that time  - BP elevated in office today and has been elevated at last several appts, pt does have severe anxiety which impacts her BP greatly, but also with other risk factors with FHx and weight  - will start low dose BP meds today to address this--> HCTZ 12.5 mg once daily  - Cont checking BP at home daily and keep log, call if values trend outside of goal range

## 2021-08-30 NOTE — PROGRESS NOTES
"Chief Complaint  Follow-up, Med Refill, Anxiety, and Depression    Subjective          Valery Aguilar presents to [facility] for follow up and med refills. Pt taking all medications daily as prescribed with good reported compliance. No issues or side effects with meds.   Pt is now seeing ortho related to knee issues, is on  meloxicam for knee pain currently and pending an MRI waiting on insurance coverage. Also currently going to PT.   Pt has also been to ER since she was last seen. Went to [facility] and was diagnosed with strep, then went to ER the following day via EMS with swelling, tingling all over, disoriented. BP at home was 180/110s. She was discharged home with diagnosis of anxiety creating chest pain/panic attack.  Does think her depression is worse lately, not very motivated, has not been able to exercise related to his knee pain and her achilles issue. More generalized fatigue. Still taking sertraline 150 mg daily and buspar. Has ativan for prn use but has not refilled since march.       Objective   Vital Signs:     /84   Pulse 88   Temp 96.6 °F (35.9 °C)   Resp 16   Ht 162.6 cm (64\")   Wt 127 kg (281 lb)   SpO2 98%   BMI 48.23 kg/m²     Physical Exam  Vitals and nursing note reviewed.   Constitutional:       General: She is not in acute distress.     Appearance: Normal appearance.   Cardiovascular:      Rate and Rhythm: Normal rate and regular rhythm.      Pulses: Normal pulses.      Heart sounds: Normal heart sounds. No murmur heard.     Pulmonary:      Effort: Pulmonary effort is normal. No respiratory distress.      Breath sounds: Normal breath sounds.   Abdominal:      General: Abdomen is flat. Bowel sounds are normal.      Palpations: Abdomen is soft.      Tenderness: There is no abdominal tenderness.   Musculoskeletal:      Right lower leg: No edema.      Left lower leg: No edema.   Neurological:      Mental Status: She is alert and oriented " to person, place, and time. Mental status is at baseline.   Psychiatric:         Mood and Affect: Mood normal.         Behavior: Behavior normal.          Result Review :   Results reviewed by Zandra Rios MD on 8/30/2021 09:44 EDT  Labs:  COVID-19 and FLU A/B PCR - Swab, Nasopharynx (08/18/2021 13:28)   Troponin (08/18/2021 13:28)   Comprehensive Metabolic Panel (08/18/2021 13:28)   CBC & Differential (08/18/2021 13:28)   SARS-CoV-2, SIMONA 2 DAY TAT - Swab, Nasopharynx (08/17/2021 08:52)   COVID-19,[facility],NP/OP Swab in Transport Media or ESwab 72 HR TAT - Swab, Nasopharynx (08/17/2021 08:52)     Data:   XR Chest 1 View (08/18/2021 14:30)   XR Knee 3 View Left (07/06/2021 20:17)        Assessment and Plan      Diagnoses and all orders for this visit:    1. Essential hypertension (Primary)  Assessment & Plan:  UNCONTROLLED  - had issues mostly with pregnancy, has not required anti-HTN meds since that time  - BP elevated in office today and has been elevated at last several appts, pt does have severe anxiety which impacts her BP greatly, but also with other risk factors with FHx and weight  - will start low dose BP meds today to address this--> HCTZ 12.5 mg once daily  - Cont checking BP at home daily and keep log, call if values trend outside of goal range      Orders:  -     hydroCHLOROthiazide (HYDRODIURIL) 12.5 MG tablet; Take 1 tablet by mouth Daily.  Dispense: 30 tablet; Refill: 1  -     Comprehensive Metabolic Panel  -     Hemoglobin A1c  -     Lipid Panel    2. PCOS (polycystic ovarian syndrome)  -     Comprehensive Metabolic Panel  -     Hemoglobin A1c  -     Lipid Panel    3. Chronic migraine without aura without status migrainosus, not intractable  Assessment & Plan:  CONTROLLED  - cont nortripyline nightly  - cont gabapentin 600 mg BID, refills not needed yet, can request through pharmacy at end of Sept when due  - cont with maxalt and ubrelvy for abortive medication--> refills sent  - avoid migraine  triggers, get enough sleep, improve hydration      Orders:  -     rizatriptan MLT (MAXALT-MLT) 10 MG disintegrating tablet; May repeat in 2 hours if needed  Dispense: 15 tablet; Refill: 3    4. Anxiety and depression  Assessment & Plan:  UNCONTROLLED  - will bump sertraline dose from 150 mg to 200 mg today for improved depression control, does feel like anxiety is adequately controlled at this time but feels more fatigued and just down overall--> refills sent  - cont on buspar 10 mg BID, occasionally TID--> refills sent  - Reviewed potential side effects of medication including sexual performance changes, insomnia, fatigue, weight changes. Pt tolerating well without any issues.  - plan to follow up in 1 mos, if not improved, would consider addition of low dose abilify or lamictal to her regimen, pt has tried Wellbutrin in the past without any benefit   - will refill lorazepam for prn use, no red flags for abuse or overuse  - Reviewed controlled nature of substance, potential for abuse/addiction, and possible adverse effects of medication. No red flags for abuse at this time.   - Controlled substances contract signed and in chart.   - Hang checked and appropriate.         Orders:  -     sertraline (ZOLOFT) 100 MG tablet; Take 2 tablets by mouth Daily.  Dispense: 180 tablet; Refill: 1  -     busPIRone (BUSPAR) 10 MG tablet; Take 1 tablet by mouth 3 (Three) Times a Day.  Dispense: 270 tablet; Refill: 1  -     LORazepam (ATIVAN) 0.5 MG tablet; Take 1 tablet by mouth Every 12 (Twelve) Hours As Needed for Anxiety.  Dispense: 30 tablet; Refill: 1      Follow Up   Return in about 4 weeks (around 9/27/2021), or follow up HTN, depression.    Patient was given instructions and counseling regarding her condition or for health maintenance advice. Please see specific information pulled into the AVS if appropriate.     Yessy Rios MD  [facility]  Phone: [phone]  Fax:  [phone]

## 2021-08-30 NOTE — ASSESSMENT & PLAN NOTE
CONTROLLED  - cont nortripyline nightly  - cont gabapentin 600 mg BID, refills not needed yet, can request through pharmacy at end of Sept when due  - cont with maxalt and ubrelvy for abortive medication--> refills sent  - avoid migraine triggers, get enough sleep, improve hydration

## 2021-08-31 ENCOUNTER — HOSPITAL ENCOUNTER (OUTPATIENT)
Dept: MRI IMAGING | Facility: HOSPITAL | Age: 36
Discharge: HOME | End: 2021-08-31
Admitting: ORTHOPAEDIC SURGERY

## 2021-08-31 DIAGNOSIS — M25.562 MECHANICAL KNEE PAIN, LEFT: ICD-10-CM

## 2021-08-31 DIAGNOSIS — M17.12 PRIMARY OSTEOARTHRITIS OF LEFT KNEE: ICD-10-CM

## 2021-08-31 DIAGNOSIS — Z98.890 S/P ACL RECONSTRUCTION: ICD-10-CM

## 2021-08-31 LAB
ALBUMIN SERPL-MCNC: 4.7 G/DL (ref 3.5–5.2)
ALBUMIN/GLOB SERPL: 1.7 G/DL
ALP SERPL-CCNC: 71 U/L (ref 39–117)
ALT SERPL-CCNC: 25 U/L (ref 1–33)
AST SERPL-CCNC: 22 U/L (ref 1–32)
BILIRUB SERPL-MCNC: 0.6 MG/DL (ref 0–1.2)
BUN SERPL-MCNC: 13 MG/DL (ref 6–20)
BUN/CREAT SERPL: 18.6 (ref 7–25)
CALCIUM SERPL-MCNC: 9.4 MG/DL (ref 8.6–10.5)
CHLORIDE SERPL-SCNC: 103 MMOL/L (ref 98–107)
CHOLEST SERPL-MCNC: 198 MG/DL (ref 0–200)
CO2 SERPL-SCNC: 22.6 MMOL/L (ref 22–29)
CREAT SERPL-MCNC: 0.7 MG/DL (ref 0.57–1)
GLOBULIN SER CALC-MCNC: 2.8 GM/DL
GLUCOSE SERPL-MCNC: 91 MG/DL (ref 65–99)
HBA1C MFR BLD: 5.3 % (ref 4.8–5.6)
HDLC SERPL-MCNC: 40 MG/DL (ref 40–60)
LDLC SERPL CALC-MCNC: 132 MG/DL (ref 0–100)
POTASSIUM SERPL-SCNC: 4.5 MMOL/L (ref 3.5–5.2)
PROT SERPL-MCNC: 7.5 G/DL (ref 6–8.5)
SODIUM SERPL-SCNC: 140 MMOL/L (ref 136–145)
TRIGL SERPL-MCNC: 145 MG/DL (ref 0–150)
VLDLC SERPL CALC-MCNC: 26 MG/DL (ref 5–40)

## 2021-08-31 PROCEDURE — 73721 MRI JNT OF LWR EXTRE W/O DYE: CPT

## 2021-09-01 ENCOUNTER — HOSPITAL ENCOUNTER (OUTPATIENT)
Dept: PHYSICAL THERAPY | Facility: HOSPITAL | Age: 36
Setting detail: THERAPIES SERIES
Discharge: HOME | End: 2021-09-01

## 2021-09-01 ENCOUNTER — TELEPHONE (OUTPATIENT)
Dept: ORTHOPEDIC SURGERY | Facility: CLINIC | Age: 36
End: 2021-09-01

## 2021-09-01 DIAGNOSIS — M25.562 MECHANICAL KNEE PAIN, LEFT: ICD-10-CM

## 2021-09-01 DIAGNOSIS — M17.12 PRIMARY OSTEOARTHRITIS OF LEFT KNEE: Primary | ICD-10-CM

## 2021-09-01 PROCEDURE — 97110 THERAPEUTIC EXERCISES: CPT | Performed by: PHYSICAL THERAPIST

## 2021-09-01 NOTE — TELEPHONE ENCOUNTER
LM FOR PATIENT TO CALL AND MAKE A FOLLOW UP ABOUT 2-3 WEEK PER NAK  Please schedule patient for follow up to review study results-anytime in the next 3 to 4 weeks   FOR MRI   HUB OK TO SCHEDULE

## 2021-09-01 NOTE — THERAPY TREATMENT NOTE
Outpatient Physical Therapy Ortho Treatment Note  HEBER Casilals     Patient Name: Valery Aguilar  : 1985  MRN: [id number]  Today's Date: 2021      Visit Date: 2021    Visit Dx:    ICD-10-CM ICD-9-CM   1. Primary osteoarthritis of left knee  M17.12 715.16   2. Mechanical knee pain, left  M25.562 719.46       Patient Active Problem List   Diagnosis   • Chronic migraine without aura without status migrainosus, not intractable   • Cervicogenic headache   • Secondary oligomenorrhea   • Family history of breast cancer   • Obstructive sleep apnea, adult   • GERD with apnea   • Vitamin D deficiency   • PCOS (polycystic ovarian syndrome)   • Panic disorder   • Insulin resistance   • Essential hypertension   • GERD (gastroesophageal reflux disease)   • Anxiety and depression   • Tendonitis, Achilles, left   • S/P ACL reconstruction   • Primary osteoarthritis of left knee   • Mechanical knee pain, left        Past Medical History:   Diagnosis Date   • Abdominal pain    • Anxiety    • Anxiety and depression 3/8/2021   • Arthritis     KNEE   • Chronic sinusitis, unspecified    • Chronic wound infection of abdomen 2019    Added automatically from request for surgery [id number]   • Depression    • GERD (gastroesophageal reflux disease)    • H/O echocardiogram      EF 56% NORMAL DIASTOLIC FUNCTION NO VALVULAR DISEASE   • H/O exercise stress test     REPORTEDLY NORMAL DONE FOR CHEST PAIN DX WITH ANXIETY   • Headache, tension-type    • History of Holter monitoring     REPORTEDLY NORMAL DONE FOR CHEST PAIN DX WITH ANXIETY   • History of MRI of brain and brain stem 2017    NORMAL DONE FOR MIGRAINES   • Hypertension     PRE-ECLAMPSIA WITH BOTH CHILDREN   • Incisional hernia     SCHEDULED FOR REPAIR   • Incisional hernia, without obstruction or gangrene 10/24/2018    Added automatically from request for surgery [id number]   • Infection following a procedure, unspecified, initial encounter    • Insulin  resistance     D/T PCOS   • Lobar pneumonia, unspecified organism (CMS/HCC)    • Meralgia paresthetica, left lower limb    • Metabolic syndrome    • Migraine    • MRSA (methicillin resistant Staphylococcus aureus) infection 2019    Abdominal wound   • Nasal congestion    • Other injury of unspecified body region, initial encounter    • Panic attack    • Panic disorder    • Papanicolaou smear 2020    NORMAL WITH NEG HPV NEVER ABN   • PCOS (polycystic ovarian syndrome)    • Pneumonia 2018   • PONV (postoperative nausea and vomiting)    • Right lower quadrant abdominal pain 2019   • Seasonal allergies    • Spinal headache    • Unspecified contact dermatitis, unspecified cause    • Viral infection     UNSPECIFIED   • Vitamin D deficiency    • Wound infection 2018        Past Surgical History:   Procedure Laterality Date   • ABDOMINAL WALL ABSCESS INCISION AND DRAINAGE N/A 2018    Procedure: Debridement of abdominal wall;  Surgeon: Brigido Navarrete MD;  Location: [facility] OR;  Service: General   • ADENOIDECTOMY     •  SECTION      X2   • CHOLECYSTECTOMY      LAP   • HERNIA REPAIR     • HX OVARIAN CYSTECTOMY     • INCISION AND DRAINAGE TRUNK N/A 2018    Procedure: wound debridement, abdomen;  Surgeon: Rachel Dolan MD;  Location: [facility] OR;  Service: General   • INCISION AND DRAINAGE TRUNK N/A 2019    Procedure: WOUND CLOSURE ABDOMINAL;  Surgeon: Rachel Dolan MD;  Location: [facility] OR;  Service: General   • KNEE ACL RECONSTRUCTION Left     DR. CARRASCO   • OVARIAN CYST REMOVAL      EX LAP WITH OVARIAN CYST REMOVAL HERNIA REPAIR    • TONSILLECTOMY      T&A   • TRUNK DEBRIDEMENT N/A 2018    Procedure: Abdominal wound debridement;  Surgeon: Rachel Dolan MD;  Location: [facility] OR;  Service: General   • VENTRAL/INCISIONAL HERNIA REPAIR N/A 11/15/2018    Procedure: Lateral Component Separation Herniorrhapy Repair with Mesh, Lysis of adhesions, and skin  lesion excision of Right Side;  Surgeon: Rachel Dolan MD;  Location: [facility];  Service: General       PT Ortho     Row Name 09/01/21 0600       Subjective Comments    Subjective Comments  Pt states her knee has been sore this week.  -GC      User Key  (r) = Recorded By, (t) = Taken By, (c) = Cosigned By    Initials Name Provider Type    GC Johan Cox, PT Physical Therapist                      PT Assessment/Plan     Row Name 09/01/21 0600          PT Assessment    Assessment Comments  reviewed MRI results and held CKC exercises due to possible meniscal/ACL involvement.   -GC        PT Plan    PT Plan Comments  Pt is to continue her HEP daily. Will follow up with MD to discuss MRI results.  -GC       User Key  (r) = Recorded By, (t) = Taken By, (c) = Cosigned By    Initials Name Provider Type    GC Johan Cox, PT Physical Therapist            OP Exercises     Row Name 09/01/21 0600             Subjective Comments    Subjective Comments  Pt states her knee has been sore this week.  -GC         Exercise 3    Exercise Name 3  Hamstring stretch  -GC      Cueing 3  Verbal;Tactile  -GC      Reps 3  15  -GC      Time 3  10 secs  -GC         Exercise 4    Exercise Name 4  QS with Russian Stim  -GC      Cueing 4  Verbal;Tactile  -GC      Time 4  10 min 10/10  -GC         Exercise 5    Exercise Name 5  SLR  -GC      Cueing 5  Verbal;Tactile  -GC      Reps 5  30  -GC      Time 5  3#  -GC         Exercise 6    Exercise Name 6  Hip ADD  -GC      Cueing 6  Verbal;Tactile  -GC      Reps 6  30  -GC      Time 6  3#  -GC         Exercise 7    Exercise Name 7  Hip ABD  -GC      Cueing 7  Verbal;Tactile  -GC      Reps 7  30  -GC      Time 7  3#  -GC         Exercise 8    Exercise Name 8  SAQ  -GC      Cueing 8  Verbal;Tactile  -GC      Reps 8  50  -GC      Time 8  4#  -GC         Exercise 9    Exercise Name 9  LAQ/ball squeeze  -GC      Cueing 9  Verbal;Tactile  -GC      Reps 9  50  -GC      Time 9  4#  -GC          "Exercise 10    Exercise Name 10  TKE vs theraband  -GC      Cueing 10  Verbal;Demo  -GC      Reps 10  50  -GC      Time 10  gold  -GC         Exercise 11    Exercise Name 11  Mini Squats  -GC      Cueing 11  Verbal;Demo  -GC      Reps 11  --  -GC         Exercise 12    Exercise Name 12  Lateral Dips on 2\" step  -GC      Cueing 12  Verbal;Demo  -GC      Reps 12  --  -GC        User Key  (r) = Recorded By, (t) = Taken By, (c) = Cosigned By    Initials Name Provider Type     Johan Cox, PT Physical Therapist                                          Time Calculation:   Start Time: 0600  Stop Time: 0632  Time Calculation (min): 32 min  Therapy Charges for Today     Code Description Service Date Service Provider Modifiers Qty    [id number]  PT THER PROC EA 15 MIN 9/1/2021 Johan Cox, PT GP 1                    Johan Cox, EPHRAIM  9/1/2021     "

## 2021-09-13 ENCOUNTER — TELEPHONE (OUTPATIENT)
Dept: INTERNAL MEDICINE | Facility: CLINIC | Age: 36
End: 2021-09-13

## 2021-09-13 NOTE — PROGRESS NOTES
"Chief Complaint  Hypertension and Follow-up (from urgent care)    Subjective          Valery Aguilar presents to [facility]  History of Present Illness  Seen in ED for High BP.  She is actually seen yesterday in the immediate care center for Covid exposure.  No significant mention of blood pressure from that evaluation.  No chest pains headaches.  Reviewed current medications  Does not feel like stress is an issue.  No medication changes although she is taking meloxicam for knee pain per Ortho which can exacerbate the symptoms.  She does have some dizziness as well.  Certainly no focal neurologic findings or symptoms    Objective   Vital Signs:   /100 (BP Location: Left arm, Patient Position: Sitting, Cuff Size: Large Adult)   Pulse 96   Temp 96.9 °F (36.1 °C) (Temporal)   Resp 16   Ht 162.6 cm (64\")   Wt 129 kg (284 lb)   SpO2 97%   BMI 48.75 kg/m²     Physical Exam  Vitals and nursing note reviewed.   Constitutional:       Appearance: Normal appearance.   HENT:      Head: Normocephalic and atraumatic.   Cardiovascular:      Rate and Rhythm: Normal rate and regular rhythm.   Pulmonary:      Effort: Pulmonary effort is normal.      Breath sounds: Normal breath sounds.   Abdominal:      General: Abdomen is flat.      Palpations: Abdomen is soft.   Musculoskeletal:         General: No swelling or deformity.      Cervical back: Neck supple.      Right lower leg: No edema.      Left lower leg: No edema.   Skin:     General: Skin is warm.      Capillary Refill: Capillary refill takes less than 2 seconds.      Findings: No rash.   Neurological:      General: No focal deficit present.      Mental Status: She is alert and oriented to person, place, and time.        Result Review : Reviewed previous labs and notes                Assessment and Plan hypertension with some dizziness and headaches.  I am not sure her blood pressure is causing these other symptoms but " certainly possible.  She has been on the meloxicam for what seems like a shorter period of time which could contribute to some of the symptoms.  She sees the orthopedist later this week.  Add losartan to the hydrochlorothiazide and recheck in 4 weeks or sooner if problems.  I think she certainly has sleep apnea as well and a name given to schedule a sleep study  Diagnoses and all orders for this visit:    1. Essential hypertension (Primary)    2. PCOS (polycystic ovarian syndrome)    Other orders  -     losartan-hydrochlorothiazide (Hyzaar) 50-12.5 MG per tablet; Take 1 tablet by mouth Daily.  Dispense: 30 tablet; Refill: 3        Follow Up   Return in about 4 weeks (around 10/12/2021).  Patient was given instructions and counseling regarding her condition or for health maintenance advice. Please see specific information pulled into the AVS if appropriate.

## 2021-09-13 NOTE — TELEPHONE ENCOUNTER
Patient called requesting refill of gabapentin. Refill request sent to Dr. Guerra.   Pt called stating that she went to urgent care today and BP was high; was told to get in to see PCP ASAP for a med/BP check; EKG done at urgent care and normal;

## 2021-09-14 ENCOUNTER — OFFICE VISIT (OUTPATIENT)
Dept: INTERNAL MEDICINE | Facility: CLINIC | Age: 36
End: 2021-09-14

## 2021-09-14 VITALS
DIASTOLIC BLOOD PRESSURE: 100 MMHG | RESPIRATION RATE: 16 BRPM | SYSTOLIC BLOOD PRESSURE: 146 MMHG | HEIGHT: 64 IN | TEMPERATURE: 96.9 F | OXYGEN SATURATION: 97 % | WEIGHT: 284 LBS | HEART RATE: 96 BPM | BODY MASS INDEX: 48.49 KG/M2

## 2021-09-14 DIAGNOSIS — I10 ESSENTIAL HYPERTENSION: Primary | ICD-10-CM

## 2021-09-14 DIAGNOSIS — R42 DIZZINESS: ICD-10-CM

## 2021-09-14 DIAGNOSIS — E28.2 PCOS (POLYCYSTIC OVARIAN SYNDROME): ICD-10-CM

## 2021-09-14 PROCEDURE — 99214 OFFICE O/P EST MOD 30 MIN: CPT | Performed by: INTERNAL MEDICINE

## 2021-09-14 RX ORDER — MELOXICAM 15 MG/1
TABLET ORAL
Qty: 30 TABLET | Refills: 0 | Status: SHIPPED | OUTPATIENT
Start: 2021-09-14 | End: 2021-10-11

## 2021-09-14 RX ORDER — LOSARTAN POTASSIUM AND HYDROCHLOROTHIAZIDE 12.5; 5 MG/1; MG/1
1 TABLET ORAL DAILY
Qty: 30 TABLET | Refills: 3 | Status: SHIPPED | OUTPATIENT
Start: 2021-09-14 | End: 2021-10-11 | Stop reason: SDUPTHER

## 2021-09-16 ENCOUNTER — OFFICE VISIT (OUTPATIENT)
Dept: ORTHOPEDIC SURGERY | Facility: CLINIC | Age: 36
End: 2021-09-16

## 2021-09-16 VITALS — BODY MASS INDEX: 48.49 KG/M2 | HEIGHT: 64 IN | WEIGHT: 284 LBS

## 2021-09-16 DIAGNOSIS — M17.12 PRIMARY OSTEOARTHRITIS OF LEFT KNEE: Primary | ICD-10-CM

## 2021-09-16 DIAGNOSIS — Z98.890 S/P ACL RECONSTRUCTION: ICD-10-CM

## 2021-09-16 DIAGNOSIS — T84.89XS ACL GRAFT TEAR, SEQUELA: ICD-10-CM

## 2021-09-16 PROCEDURE — 20610 DRAIN/INJ JOINT/BURSA W/O US: CPT | Performed by: ORTHOPAEDIC SURGERY

## 2021-09-16 PROCEDURE — 99214 OFFICE O/P EST MOD 30 MIN: CPT | Performed by: ORTHOPAEDIC SURGERY

## 2021-09-16 RX ADMIN — TRIAMCINOLONE ACETONIDE 80 MG: 40 INJECTION, SUSPENSION INTRA-ARTICULAR; INTRAMUSCULAR at 08:23

## 2021-09-16 RX ADMIN — LIDOCAINE HYDROCHLORIDE 8 ML: 10 INJECTION, SOLUTION INFILTRATION; PERINEURAL at 08:23

## 2021-09-16 NOTE — PROGRESS NOTES
Subjective:     Patient ID: Valery Aguilar is a 36 y.o. female.    Chief Complaint:  Follow-up left knee pain  Follow-up MRI results  History of Present Illness  Valery Aguilar returns to clinic today for evaluation of left knee pain. The patient rates her pain a 7 or 8 out of 10, localized to the lateral aspect of her knee and anterior groin. She describes intermittent pain sharp in nature, and denies pain that radiates distally to her lower left extremities. She denies numbness or tingling in her knee, but does experience mild numbness and tingling in her fingers.      The patient reports positive relief with the Meloxicam. She notes she was seen by PCP who advised her that the Meloxicam is causing her hypertension, but the onset of her symptom's of dizziness started approximately 2 weeks ago and she has been on the Meloxicam for 2 months. She denies having previous injections.     She states she went 13 years without an ACL. The patient was seen with Dr. Hou when she was in her 20's for her left knee pain who did an exam on her, and advised her to work on physical therapy and wear a knee brace. She returned to him 5 years later when she tore cartilage, and that is when he advised her that she did not have an ACL in her left knee and proceeded with an ACL reconstruction at that point.      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    • Occupation: [employer]   Tobacco Use   • Smoking status: Former Smoker     Packs/day: 0.50     Years: 1.00     Pack years: 0.50     Quit date:      Years since quittin.7   • Smokeless tobacco: Never Used   Vaping Use   • Vaping Use: Never used   Substance and Sexual Activity   • Alcohol use: Yes     Comment: occasional   • Drug use: No   • Sexual activity: Defer     Partners: Male     Birth control/protection: OCP     Comment: LNMP irregular      Past Medical History:   Diagnosis Date   • Abdominal pain    •  Anxiety    • Anxiety and depression 3/8/2021   • Arthritis     KNEE   • Chronic sinusitis, unspecified    • Chronic wound infection of abdomen 2/20/2019    Added automatically from request for surgery [id number]   • Depression    • GERD (gastroesophageal reflux disease)    • H/O echocardiogram     2019 EF 56% NORMAL DIASTOLIC FUNCTION NO VALVULAR DISEASE   • H/O exercise stress test     REPORTEDLY NORMAL DONE FOR CHEST PAIN DX WITH ANXIETY   • Headache, tension-type    • History of Holter monitoring     REPORTEDLY NORMAL DONE FOR CHEST PAIN DX WITH ANXIETY   • History of MRI of brain and brain stem 01/01/2017    NORMAL DONE FOR MIGRAINES   • Hypertension     PRE-ECLAMPSIA WITH BOTH CHILDREN   • Incisional hernia     SCHEDULED FOR REPAIR   • Incisional hernia, without obstruction or gangrene 10/24/2018    Added automatically from request for surgery [id number]   • Infection following a procedure, unspecified, initial encounter    • Insulin resistance     D/T PCOS   • Lobar pneumonia, unspecified organism (CMS/HCC)    • Meralgia paresthetica, left lower limb    • Metabolic syndrome    • Migraine    • MRSA (methicillin resistant Staphylococcus aureus) infection 01/2019    Abdominal wound   • Nasal congestion    • Other injury of unspecified body region, initial encounter    • Panic attack    • Panic disorder    • Papanicolaou smear 06/01/2020    NORMAL WITH NEG HPV NEVER ABN   • PCOS (polycystic ovarian syndrome)    • Pneumonia 07/2018   • PONV (postoperative nausea and vomiting)    • Right lower quadrant abdominal pain 1/20/2019   • Seasonal allergies    • Spinal headache    • Unspecified contact dermatitis, unspecified cause    • Viral infection     UNSPECIFIED   • Vitamin D deficiency    • Wound infection 12/2/2018     Past Surgical History:   Procedure Laterality Date   • ABDOMINAL WALL ABSCESS INCISION AND DRAINAGE N/A 12/2/2018    Procedure: Debridement of abdominal wall;  Surgeon: Brigido Navarrete MD;  Location:   [facility] OR;  Service: General   • ADENOIDECTOMY     •  SECTION      X2   • CHOLECYSTECTOMY      LAP   • HERNIA REPAIR     • HX OVARIAN CYSTECTOMY     • INCISION AND DRAINAGE TRUNK N/A 2018    Procedure: wound debridement, abdomen;  Surgeon: Rachel Dolan MD;  Location:  [facility] OR;  Service: General   • INCISION AND DRAINAGE TRUNK N/A 2019    Procedure: WOUND CLOSURE ABDOMINAL;  Surgeon: Rachel Dolan MD;  Location: [facility] OR;  Service: General   • KNEE ACL RECONSTRUCTION Left     DR. CARRASCO   • OVARIAN CYST REMOVAL      EX LAP WITH OVARIAN CYST REMOVAL HERNIA REPAIR    • TONSILLECTOMY      T&A   • TRUNK DEBRIDEMENT N/A 2018    Procedure: Abdominal wound debridement;  Surgeon: Rachel Dolan MD;  Location: [facility] OR;  Service: General   • VENTRAL/INCISIONAL HERNIA REPAIR N/A 11/15/2018    Procedure: Lateral Component Separation Herniorrhapy Repair with Mesh, Lysis of adhesions, and skin lesion excision of Right Side;  Surgeon: Rachel Dolan MD;  Location: [facility] OR;  Service: General       Family History   Problem Relation Age of Onset   • Stroke Mother    • Heart disease Mother    • Hypertension Mother    • Heart disease Father    • Hypertension Father    • Diabetes Father    • Dementia Maternal Grandmother    • Stroke Maternal Grandmother    • Diabetes Maternal Grandmother    • Stroke Paternal Grandmother    • Hypertension Paternal Grandmother    • Cancer Paternal Grandmother    • Heart disease Paternal Grandfather    • Diabetes Paternal Grandfather          Review of Systems   Constitutional: Negative for chills, diaphoresis, fever and unexpected weight change.   HENT: Negative for hearing loss, nosebleeds, sneezing and sore throat.    Eyes: Negative for pain and visual disturbance.   Respiratory: Negative for cough, shortness of breath and wheezing.    Cardiovascular: Negative for chest pain and palpitations.   Gastrointestinal: Negative for abdominal pain,  "diarrhea, nausea and vomiting.   Endocrine: Negative for cold intolerance, heat intolerance and polydipsia.   Genitourinary: Negative for difficulty urinating, dyspareunia and hematuria.   Musculoskeletal: Positive for arthralgias and myalgias. Negative for joint swelling.   Skin: Negative for rash and wound.   Allergic/Immunologic: Negative for environmental allergies.   Neurological: Negative for dizziness, syncope and numbness.   Hematological: Does not bruise/bleed easily.   Psychiatric/Behavioral: Negative for dysphoric mood and sleep disturbance. The patient is not nervous/anxious.            Objective:  Vitals:    09/16/21 0734   Weight: 129 kg (284 lb)   Height: 162.6 cm (64.02\")         09/16/21 0734   Weight: 129 kg (284 lb)     Body mass index is 48.72 kg/m².  Physical Exam    Vital signs reviewed.   General: No acute distress, alert and oriented  Eyes: conjunctiva clear; pupils equally round and reactive  ENT: external ears and nose atraumatic; oropharynx clear  CV: no peripheral edema  Resp: normal respiratory effort  Skin: no rashes or wounds; normal turgor  Psych: mood and affect appropriate; recent and remote memory intact          Ortho Exam     Left- Knee    ROM 2 to 125 degrees  4+/5 on flexion  4+/5 on extension  Maximal tenderness lateral joint line.  Effusion- mild  Grade 2B Lachman  Anterior drawer- 1+ with soft endpoint  Stable opening on varus and valgus stress at 2 and 30  Moderate increased valgus alignment  Active patellar compression test- positive   Positive sensation light touch all distributions symmetric to contralateral side  Brisk cap refill all digits  2+ dorsalis pedis pulse  Imaging:  MRI Knee Left Without Contrast    Result Date: 8/31/2021  Impression: 1. Limited evaluation of the anterior cruciate ligament graft and intercondylar notch. There is a large bulky osteophyte arising from the medial aspect of the lateral femoral condyle that impinges on the anterior cruciate " ligament graft. The graft is thickened and not definitively identified in continuity and the appearance suggests a tear, however clinical correlation is recommended as a thin strand could remain intact. This is altered in comparison with the previous examination in which the graft was clearly identified. 2. Attenuation of the body and posterior horn of the lateral meniscus. Signal abnormality in the body lateral meniscus reaches the free edge and suggests meniscal tearing, although postsurgical changes could contribute to this appearance. The appearance is stable from the prior exam. 3. Postsurgical changes in the medial meniscus, with no change. No definite recurrent tear and a small residual right. 4. Degenerative changes in the patellofemoral joint are relatively severe in the lower pole lateral facet patella, and have progressed from prior exam. 5. Well-corticated focus of ossification posterior superior to the lateral tibial plateau which may reflect a loose body or the residua of an old ununited fracture. 6. Small to moderate sized joint effusion, and mild synovial proliferation. Signer Name: Roya Palmer MD  Signed: 8/31/2021 2:34 PM  Workstation Name: [email or web address]  [facility]    Review of outside MRI left knee include review of images as well as radiology report indicates large osteophyte medial aspect lateral femoral condyle with thickening of prior ACL graft concerning for high-grade partial versus full-thickness tear of graft.  Significant volume loss of body and posterior horn lateral meniscus with no acute change consistent with tear of medial or lateral meniscus.  Advanced arthritic change of the patellofemoral and lateral joint.    Assessment:        1. Primary osteoarthritis of left knee    2. S/P ACL reconstruction           Plan:  Large Joint Arthrocentesis: L knee  Date/Time: 9/16/2021 8:23 AM  Consent given by: patient  Site marked: site marked  Timeout: Immediately  prior to procedure a time out was called to verify the correct patient, procedure, equipment, support staff and site/side marked as required   Supporting Documentation  Indications: pain   Procedure Details  Location: knee - L knee  Preparation: Patient was prepped and draped in the usual sterile fashion  Needle size: 22 G  Approach: anterolateral  Medications administered: 80 mg triamcinolone acetonide 40 MG/ML; 8 mL lidocaine 1 %  Patient tolerance: patient tolerated the procedure well with no immediate complications                1. Discussed treatment options at length with patient at today's visit. Including the patient discussing an alternative medication with her primary care provider to improve in her blood pressure, or we can switch from Meloxicam to Diclofenac. Options 2 cortisone injection to improve with inflammation control. The patient would like to proceed with an injection, at that point I advised her to monitor her progress and gradually decrease her Meloxicam if she is getting relief from the injection.  2. Patient would like to proceed with cortisone injection today to the left knee anterolateral. Recommended limited use of affected extremity for the next 24 hours to only essential activites other than work on general active and passive motion. Recommended supplementing with ice and soft tissue massage. Discussed with patient that they should see results in 5-7 days, if no improvement in 5-6 weeks I have asked them to call the office to review other options. Patient should call office immediately if they notice redness, warmth, fevers, chills, or residual numbness or tingling for greater than 6 hours after injection.   3. Home exercises to work on a strengthening help support the knee  4. Follow up: as-needed.       Valery Aguilar was in agreement with plan and had all questions answered.     Orders:  Orders Placed This Encounter   Procedures   • Large Joint Arthrocentesis: L knee        Medications:  No orders of the defined types were placed in this encounter.      Followup:  Return if symptoms worsen or fail to improve.    Diagnoses and all orders for this visit:    1. Primary osteoarthritis of left knee (Primary)    2. S/P ACL reconstruction    Other orders  -     Large Joint Arthrocentesis: L knee          Dictated utilizing Dragon dictation     Scribed for Patrick Napier MD by Robb Rodriguez.  9/22/2021  08:55 EDT

## 2021-09-22 PROBLEM — T84.89XS: Status: ACTIVE | Noted: 2021-09-22

## 2021-09-22 RX ORDER — TRIAMCINOLONE ACETONIDE 40 MG/ML
80 INJECTION, SUSPENSION INTRA-ARTICULAR; INTRAMUSCULAR
Status: COMPLETED | OUTPATIENT
Start: 2021-09-16 | End: 2021-09-16

## 2021-09-22 RX ORDER — LIDOCAINE HYDROCHLORIDE 10 MG/ML
8 INJECTION, SOLUTION INFILTRATION; PERINEURAL
Status: COMPLETED | OUTPATIENT
Start: 2021-09-16 | End: 2021-09-16

## 2021-09-26 DIAGNOSIS — G43.011 INTRACTABLE MIGRAINE WITHOUT AURA AND WITH STATUS MIGRAINOSUS: Chronic | ICD-10-CM

## 2021-09-27 RX ORDER — GABAPENTIN 600 MG/1
TABLET ORAL
Qty: 180 TABLET | Refills: 1 | Status: SHIPPED | OUTPATIENT
Start: 2021-09-27 | End: 2022-03-23 | Stop reason: SDUPTHER

## 2021-10-11 ENCOUNTER — OFFICE VISIT (OUTPATIENT)
Dept: INTERNAL MEDICINE | Facility: CLINIC | Age: 36
End: 2021-10-11

## 2021-10-11 VITALS
HEART RATE: 96 BPM | DIASTOLIC BLOOD PRESSURE: 88 MMHG | OXYGEN SATURATION: 100 % | BODY MASS INDEX: 49.51 KG/M2 | WEIGHT: 290 LBS | HEIGHT: 64 IN | TEMPERATURE: 97.1 F | SYSTOLIC BLOOD PRESSURE: 140 MMHG | RESPIRATION RATE: 16 BRPM

## 2021-10-11 DIAGNOSIS — I10 ESSENTIAL HYPERTENSION: Primary | ICD-10-CM

## 2021-10-11 DIAGNOSIS — G47.33 OSA (OBSTRUCTIVE SLEEP APNEA): ICD-10-CM

## 2021-10-11 DIAGNOSIS — E28.2 PCOS (POLYCYSTIC OVARIAN SYNDROME): ICD-10-CM

## 2021-10-11 PROCEDURE — 90686 IIV4 VACC NO PRSV 0.5 ML IM: CPT | Performed by: INTERNAL MEDICINE

## 2021-10-11 PROCEDURE — 99214 OFFICE O/P EST MOD 30 MIN: CPT | Performed by: INTERNAL MEDICINE

## 2021-10-11 PROCEDURE — 90471 IMMUNIZATION ADMIN: CPT | Performed by: INTERNAL MEDICINE

## 2021-10-11 RX ORDER — LOSARTAN POTASSIUM AND HYDROCHLOROTHIAZIDE 12.5; 5 MG/1; MG/1
1 TABLET ORAL DAILY
Qty: 90 TABLET | Refills: 1 | Status: SHIPPED | OUTPATIENT
Start: 2021-10-11 | End: 2022-07-18

## 2021-10-11 RX ORDER — MELOXICAM 15 MG/1
TABLET ORAL
Qty: 30 TABLET | Refills: 0 | Status: SHIPPED | OUTPATIENT
Start: 2021-10-11 | End: 2021-11-12

## 2021-10-11 NOTE — PROGRESS NOTES
"Chief Complaint  Hypertension    Subjective          Valery Aguilar presents to [facility]  History of Present Illness  Denies any significant headache, shortness of breath or chest pain.  No visual changes.  Taking medication without complication.  Has not got a sleep study set up yet  Objective   Vital Signs:   /88 (BP Location: Left arm, Patient Position: Sitting, Cuff Size: Large Adult)   Pulse 96   Temp 97.1 °F (36.2 °C) (Temporal)   Resp 16   Ht 162.6 cm (64\")   Wt 132 kg (290 lb)   SpO2 100%   BMI 49.78 kg/m²     Physical Exam  Vitals and nursing note reviewed.   Constitutional:       Appearance: Normal appearance.   HENT:      Head: Normocephalic and atraumatic.   Cardiovascular:      Rate and Rhythm: Normal rate and regular rhythm.   Pulmonary:      Effort: Pulmonary effort is normal.      Breath sounds: Normal breath sounds.   Abdominal:      General: Abdomen is flat.      Palpations: Abdomen is soft.   Musculoskeletal:         General: No swelling or deformity.      Cervical back: Neck supple.      Right lower leg: No edema.      Left lower leg: No edema.   Skin:     General: Skin is warm.      Capillary Refill: Capillary refill takes less than 2 seconds.      Findings: No rash.   Neurological:      General: No focal deficit present.      Mental Status: She is alert and oriented to person, place, and time.        Result Review : Previous notes                Assessment and Plan essential hypertension, PCOS, obesity and SHANE likely.  Blood pressures doing better with the addition of hydrochlorothiazide.  Weight gain noted.  We talked about exercise including nonimpact exercise to help preserve the joints.  Weight reduction will significantly help all the above.  She is starting to work on that.  Schedule sleep study.  Follow-up with Dr. Rios in about 3 months.  Flu shot given  Diagnoses and all orders for this visit:    1. Essential " hypertension (Primary)    2. SHANE (obstructive sleep apnea)  -     Ambulatory Referral to Sleep Medicine    3. PCOS (polycystic ovarian syndrome)    Other orders  -     losartan-hydrochlorothiazide (Hyzaar) 50-12.5 MG per tablet; Take 1 tablet by mouth Daily.  Dispense: 90 tablet; Refill: 1  -     FluLaval/Fluarix >6 Months ([id number])        Follow Up   Return in about 3 months (around 1/11/2022).  Patient was given instructions and counseling regarding her condition or for health maintenance advice. Please see specific information pulled into the AVS if appropriate.

## 2021-10-11 NOTE — TELEPHONE ENCOUNTER
RX Refill request.        1. Primary osteoarthritis of left knee    2. S/P ACL reconstruction      Last seen 09.16.2021

## 2021-10-18 ENCOUNTER — APPOINTMENT (OUTPATIENT)
Dept: SLEEP MEDICINE | Facility: HOSPITAL | Age: 36
End: 2021-10-18

## 2021-10-25 ENCOUNTER — OFFICE VISIT (OUTPATIENT)
Dept: SLEEP MEDICINE | Facility: HOSPITAL | Age: 36
End: 2021-10-25

## 2021-10-25 VITALS
WEIGHT: 293 LBS | HEIGHT: 64 IN | BODY MASS INDEX: 50.02 KG/M2 | DIASTOLIC BLOOD PRESSURE: 103 MMHG | HEART RATE: 94 BPM | SYSTOLIC BLOOD PRESSURE: 156 MMHG

## 2021-10-25 DIAGNOSIS — E66.01 MORBID OBESITY (HCC): ICD-10-CM

## 2021-10-25 DIAGNOSIS — G47.8 NON-RESTORATIVE SLEEP: ICD-10-CM

## 2021-10-25 DIAGNOSIS — G47.10 HYPERSOMNIA: Primary | ICD-10-CM

## 2021-10-25 PROCEDURE — 99203 OFFICE O/P NEW LOW 30 MIN: CPT | Performed by: FAMILY MEDICINE

## 2021-10-25 PROCEDURE — G0463 HOSPITAL OUTPT CLINIC VISIT: HCPCS

## 2021-10-25 NOTE — PROGRESS NOTES
[facility] New Patient/Consultation       Reason for Consultation: SHANE      Patient Care Team:  Zandra Rios MD as PCP - General (Internal Medicine & Pediatrics)  Sindi Queen MD as Consulting Physician (Sleep Medicine)      History of present illness:  Thank you for asking me to see your patient.  The patient is a 36 y.o. female with hypertension vitamin D deficiency PCOS GERD anxiety depression panic disorder status post ACL reconstruction osteoarthritis chronic migraines presents today with concern for sleep disorder.  Had a sleep study in 2006; was not prescribed Pap breathing machine.  Reports history of anxiety therefore she feels that she has a hard time falling asleep or falling back asleep when she wakes up.  Tonsils and adenoids taken out 2006.  Reports hypersomnia nonrestorative sleep snoring waking up with morning headaches nocturia 1-2 times a night restless sleep sweating excessively during sleep leg jerking at night.  No family history of sleep apnea that she is aware of.  ESS of 11. Is on Gapabentin for occipital neuralgia; 600 mg BID; HA has gone away with this except for occasional migraines.    Sleep Schedule:  Bed time: 10 PM to 11 PM  Sleep latency: An hour or so  Wake time: 6:45 AM to 7:30 AM  Average hours slept: Around 8  Non-restorative sleep: Yes  Number of naps per day: 0-1  Rotating shifts?:  No  Nocturia: 1-2 times a night  Electronics in bedroom: Yes    Excessive daytime sleepiness or drowsiness:Y  Any accidents at work due to sleepiness in the last 5 years:N  Any difficulty driving due to sleepiness or being drowsy: N  Weight changed in the last 5 years:N    Snoring:Y  Witnessed apneas:N  Have you ever awakened gasping for breath, coughing, choking or respiratory discomfort: N  Morning headaches: Y  Awaken with a sore throat or dry mouth: N    Any reports of leg jerking at night: Y  Urge sensations: N  Does pain disrupt sleep: N  Sweating during sleep: Y  Teeth  grinding: N    Any sudden episodes of sleep during the day: N  Sleep paralysis/hallucinations: N  Muscle weakness with laughing/anger: N  Nightmares: N  Sleep walking: N    Are you sleepy when you increase your sleep time: Y  Do you sleep better away from your own bed: N    ESS: 11    Social History: Tobacco meals [age] very little alcohol rare use 1 caffeinated beverage a day    Review of Systems:    A complete review of systems was done and all were negative with the exception of frequent urination anxiety depression dizziness frequent heartburn     Allergies:  Penicillins    Family History: SHANE no       Current Outpatient Medications:   •  busPIRone (BUSPAR) 10 MG tablet, Take 1 tablet by mouth 3 (Three) Times a Day., Disp: 270 tablet, Rfl: 1  •  cetirizine (zyrTEC) 10 MG tablet, Take 10 mg by mouth Every Night., Disp: , Rfl:   •  cholecalciferol (VITAMIN D3) 1000 units tablet, Take 2,000 Units by mouth Daily., Disp: , Rfl:   •  fluticasone (FLONASE) 50 MCG/ACT nasal spray, 2 sprays into the nostril(s) as directed by provider Daily., Disp: , Rfl:   •  gabapentin (NEURONTIN) 600 MG tablet, TAKE ONE TABLET BY MOUTH TWICE A DAY, Disp: 180 tablet, Rfl: 1  •  LORazepam (ATIVAN) 0.5 MG tablet, Take 1 tablet by mouth Every 12 (Twelve) Hours As Needed for Anxiety., Disp: 30 tablet, Rfl: 1  •  losartan-hydrochlorothiazide (Hyzaar) 50-12.5 MG per tablet, Take 1 tablet by mouth Daily., Disp: 90 tablet, Rfl: 1  •  meloxicam (MOBIC) 15 MG tablet, TAKE ONE TABLET BY MOUTH DAILY, Disp: 30 tablet, Rfl: 0  •  metFORMIN ER (GLUCOPHAGE-XR) 500 MG 24 hr tablet, TAKE TWO TABLETS BY MOUTH EVERY MORNING, Disp: 180 tablet, Rfl: 1  •  nortriptyline (PAMELOR) 50 MG capsule, TAKE TWO CAPSULES BY MOUTH ONCE NIGHTLY, Disp: 180 capsule, Rfl: 1  •  phenazopyridine (PYRIDIUM) 200 MG tablet, Take 1 tablet by mouth 3 (Three) Times a Day As Needed for Bladder Spasms., Disp: 12 tablet, Rfl: 0  •  rizatriptan MLT (MAXALT-MLT) 10 MG disintegrating  "tablet, May repeat in 2 hours if needed, Disp: 15 tablet, Rfl: 3  •  sertraline (ZOLOFT) 100 MG tablet, Take 2 tablets by mouth Daily., Disp: 180 tablet, Rfl: 1    Vital Signs:    Vitals:    10/25/21 0700   BP: (!) 156/103   Pulse: 94   Weight: 134 kg (296 lb)   Height: 162.6 cm (64\")      Body mass index is 50.81 kg/m².  Neck Circumference: 18.5 inches      Physical Exam:   General Alert and oriented. No acute distress noted   Pharynx/Throat Class III Mallampati airway, large tongue, no evidence of redundant lateral pharyngeal tissue. No oral lesions. No thrush. Moist mucous membranes.   Head Normocephalic. Symmetrical. Atraumatic.    Nose No septal deviation. No drainage   Chest Wall Normal shape. Symmetric expansion with respiration. No tenderness.   Neck Trachea midline, no thyromegaly or adenopathy    Lungs Clear to auscultation bilaterally. No wheezes. No rhonchi. No rales. Respirations regular, even and unlabored.   Heart Regular rhythm and normal rate. Normal S1 and S2. No murmur   Abdomen Soft, non-tender and non-distended. Normal bowel sounds. No masses.   Extremities Moves all extremities well. No edema   Psychiatric Normal mood and affect.       Impression:  1. Hypersomnia    2. Non-restorative sleep    3. Morbid obesity (HCC)        Plan:    Good sleep hygiene measures should be maintained.  Weight loss would be beneficial in this patient who is morbidly obese with BMI 50.8.    I discussed the pathophysiology of obstructive sleep apnea with the patient.  We discussed the adverse outcomes associated with untreated sleep-disordered breathing.  We discussed treatment modalities of obstructive sleep apnea including CPAP device as well as oral mandibular advancement device. Sleep study will be scheduled to establish definitive diagnosis of sleep disorder breathing.  Weight loss will be strongly beneficial in order to reduce the severity of sleep-disordered breathing.  Patient has narrow oropharyngeal " structure.  Caution during activities that require prolonged concentration is strongly advised.  Patient will be notified of sleep study results after sleep study is completed.  If sleep apnea is only mild,  oral mandibular advancement device may be one of the treatment options.  However if sleep apnea is moderately severe, CPAP treatment will be strongly encouraged.  The patient is not opposed to treatment with CPAP device if we confirm significant obstructive sleep apnea on polysomnography.     Even if mild can start with CPAP.    Thank you for allowing me to participate in your patient's care.    Sindi Queen MD  Sleep Medicine  10/25/21  09:21 EDT

## 2021-10-28 ENCOUNTER — TELEPHONE (OUTPATIENT)
Dept: ORTHOPEDIC SURGERY | Facility: CLINIC | Age: 36
End: 2021-10-28

## 2021-10-28 NOTE — TELEPHONE ENCOUNTER
"Patient's weaned off the Mobic as you suggested at the last visit, since then she has noticed that the Mobic was not only the left knee but it was helping the right knee as it is now achy, she thinks this is from over compensating. She has taken the Mobic the past two days to get some relief so she could work. She states you all had discussed a few options at her visit.       Plan of care from 09.16.2021  1. \"Discussed treatment options at length with patient at today's visit. Including the patient discussing an alternative medication with her primary care provider to improve in her blood pressure, or we can switch from Meloxicam to Diclofenac. Options 2 cortisone injection to improve with inflammation control. The patient would like to proceed with an injection, at that point I advised her to monitor her progress and gradually decrease her Meloxicam if she is getting relief from the injection.  2. Patient would like to proceed with cortisone injection today to the left knee anterolateral. Recommended limited use of affected extremity for the next 24 hours to only essential activites other than work on general active and passive motion. Recommended supplementing with ice and soft tissue massage. Discussed with patient that they should see results in 5-7 days, if no improvement in 5-6 weeks I have asked them to call the office to review other options. Patient should call office immediately if they notice redness, warmth, fevers, chills, or residual numbness or tingling for greater than 6 hours after injection.   3. Home exercises to work on a strengthening help support the knee  4. Follow up: as-needed. \"    Please advise.      "

## 2021-11-08 ENCOUNTER — HOSPITAL ENCOUNTER (OUTPATIENT)
Dept: SLEEP MEDICINE | Facility: HOSPITAL | Age: 36
Discharge: HOME | End: 2021-11-08
Admitting: FAMILY MEDICINE

## 2021-11-08 DIAGNOSIS — G47.8 NON-RESTORATIVE SLEEP: ICD-10-CM

## 2021-11-08 DIAGNOSIS — E66.01 MORBID OBESITY (HCC): ICD-10-CM

## 2021-11-08 DIAGNOSIS — G47.10 HYPERSOMNIA: ICD-10-CM

## 2021-11-08 PROCEDURE — 95806 SLEEP STUDY UNATT&RESP EFFT: CPT | Performed by: FAMILY MEDICINE

## 2021-11-08 PROCEDURE — 95806 SLEEP STUDY UNATT&RESP EFFT: CPT

## 2021-11-12 RX ORDER — MELOXICAM 15 MG/1
TABLET ORAL
Qty: 30 TABLET | Refills: 0 | Status: SHIPPED | OUTPATIENT
Start: 2021-11-12 | End: 2021-12-16

## 2021-11-15 ENCOUNTER — TELEPHONE (OUTPATIENT)
Dept: SLEEP MEDICINE | Facility: HOSPITAL | Age: 36
End: 2021-11-15

## 2021-11-15 NOTE — TELEPHONE ENCOUNTER
Called patient to discuss sleep study results. Sending order to [facility]. Advised patient to call and schedule a compliance visit once unit is received. Patient understood.

## 2021-12-16 RX ORDER — MELOXICAM 15 MG/1
TABLET ORAL
Qty: 30 TABLET | Refills: 0 | Status: SHIPPED | OUTPATIENT
Start: 2021-12-16 | End: 2022-01-19

## 2021-12-22 RX ORDER — NORTRIPTYLINE HYDROCHLORIDE 50 MG/1
CAPSULE ORAL
Qty: 180 CAPSULE | Refills: 1 | Status: SHIPPED | OUTPATIENT
Start: 2021-12-22 | End: 2022-06-19

## 2021-12-22 RX ORDER — METFORMIN HYDROCHLORIDE 500 MG/1
TABLET, EXTENDED RELEASE ORAL
Qty: 180 TABLET | Refills: 1 | Status: SHIPPED | OUTPATIENT
Start: 2021-12-22 | End: 2022-06-19

## 2021-12-22 NOTE — TELEPHONE ENCOUNTER
Rx Refill Note  Requested Prescriptions     Pending Prescriptions Disp Refills   • metFORMIN ER (GLUCOPHAGE-XR) 500 MG 24 hr tablet [Pharmacy Med Name: METFORMIN HCL  MG TABLET] 180 tablet 1     Sig: TAKE TWO TABLETS BY MOUTH EVERY MORNING   • nortriptyline (PAMELOR) 50 MG capsule [Pharmacy Med Name: NORTRIPTYLINE HCL 50 MG CAP] 180 capsule 1     Sig: TAKE TWO CAPSULES BY MOUTH NIGHTLY      Last office visit with prescribing clinician: 8/30/2021      Next office visit with prescribing clinician: 1/10/2022            Zohreh Schmidt MA  12/22/21, 07:45 EST

## 2021-12-23 ENCOUNTER — CLINICAL SUPPORT (OUTPATIENT)
Dept: ORTHOPEDIC SURGERY | Facility: CLINIC | Age: 36
End: 2021-12-23

## 2021-12-23 VITALS — HEIGHT: 64 IN | WEIGHT: 293 LBS | BODY MASS INDEX: 50.02 KG/M2

## 2021-12-23 DIAGNOSIS — M17.12 PRIMARY OSTEOARTHRITIS OF LEFT KNEE: Primary | ICD-10-CM

## 2021-12-23 PROCEDURE — 20610 DRAIN/INJ JOINT/BURSA W/O US: CPT | Performed by: ORTHOPAEDIC SURGERY

## 2021-12-23 RX ORDER — TRIAMCINOLONE ACETONIDE 40 MG/ML
80 INJECTION, SUSPENSION INTRA-ARTICULAR; INTRAMUSCULAR
Status: COMPLETED | OUTPATIENT
Start: 2021-12-23 | End: 2021-12-23

## 2021-12-23 RX ORDER — FOLIC ACID 0.8 MG
TABLET ORAL
COMMUNITY

## 2021-12-23 RX ORDER — LIDOCAINE HYDROCHLORIDE 10 MG/ML
8 INJECTION, SOLUTION EPIDURAL; INFILTRATION; INTRACAUDAL; PERINEURAL
Status: COMPLETED | OUTPATIENT
Start: 2021-12-23 | End: 2021-12-23

## 2021-12-23 RX ADMIN — LIDOCAINE HYDROCHLORIDE 8 ML: 10 INJECTION, SOLUTION EPIDURAL; INFILTRATION; INTRACAUDAL; PERINEURAL at 16:02

## 2021-12-23 RX ADMIN — TRIAMCINOLONE ACETONIDE 80 MG: 40 INJECTION, SUSPENSION INTRA-ARTICULAR; INTRAMUSCULAR at 16:02

## 2021-12-23 NOTE — PROGRESS NOTES
Procedure   Large Joint Arthrocentesis: L knee  Date/Time: 12/23/2021 4:02 PM  Consent given by: patient  Site marked: site marked  Timeout: Immediately prior to procedure a time out was called to verify the correct patient, procedure, equipment, support staff and site/side marked as required   Supporting Documentation  Indications: pain   Procedure Details  Location: knee - L knee  Preparation: Patient was prepped and draped in the usual sterile fashion  Needle size: 22 G  Approach: superior (lateral)  Medications administered: 80 mg triamcinolone acetonide 40 MG/ML; 8 mL lidocaine PF 1% 1 %  Patient tolerance: patient tolerated the procedure well with no immediate complications            Cc: Left knee DJD    Patient presents to clinic today for left knee intra-articular cortisone injection(s). I explained details of injections as well as risks, benefits and alternatives with the patient today, had all questions answered, wished to proceed with injections. Patient was instructed to watch for signs or symptoms of infection including redness, swelling, warmth to the touch, or significant increased pain and to contact our office immediately if any of these issues were noted.

## 2022-01-10 ENCOUNTER — OFFICE VISIT (OUTPATIENT)
Dept: INTERNAL MEDICINE | Facility: CLINIC | Age: 37
End: 2022-01-10

## 2022-01-10 VITALS
TEMPERATURE: 97.1 F | RESPIRATION RATE: 16 BRPM | WEIGHT: 292 LBS | HEIGHT: 64 IN | HEART RATE: 97 BPM | SYSTOLIC BLOOD PRESSURE: 136 MMHG | DIASTOLIC BLOOD PRESSURE: 74 MMHG | BODY MASS INDEX: 49.85 KG/M2 | OXYGEN SATURATION: 97 %

## 2022-01-10 DIAGNOSIS — R35.0 URINARY FREQUENCY: ICD-10-CM

## 2022-01-10 DIAGNOSIS — E66.01 MORBID (SEVERE) OBESITY DUE TO EXCESS CALORIES: Chronic | ICD-10-CM

## 2022-01-10 DIAGNOSIS — G43.709 CHRONIC MIGRAINE WITHOUT AURA WITHOUT STATUS MIGRAINOSUS, NOT INTRACTABLE: Chronic | ICD-10-CM

## 2022-01-10 DIAGNOSIS — I10 ESSENTIAL HYPERTENSION: Primary | Chronic | ICD-10-CM

## 2022-01-10 DIAGNOSIS — F32.A ANXIETY AND DEPRESSION: Chronic | ICD-10-CM

## 2022-01-10 DIAGNOSIS — F41.9 ANXIETY AND DEPRESSION: Chronic | ICD-10-CM

## 2022-01-10 DIAGNOSIS — E28.2 PCOS (POLYCYSTIC OVARIAN SYNDROME): ICD-10-CM

## 2022-01-10 LAB
BILIRUB BLD-MCNC: NEGATIVE MG/DL
CLARITY, POC: CLEAR
COLOR UR: YELLOW
EXPIRATION DATE: ABNORMAL
GLUCOSE UR STRIP-MCNC: NEGATIVE MG/DL
KETONES UR QL: NEGATIVE
LEUKOCYTE EST, POC: NEGATIVE
Lab: ABNORMAL
NITRITE UR-MCNC: NEGATIVE MG/ML
PH UR: 6.5 [PH] (ref 5–8)
PROT UR STRIP-MCNC: NEGATIVE MG/DL
RBC # UR STRIP: ABNORMAL /UL
SP GR UR: 1.03 (ref 1–1.03)
UROBILINOGEN UR QL: NORMAL

## 2022-01-10 PROCEDURE — 81003 URINALYSIS AUTO W/O SCOPE: CPT | Performed by: INTERNAL MEDICINE

## 2022-01-10 PROCEDURE — 99214 OFFICE O/P EST MOD 30 MIN: CPT | Performed by: INTERNAL MEDICINE

## 2022-01-10 RX ORDER — BUPROPION HYDROCHLORIDE 300 MG/1
300 TABLET ORAL DAILY
Qty: 30 TABLET | Refills: 2 | Status: SHIPPED | OUTPATIENT
Start: 2022-01-10 | End: 2022-02-11 | Stop reason: SDUPTHER

## 2022-01-10 NOTE — PROGRESS NOTES
"Chief Complaint  Follow-up, Med Refill, and Hypertension    Subjective          Valery Aguilar presents to [facility] for MED REFILLS AND FOLLOW UP. Pt taking all medications daily as prescribed with good reported compliance. No issues or side effects with meds.   Pt notes she has been having heart palpitations and some headaches this week. Has checked BP at home and gotten values of 160s/90s. HR has also been staying high in 90-100s.   Also feels like her zoloft is not working as well anymore. Anxiety feels adequately controlled but her depressed mood is worse again.   Also worried about possible UTI. Increased urinary frequency with some vaginal discharge. No dysuria or blood in urine. No fevers.   She was diagnosed with SHANE but has not yet been placed on CPAP therapy.       Objective   Vital Signs:     /74   Pulse 97   Temp 97.1 °F (36.2 °C)   Resp 16   Ht 162.6 cm (64\")   Wt 132 kg (292 lb)   SpO2 97%   BMI 50.12 kg/m²     Physical Exam  Vitals and nursing note reviewed.   Constitutional:       General: She is not in acute distress.     Appearance: Normal appearance. She is obese.   Cardiovascular:      Rate and Rhythm: Normal rate and regular rhythm.      Pulses: Normal pulses.      Heart sounds: Normal heart sounds. No murmur heard.      Pulmonary:      Effort: Pulmonary effort is normal. No respiratory distress.      Breath sounds: Normal breath sounds.   Abdominal:      General: Abdomen is flat. Bowel sounds are normal.      Palpations: Abdomen is soft.      Tenderness: There is no abdominal tenderness.   Musculoskeletal:      Right lower leg: No edema.      Left lower leg: No edema.   Neurological:      Mental Status: She is alert and oriented to person, place, and time. Mental status is at baseline.   Psychiatric:         Mood and Affect: Mood normal.         Behavior: Behavior normal.          Brief Urine Lab Results  (Last result in the past " 365 days)      Color   Clarity   Blood   Leuk Est   Nitrite   Protein   CREAT   Urine HCG        01/10/22 1118 Yellow   Clear   Moderate   Negative   Negative   Negative                   Result Review :   The following data was reviewed by: Zandra Rios MD on 01/10/2022:  CMP    CMP 8/18/21 8/30/21   Glucose 82 91   BUN 9 13   Creatinine 0.78 0.70   eGFR Non  Am 83 94   eGFR African Am  114   Sodium 139 140   Potassium 3.8 4.5   Chloride 103 103   Calcium 9.1 9.4   Total Protein  7.5   Albumin 4.00 4.70   Globulin  2.8   Total Bilirubin 0.6 0.6   Alkaline Phosphatase 64 71   AST (SGOT) 20 22   ALT (SGPT) 22 25      Comments are available for some flowsheets but are not being displayed.           CBC w/diff    CBC w/Diff 8/18/21   WBC 6.84   RBC 5.90 (A)   Hemoglobin 15.1   Hematocrit 47.9 (A)   MCV 81.2   MCH 25.6 (A)   MCHC 31.5   RDW 13.8   Platelets 210   Neutrophil Rel % 64.6   Immature Granulocyte Rel % 0.4   Lymphocyte Rel % 26.8   Monocyte Rel % 6.6   Eosinophil Rel % 1.2   Basophil Rel % 0.4   (A) Abnormal value            Lipid Panel    Lipid Panel 8/30/21   Total Cholesterol 198   Triglycerides 145   HDL Cholesterol 40   VLDL Cholesterol 26   LDL Cholesterol  132 (A)   (A) Abnormal value       Comments are available for some flowsheets but are not being displayed.               Most Recent A1C    HGBA1C Most Recent 8/30/21   Hemoglobin A1C 5.30      Comments are available for some flowsheets but are not being displayed.           UA    Urinalysis 7/6/21 10/4/21 1/10/22   Ketones, UA Negative Negative Negative   Leukocytes, UA Negative Trace (A) Negative   (A) Abnormal value            Urine Culture    Urine Culture 10/4/21   Urine Culture Final report (A)   (A) Abnormal value                 Assessment and Plan      Diagnoses and all orders for this visit:    1. Essential hypertension (Primary)  Assessment & Plan:  CONTROLLED  - BP in goal range today, has had some reported values above goal  range at home but possibly with active infection  - for now, will cont current dose of ARB-thiazide, will adjust if no infection or if elevated home values persist  - Cont checking BP at home daily, call if values trend outside of goal range      Orders:  -     Comprehensive Metabolic Panel  -     Hemoglobin A1c  -     Lipid Panel    2. Chronic migraine without aura without status migrainosus, not intractable  Assessment & Plan:  CONTROLLED  - cont nortripyline nightly  - cont gabapentin 600 mg BID, refills not needed yet  - cont with maxalt and ubrelvy for abortive medication--> refills sent  - avoid migraine triggers, get enough sleep, improve hydration        3. Anxiety and depression  Assessment & Plan:  UNCONTROLLED  - will add back wellbutrin 300 mg once daily, has tried in the past without much benefit but major symptom at that time being anxiety and not as much depression, never had bad side effects, will see if this as an augmenting medication is helpful  - cont on sertraline 200 mg once daily (recently increased from 150 mg daily for similar complaints)  - cont on buspar 10 mg BID, occasionally TID--> refills sent  - Reviewed potential side effects of medication including sexual performance changes, insomnia, fatigue, weight changes. Pt tolerating well without any issues.  - has lorazepam for prn use, no red flags for abuse or overuse, no refills needed at this time, last refill was Aug 2021  - Reviewed controlled nature of substance, potential for abuse/addiction, and possible adverse effects of medication. No red flags for abuse at this time.   - Controlled substances contract signed and in chart.   - Hang checked and appropriate.         Orders:  -     buPROPion XL (Wellbutrin XL) 300 MG 24 hr tablet; Take 1 tablet by mouth Daily.  Dispense: 30 tablet; Refill: 2    4. Morbid (severe) obesity due to excess calories (HCC)/ PCOS  Assessment & Plan:  UNCONTROLLED  - pt with numerous complications related  to her obesity including HTN, HLD, insulin resistence with PCOS, and GERD  - pt is currently on metformin related to PCOS, will attempt to transition to saxenda for improved glucose metabolism and assistance with weight loss  - reviewed risks and benefits, need for ongoing work with diet and exercise      Orders:  -     Liraglutide (SAXENDA) 18 MG/3ML injection pen; Inject 0.6mg under skin daily for week one, THEN 1.2mg daily for week two, THEN 1.8mg daily for week three, then 2.4mg daily for week four.  Dispense: 3 pen; Refill: 0    5. Urinary frequency  -     Urine Culture - Urine, Urine, Clean Catch; Future  -     POCT urinalysis dipstick, automated  -     Urine Culture - Urine, Urine, Clean Catch    Follow Up   Return in about 1 month (around 2/10/2022) for follow up.    Patient was given instructions and counseling regarding her condition or for health maintenance advice. Please see specific information pulled into the AVS if appropriate.     Yessy Rios MD  [facility] [place] [facility]  Phone: [phone]  Fax: [phone]

## 2022-01-10 NOTE — ASSESSMENT & PLAN NOTE
UNCONTROLLED  - will add back wellbutrin 300 mg once daily, has tried in the past without much benefit but major symptom at that time being anxiety and not as much depression, never had bad side effects, will see if this as an augmenting medication is helpful  - cont on sertraline 200 mg once daily (recently increased from 150 mg daily for similar complaints)  - cont on buspar 10 mg BID, occasionally TID--> refills sent  - Reviewed potential side effects of medication including sexual performance changes, insomnia, fatigue, weight changes. Pt tolerating well without any issues.  - has lorazepam for prn use, no red flags for abuse or overuse, no refills needed at this time, last refill was Aug 2021  - Reviewed controlled nature of substance, potential for abuse/addiction, and possible adverse effects of medication. No red flags for abuse at this time.   - Controlled substances contract signed and in chart.   - Hang checked and appropriate.

## 2022-01-10 NOTE — ASSESSMENT & PLAN NOTE
CONTROLLED  - cont nortripyline nightly  - cont gabapentin 600 mg BID, refills not needed yet  - cont with maxalt and ubrelvy for abortive medication--> refills sent  - avoid migraine triggers, get enough sleep, improve hydration

## 2022-01-10 NOTE — ASSESSMENT & PLAN NOTE
CONTROLLED  - BP in goal range today, has had some reported values above goal range at home but possibly with active infection  - for now, will cont current dose of ARB-thiazide, will adjust if no infection or if elevated home values persist  - Cont checking BP at home daily, call if values trend outside of goal range

## 2022-01-10 NOTE — ASSESSMENT & PLAN NOTE
UNCONTROLLED  - pt with numerous complications related to her obesity including HTN, HLD, insulin resistence with PCOS, and GERD  - pt is currently on metformin related to PCOS, will attempt to transition to saxenda for improved glucose metabolism and assistance with weight loss  - reviewed risks and benefits, need for ongoing work with diet and exercise     Clear bilaterally, pupils equal, round and reactive to light.

## 2022-01-11 ENCOUNTER — PATIENT MESSAGE (OUTPATIENT)
Dept: INTERNAL MEDICINE | Facility: CLINIC | Age: 37
End: 2022-01-11

## 2022-01-11 LAB
ALBUMIN SERPL-MCNC: 4.5 G/DL (ref 3.8–4.8)
ALBUMIN/GLOB SERPL: 2 {RATIO} (ref 1.2–2.2)
ALP SERPL-CCNC: 49 IU/L (ref 44–121)
ALT SERPL-CCNC: 27 IU/L (ref 0–32)
AST SERPL-CCNC: 18 IU/L (ref 0–40)
BACTERIA UR CULT: NO GROWTH
BACTERIA UR CULT: NORMAL
BILIRUB SERPL-MCNC: 0.6 MG/DL (ref 0–1.2)
BUN SERPL-MCNC: 17 MG/DL (ref 6–20)
BUN/CREAT SERPL: 26 (ref 9–23)
CALCIUM SERPL-MCNC: 9.3 MG/DL (ref 8.7–10.2)
CHLORIDE SERPL-SCNC: 104 MMOL/L (ref 96–106)
CHOLEST SERPL-MCNC: 223 MG/DL (ref 100–199)
CO2 SERPL-SCNC: 21 MMOL/L (ref 20–29)
CREAT SERPL-MCNC: 0.65 MG/DL (ref 0.57–1)
GLOBULIN SER CALC-MCNC: 2.2 G/DL (ref 1.5–4.5)
GLUCOSE SERPL-MCNC: 86 MG/DL (ref 65–99)
HBA1C MFR BLD: 5.3 % (ref 4.8–5.6)
HDLC SERPL-MCNC: 49 MG/DL
LDLC SERPL CALC-MCNC: 153 MG/DL (ref 0–99)
POTASSIUM SERPL-SCNC: 4.5 MMOL/L (ref 3.5–5.2)
PROT SERPL-MCNC: 6.7 G/DL (ref 6–8.5)
SODIUM SERPL-SCNC: 138 MMOL/L (ref 134–144)
TRIGL SERPL-MCNC: 117 MG/DL (ref 0–149)
VLDLC SERPL CALC-MCNC: 21 MG/DL (ref 5–40)

## 2022-01-12 RX ORDER — PEN NEEDLE, DIABETIC 30 GX3/16"
1 NEEDLE, DISPOSABLE MISCELLANEOUS DAILY
Qty: 100 EACH | Refills: 10 | Status: SHIPPED | OUTPATIENT
Start: 2022-01-12

## 2022-01-12 NOTE — TELEPHONE ENCOUNTER
From: ALTAF COSME  To: Valery Aguilar  Sent: 1/11/2022 10:13 AM EST  Subject: Lab Results    Valery,     Below is Dr. Rios's interpretation on your most recent lab results:    Kidney and liver test normal   Diabetes test continues to be negative while she is taking the metformin   Chol panel jumped 20 points this time, definitely want to cont work on diet and exercise which we are addressing hopefully with the saxenda, but really want to get serious before we end up on chol medication as well     I did get the saxenda approved yesterday so you should be able to pick that up today. If you have any questions please feel free to let me know.    Thanks!  Melissa

## 2022-01-19 RX ORDER — MELOXICAM 15 MG/1
TABLET ORAL
Qty: 30 TABLET | Refills: 0 | Status: SHIPPED | OUTPATIENT
Start: 2022-01-19 | End: 2022-02-21

## 2022-01-24 NOTE — ASSESSMENT & PLAN NOTE
STABLE BUT UNCONTROLLED  - cont on current medication for now with sertraline 150 mg and buspar 10 mg TID for now, pt not perfectly controlled but also has been worse in the past so will hold on any med chagnes for now and see if we can make some non-pharm changes that may help  - strongly encoruage pt to find ways to motivate herself and create accountability for exercise, this has numerous possible benefits for her, but do believe it would greatly improve her mental health  - cont use of ativan on prn basis, good measure of her overall anxiety control in objective way, has used more frequently in past 2-3 weeks but still only a couple times per week and has prevoiusly been multiple times per day  - call back in 2-3 weeks if not improvnig or worsening and will bump up sertraline to 200 mg daily   Right arm;

## 2022-02-06 DIAGNOSIS — E28.2 PCOS (POLYCYSTIC OVARIAN SYNDROME): ICD-10-CM

## 2022-02-06 DIAGNOSIS — E66.01 MORBID (SEVERE) OBESITY DUE TO EXCESS CALORIES: Chronic | ICD-10-CM

## 2022-02-06 RX ORDER — LIRAGLUTIDE 6 MG/ML
3 INJECTION, SOLUTION SUBCUTANEOUS DAILY
Qty: 15 PEN | Refills: 1 | Status: SHIPPED | OUTPATIENT
Start: 2022-02-06 | End: 2022-04-06

## 2022-02-11 ENCOUNTER — OFFICE VISIT (OUTPATIENT)
Dept: INTERNAL MEDICINE | Facility: CLINIC | Age: 37
End: 2022-02-11

## 2022-02-11 VITALS
WEIGHT: 293 LBS | SYSTOLIC BLOOD PRESSURE: 136 MMHG | DIASTOLIC BLOOD PRESSURE: 84 MMHG | RESPIRATION RATE: 16 BRPM | TEMPERATURE: 97.1 F | BODY MASS INDEX: 50.02 KG/M2 | HEIGHT: 64 IN | OXYGEN SATURATION: 99 % | HEART RATE: 98 BPM

## 2022-02-11 DIAGNOSIS — E66.01 MORBID (SEVERE) OBESITY DUE TO EXCESS CALORIES: Chronic | ICD-10-CM

## 2022-02-11 DIAGNOSIS — F41.9 ANXIETY AND DEPRESSION: Primary | Chronic | ICD-10-CM

## 2022-02-11 DIAGNOSIS — F32.A ANXIETY AND DEPRESSION: Primary | Chronic | ICD-10-CM

## 2022-02-11 DIAGNOSIS — I10 ESSENTIAL HYPERTENSION: Chronic | ICD-10-CM

## 2022-02-11 PROCEDURE — 99214 OFFICE O/P EST MOD 30 MIN: CPT | Performed by: INTERNAL MEDICINE

## 2022-02-11 RX ORDER — SYRINGE WITH NEEDLE, 1 ML 28GX1/2"
1 SYRINGE, EMPTY DISPOSABLE MISCELLANEOUS DAILY
Qty: 1 EACH | Refills: 0 | Status: SHIPPED | OUTPATIENT
Start: 2022-02-11 | End: 2022-05-24

## 2022-02-11 RX ORDER — BUPROPION HYDROCHLORIDE 300 MG/1
300 TABLET ORAL DAILY
Qty: 90 TABLET | Refills: 1 | Status: SHIPPED | OUTPATIENT
Start: 2022-02-11 | End: 2022-02-18 | Stop reason: HOSPADM

## 2022-02-11 NOTE — ASSESSMENT & PLAN NOTE
IMPROVED  - cont on sertraline 200 mg and wellbutrin 300 mg once daily (recently increased from 150 mg daily for similar complaints)  - cont on buspar 10 mg BID, occasionally TID--> refills sent  - Reviewed potential side effects of medication including sexual performance changes, insomnia, fatigue, weight changes. Pt tolerating well without any issues.  - has lorazepam for prn use, no red flags for abuse or overuse, no refills needed at this time, last refill was Aug 2021  - Reviewed controlled nature of substance, potential for abuse/addiction, and possible adverse effects of medication. No red flags for abuse at this time.   - Controlled substances contract signed and in chart.   - Hang checked and appropriate.

## 2022-02-11 NOTE — ASSESSMENT & PLAN NOTE
IMPROVING  - has lost 6 lbs on home scale over past month  - currently on saxenda and has titrated up to 6 mg daily, tolerating well and feels good on this medication  - encouraged pt to cont working on diet and exercise to maximize impact of saxenda

## 2022-02-11 NOTE — PROGRESS NOTES
"Chief Complaint  Follow-up and Hypertension    Subjective          Valery Aguilar presents to [facility] for follow up on depression and HTN.   Wellbutrin restarted last appt to aid in depression treatment. She feels like her mood overall is much better. Less depressed mood, more good days than bad days.   Also had concerns about elevated BP values at home at last appt, in office value was normal so med changes were made. Today, her BP remains normal again. Does note she was having elevated values again at home a couple weeks ago but that has seemed to resolve.   Has gotten her CPAP machine that she used for the first time last night.   Lastly, attempted to add saxenda for weight loss at her last appt related to her obesity and PCOS status. She has been able to  this Rx, has been using daily as prescribed, she has titrated up to 3 mg dose and tolerating fine. She notes she is down 6 lbs on her home scale since starting the medication and has also enrolled in [employer] which she also feels has helped her to have the support group.     Objective   Vital Signs:     /84   Pulse 98   Temp 97.1 °F (36.2 °C)   Resp 16   Ht 162.6 cm (64\")   Wt 133 kg (293 lb)   SpO2 99%   BMI 50.29 kg/m²     Physical Exam  Vitals and nursing note reviewed.   Constitutional:       General: She is not in acute distress.     Appearance: Normal appearance.   Cardiovascular:      Rate and Rhythm: Normal rate and regular rhythm.      Pulses: Normal pulses.      Heart sounds: Normal heart sounds. No murmur heard.      Pulmonary:      Effort: Pulmonary effort is normal. No respiratory distress.      Breath sounds: Normal breath sounds.   Abdominal:      General: Abdomen is flat. Bowel sounds are normal.      Palpations: Abdomen is soft.      Tenderness: There is no abdominal tenderness.   Musculoskeletal:      Right lower leg: No edema.      Left lower leg: No edema.   Neurological:    "   Mental Status: She is alert and oriented to person, place, and time. Mental status is at baseline.   Psychiatric:         Mood and Affect: Mood normal.         Behavior: Behavior normal.          Result Review :   The following data was reviewed by: Zandra Rios MD on 02/11/2022:  CMP    CMP 8/18/21 8/30/21 1/10/22   Glucose 82 91 86   BUN 9 13 17   Creatinine 0.78 0.70 0.65   eGFR Non  Am 83 94 114   eGFR African Am  114 131   Sodium 139 140 138   Potassium 3.8 4.5 4.5   Chloride 103 103 104   Calcium 9.1 9.4 9.3   Total Protein  7.5 6.7   Albumin 4.00 4.70 4.5   Globulin  2.8 2.2   Total Bilirubin 0.6 0.6 0.6   Alkaline Phosphatase 64 71 49   AST (SGOT) 20 22 18   ALT (SGPT) 22 25 27      Comments are available for some flowsheets but are not being displayed.           CBC w/diff    CBC w/Diff 8/18/21   WBC 6.84   RBC 5.90 (A)   Hemoglobin 15.1   Hematocrit 47.9 (A)   MCV 81.2   MCH 25.6 (A)   MCHC 31.5   RDW 13.8   Platelets 210   Neutrophil Rel % 64.6   Immature Granulocyte Rel % 0.4   Lymphocyte Rel % 26.8   Monocyte Rel % 6.6   Eosinophil Rel % 1.2   Basophil Rel % 0.4   (A) Abnormal value            Lipid Panel    Lipid Panel 8/30/21 1/10/22   Total Cholesterol 198 223 (A)   Triglycerides 145 117   HDL Cholesterol 40 49   VLDL Cholesterol 26 21   LDL Cholesterol  132 (A) 153 (A)   (A) Abnormal value       Comments are available for some flowsheets but are not being displayed.               Most Recent A1C    HGBA1C Most Recent 1/10/22   Hemoglobin A1C 5.3      Comments are available for some flowsheets but are not being displayed.                Assessment and Plan      Diagnoses and all orders for this visit:    1. Anxiety and depression (Primary)  Assessment & Plan:  IMPROVED  - cont on sertraline 200 mg and wellbutrin 300 mg once daily (recently increased from 150 mg daily for similar complaints)  - cont on buspar 10 mg BID, occasionally TID--> refills sent  - Reviewed potential side effects  of medication including sexual performance changes, insomnia, fatigue, weight changes. Pt tolerating well without any issues.  - has lorazepam for prn use, no red flags for abuse or overuse, no refills needed at this time, last refill was Aug 2021  - Reviewed controlled nature of substance, potential for abuse/addiction, and possible adverse effects of medication. No red flags for abuse at this time.   - Controlled substances contract signed and in chart.   - Hang checked and appropriate.         Orders:  -     buPROPion XL (Wellbutrin XL) 300 MG 24 hr tablet; Take 1 tablet by mouth Daily.  Dispense: 90 tablet; Refill: 1    2. Essential hypertension  Assessment & Plan:  CONTROLLED  - BP in goal range today, has had some reported values above goal range at home but possibly with active infection  - for now, will cont current dose of ARB-thiazide, will adjust if no infection or if elevated home values persist  - Cont checking BP at home daily, call if values trend outside of goal range        3. Morbid (severe) obesity due to excess calories (HCC)  Assessment & Plan:  IMPROVING  - has lost 6 lbs on home scale over past month  - currently on saxenda and has titrated up to 6 mg daily, tolerating well and feels good on this medication  - encouraged pt to cont working on diet and exercise to maximize impact of saxenda    Orders:  -     BD [employer] misc; 1 each Daily.  Dispense: 1 each; Refill: 0      Follow Up   Return in about 3 months (around 5/11/2022) for follow up weight loss, migraines, anxiety.    Patient was given instructions and counseling regarding her condition or for health maintenance advice. Please see specific information pulled into the AVS if appropriate.     Yessy Rios MD  [facility]  Phone: [phone]  Fax: [phone]

## 2022-02-13 ENCOUNTER — APPOINTMENT (OUTPATIENT)
Dept: CT IMAGING | Facility: HOSPITAL | Age: 37
End: 2022-02-13

## 2022-02-13 ENCOUNTER — HOSPITAL ENCOUNTER (EMERGENCY)
Facility: HOSPITAL | Age: 37
Discharge: HOME | End: 2022-02-13
Attending: EMERGENCY MEDICINE | Admitting: EMERGENCY MEDICINE

## 2022-02-13 VITALS
BODY MASS INDEX: 49.34 KG/M2 | WEIGHT: 289 LBS | HEART RATE: 96 BPM | OXYGEN SATURATION: 100 % | HEIGHT: 64 IN | SYSTOLIC BLOOD PRESSURE: 154 MMHG | RESPIRATION RATE: 16 BRPM | TEMPERATURE: 97.8 F | DIASTOLIC BLOOD PRESSURE: 88 MMHG

## 2022-02-13 DIAGNOSIS — N83.201 CYST OF RIGHT OVARY: Primary | ICD-10-CM

## 2022-02-13 LAB
ALBUMIN SERPL-MCNC: 4.3 G/DL (ref 3.5–5.2)
ALBUMIN/GLOB SERPL: 1.6 G/DL
ALP SERPL-CCNC: 55 U/L (ref 39–117)
ALT SERPL W P-5'-P-CCNC: 34 U/L (ref 1–33)
ANION GAP SERPL CALCULATED.3IONS-SCNC: 13.3 MMOL/L (ref 5–15)
AST SERPL-CCNC: 22 U/L (ref 1–32)
BASOPHILS # BLD AUTO: 0.04 10*3/MM3 (ref 0–0.2)
BASOPHILS NFR BLD AUTO: 0.5 % (ref 0–1.5)
BILIRUB SERPL-MCNC: 0.4 MG/DL (ref 0–1.2)
BILIRUB UR QL STRIP: NEGATIVE
BUN SERPL-MCNC: 13 MG/DL (ref 6–20)
BUN/CREAT SERPL: 16.5 (ref 7–25)
CALCIUM SPEC-SCNC: 9.4 MG/DL (ref 8.6–10.5)
CHLORIDE SERPL-SCNC: 103 MMOL/L (ref 98–107)
CLARITY UR: ABNORMAL
CO2 SERPL-SCNC: 23.7 MMOL/L (ref 22–29)
COLOR UR: YELLOW
CREAT SERPL-MCNC: 0.79 MG/DL (ref 0.57–1)
DEPRECATED RDW RBC AUTO: 44.7 FL (ref 37–54)
EOSINOPHIL # BLD AUTO: 0.25 10*3/MM3 (ref 0–0.4)
EOSINOPHIL NFR BLD AUTO: 3.2 % (ref 0.3–6.2)
ERYTHROCYTE [DISTWIDTH] IN BLOOD BY AUTOMATED COUNT: 14.9 % (ref 12.3–15.4)
GFR SERPL CREATININE-BSD FRML MDRD: 82 ML/MIN/1.73
GLOBULIN UR ELPH-MCNC: 2.7 GM/DL
GLUCOSE SERPL-MCNC: 94 MG/DL (ref 65–99)
GLUCOSE UR STRIP-MCNC: NEGATIVE MG/DL
HCG SERPL QL: NEGATIVE
HCT VFR BLD AUTO: 44.2 % (ref 34–46.6)
HGB BLD-MCNC: 14.3 G/DL (ref 12–15.9)
HGB UR QL STRIP.AUTO: NEGATIVE
HOLD SPECIMEN: NORMAL
IMM GRANULOCYTES # BLD AUTO: 0.07 10*3/MM3 (ref 0–0.05)
IMM GRANULOCYTES NFR BLD AUTO: 0.9 % (ref 0–0.5)
KETONES UR QL STRIP: ABNORMAL
LEUKOCYTE ESTERASE UR QL STRIP.AUTO: NEGATIVE
LIPASE SERPL-CCNC: 43 U/L (ref 13–60)
LYMPHOCYTES # BLD AUTO: 1.76 10*3/MM3 (ref 0.7–3.1)
LYMPHOCYTES NFR BLD AUTO: 22.2 % (ref 19.6–45.3)
MCH RBC QN AUTO: 26.9 PG (ref 26.6–33)
MCHC RBC AUTO-ENTMCNC: 32.4 G/DL (ref 31.5–35.7)
MCV RBC AUTO: 83.1 FL (ref 79–97)
MONOCYTES # BLD AUTO: 0.4 10*3/MM3 (ref 0.1–0.9)
MONOCYTES NFR BLD AUTO: 5.1 % (ref 5–12)
NEUTROPHILS NFR BLD AUTO: 5.4 10*3/MM3 (ref 1.7–7)
NEUTROPHILS NFR BLD AUTO: 68.1 % (ref 42.7–76)
NITRITE UR QL STRIP: NEGATIVE
NRBC BLD AUTO-RTO: 0 /100 WBC (ref 0–0.2)
PH UR STRIP.AUTO: 7.5 [PH] (ref 4.5–8)
PLATELET # BLD AUTO: 189 10*3/MM3 (ref 140–450)
PMV BLD AUTO: 9.7 FL (ref 6–12)
POTASSIUM SERPL-SCNC: 3.8 MMOL/L (ref 3.5–5.2)
PROT SERPL-MCNC: 7 G/DL (ref 6–8.5)
PROT UR QL STRIP: NEGATIVE
RBC # BLD AUTO: 5.32 10*6/MM3 (ref 3.77–5.28)
SODIUM SERPL-SCNC: 140 MMOL/L (ref 136–145)
SP GR UR STRIP: 1.02 (ref 1–1.03)
UROBILINOGEN UR QL STRIP: ABNORMAL
WBC NRBC COR # BLD: 7.92 10*3/MM3 (ref 3.4–10.8)
WHOLE BLOOD HOLD SPECIMEN: NORMAL
WHOLE BLOOD HOLD SPECIMEN: NORMAL

## 2022-02-13 PROCEDURE — 83690 ASSAY OF LIPASE: CPT | Performed by: EMERGENCY MEDICINE

## 2022-02-13 PROCEDURE — 74176 CT ABD & PELVIS W/O CONTRAST: CPT

## 2022-02-13 PROCEDURE — 93005 ELECTROCARDIOGRAM TRACING: CPT

## 2022-02-13 PROCEDURE — 84703 CHORIONIC GONADOTROPIN ASSAY: CPT | Performed by: EMERGENCY MEDICINE

## 2022-02-13 PROCEDURE — 81003 URINALYSIS AUTO W/O SCOPE: CPT | Performed by: EMERGENCY MEDICINE

## 2022-02-13 PROCEDURE — 36415 COLL VENOUS BLD VENIPUNCTURE: CPT

## 2022-02-13 PROCEDURE — 99282 EMERGENCY DEPT VISIT SF MDM: CPT

## 2022-02-13 PROCEDURE — 93010 ELECTROCARDIOGRAM REPORT: CPT | Performed by: INTERNAL MEDICINE

## 2022-02-13 PROCEDURE — 80053 COMPREHEN METABOLIC PANEL: CPT | Performed by: EMERGENCY MEDICINE

## 2022-02-13 PROCEDURE — 85025 COMPLETE CBC W/AUTO DIFF WBC: CPT | Performed by: EMERGENCY MEDICINE

## 2022-02-13 PROCEDURE — 99283 EMERGENCY DEPT VISIT LOW MDM: CPT

## 2022-02-13 RX ORDER — SODIUM CHLORIDE 0.9 % (FLUSH) 0.9 %
10 SYRINGE (ML) INJECTION AS NEEDED
Status: DISCONTINUED | OUTPATIENT
Start: 2022-02-13 | End: 2022-02-13 | Stop reason: HOSPADM

## 2022-02-13 RX ORDER — OMEPRAZOLE 20 MG/1
40 CAPSULE, DELAYED RELEASE ORAL DAILY
Qty: 60 CAPSULE | Refills: 0 | Status: SHIPPED | OUTPATIENT
Start: 2022-02-13 | End: 2022-03-15

## 2022-02-14 ENCOUNTER — OFFICE VISIT (OUTPATIENT)
Dept: OBSTETRICS AND GYNECOLOGY | Facility: CLINIC | Age: 37
End: 2022-02-14

## 2022-02-14 VITALS
DIASTOLIC BLOOD PRESSURE: 82 MMHG | BODY MASS INDEX: 49.85 KG/M2 | WEIGHT: 292 LBS | HEIGHT: 64 IN | SYSTOLIC BLOOD PRESSURE: 138 MMHG

## 2022-02-14 DIAGNOSIS — N83.201 CYST OF RIGHT OVARY: Primary | ICD-10-CM

## 2022-02-14 LAB — QT INTERVAL: 346 MS

## 2022-02-14 PROCEDURE — 99213 OFFICE O/P EST LOW 20 MIN: CPT | Performed by: OBSTETRICS & GYNECOLOGY

## 2022-02-14 RX ORDER — LEVONORGESTREL AND ETHINYL ESTRADIOL 0.1-0.02MG
1 KIT ORAL DAILY
Qty: 56 TABLET | Refills: 0 | Status: SHIPPED | OUTPATIENT
Start: 2022-02-14 | End: 2022-04-22

## 2022-02-14 NOTE — DISCHARGE INSTRUCTIONS
Follow-up with OB/GYN for evaluation of 6.2 cm probable ovarian cyst.  Take over-the-counter pain medications as needed for abdominal pain.  Take omeprazole 40 mg daily as needed for reflux and indigestion.  Return to the emergency department for medical emergencies, such as worsening or severe symptoms.

## 2022-02-14 NOTE — PROGRESS NOTES
"PROBLEM VISIT    Chief Complaint: right ovarian cyst.      Valery Aguilar is a 36 y.o. patient who presents for follow up from ER after 6cm cyst diagnosed on CT scan done in ER 2/13/22. Pt has a hx of a 16cm left tubal cyst in 2013. Pt went to ER due to heartburn and LLQ pain. In the ER she had incidental finding of a 6cm cyst. Pt presents for US and a visit. Pt reports she has no RLQ pain.  Chief Complaint   Patient presents with   • Follow-up     er, cyst             The following portions of the patient's history were reviewed and updated as appropriate: allergies, current medications and problem list.    Review of Systems   Constitutional: Negative for appetite change, chills, fatigue, fever and unexpected weight change.   Gastrointestinal: Negative for abdominal distention, abdominal pain, anal bleeding, blood in stool, constipation, diarrhea, nausea and vomiting.   Genitourinary: Negative for dyspareunia, dysuria, menstrual problem, pelvic pain, vaginal bleeding, vaginal discharge and vaginal pain.        LLQ pain       /82   Ht 162.6 cm (64\")   Wt 132 kg (292 lb)   BMI 50.12 kg/m²     Physical Exam  Vitals reviewed.   Constitutional:       General: She is not in acute distress.     Appearance: She is obese. She is not ill-appearing, toxic-appearing or diaphoretic.   Neurological:      General: No focal deficit present.      Mental Status: She is oriented to person, place, and time.      Cranial Nerves: No cranial nerve deficit.      Motor: No weakness.   Psychiatric:         Mood and Affect: Mood normal.         Behavior: Behavior normal.         Thought Content: Thought content normal.         Judgment: Judgment normal.           Assessment/Plan   Diagnoses and all orders for this visit:    1. Cyst of right ovary (Primary)    Other orders  -     levonorgestrel-ethinyl estradiol (AVIANE,ALESSE,LESSINA) 0.1-20 MG-MCG per tablet; Take 1 tablet by mouth Daily.  Dispense: 56 tablet; Refill: 0    37yo " with right ovarian cyst    1) Right ovarian cyst: Asymptomatic. Dx on CT scan 2/13/22. TVUS today reveals: AV uterus with IUD in place. Normal left ovary. Right ovary with 5.6 x 4.9cm simple cyst. Start OCPs today for ovarian suppression. FU in 6 weeks for US and visit. Torsion precautions reviewed.     2) Contraception: Mirena IUD in place10/2018.    3) gyn HM: LPS 6/2020      Return in about 6 weeks (around 3/28/2022) for Gynecology FU.      Karen Marvin DO    2/14/2022  11:01 EST

## 2022-02-14 NOTE — ED NOTES
Pt presents with c/o L side abd pain today with indigestion. She states the last time she had these symptoms she had a ovarian cyst that needed surgical removal. She tried taking tums and her home pepcid but states the indigestion did not improve. Pt says she made herself vomit and that did not help either. She describes the L side pain as intermittent and like a spasm of sharp pain. Pt is otherwise in NAD, A&Ox4, ambulatory with a steady gait. No vomiting or dry heaving on exam.      Inge Vamra, RN  02/13/22 1941

## 2022-02-14 NOTE — ED PROVIDER NOTES
EMERGENCY DEPARTMENT ENCOUNTER      Room Number: 11/11    History is provided by the patient, no translation services needed    HPI:    Chief complaint: Abdominal pain, reflux    Location: LLQ    Quality/severity: Patient's reflux is worse than normal.  Abdominal pain is mild.    Timing/Duration: X1 day    Modifying Factors: Patient takes omeprazole 20 mg daily.  History of ovarian cystectomy in 2018.    Associated Symptoms: Reports nausea, anxiety.  Denies vomiting, diarrhea, constipation.    Narrative: Pt is a 36 y.o. female who presents complaining of LLQ abdominal pain and reflux.  Associated  nausea.  Denies vomiting, diarrhea, constipation.  Patient normally takes omeprazole 20 mg daily for reflux, but states it is worse today.  Abdominal pain is mild.  However, the patient has a history of ovarian cystectomy in 2018, and stated her symptoms were similar.  She was anxious and wanted to be checked out in the emergency department.      PMD: Zandra Rios MD    REVIEW OF SYSTEMS  Review of Systems   Constitutional: Negative for chills and fever.   HENT: Negative for congestion and sore throat.    Respiratory: Negative for cough and shortness of breath.    Cardiovascular: Negative for chest pain.   Gastrointestinal: Positive for abdominal pain and nausea. Negative for constipation, diarrhea and vomiting.   Neurological: Negative for dizziness.         PAST MEDICAL HISTORY  Active Ambulatory Problems     Diagnosis Date Noted   • Chronic migraine without aura without status migrainosus, not intractable 08/31/2016   • Cervicogenic headache 08/31/2016   • Secondary oligomenorrhea 08/20/2018   • Family history of breast cancer 10/09/2018   • Obstructive sleep apnea, adult 11/27/2018   • GERD with apnea 11/27/2018   • Vitamin D deficiency    • PCOS (polycystic ovarian syndrome)    • Panic disorder    • Insulin resistance    • Essential hypertension    • GERD (gastroesophageal reflux disease)    • Anxiety and  depression 2021   • Tendonitis, Achilles, left 05/10/2021   • S/P ACL reconstruction 2021   • Primary osteoarthritis of left knee 2021   • Mechanical knee pain, left 2021   • ACL graft tear, sequela 2021   • Morbid (severe) obesity due to excess calories ([facility]) 01/10/2022     Resolved Ambulatory Problems     Diagnosis Date Noted   • Incisional hernia, without obstruction or gangrene 10/24/2018   • Incisional hernia 11/15/2018   • Wound infection 2018   • Right lower quadrant abdominal pain 2019   • Chronic wound infection of abdomen 2019     Past Medical History:   Diagnosis Date   • Abdominal pain    • Anxiety    • Arthritis    • Chronic sinusitis, unspecified    • Depression    • H/O echocardiogram    • H/O exercise stress test    • Headache, tension-type    • History of Holter monitoring    • History of MRI of brain and brain stem 2017   • Hypertension    • Infection following a procedure, unspecified, initial encounter    • Lobar pneumonia, unspecified organism ([facility])    • Meralgia paresthetica, left lower limb    • Metabolic syndrome    • Migraine    • MRSA (methicillin resistant Staphylococcus aureus) infection 2019   • Nasal congestion    • Other injury of unspecified body region, initial encounter    • Panic attack    • Papanicolaou smear 2020   • Pneumonia 2018   • PONV (postoperative nausea and vomiting)    • Seasonal allergies    • Spinal headache    • Unspecified contact dermatitis, unspecified cause    • Viral infection        PAST SURGICAL HISTORY  Past Surgical History:   Procedure Laterality Date   • ABDOMINAL WALL ABSCESS INCISION AND DRAINAGE N/A 2018    Procedure: Debridement of abdominal wall;  Surgeon: Brigido Navarrete MD;  Location: [facility];  Service: General   • ADENOIDECTOMY     •  SECTION      X2   • CHOLECYSTECTOMY      LAP   • HERNIA REPAIR     • HX OVARIAN CYSTECTOMY     • INCISION AND DRAINAGE TRUNK N/A  2018    Procedure: wound debridement, abdomen;  Surgeon: Rachel Dolan MD;  Location: [facility] OR;  Service: General   • INCISION AND DRAINAGE TRUNK N/A 2019    Procedure: WOUND CLOSURE ABDOMINAL;  Surgeon: Rachel Dolan MD;  Location: [facility] OR;  Service: General   • KNEE ACL RECONSTRUCTION Left     DR. CARRASCO   • OVARIAN CYST REMOVAL      EX LAP WITH OVARIAN CYST REMOVAL HERNIA REPAIR    • TONSILLECTOMY      T&A   • TRUNK DEBRIDEMENT N/A 2018    Procedure: Abdominal wound debridement;  Surgeon: Rachel Dolan MD;  Location: [facility] OR;  Service: General   • VENTRAL/INCISIONAL HERNIA REPAIR N/A 11/15/2018    Procedure: Lateral Component Separation Herniorrhapy Repair with Mesh, Lysis of adhesions, and skin lesion excision of Right Side;  Surgeon: Rachel Dolan MD;  Location: [facility] OR;  Service: General       FAMILY HISTORY  Family History   Problem Relation Age of Onset   • Stroke Mother    • Heart disease Mother    • Hypertension Mother    • Heart disease Father    • Hypertension Father    • Diabetes Father    • Dementia Maternal Grandmother    • Stroke Maternal Grandmother    • Diabetes Maternal Grandmother    • Stroke Paternal Grandmother    • Hypertension Paternal Grandmother    • Cancer Paternal Grandmother    • Heart disease Paternal Grandfather    • Diabetes Paternal Grandfather        SOCIAL HISTORY  Social History     Socioeconomic History   • Marital status:    Tobacco Use   • Smoking status: Former Smoker     Packs/day: 0.50     Years: 1.00     Pack years: 0.50     Quit date:      Years since quittin.1   • Smokeless tobacco: Never Used   Vaping Use   • Vaping Use: Never used   Substance and Sexual Activity   • Alcohol use: Yes     Comment: occasional   • Drug use: No   • Sexual activity: Defer     Partners: Male     Birth control/protection: OCP     Comment: LNMP irregular       ALLERGIES  Penicillins      Current Facility-Administered  Medications:   •  sodium chloride 0.9 % flush 10 mL, 10 mL, Intravenous, PRN, Eduardo Lee MD    Current Outpatient Medications:   •  Ascorbic Acid (DESHAWN-C PO), Take  by mouth., Disp: , Rfl:   •  BD [employer] misc, 1 each Daily., Disp: 1 each, Rfl: 0  •  buPROPion XL (Wellbutrin XL) 300 MG 24 hr tablet, Take 1 tablet by mouth Daily., Disp: 90 tablet, Rfl: 1  •  busPIRone (BUSPAR) 10 MG tablet, Take 1 tablet by mouth 3 (Three) Times a Day., Disp: 270 tablet, Rfl: 1  •  cetirizine (zyrTEC) 10 MG tablet, Take 10 mg by mouth Every Night., Disp: , Rfl:   •  cholecalciferol (VITAMIN D3) 1000 units tablet, Take 2,000 Units by mouth Daily., Disp: , Rfl:   •  fluticasone (FLONASE) 50 MCG/ACT nasal spray, 2 sprays into the nostril(s) as directed by provider Daily., Disp: , Rfl:   •  gabapentin (NEURONTIN) 600 MG tablet, TAKE ONE TABLET BY MOUTH TWICE A DAY, Disp: 180 tablet, Rfl: 1  •  Insulin Pen Needle (Pen Needles) 31G X 5 MM misc, 1 each Daily., Disp: 100 each, Rfl: 10  •  Liraglutide (Saxenda) 18 MG/3ML injection pen, Inject 3 mg under the skin into the appropriate area as directed Daily., Disp: 15 pen, Rfl: 1  •  LORazepam (ATIVAN) 0.5 MG tablet, Take 1 tablet by mouth Every 12 (Twelve) Hours As Needed for Anxiety., Disp: 30 tablet, Rfl: 1  •  losartan-hydrochlorothiazide (Hyzaar) 50-12.5 MG per tablet, Take 1 tablet by mouth Daily., Disp: 90 tablet, Rfl: 1  •  Magnesium 500 MG capsule, Take  by mouth., Disp: , Rfl:   •  meloxicam (MOBIC) 15 MG tablet, TAKE ONE TABLET BY MOUTH DAILY, Disp: 30 tablet, Rfl: 0  •  metFORMIN ER (GLUCOPHAGE-XR) 500 MG 24 hr tablet, TAKE TWO TABLETS BY MOUTH EVERY MORNING, Disp: 180 tablet, Rfl: 1  •  Multiple Vitamins-Minerals (ZINC PO), Take  by mouth., Disp: , Rfl:   •  nortriptyline (PAMELOR) 50 MG capsule, TAKE TWO CAPSULES BY MOUTH NIGHTLY, Disp: 180 capsule, Rfl: 1  •  omeprazole (priLOSEC) 20 MG capsule, Take 2 capsules by mouth Daily for 30 days., Disp: 60 capsule,  Rfl: 0  •  rizatriptan MLT (MAXALT-MLT) 10 MG disintegrating tablet, May repeat in 2 hours if needed, Disp: 15 tablet, Rfl: 3  •  sertraline (ZOLOFT) 100 MG tablet, Take 2 tablets by mouth Daily., Disp: 180 tablet, Rfl: 1    PHYSICAL EXAM  ED Triage Vitals   Temp Pulse Resp BP SpO2   -- -- -- -- --      Temp src Heart Rate Source Patient Position BP Location FiO2 (%)   -- -- -- -- --       Physical Exam  Constitutional:       General: She is not in acute distress.     Appearance: She is not ill-appearing.   HENT:      Head: Normocephalic and atraumatic.   Eyes:      Extraocular Movements: Extraocular movements intact.   Cardiovascular:      Rate and Rhythm: Normal rate and regular rhythm.      Heart sounds: Normal heart sounds.   Pulmonary:      Effort: Pulmonary effort is normal.      Breath sounds: Normal breath sounds.   Abdominal:      General: There is no distension.      Palpations: Abdomen is soft.      Tenderness: There is no abdominal tenderness. There is no guarding.   Musculoskeletal:      Cervical back: Normal range of motion. No rigidity.   Skin:     General: Skin is warm and dry.   Neurological:      General: No focal deficit present.      Mental Status: She is alert and oriented to person, place, and time.   Psychiatric:         Mood and Affect: Mood is anxious. Affect is tearful.       LAB RESULTS  Lab Results (last 24 hours)     Procedure Component Value Units Date/Time    CBC & Differential [[id number]]  (Abnormal) Collected: 02/13/22 1928    Specimen: Blood Updated: 02/13/22 1934    Narrative:      The following orders were created for panel order CBC & Differential.  Procedure                               Abnormality         Status                     ---------                               -----------         ------                     CBC Auto Differential[[id number]]        Abnormal            Final result                 Please view results for these tests on the individual orders.     Comprehensive Metabolic Panel [[id number]]  (Abnormal) Collected: 02/13/22 1928    Specimen: Blood Updated: 02/13/22 1953     Glucose 94 mg/dL      BUN 13 mg/dL      Creatinine 0.79 mg/dL      Sodium 140 mmol/L      Potassium 3.8 mmol/L      Comment: Slight hemolysis detected by analyzer. Results may be affected.        Chloride 103 mmol/L      CO2 23.7 mmol/L      Calcium 9.4 mg/dL      Total Protein 7.0 g/dL      Albumin 4.30 g/dL      ALT (SGPT) 34 U/L      AST (SGOT) 22 U/L      Alkaline Phosphatase 55 U/L      Total Bilirubin 0.4 mg/dL      eGFR Non African Amer 82 mL/min/1.73      Globulin 2.7 gm/dL      A/G Ratio 1.6 g/dL      BUN/Creatinine Ratio 16.5     Anion Gap 13.3 mmol/L     Narrative:      GFR Normal >60  Chronic Kidney Disease <60  Kidney Failure <15      Lipase [[id number]]  (Normal) Collected: 02/13/22 1928    Specimen: Blood Updated: 02/13/22 1953     Lipase 43 U/L     Urinalysis With Microscopic If Indicated (No Culture) - Urine, Clean Catch [[id number]]  (Abnormal) Collected: 02/13/22 1928    Specimen: Urine, Clean Catch Updated: 02/13/22 1936     Color, UA Yellow     Appearance, UA Slightly Cloudy     pH, UA 7.5     Specific Gravity, UA 1.025     Glucose, UA Negative     Ketones, UA Trace     Bilirubin, UA Negative     Blood, UA Negative     Protein, UA Negative     Leuk Esterase, UA Negative     Nitrite, UA Negative     Urobilinogen, UA 1.0 E.U./dL    Narrative:      Urine microscopic not indicated.    hCG, Serum, Qualitative [[id number]]  (Normal) Collected: 02/13/22 1928    Specimen: Blood Updated: 02/13/22 1948     HCG Qualitative Negative    CBC Auto Differential [[id number]]  (Abnormal) Collected: 02/13/22 1928    Specimen: Blood Updated: 02/13/22 1934     WBC 7.92 10*3/mm3      RBC 5.32 10*6/mm3      Hemoglobin 14.3 g/dL      Hematocrit 44.2 %      MCV 83.1 fL      MCH 26.9 pg      MCHC 32.4 g/dL      RDW 14.9 %      RDW-SD 44.7 fl      MPV 9.7 fL      Platelets 189 10*3/mm3      Neutrophil %  68.1 %      Lymphocyte % 22.2 %      Monocyte % 5.1 %      Eosinophil % 3.2 %      Basophil % 0.5 %      Immature Grans % 0.9 %      Neutrophils, Absolute 5.40 10*3/mm3      Lymphocytes, Absolute 1.76 10*3/mm3      Monocytes, Absolute 0.40 10*3/mm3      Eosinophils, Absolute 0.25 10*3/mm3      Basophils, Absolute 0.04 10*3/mm3      Immature Grans, Absolute 0.07 10*3/mm3      nRBC 0.0 /100 WBC             I ordered the above labs and reviewed the results    RADIOLOGY  CT Abdomen Pelvis Without Contrast    Result Date: 2/13/2022  INDICATION: Left lower quadrant abdominal pain. History of previous ovarian cyst. Left lateral pain is chronic and intermittent. Epigastric pain today. Postcholecystectomy and hernia repair. TECHNIQUE: CT of the abdomen and pelvis without contrast. Coronal and sagittal reconstructions were obtained.  Radiation dose reduction techniques included automated exposure control or exposure modulation based on body size. Radiation audit for number of CT and nuclear cardiology exams performed in the last year: 0.  COMPARISON: CT abdomen pelvis from 1/19/2019. FINDINGS: Lung bases: There is minimal peripheral airspace disease at lung bases. Please correlate for any clinical evidence of Covid. There is a small hiatal hernia. Abdomen: Lack of IV contrast media limits assessment for pathology other than urinary tract calculus disease. The noncontrast liver is mildly enlarged with diffuse hepatic steatosis. Configuration of liver is not changed. Spleen is borderline enlarged about 14.2 cm in length. This is mildly increased in size. The patient is postcholecystectomy. Pancreas is fatty replaced. There are no intrarenal calculi. There is no hydronephrosis. There is a moderate colonic stool burden. The appendix is radiographically within normal limits. There is no evidence for bowel obstruction. There is no free air or percutaneously drainable fluid collection. Pelvis: There is an IUD expected location  within the uterus. There is a large probable right adnexal cyst about 6.2 cm in diameter. This is best characterized with follow-up pelvic ultrasound. Left ovary is unremarkable on CT scan. Bladder is decompressed without evidence for bladder calculus. There are postoperative changes consistent with previous anterior abdominal wall surgery.     1. Subtle peripheral infiltrates at the lung bases. Please correlate for clinical evidence of Covid pneumonia. 2. Post cholecystectomy. 3. Hepatomegaly with diffuse hepatic steatosis redemonstrated. Borderline splenic enlargement increased from prior. 4. Appendix is radiographically within normal limits and there is no evidence for bowel obstruction. There is a moderate colonic stool burden. 5. There is a 6.2 cm in diameter probable right adnexal cyst. This is best further characterized with pelvic ultrasound. 4. IUD in expected location Signer Name: Reanna Harrison MD  Signed: 2/13/2022 8:24 PM  Workstation Name: [email or web address]  [facility]      I ordered the above radiologic testing and reviewed the results    PROCEDURES  Procedures      PROGRESS AND CONSULTS           MEDICAL DECISION MAKING    MDM     My differential diagnosis for abdominal pain includes but is not limited to:  Gastritis, gastroenteritis, peptic ulcer disease, GERD, esophageal perforation, acute appendicitis, mesenteric adenitis, Meckel’s diverticulum, epiploic appendagitis, diverticulitis, colon cancer, ulcerative colitis, Crohn’s disease, intussusception, small bowel obstruction, adhesions, ischemic bowel, perforated viscus, ileus, obstipation, biliary colic, cholecystitis, cholelithiasis, Woody-Dusty Albert, hepatitis, pancreatitis, common bile duct obstruction, cholangitis, bile leak, splenic trauma, splenic rupture, splenic infarction, splenic abscess, abdominal abscess, ascites, ovarian cyst, ovarian torsion    DIAGNOSIS  Final diagnoses:   Cyst of right ovary       Latest Documented  Vital Signs:  As of 22:04 EST  BP- 154/88 HR- 96 Temp- 97.8 °F (36.6 °C) (Oral) O2 sat- 100%    DISPOSITION  Discharged home    Discussed pertinent findings with the patient/family.  Patient/Family voiced understanding of need to follow-up for recheck and further testing as needed.  Return to the Emergency Department warnings were given.         Medication List      New Prescriptions    omeprazole 20 MG capsule  Commonly known as: priLOSEC  Take 2 capsules by mouth Daily for 30 days.           Where to Get Your Medications      These medications were sent to [facility] [address] [place] [address] - [address] [address] - [phone]  - [phone] FX  [address] [address]    Phone: [phone]   · omeprazole 20 MG capsule              Follow-up Information     Schedule an appointment as soon as possible for a visit  with Karen Marvin DO.    Specialties: Obstetrics and Gynecology, Gynecology  Contact information:  [address]  [address]  [facility] [address] [phone]                           Dictated utilizing Dragon dictation     Anca Campos PA-C  02/13/22 2201

## 2022-02-17 ENCOUNTER — APPOINTMENT (OUTPATIENT)
Dept: MRI IMAGING | Facility: HOSPITAL | Age: 37
End: 2022-02-17

## 2022-02-17 ENCOUNTER — APPOINTMENT (OUTPATIENT)
Dept: CT IMAGING | Facility: HOSPITAL | Age: 37
End: 2022-02-17

## 2022-02-17 ENCOUNTER — HOSPITAL ENCOUNTER (OUTPATIENT)
Facility: HOSPITAL | Age: 37
Setting detail: OBSERVATION
Discharge: HOME | End: 2022-02-18
Attending: EMERGENCY MEDICINE | Admitting: EMERGENCY MEDICINE

## 2022-02-17 ENCOUNTER — APPOINTMENT (OUTPATIENT)
Dept: GENERAL RADIOLOGY | Facility: HOSPITAL | Age: 37
End: 2022-02-17

## 2022-02-17 DIAGNOSIS — R56.9 NEW ONSET SEIZURE: Primary | ICD-10-CM

## 2022-02-17 LAB
ALBUMIN SERPL-MCNC: 4.8 G/DL (ref 3.5–5.2)
ALBUMIN/GLOB SERPL: 1.7 G/DL
ALP SERPL-CCNC: 58 U/L (ref 39–117)
ALT SERPL W P-5'-P-CCNC: 36 U/L (ref 1–33)
AMPHET+METHAMPHET UR QL: NEGATIVE
ANION GAP SERPL CALCULATED.3IONS-SCNC: 14 MMOL/L (ref 5–15)
AST SERPL-CCNC: 27 U/L (ref 1–32)
BARBITURATES UR QL SCN: NEGATIVE
BASOPHILS # BLD AUTO: 0.03 10*3/MM3 (ref 0–0.2)
BASOPHILS NFR BLD AUTO: 0.4 % (ref 0–1.5)
BENZODIAZ UR QL SCN: NEGATIVE
BILIRUB SERPL-MCNC: 0.3 MG/DL (ref 0–1.2)
BUN SERPL-MCNC: 16 MG/DL (ref 6–20)
BUN/CREAT SERPL: 18.4 (ref 7–25)
CALCIUM SPEC-SCNC: 9.3 MG/DL (ref 8.6–10.5)
CANNABINOIDS SERPL QL: NEGATIVE
CHLORIDE SERPL-SCNC: 101 MMOL/L (ref 98–107)
CO2 SERPL-SCNC: 22 MMOL/L (ref 22–29)
COCAINE UR QL: NEGATIVE
CREAT SERPL-MCNC: 0.87 MG/DL (ref 0.57–1)
DEPRECATED RDW RBC AUTO: 43.4 FL (ref 37–54)
EOSINOPHIL # BLD AUTO: 0.11 10*3/MM3 (ref 0–0.4)
EOSINOPHIL NFR BLD AUTO: 1.5 % (ref 0.3–6.2)
ERYTHROCYTE [DISTWIDTH] IN BLOOD BY AUTOMATED COUNT: 14.7 % (ref 12.3–15.4)
ETHANOL BLD-MCNC: <10 MG/DL (ref 0–10)
ETHANOL UR QL: <0.01 %
GFR SERPL CREATININE-BSD FRML MDRD: 73 ML/MIN/1.73
GLOBULIN UR ELPH-MCNC: 2.8 GM/DL
GLUCOSE BLDC GLUCOMTR-MCNC: 82 MG/DL (ref 70–130)
GLUCOSE SERPL-MCNC: 87 MG/DL (ref 65–99)
HCG SERPL QL: NEGATIVE
HCT VFR BLD AUTO: 46.9 % (ref 34–46.6)
HGB BLD-MCNC: 15.4 G/DL (ref 12–15.9)
IMM GRANULOCYTES # BLD AUTO: 0.06 10*3/MM3 (ref 0–0.05)
IMM GRANULOCYTES NFR BLD AUTO: 0.8 % (ref 0–0.5)
LYMPHOCYTES # BLD AUTO: 1.34 10*3/MM3 (ref 0.7–3.1)
LYMPHOCYTES NFR BLD AUTO: 17.7 % (ref 19.6–45.3)
MAGNESIUM SERPL-MCNC: 2.1 MG/DL (ref 1.6–2.6)
MCH RBC QN AUTO: 26.9 PG (ref 26.6–33)
MCHC RBC AUTO-ENTMCNC: 32.8 G/DL (ref 31.5–35.7)
MCV RBC AUTO: 81.8 FL (ref 79–97)
METHADONE UR QL SCN: NEGATIVE
MONOCYTES # BLD AUTO: 0.36 10*3/MM3 (ref 0.1–0.9)
MONOCYTES NFR BLD AUTO: 4.8 % (ref 5–12)
NEUTROPHILS NFR BLD AUTO: 5.67 10*3/MM3 (ref 1.7–7)
NEUTROPHILS NFR BLD AUTO: 74.8 % (ref 42.7–76)
NRBC BLD AUTO-RTO: 0 /100 WBC (ref 0–0.2)
OPIATES UR QL: NEGATIVE
OXYCODONE UR QL SCN: NEGATIVE
PHOSPHATE SERPL-MCNC: 2.9 MG/DL (ref 2.5–4.5)
PLATELET # BLD AUTO: 202 10*3/MM3 (ref 140–450)
PMV BLD AUTO: 10 FL (ref 6–12)
POTASSIUM SERPL-SCNC: 4.3 MMOL/L (ref 3.5–5.2)
PROT SERPL-MCNC: 7.6 G/DL (ref 6–8.5)
QT INTERVAL: 323 MS
RBC # BLD AUTO: 5.73 10*6/MM3 (ref 3.77–5.28)
SARS-COV-2 RNA RESP QL NAA+PROBE: NOT DETECTED
SODIUM SERPL-SCNC: 137 MMOL/L (ref 136–145)
WBC NRBC COR # BLD: 7.57 10*3/MM3 (ref 3.4–10.8)

## 2022-02-17 PROCEDURE — 83735 ASSAY OF MAGNESIUM: CPT | Performed by: PHYSICIAN ASSISTANT

## 2022-02-17 PROCEDURE — 0 LEVETIRACETAM IN NACL 0.54% 1500 MG/100ML SOLUTION: Performed by: EMERGENCY MEDICINE

## 2022-02-17 PROCEDURE — G0378 HOSPITAL OBSERVATION PER HR: HCPCS

## 2022-02-17 PROCEDURE — 82962 GLUCOSE BLOOD TEST: CPT

## 2022-02-17 PROCEDURE — 85025 COMPLETE CBC W/AUTO DIFF WBC: CPT | Performed by: PHYSICIAN ASSISTANT

## 2022-02-17 PROCEDURE — 99284 EMERGENCY DEPT VISIT MOD MDM: CPT

## 2022-02-17 PROCEDURE — 84100 ASSAY OF PHOSPHORUS: CPT | Performed by: PHYSICIAN ASSISTANT

## 2022-02-17 PROCEDURE — 70496 CT ANGIOGRAPHY HEAD: CPT

## 2022-02-17 PROCEDURE — 80307 DRUG TEST PRSMV CHEM ANLYZR: CPT | Performed by: PHYSICIAN ASSISTANT

## 2022-02-17 PROCEDURE — 70450 CT HEAD/BRAIN W/O DYE: CPT

## 2022-02-17 PROCEDURE — 0 GADOBENATE DIMEGLUMINE 529 MG/ML SOLUTION: Performed by: EMERGENCY MEDICINE

## 2022-02-17 PROCEDURE — 70553 MRI BRAIN STEM W/O & W/DYE: CPT

## 2022-02-17 PROCEDURE — 82077 ASSAY SPEC XCP UR&BREATH IA: CPT | Performed by: PHYSICIAN ASSISTANT

## 2022-02-17 PROCEDURE — 25010000002 IOPAMIDOL 61 % SOLUTION: Performed by: EMERGENCY MEDICINE

## 2022-02-17 PROCEDURE — A9577 INJ MULTIHANCE: HCPCS | Performed by: EMERGENCY MEDICINE

## 2022-02-17 PROCEDURE — 71045 X-RAY EXAM CHEST 1 VIEW: CPT

## 2022-02-17 PROCEDURE — C9803 HOPD COVID-19 SPEC COLLECT: HCPCS

## 2022-02-17 PROCEDURE — 96374 THER/PROPH/DIAG INJ IV PUSH: CPT

## 2022-02-17 PROCEDURE — U0003 INFECTIOUS AGENT DETECTION BY NUCLEIC ACID (DNA OR RNA); SEVERE ACUTE RESPIRATORY SYNDROME CORONAVIRUS 2 (SARS-COV-2) (CORONAVIRUS DISEASE [COVID-19]), AMPLIFIED PROBE TECHNIQUE, MAKING USE OF HIGH THROUGHPUT TECHNOLOGIES AS DESCRIBED BY CMS-2020-01-R: HCPCS | Performed by: EMERGENCY MEDICINE

## 2022-02-17 PROCEDURE — 93010 ELECTROCARDIOGRAM REPORT: CPT | Performed by: INTERNAL MEDICINE

## 2022-02-17 PROCEDURE — 80053 COMPREHEN METABOLIC PANEL: CPT | Performed by: PHYSICIAN ASSISTANT

## 2022-02-17 PROCEDURE — 84703 CHORIONIC GONADOTROPIN ASSAY: CPT | Performed by: PHYSICIAN ASSISTANT

## 2022-02-17 PROCEDURE — 93005 ELECTROCARDIOGRAM TRACING: CPT | Performed by: PHYSICIAN ASSISTANT

## 2022-02-17 RX ORDER — DEXTROSE MONOHYDRATE 25 G/50ML
25 INJECTION, SOLUTION INTRAVENOUS
Status: DISCONTINUED | OUTPATIENT
Start: 2022-02-17 | End: 2022-02-18 | Stop reason: HOSPADM

## 2022-02-17 RX ORDER — INSULIN LISPRO 100 [IU]/ML
0-9 INJECTION, SOLUTION INTRAVENOUS; SUBCUTANEOUS
Status: DISCONTINUED | OUTPATIENT
Start: 2022-02-18 | End: 2022-02-18 | Stop reason: HOSPADM

## 2022-02-17 RX ORDER — ONDANSETRON 4 MG/1
4 TABLET, FILM COATED ORAL EVERY 6 HOURS PRN
Status: DISCONTINUED | OUTPATIENT
Start: 2022-02-17 | End: 2022-02-18 | Stop reason: HOSPADM

## 2022-02-17 RX ORDER — NICOTINE POLACRILEX 4 MG
15 LOZENGE BUCCAL
Status: DISCONTINUED | OUTPATIENT
Start: 2022-02-17 | End: 2022-02-18 | Stop reason: HOSPADM

## 2022-02-17 RX ORDER — SODIUM CHLORIDE 0.9 % (FLUSH) 0.9 %
10 SYRINGE (ML) INJECTION EVERY 12 HOURS SCHEDULED
Status: DISCONTINUED | OUTPATIENT
Start: 2022-02-17 | End: 2022-02-18 | Stop reason: HOSPADM

## 2022-02-17 RX ORDER — ONDANSETRON 2 MG/ML
4 INJECTION INTRAMUSCULAR; INTRAVENOUS EVERY 6 HOURS PRN
Status: DISCONTINUED | OUTPATIENT
Start: 2022-02-17 | End: 2022-02-18 | Stop reason: HOSPADM

## 2022-02-17 RX ORDER — SODIUM CHLORIDE 0.9 % (FLUSH) 0.9 %
10 SYRINGE (ML) INJECTION AS NEEDED
Status: DISCONTINUED | OUTPATIENT
Start: 2022-02-17 | End: 2022-02-18 | Stop reason: HOSPADM

## 2022-02-17 RX ORDER — NITROGLYCERIN 0.4 MG/1
0.4 TABLET SUBLINGUAL
Status: DISCONTINUED | OUTPATIENT
Start: 2022-02-17 | End: 2022-02-18 | Stop reason: HOSPADM

## 2022-02-17 RX ORDER — LEVETIRACETAM 15 MG/ML
1500 INJECTION INTRAVASCULAR ONCE
Status: COMPLETED | OUTPATIENT
Start: 2022-02-17 | End: 2022-02-17

## 2022-02-17 RX ADMIN — SODIUM CHLORIDE, PRESERVATIVE FREE 10 ML: 5 INJECTION INTRAVENOUS at 21:30

## 2022-02-17 RX ADMIN — GADOBENATE DIMEGLUMINE 20 ML: 529 INJECTION, SOLUTION INTRAVENOUS at 22:28

## 2022-02-17 RX ADMIN — LEVETIRACETAM 1500 MG: 15 INJECTION INTRAVASCULAR at 21:09

## 2022-02-17 RX ADMIN — IOPAMIDOL 95 ML: 612 INJECTION, SOLUTION INTRAVENOUS at 21:41

## 2022-02-17 NOTE — ED PROVIDER NOTES
MD ATTESTATION NOTE    The CALEB and I have discussed this patient's history, physical exam, and treatment plan.  I have reviewed the documentation and personally had a face to face interaction with the patient. I affirm the documentation and agree with the treatment and plan.  The attached note describes my personal findings.      I provided a substantive portion of the care of the patient.  I personally performed the physical exam in its entirety, and below are my findings.  For this patient encounter, the patient wore surgical mask, I wore full protective PPE including N95 and eye protection    Brief HPI: 36-year-old who had a witnessed seizure at work today with no history of seizures.  EMS states the patient was postictal on arrival.  Patient states she takes Ativan as needed for anxiety but only uses it once or twice per month.  Patient denies history of alcohol abuse.  Patient denies any history of seizures.  Patient does state she has a history of migraines and has had a negative MRI and EEG in the past.  Patient states she did have a mild headache prior to this episode today.    General : 36-year-old patient is awake alert and oriented  HEENT: NCAT  CV: Heart is regular with no murmurs  Respiratory: CTA bilaterally  Abd: Soft and nontender  Ext: No acute abnormalities  Skin: No rash  Neuro: Cranial nerves II through XII grossly intact as tested.  No acute lateralizing deficits.  Psych: Normal mood and affect      Plan: Place the patient on the monitor while obtaining labs, EKG, chest x-ray and head CT for further evaluation.  Patient's labs and UDS are unremarkable.  Chest x-ray is negative.    EKG    EKG time: 1432  Rhythm/Rate: Sinus tachycardia 106  No Acute Ischemia  Non-Specific ST-T changes    Unchanged compared to prior on     Interpreted Contemporaneously by me.  Independently viewed by me      Patient's labs and CT are reassuring.  Patient's had no further seizure-like activity in the ER.  I will  consult neurology about the patient's new onset seizures.  Dr. Nichols has requested the patient receive 2 g of Keppra IV, CT venogram of the head and MRI with and without contrast and EEG.  I have discussed the CT venogram with the CT tech.  I spoke with Obdulia Anna from the observation unit who is aware of the above and will admit the patient under Dr. Queen service.    ED Course as of 02/17/22 1937   u Feb 17, 2022   1543 Phosphorus: 2.9 [KA]   1543 Magnesium: 2.1 [KA]   1544 Sodium: 137 [KA]   1544 Calcium: 9.3 [KA]   1544 HCG Qualitative: Negative [KA]   1553 Full course of care assumed by Dr. Wick at this time. [KA]   1902 Patient's head CT is negative acute. [GP]   1924 I discussed the case with Dr. Nichols from neurology.  He has requested I give the patient 2 g of Keppra IV and obtain a CT venogram of her head this evening.  He recommends admission for MRI of the brain with and without contrast and EEG. [GP]   1926 I discussed the case with Obdulia Anna from the observation unit.  She is aware of the patient's new onset seizure my consultation with Dr. Nichols.  She is aware that I ordered a CT venogram for this evening and that Dr. Nichols has requested an MRI of the brain with and without contrast and an EEG.  She will admit the patient to the observation unit under Dr. Queen's service. [GP]   1935 Advised the patient of my consultation with neurology.  I advised her that we will start her on Keppra and obtain a CT venogram and admit her to the observation unit for further imaging and work-up.  The patient understands and agrees with the plan. [GP]      ED Course User Index  [GP] Sergio Wick MD  [KA] Kendra Guthrie PA       Final diagnoses:   New onset seizure (HCC)         Disposition: Admission to observation unit     Sergio Wick MD  02/17/22 1938

## 2022-02-17 NOTE — ED PROVIDER NOTES
EMERGENCY DEPARTMENT ENCOUNTER    Room Number:  07/07  Date seen:  2/17/2022  Time seen: 16:19 EST  PCP: Zandra Rios MD  Historian: patient      HPI:  Chief Complaint: witnessed seizure activity    A complete HPI/ROS/PMH/PSH/SH/FH are unobtainable due to: none    Context: Valery Aguilar is a 36 y.o. female who presents to the ED for evaluation of reported seizure activity that occurred at work.  She arrives via EMS for further evaluation and treatment.  Coworkers reported to EMS that patient collapsed and had tonic-clonic activity and upon arrival of EMS patient was postictal.  The patient states she remembers standing talking with her coworkers and then woke up on the floor.  She denies any history of seizures as well as any headache chest pain shortness of breath nausea or vomiting.  She is not incontinent of urine.  She denies any drug use, states she takes Ativan as needed for anxiety but only uses it once or twice a month.  She states she uses alcohol once or twice a month.  Denies any other complaints at this time.        PAST MEDICAL HISTORY  Active Ambulatory Problems     Diagnosis Date Noted   • Chronic migraine without aura without status migrainosus, not intractable 08/31/2016   • Cervicogenic headache 08/31/2016   • Secondary oligomenorrhea 08/20/2018   • Family history of breast cancer 10/09/2018   • Obstructive sleep apnea, adult 11/27/2018   • GERD with apnea 11/27/2018   • Vitamin D deficiency    • PCOS (polycystic ovarian syndrome)    • Panic disorder    • Insulin resistance    • Essential hypertension    • GERD (gastroesophageal reflux disease)    • Anxiety and depression 03/08/2021   • Tendonitis, Achilles, left 05/10/2021   • S/P ACL reconstruction 07/20/2021   • Primary osteoarthritis of left knee 07/20/2021   • Mechanical knee pain, left 07/20/2021   • ACL graft tear, sequela 09/22/2021   • Morbid (severe) obesity due to excess calories (HCC) 01/10/2022     Resolved Ambulatory Problems      Diagnosis Date Noted   • Incisional hernia, without obstruction or gangrene 10/24/2018   • Incisional hernia 11/15/2018   • Wound infection 2018   • Right lower quadrant abdominal pain 2019   • Chronic wound infection of abdomen 2019     Past Medical History:   Diagnosis Date   • Abdominal pain    • Anxiety    • Arthritis    • Chronic sinusitis, unspecified    • Depression    • H/O echocardiogram    • H/O exercise stress test    • Headache, tension-type    • History of Holter monitoring    • History of MRI of brain and brain stem 2017   • Hypertension    • Infection following a procedure, unspecified, initial encounter    • Lobar pneumonia, unspecified organism (HCC)    • Meralgia paresthetica, left lower limb    • Metabolic syndrome    • Migraine    • MRSA (methicillin resistant Staphylococcus aureus) infection 2019   • Nasal congestion    • Other injury of unspecified body region, initial encounter    • Panic attack    • Papanicolaou smear 2020   • Pneumonia 2018   • PONV (postoperative nausea and vomiting)    • Seasonal allergies    • Spinal headache    • Unspecified contact dermatitis, unspecified cause    • Viral infection          PAST SURGICAL HISTORY  Past Surgical History:   Procedure Laterality Date   • ABDOMINAL WALL ABSCESS INCISION AND DRAINAGE N/A 2018    Procedure: Debridement of abdominal wall;  Surgeon: Brigido Navarrete MD;  Location: [facility] OR;  Service: General   • ADENOIDECTOMY     •  SECTION      X2   • CHOLECYSTECTOMY      LAP   • HERNIA REPAIR     • HX OVARIAN CYSTECTOMY     • INCISION AND DRAINAGE TRUNK N/A 2018    Procedure: wound debridement, abdomen;  Surgeon: Rachel Dolan MD;  Location: [facility] OR;  Service: General   • INCISION AND DRAINAGE TRUNK N/A 2019    Procedure: WOUND CLOSURE ABDOMINAL;  Surgeon: Rachel Dolan MD;  Location: [facility] OR;  Service: General   • KNEE ACL RECONSTRUCTION Left     DR. CARRASCO    • OVARIAN CYST REMOVAL      EX LAP WITH OVARIAN CYST REMOVAL HERNIA REPAIR    • TONSILLECTOMY      T&A   • TRUNK DEBRIDEMENT N/A 2018    Procedure: Abdominal wound debridement;  Surgeon: Rachel Dolan MD;  Location: [facility] OR;  Service: General   • VENTRAL/INCISIONAL HERNIA REPAIR N/A 11/15/2018    Procedure: Lateral Component Separation Herniorrhapy Repair with Mesh, Lysis of adhesions, and skin lesion excision of Right Side;  Surgeon: Rachel Dolan MD;  Location: [facility] OR;  Service: General         FAMILY HISTORY  Family History   Problem Relation Age of Onset   • Stroke Mother    • Heart disease Mother    • Hypertension Mother    • Heart disease Father    • Hypertension Father    • Diabetes Father    • Dementia Maternal Grandmother    • Stroke Maternal Grandmother    • Diabetes Maternal Grandmother    • Stroke Paternal Grandmother    • Hypertension Paternal Grandmother    • Cancer Paternal Grandmother    • Heart disease Paternal Grandfather    • Diabetes Paternal Grandfather          SOCIAL HISTORY  Social History     Socioeconomic History   • Marital status:    Tobacco Use   • Smoking status: Former Smoker     Packs/day: 0.50     Years: 1.00     Pack years: 0.50     Quit date:      Years since quittin.1   • Smokeless tobacco: Never Used   Vaping Use   • Vaping Use: Never used   Substance and Sexual Activity   • Alcohol use: Yes     Comment: occasional   • Drug use: No   • Sexual activity: Defer     Partners: Male     Birth control/protection: OCP     Comment: LNMP irregular         ALLERGIES  Penicillins        REVIEW OF SYSTEMS  Review of Systems     All systems reviewed and negative except for those discussed in HPI.       PHYSICAL EXAM  ED Triage Vitals [22 1336]   Temp Heart Rate Resp BP SpO2   98.7 °F (37.1 °C) 110 16 150/80 98 %      Temp src Heart Rate Source Patient Position BP Location FiO2 (%)   -- -- -- -- --         GENERAL: not distressed  HENT:  atraumatic  EYES: no scleral icterus  CV: regular rhythm,  tachycardic  RESPIRATORY: normal effort, CTA B  ABDOMEN: soft, nontender nondistended no guarding or rigidity  MUSCULOSKELETAL: no deformity  NEURO: alert, oriented x4, moves all extremities, follows commands, no focal neurologic deficits  SKIN: warm, dry    Vital signs and nursing notes reviewed.          LAB RESULTS  Recent Results (from the past 24 hour(s))   Comprehensive Metabolic Panel    Collection Time: 02/17/22  2:21 PM    Specimen: Blood   Result Value Ref Range    Glucose 87 65 - 99 mg/dL    BUN 16 6 - 20 mg/dL    Creatinine 0.87 0.57 - 1.00 mg/dL    Sodium 137 136 - 145 mmol/L    Potassium 4.3 3.5 - 5.2 mmol/L    Chloride 101 98 - 107 mmol/L    CO2 22.0 22.0 - 29.0 mmol/L    Calcium 9.3 8.6 - 10.5 mg/dL    Total Protein 7.6 6.0 - 8.5 g/dL    Albumin 4.80 3.50 - 5.20 g/dL    ALT (SGPT) 36 (H) 1 - 33 U/L    AST (SGOT) 27 1 - 32 U/L    Alkaline Phosphatase 58 39 - 117 U/L    Total Bilirubin 0.3 0.0 - 1.2 mg/dL    eGFR Non African Amer 73 >60 mL/min/1.73    Globulin 2.8 gm/dL    A/G Ratio 1.7 g/dL    BUN/Creatinine Ratio 18.4 7.0 - 25.0    Anion Gap 14.0 5.0 - 15.0 mmol/L   Ethanol    Collection Time: 02/17/22  2:21 PM    Specimen: Blood   Result Value Ref Range    Ethanol <10 0 - 10 mg/dL    Ethanol % <0.010 %   hCG, Serum, Qualitative    Collection Time: 02/17/22  2:21 PM    Specimen: Blood   Result Value Ref Range    HCG Qualitative Negative Negative   Magnesium    Collection Time: 02/17/22  2:21 PM    Specimen: Blood   Result Value Ref Range    Magnesium 2.1 1.6 - 2.6 mg/dL   CBC Auto Differential    Collection Time: 02/17/22  2:21 PM    Specimen: Blood   Result Value Ref Range    WBC 7.57 3.40 - 10.80 10*3/mm3    RBC 5.73 (H) 3.77 - 5.28 10*6/mm3    Hemoglobin 15.4 12.0 - 15.9 g/dL    Hematocrit 46.9 (H) 34.0 - 46.6 %    MCV 81.8 79.0 - 97.0 fL    MCH 26.9 26.6 - 33.0 pg    MCHC 32.8 31.5 - 35.7 g/dL    RDW 14.7 12.3 - 15.4 %    RDW-SD 43.4 37.0  - 54.0 fl    MPV 10.0 6.0 - 12.0 fL    Platelets 202 140 - 450 10*3/mm3    Neutrophil % 74.8 42.7 - 76.0 %    Lymphocyte % 17.7 (L) 19.6 - 45.3 %    Monocyte % 4.8 (L) 5.0 - 12.0 %    Eosinophil % 1.5 0.3 - 6.2 %    Basophil % 0.4 0.0 - 1.5 %    Immature Grans % 0.8 (H) 0.0 - 0.5 %    Neutrophils, Absolute 5.67 1.70 - 7.00 10*3/mm3    Lymphocytes, Absolute 1.34 0.70 - 3.10 10*3/mm3    Monocytes, Absolute 0.36 0.10 - 0.90 10*3/mm3    Eosinophils, Absolute 0.11 0.00 - 0.40 10*3/mm3    Basophils, Absolute 0.03 0.00 - 0.20 10*3/mm3    Immature Grans, Absolute 0.06 (H) 0.00 - 0.05 10*3/mm3    nRBC 0.0 0.0 - 0.2 /100 WBC   Phosphorus    Collection Time: 02/17/22  2:21 PM    Specimen: Blood   Result Value Ref Range    Phosphorus 2.9 2.5 - 4.5 mg/dL   ECG 12 Lead    Collection Time: 02/17/22  2:32 PM   Result Value Ref Range    QT Interval 323 ms       Ordered the above labs and independently reviewed the results.        RADIOLOGY  CT Abdomen Pelvis Without Contrast    Result Date: 2/13/2022  Narrative: INDICATION: Left lower quadrant abdominal pain. History of previous ovarian cyst. Left lateral pain is chronic and intermittent. Epigastric pain today. Postcholecystectomy and hernia repair. TECHNIQUE: CT of the abdomen and pelvis without contrast. Coronal and sagittal reconstructions were obtained.  Radiation dose reduction techniques included automated exposure control or exposure modulation based on body size. Radiation audit for number of CT and nuclear cardiology exams performed in the last year: 0.  COMPARISON: CT abdomen pelvis from 1/19/2019. FINDINGS: Lung bases: There is minimal peripheral airspace disease at lung bases. Please correlate for any clinical evidence of Covid. There is a small hiatal hernia. Abdomen: Lack of IV contrast media limits assessment for pathology other than urinary tract calculus disease. The noncontrast liver is mildly enlarged with diffuse hepatic steatosis. Configuration of liver is not  changed. Spleen is borderline enlarged about 14.2 cm in length. This is mildly increased in size. The patient is postcholecystectomy. Pancreas is fatty replaced. There are no intrarenal calculi. There is no hydronephrosis. There is a moderate colonic stool burden. The appendix is radiographically within normal limits. There is no evidence for bowel obstruction. There is no free air or percutaneously drainable fluid collection. Pelvis: There is an IUD expected location within the uterus. There is a large probable right adnexal cyst about 6.2 cm in diameter. This is best characterized with follow-up pelvic ultrasound. Left ovary is unremarkable on CT scan. Bladder is decompressed without evidence for bladder calculus. There are postoperative changes consistent with previous anterior abdominal wall surgery.     Impression: 1. Subtle peripheral infiltrates at the lung bases. Please correlate for clinical evidence of Covid pneumonia. 2. Post cholecystectomy. 3. Hepatomegaly with diffuse hepatic steatosis redemonstrated. Borderline splenic enlargement increased from prior. 4. Appendix is radiographically within normal limits and there is no evidence for bowel obstruction. There is a moderate colonic stool burden. 5. There is a 6.2 cm in diameter probable right adnexal cyst. This is best further characterized with pelvic ultrasound. 4. IUD in expected location Signer Name: Reanna Harrison MD  Signed: 2/13/2022 8:24 PM  Workstation Name: [email or web address]  [facility] Non-ob Transvaginal    Result Date: 2/14/2022  Narrative: AV uterus with IUD in palce Normal left ovary Right ovary with 5.6 x 4.9cm simple cyst    XR Chest 1 View    Result Date: 2/17/2022  Narrative: ONE VIEW PORTABLE CHEST  HISTORY: Seizure. Hypertension.  FINDINGS: The lungs are well-expanded and clear and the heart and hilar structures are normal. There is no acute disease or change from 10/10/2020.  This report was finalized on  2/17/2022 3:08 PM by Dr. Ravinder Shankar M.D.        I ordered the above noted radiological studies. Reviewed by me and discussed with radiologist.  See dictation for official radiology interpretation.    PROCEDURES  Procedures        MEDICATIONS GIVEN IN ER  Medications - No data to display          PROGRESS AND CONSULTS    DDX includes but not limited to electrolyte abnormality, medication side effect, benzodiazepine withdrawal, alcohol withdrawal, seizure disorder    ED Course as of 02/17/22 1623   Thu Feb 17, 2022   1543 Phosphorus: 2.9 [KA]   1543 Magnesium: 2.1 [KA]   1544 Sodium: 137 [KA]   1544 Calcium: 9.3 [KA]   1544 HCG Qualitative: Negative [KA]   1553 Full course of care assumed by Dr. Wick at this time. [KA]      ED Course User Index  [KA] Kendra Guthrie PA             Patient was placed in face mask in first look. Patient was wearing facemask each time I entered the room and throughout our encounter. I wore protective equipment throughout this patient encounter including a face mask, eye shield and gloves. Hand hygiene was performed before donning protective equipment and after removal when leaving the room.        DIAGNOSIS  Final diagnoses:   New onset seizure (HCC)         Latest Documented Vital Signs:  As of 16:23 EST  BP- 129/92 HR- 107 Temp- 98.7 °F (37.1 °C) O2 sat- 93%       Kenrda Guthrie PA  02/17/22 [id number]

## 2022-02-17 NOTE — ED TRIAGE NOTES
.Pt masked on arrival, staff masked    Pt from work via ems, called for seizure activity, no history, co-workers called and described pt falling to the floor and tonic-clonic activity, pt post-ictal on ems arrival

## 2022-02-18 ENCOUNTER — APPOINTMENT (OUTPATIENT)
Dept: NEUROLOGY | Facility: HOSPITAL | Age: 37
End: 2022-02-18

## 2022-02-18 ENCOUNTER — READMISSION MANAGEMENT (OUTPATIENT)
Dept: CALL CENTER | Facility: HOSPITAL | Age: 37
End: 2022-02-18

## 2022-02-18 VITALS
WEIGHT: 290 LBS | SYSTOLIC BLOOD PRESSURE: 105 MMHG | HEART RATE: 88 BPM | DIASTOLIC BLOOD PRESSURE: 68 MMHG | TEMPERATURE: 98.5 F | BODY MASS INDEX: 49.51 KG/M2 | HEIGHT: 64 IN | OXYGEN SATURATION: 96 % | RESPIRATION RATE: 18 BRPM

## 2022-02-18 LAB
ANION GAP SERPL CALCULATED.3IONS-SCNC: 11 MMOL/L (ref 5–15)
BUN SERPL-MCNC: 17 MG/DL (ref 6–20)
BUN/CREAT SERPL: 23.3 (ref 7–25)
CALCIUM SPEC-SCNC: 9 MG/DL (ref 8.6–10.5)
CHLORIDE SERPL-SCNC: 102 MMOL/L (ref 98–107)
CO2 SERPL-SCNC: 24 MMOL/L (ref 22–29)
CREAT SERPL-MCNC: 0.73 MG/DL (ref 0.57–1)
DEPRECATED RDW RBC AUTO: 42 FL (ref 37–54)
ERYTHROCYTE [DISTWIDTH] IN BLOOD BY AUTOMATED COUNT: 14.5 % (ref 12.3–15.4)
GFR SERPL CREATININE-BSD FRML MDRD: 90 ML/MIN/1.73
GLUCOSE BLDC GLUCOMTR-MCNC: 110 MG/DL (ref 70–130)
GLUCOSE BLDC GLUCOMTR-MCNC: 92 MG/DL (ref 70–130)
GLUCOSE SERPL-MCNC: 101 MG/DL (ref 65–99)
HCT VFR BLD AUTO: 40.1 % (ref 34–46.6)
HGB BLD-MCNC: 13.1 G/DL (ref 12–15.9)
MCH RBC QN AUTO: 26.6 PG (ref 26.6–33)
MCHC RBC AUTO-ENTMCNC: 32.7 G/DL (ref 31.5–35.7)
MCV RBC AUTO: 81.5 FL (ref 79–97)
PLATELET # BLD AUTO: 190 10*3/MM3 (ref 140–450)
PMV BLD AUTO: 9.9 FL (ref 6–12)
POTASSIUM SERPL-SCNC: 4 MMOL/L (ref 3.5–5.2)
RBC # BLD AUTO: 4.92 10*6/MM3 (ref 3.77–5.28)
SODIUM SERPL-SCNC: 137 MMOL/L (ref 136–145)
WBC NRBC COR # BLD: 6.81 10*3/MM3 (ref 3.4–10.8)

## 2022-02-18 PROCEDURE — G0378 HOSPITAL OBSERVATION PER HR: HCPCS

## 2022-02-18 PROCEDURE — 95816 EEG AWAKE AND DROWSY: CPT | Performed by: PSYCHIATRY & NEUROLOGY

## 2022-02-18 PROCEDURE — 80048 BASIC METABOLIC PNL TOTAL CA: CPT | Performed by: PHYSICIAN ASSISTANT

## 2022-02-18 PROCEDURE — 85027 COMPLETE CBC AUTOMATED: CPT | Performed by: PHYSICIAN ASSISTANT

## 2022-02-18 PROCEDURE — 95816 EEG AWAKE AND DROWSY: CPT

## 2022-02-18 PROCEDURE — 99214 OFFICE O/P EST MOD 30 MIN: CPT | Performed by: NURSE PRACTITIONER

## 2022-02-18 PROCEDURE — 82962 GLUCOSE BLOOD TEST: CPT

## 2022-02-18 RX ORDER — PANTOPRAZOLE SODIUM 40 MG/1
40 TABLET, DELAYED RELEASE ORAL EVERY MORNING
Refills: 0 | Status: DISCONTINUED | OUTPATIENT
Start: 2022-02-18 | End: 2022-02-18 | Stop reason: HOSPADM

## 2022-02-18 RX ORDER — GABAPENTIN 300 MG/1
300 CAPSULE ORAL 2 TIMES DAILY
Status: DISCONTINUED | OUTPATIENT
Start: 2022-02-18 | End: 2022-02-18 | Stop reason: HOSPADM

## 2022-02-18 RX ORDER — LORAZEPAM 1 MG/1
0.5 TABLET ORAL EVERY 12 HOURS PRN
Status: DISCONTINUED | OUTPATIENT
Start: 2022-02-18 | End: 2022-02-18 | Stop reason: HOSPADM

## 2022-02-18 RX ORDER — MELATONIN
2000 DAILY
Status: DISCONTINUED | OUTPATIENT
Start: 2022-02-18 | End: 2022-02-18 | Stop reason: HOSPADM

## 2022-02-18 RX ORDER — FLUTICASONE PROPIONATE 50 MCG
2 SPRAY, SUSPENSION (ML) NASAL DAILY
Status: DISCONTINUED | OUTPATIENT
Start: 2022-02-18 | End: 2022-02-18 | Stop reason: HOSPADM

## 2022-02-18 RX ORDER — SUMATRIPTAN 50 MG/1
50 TABLET, FILM COATED ORAL ONCE AS NEEDED
Status: COMPLETED | OUTPATIENT
Start: 2022-02-18 | End: 2022-02-18

## 2022-02-18 RX ORDER — BUSPIRONE HYDROCHLORIDE 10 MG/1
10 TABLET ORAL 3 TIMES DAILY
Status: DISCONTINUED | OUTPATIENT
Start: 2022-02-18 | End: 2022-02-18 | Stop reason: HOSPADM

## 2022-02-18 RX ORDER — NORTRIPTYLINE HYDROCHLORIDE 25 MG/1
50 CAPSULE ORAL NIGHTLY
Status: DISCONTINUED | OUTPATIENT
Start: 2022-02-18 | End: 2022-02-18 | Stop reason: HOSPADM

## 2022-02-18 RX ORDER — CETIRIZINE HYDROCHLORIDE 10 MG/1
10 TABLET ORAL NIGHTLY
Status: DISCONTINUED | OUTPATIENT
Start: 2022-02-18 | End: 2022-02-18 | Stop reason: HOSPADM

## 2022-02-18 RX ADMIN — FLUTICASONE PROPIONATE 2 SPRAY: 50 SPRAY, METERED NASAL at 11:45

## 2022-02-18 RX ADMIN — SODIUM CHLORIDE, PRESERVATIVE FREE 10 ML: 5 INJECTION INTRAVENOUS at 11:45

## 2022-02-18 RX ADMIN — SERTRALINE HYDROCHLORIDE 200 MG: 50 TABLET, FILM COATED ORAL at 11:43

## 2022-02-18 RX ADMIN — SUMATRIPTAN SUCCINATE 50 MG: 50 TABLET ORAL at 06:09

## 2022-02-18 RX ADMIN — BUSPIRONE HYDROCHLORIDE 10 MG: 10 TABLET ORAL at 11:42

## 2022-02-18 RX ADMIN — GABAPENTIN 300 MG: 300 CAPSULE ORAL at 11:42

## 2022-02-18 RX ADMIN — PANTOPRAZOLE SODIUM 40 MG: 40 TABLET, DELAYED RELEASE ORAL at 06:09

## 2022-02-18 RX ADMIN — Medication 2000 UNITS: at 11:43

## 2022-02-18 RX ADMIN — LOSARTAN POTASSIUM: 50 TABLET, FILM COATED ORAL at 11:42

## 2022-02-18 NOTE — PLAN OF CARE
Goal Outcome Evaluation:              Outcome Summary: patient left observation unit at thsi time via private vehicle with all her belongings, dishcarge summary and education provided patient had no questions at the time of dishcarge.

## 2022-02-18 NOTE — PROGRESS NOTES
MD ATTESTATION NOTE    The CALEB and I have discussed this patient's history, physical exam, and treatment plan.  I have reviewed the documentation and personally had a face to face interaction with the patient. I affirm the documentation and agree with the treatment and plan.  The attached note describes my personal findings.      I provided a substantive portion of the care of the patient.  I personally performed the physical exam in its entirety, and below are my findings.  For this patient encounter, the patient wore surgical mask, I wore full protective PPE including N95 and eye protection.      Brief HPI: This patient is a 36-year-old female who was at her work today when she had a syncopal event that included witnessed tonic-clonic activity.  She was admitted to the observation unit for work-up of this first-time seizure.  She has been loaded with IV Keppra.  Her current complaints include a right-sided headache.    PHYSICAL EXAM  ED Triage Vitals   Temp Heart Rate Resp BP SpO2   02/17/22 1336 02/17/22 1336 02/17/22 1336 02/17/22 1336 02/17/22 1336   98.7 °F (37.1 °C) 110 16 150/80 98 %      Temp src Heart Rate Source Patient Position BP Location FiO2 (%)   02/17/22 1940 02/17/22 1940 02/17/22 1940 02/17/22 1940 --   Oral Monitor Sitting Right arm          GENERAL: Resting comfortably and in no acute distress  HENT: nares patent  EYES: no scleral icterus  CV: regular rhythm, normal rate, no M/R/G  RESPIRATORY: normal effort, lungs clear bilaterally  ABDOMEN: soft, nontender, without palpable mass  MUSCULOSKELETAL: no deformity  NEURO: alert, moves all extremities, follows commands  PSYCH:  calm, cooperative  SKIN: warm, dry    Vital signs and nursing notes reviewed.        Plan: We will monitor in the observation unit throughout the evening.  We will obtain an MRI as well as CTA of the head.  Neurology will see in consultation in the a.m.

## 2022-02-18 NOTE — PROGRESS NOTES
MD ATTESTATION NOTE    The CALEB and I have discussed this patient's history, physical exam, and treatment plan.  I have reviewed the documentation and personally had a face to face interaction with the patient. I affirm the documentation and agree with the treatment and plan.  The attached note describes my personal findings.      I provided a substantive portion of the care of the patient.  I personally performed the physical exam in its entirety, and below are my findings.  For this patient encounter, the patient wore surgical mask, I wore full protective PPE including N95 and eye protection.      Brief HPI: This patient is a 36-year-old female who was admitted to the observation unit following a syncopal event that occurred at work with witnessed tonic-clonic activity.  She also has a history of migraines.  She was admitted to the observation unit for a new onset seizure work-up.  She currently complains of a mild headache that is slowly improving.    PHYSICAL EXAM  ED Triage Vitals   Temp Heart Rate Resp BP SpO2   02/17/22 1336 02/17/22 1336 02/17/22 1336 02/17/22 1336 02/17/22 1336   98.7 °F (37.1 °C) 110 16 150/80 98 %      Temp src Heart Rate Source Patient Position BP Location FiO2 (%)   02/17/22 1940 02/17/22 1940 02/17/22 1940 02/17/22 1940 --   Oral Monitor Sitting Right arm          GENERAL: This patient is resting comfortably and in no acute distress  HENT: nares patent  EYES: no scleral icterus  CV: regular rhythm, normal rate, no M/R/G  RESPIRATORY: normal effort, lungs clear to auscultation bilaterally  ABDOMEN: soft, nontender to palpation, no palpable masses  MUSCULOSKELETAL: no deformity, no edema  NEURO: alert, moves all extremities, follows commands, no speech deficits or aphasia  PSYCH:  calm, cooperative  SKIN: warm, dry    Vital signs and nursing notes reviewed.        Plan: She has received IV Keppra and has had no further potential seizure events.  The patient's MRI was unremarkable and she  has just received an EEG.  Neurology will see in consultation pending EEG results.  Disposition to follow.

## 2022-02-18 NOTE — PLAN OF CARE
Goal Outcome Evaluation:  Plan of Care Reviewed With: patient        Progress: improving  Outcome Summary: A/O, complains of HA, MRI, CTA completed, neuro consult pending, will need EEG, no seizure activity noted, VSS, will continue to monitor.

## 2022-02-18 NOTE — ED NOTES
Nursing report ED to floor  Valery Aguilar  36 y.o.  female    HPI :   Chief Complaint   Patient presents with   • Seizures       Admitting doctor:   Ed Queen MD    Admitting diagnosis:   The encounter diagnosis was New onset seizure (HCC).    Code status:   Current Code Status     Date Active Code Status Order ID Comments User Context       2/17/2022 1931 CPR (Attempt to Resuscitate) [id number]  Obdulia Anna PA ED     Advance Care Planning Activity      Questions for Current Code Status     Question Answer    Code Status (Patient has no pulse and is not breathing) CPR (Attempt to Resuscitate)    Medical Interventions (Patient has pulse or is breathing) Full Support    Level Of Support Discussed With Patient          Allergies:   Penicillins    Intake and Output  No intake or output data in the 24 hours ending 02/17/22 1941    Weight:       02/17/22  1416   Weight: 132 kg (290 lb)       Most recent vitals:   Vitals:    02/17/22 1621 02/17/22 1652 02/17/22 1753 02/17/22 1940   BP: 127/72 108/71 119/60 134/83   BP Location:    Right arm   Patient Position:    Sitting   Pulse: 101 98 101 95   Resp:    16   Temp:    98.4 °F (36.9 °C)   TempSrc:    Oral   SpO2: 95% 93% 96% 95%   Weight:       Height:           Active LDAs/IV Access:   Lines, Drains & Airways     Active LDAs     Name Placement date Placement time Site Days    Peripheral IV 02/17/22 1415 Left Antecubital 02/17/22  1415  Antecubital  less than 1                Labs (abnormal labs have a star):   Labs Reviewed   COMPREHENSIVE METABOLIC PANEL - Abnormal; Notable for the following components:       Result Value    ALT (SGPT) 36 (*)     All other components within normal limits    Narrative:     GFR Normal >60  Chronic Kidney Disease <60  Kidney Failure <15     CBC WITH AUTO DIFFERENTIAL - Abnormal; Notable for the following components:    RBC 5.73 (*)     Hematocrit 46.9 (*)     Lymphocyte % 17.7 (*)     Monocyte % 4.8 (*)     Immature Grans %  0.8 (*)     Immature Grans, Absolute 0.06 (*)     All other components within normal limits   URINE DRUG SCREEN - Normal    Narrative:     Negative Thresholds Per Drugs Screened:    Amphetamines                 500 ng/ml  Barbiturates                 200 ng/ml  Benzodiazepines              100 ng/ml  Cocaine                      300 ng/ml  Methadone                    300 ng/ml  Opiates                      300 ng/ml  Oxycodone                    100 ng/ml  THC                           50 ng/ml    The Normal Value for all drugs tested is negative. This report includes final unconfirmed screening results to be used for medical treatment purposes only. Unconfirmed results must not be used for non-medical purposes such as employment or legal testing. Clinical consideration should be applied to any drug of abuse test, particularly when unconfirmed results are used.           HCG, SERUM, QUALITATIVE - Normal   MAGNESIUM - Normal   PHOSPHORUS - Normal   COVID PRE-OP / PRE-PROCEDURE SCREENING ORDER (NO ISOLATION)    Narrative:     The following orders were created for panel order COVID PRE-OP / PRE-PROCEDURE SCREENING ORDER (NO ISOLATION) - Swab, Nasopharynx.  Procedure                               Abnormality         Status                     ---------                               -----------         ------                     COVID-19,[facility] IN-HOUSE...[[id number]]                                                   Please view results for these tests on the individual orders.   COVID-19,[facility] IN-HOUSE CEPHEID/CHINO, NP SWAB IN TRANSPORT MEDIA 8-12 HR TAT   ETHANOL   CBC AND DIFFERENTIAL    Narrative:     The following orders were created for panel order CBC & Differential.  Procedure                               Abnormality         Status                     ---------                               -----------         ------                     CBC Auto Differential[[id number]]        Abnormal            Final result                  Please view results for these tests on the individual orders.       EKG:   ECG 12 Lead   Final Result   HEART RATE= 106  bpm   RR Interval= 568  ms   UT Interval= 177  ms   P Horizontal Axis= 27  deg   P Front Axis= 42  deg   QRSD Interval= 92  ms   QT Interval= 323  ms   QRS Axis= -3  deg   T Wave Axis= 87  deg   - OTHERWISE NORMAL ECG -   Sinus tachycardia   No change from prior tracing   Electronically Signed By: Alek Galvez ([facility]) 2022 17:06:14   Date and Time of Study: 2022 14:32:12          Meds given in ED:   Medications   levETIRAcetam in NaCl 0.54% (KEPPRA) IVPB 1,500 mg (has no administration in time range)       Imaging results:  CT Head Without Contrast    Result Date: 2022  1.  No findings of acute intracranial abnormality.   This report was finalized on 2022 6:01 PM by Dr. oTy Cao M.D.        Ambulatory status:   - ambulatory     Social issues:   Social History     Socioeconomic History   • Marital status:    Tobacco Use   • Smoking status: Former Smoker     Packs/day: 0.50     Years: 1.00     Pack years: 0.50     Quit date:      Years since quittin.1   • Smokeless tobacco: Never Used   Vaping Use   • Vaping Use: Never used   Substance and Sexual Activity   • Alcohol use: Yes     Comment: occasional   • Drug use: No   • Sexual activity: Defer     Partners: Male     Birth control/protection: OCP     Comment: LNMP irregular       NIH Stroke Scale:        Nursing report ED to floor:       Nicole Campos RN  [phone]

## 2022-02-18 NOTE — DISCHARGE INSTRUCTIONS
Return to the emergency department with worsening symptoms, uncontrolled pain, inability to tolerate oral liquids, fever greater than 105°F not controlled by Tylenol and ibuprofen or as needed with emergent concerns.    Seizure Precautions: Patient is not to drive for at least 3 months, operate heavy machinery, take tub baths, swim unattended, climb heights, or perform any other activities that can bring harm to himself/herself or others during an episode of altered awareness.

## 2022-02-18 NOTE — CASE MANAGEMENT/SOCIAL WORK
Discharge Planning Assessment  [facility]     Patient Name: Valery Aguilar  MRN: [id number]  Today's Date: 2/17/2022    Admit Date: 2/17/2022     Discharge Needs Assessment     Row Name 02/17/22 1951       Living Environment    Lives With spouse; child(chapis), dependent    Name(s) of Who Lives With Patient Lives with , Khoa and 2 teenage sons.    Current Living Arrangements home/apartment/condo    Primary Care Provided by self    Provides Primary Care For no one    Family Caregiver if Needed spouse    Family Caregiver Names Lives with , Khoa and 2 teenage sons.    Quality of Family Relationships supportive    Able to Return to Prior Arrangements yes       Resource/Environmental Concerns    Resource/Environmental Concerns none    Transportation Concerns car, none       Transition Planning    Patient/Family Anticipates Transition to home with family    Patient/Family Anticipated Services at Transition none    Transportation Anticipated family or friend will provide       Discharge Needs Assessment    Readmission Within the Last 30 Days current reason for admission unrelated to previous admission    Equipment Currently Used at Home cpap; bp cuff    Concerns to be Addressed denies needs/concerns at this time; no discharge needs identified    Anticipated Changes Related to Illness none    Equipment Needed After Discharge none    Patient's Choice of Community Agency(s) Patient denies discharge needs at this time.               Discharge Plan     Row Name 02/17/22 1959       Plan    Plan Discharge home with family    Patient/Family in Agreement with Plan yes    Plan Comments I entered the patients room in proper PPE, introduced myself and my role.  The patient was accompanied by her , Khoa and permission was given to speak with/in front of.  The patient states that she currently lives with her  and two teenage boys.  She currently uses a CPAP nightly, her  has brought hers from  home.  She uses [facility] in [place] at [phone].  She has used [facility] in the past but has never used Rehab.  No falls until today, per the patient and  she fell forward and then started seizing.  She states that she has 4 stairs leading into her home.  At this time the patient denies any needs at discharge.  I did encourage her to request Case Management should her needs change.  Both she and her  verbalized understanding and had no further questions/concerns at this time.              Continued Care and Services - Admitted Since 2/17/2022    Coordination has not been started for this encounter.          Demographic Summary    No documentation.                Functional Status    No documentation.                Psychosocial    No documentation.                Abuse/Neglect    No documentation.                Legal    No documentation.                Substance Abuse    No documentation.                Patient Forms    No documentation.                   Bailee David RN

## 2022-02-18 NOTE — CONSULTS
Neurology Consult Note    Consult Date: 2/18/2022    Referring MD: Ed Queen MD    Reason for Consult I have been asked to see the patient in neurological consultation to render advice and opinion regarding seizure    Valery Aguilar is a 36 y.o. female with past medical history of HTN, PCOS, GERD, anxiety/depression, obesity, SHANE (recently started on CPAP), and migraine headache presented 2/17 with new onset seizure.  The patient states that she was at work standing around talking to coworkers in the next and she knew she woke up on the floor with 2 EMTs standing over her.  Her coworkers told her she falling down face forward and had jerking all over with extension of her feet bilaterally.  She denied any warning symptoms.  No previous history of seizure, meningitis, or concussion.  She is unsure how long it lasted.  She denies any bowel or bladder incontinence or tongue bite.  She did have a recent presentation to the ED due to complaints of indigestion which she previously experienced in the setting of a large ovarian cyst.  She followed up with her OB/GYN on Monday and has been found to have a another cyst, 6 cm, so she was started on hormone therapy to try to stabilize/shrink the cyst.  She also notes she was started on Wellbutrin about 1 week ago for her anxiety/depression.  She has had a migraine since Monday as well, right frontal, with associated light and sound sensitivity and nausea, typical for her migraines.  This is improved with Imitrex, headache currently 2 out of 10.  She denies any known provoking factors including no recent stress or sleep deprivation.    She was given Keppra 1500 mg IV in the ED.  MRI brain with and without contrast was unremarkable aside from artifact related to hearing.  CT a/CTV head was unremarkable.  EEG is pending.    No known family history of seizures (her grandmother who developed seizures in her life.  She does not smoke or drink.    Past Medical/Surgical  Hx:  Past Medical History:   Diagnosis Date   • Abdominal pain    • Anxiety    • Anxiety and depression 3/8/2021   • Arthritis     KNEE   • Chronic sinusitis, unspecified    • Chronic wound infection of abdomen 2/20/2019    Added automatically from request for surgery [id number]   • Depression    • GERD (gastroesophageal reflux disease)    • H/O echocardiogram     2019 EF 56% NORMAL DIASTOLIC FUNCTION NO VALVULAR DISEASE   • H/O exercise stress test     REPORTEDLY NORMAL DONE FOR CHEST PAIN DX WITH ANXIETY   • Headache, tension-type    • History of Holter monitoring     REPORTEDLY NORMAL DONE FOR CHEST PAIN DX WITH ANXIETY   • History of MRI of brain and brain stem 01/01/2017    NORMAL DONE FOR MIGRAINES   • Hypertension     PRE-ECLAMPSIA WITH BOTH CHILDREN   • Incisional hernia     SCHEDULED FOR REPAIR   • Incisional hernia, without obstruction or gangrene 10/24/2018    Added automatically from request for surgery [id number]   • Infection following a procedure, unspecified, initial encounter    • Insulin resistance     D/T PCOS   • Lobar pneumonia, unspecified organism (HCC)    • Meralgia paresthetica, left lower limb    • Metabolic syndrome    • Migraine    • MRSA (methicillin resistant Staphylococcus aureus) infection 01/2019    Abdominal wound   • Nasal congestion    • Other injury of unspecified body region, initial encounter    • Panic attack    • Panic disorder    • Papanicolaou smear 06/01/2020    NORMAL WITH NEG HPV NEVER ABN   • PCOS (polycystic ovarian syndrome)    • Pneumonia 07/2018   • PONV (postoperative nausea and vomiting)    • Right lower quadrant abdominal pain 1/20/2019   • Seasonal allergies    • Spinal headache    • Unspecified contact dermatitis, unspecified cause    • Viral infection     UNSPECIFIED   • Vitamin D deficiency    • Wound infection 12/2/2018     Past Surgical History:   Procedure Laterality Date   • ABDOMINAL WALL ABSCESS INCISION AND DRAINAGE N/A 12/2/2018    Procedure: Debridement of  abdominal wall;  Surgeon: Brigido Navarrete MD;  Location: [facility] OR;  Service: General   • ADENOIDECTOMY     •  SECTION      X2   • CHOLECYSTECTOMY      LAP   • HERNIA REPAIR     • HX OVARIAN CYSTECTOMY     • INCISION AND DRAINAGE TRUNK N/A 2018    Procedure: wound debridement, abdomen;  Surgeon: Rachel Dolan MD;  Location: [facility] OR;  Service: General   • INCISION AND DRAINAGE TRUNK N/A 2019    Procedure: WOUND CLOSURE ABDOMINAL;  Surgeon: Rachel Dolan MD;  Location: [facility] OR;  Service: General   • KNEE ACL RECONSTRUCTION Left     DR. CARRASCO   • OVARIAN CYST REMOVAL      EX LAP WITH OVARIAN CYST REMOVAL HERNIA REPAIR    • TONSILLECTOMY      T&A   • TRUNK DEBRIDEMENT N/A 2018    Procedure: Abdominal wound debridement;  Surgeon: Rachel Dolan MD;  Location: [facility] OR;  Service: General   • VENTRAL/INCISIONAL HERNIA REPAIR N/A 11/15/2018    Procedure: Lateral Component Separation Herniorrhapy Repair with Mesh, Lysis of adhesions, and skin lesion excision of Right Side;  Surgeon: Rachel Dolan MD;  Location: [facility] OR;  Service: General       Medications On Admission  Medications Prior to Admission   Medication Sig Dispense Refill Last Dose   • Ascorbic Acid (DESHAWN-C PO) Take  by mouth.   2022 at 0800   • buPROPion XL (Wellbutrin XL) 300 MG 24 hr tablet Take 1 tablet by mouth Daily. 90 tablet 1 2022 at 0800   • busPIRone (BUSPAR) 10 MG tablet Take 1 tablet by mouth 3 (Three) Times a Day. 270 tablet 1 2022 at 0800   • cetirizine (zyrTEC) 10 MG tablet Take 10 mg by mouth Every Night.   2022 at 0800   • cholecalciferol (VITAMIN D3) 1000 units tablet Take 2,000 Units by mouth Daily.   2022 at 0800   • fluticasone (FLONASE) 50 MCG/ACT nasal spray 2 sprays into the nostril(s) as directed by provider Daily.      • gabapentin (NEURONTIN) 600 MG tablet TAKE ONE TABLET BY MOUTH TWICE A  tablet 1 2022 at 0800   • Insulin Pen Needle  "(Pen Needles) 31G X 5 MM misc 1 each Daily. 100 each 10    • levonorgestrel-ethinyl estradiol (AVIANE,ALESSE,LESSINA) 0.1-20 MG-MCG per tablet Take 1 tablet by mouth Daily. 56 tablet 0 2022 at 0800   • LORazepam (ATIVAN) 0.5 MG tablet Take 1 tablet by mouth Every 12 (Twelve) Hours As Needed for Anxiety. 30 tablet 1    • losartan-hydrochlorothiazide (Hyzaar) 50-12.5 MG per tablet Take 1 tablet by mouth Daily. 90 tablet 1 2022 at 0800   • Magnesium 500 MG capsule Take  by mouth.   2022 at 2100   • metFORMIN ER (GLUCOPHAGE-XR) 500 MG 24 hr tablet TAKE TWO TABLETS BY MOUTH EVERY MORNING 180 tablet 1 2022 at 0800   • omeprazole (priLOSEC) 20 MG capsule Take 2 capsules by mouth Daily for 30 days. 60 capsule 0 2022 at 0800   • rizatriptan MLT (MAXALT-MLT) 10 MG disintegrating tablet May repeat in 2 hours if needed 15 tablet 3 2022 at 0800   • sertraline (ZOLOFT) 100 MG tablet Take 2 tablets by mouth Daily. 180 tablet 1 2022 at 0800   • BD [employer] misc 1 each Daily. 1 each 0    • Liraglutide (Saxenda) 18 MG/3ML injection pen Inject 3 mg under the skin into the appropriate area as directed Daily. 15 pen 1 2022 at 2100   • meloxicam (MOBIC) 15 MG tablet TAKE ONE TABLET BY MOUTH DAILY 30 tablet 0 2022 at 0800   • Multiple Vitamins-Minerals (ZINC PO) Take  by mouth.      • nortriptyline (PAMELOR) 50 MG capsule TAKE TWO CAPSULES BY MOUTH NIGHTLY 180 capsule 1 2022 at 2100       Allergies:  Allergies   Allergen Reactions   • Penicillins Other (See Comments)     \"im resistant to any \"cillin\" drugs\"        Social Hx:  Social History     Socioeconomic History   • Marital status:    Tobacco Use   • Smoking status: Former Smoker     Packs/day: 0.50     Years: 1.00     Pack years: 0.50     Quit date:      Years since quittin.1   • Smokeless tobacco: Never Used   Vaping Use   • Vaping Use: Never used   Substance and Sexual Activity   • Alcohol use: Yes     " "Comment: occasional   • Drug use: No   • Sexual activity: Defer     Partners: Male     Birth control/protection: OCP     Comment: LNMP irregular       Family Hx:  Family History   Problem Relation Age of Onset   • Stroke Mother    • Heart disease Mother    • Hypertension Mother    • Heart disease Father    • Hypertension Father    • Diabetes Father    • Dementia Maternal Grandmother    • Stroke Maternal Grandmother    • Diabetes Maternal Grandmother    • Stroke Paternal Grandmother    • Hypertension Paternal Grandmother    • Cancer Paternal Grandmother    • Heart disease Paternal Grandfather    • Diabetes Paternal Grandfather        REVIEW OF SYSTEMS:   Constitutional: [No fevers, chills, sweats or weight loss/gain]   Eye: [No change in vision, double vision, or loss of vision]   HEENT: [No tenderness, dizziness, or tinnitus. Normal smell and taste. Normal speech and swallowing]   Respiratory: [No shortness of breath, coughing, wheezing]   Cardiovascular: [No Chest pain, palpitations, syncope, [place]]   Gastrointestinal: [Normal bowel function. No nausea, vomiting, diarrhea]   Genitourinary: [Normal bladder function]   Musculoskeletal: [No trauma, joint or neck pain, myalgias, cramping or weakness]   Skin: [No itching, burning, pain, rashes, or birthmarks]   Endocrinology: [No heat or cold intolerance]   Psychiatric: [No sleep disturbance.] + anxiety/depression  Neurologic: [See HPI, above]         Exam    /81 (BP Location: Right arm, Patient Position: Lying)   Pulse 87   Temp 97.7 °F (36.5 °C) (Oral)   Resp 18   Ht 162.6 cm (64\")   Wt 132 kg (290 lb)   SpO2 94%   BMI 49.78 kg/m²   General appearance: Well developed, well nourished, well groomed, alert and cooperative. Obese.  HEENT: Normocephalic.   Neck: supple.   Cardiac: Regular rate and rhythm. No murmurs.   Peripheral Vasculature: Radial pulses are equal and symmetric.  Chest Exam: Clear to auscultation bilaterally, no wheezes, no " rhonchi.  Extremities: Normal, no edema.   Skin: No rashes or birthmarks.     Higher integrative function: Oriented to time, place, person, intact recent and remote memory, attention span, concentration and language. Spontaneous speech, fund of vocabulary are normal.   CN II: Normal  visual fields.   CN III IV VI: Extraocular movements are full without nystagmus. Pupils are equal, round, and reactive to light.   CN V: Normal facial sensation.  CN VII: Facial movements are symmetric, no weakness.   CN VIII: Auditory acuity is normal.   CN IX & X: Symmetric palatal movement.   CN XI: Sternocleidomastoid and trapezius are normal. No weakness.   CN XII: The tongue is midline. No atrophy or fasciculations.   Motor: Normal muscle strength, bulk, and tone in upper and lower extremities. No fasciculations, rigidity, spasticity or abnormal movements.   Sensation: Normal light touch.  Station and gait: Deferred.   Muscle stretch reflexes: Reflexes are hypoactive.  Plantar reflexes are flexor bilaterally.   Coordination: Finger to nose test showed no dysmetria. Rapid alternating movements were normal. Heel to shin normal.     DATA:    Lab Results   Component Value Date    GLUCOSE 101 (H) 02/18/2022    CALCIUM 9.0 02/18/2022     02/18/2022    K 4.0 02/18/2022    CO2 24.0 02/18/2022     02/18/2022    BUN 17 02/18/2022    CREATININE 0.73 02/18/2022    EGFRIFAFRI 131 01/10/2022    EGFRIFNONA 90 02/18/2022    BCR 23.3 02/18/2022    ANIONGAP 11.0 02/18/2022     Lab Results   Component Value Date    WBC 6.81 02/18/2022    HGB 13.1 02/18/2022    HCT 40.1 02/18/2022    MCV 81.5 02/18/2022     02/18/2022     No results found for: CHOL  Lab Results   Component Value Date    HDL 49 01/10/2022    HDL 40 08/30/2021     Lab Results   Component Value Date     (H) 01/10/2022     (H) 08/30/2021     Lab Results   Component Value Date    TRIG 117 01/10/2022    TRIG 145 08/30/2021     Lab Results   Component Value  Date    HGBA1C 5.3 01/10/2022     No results found for: INR, PROTIME    Imaging review:   MRI BRAIN WITH AND WITHOUT CONTRAST     HISTORY:    Seizure     COMPARISON: CTA head 02/17/2022 and CT head 02/17/2022     TECHNIQUE: Multiplanar, multisequence MR imaging of the brain was  performed with and without intravenous contrast.     FINDINGS:     Please note that portions of the left temporal lobe are obscured by  artifact related likely related to overlying earrings.     There is no restricted diffusion.  The white matter demonstrates normal  signal characteristics.  There is no mass effect, midline shift,  hydrocephalus, parenchymal hemorrhage, or abnormal extra-axial fluid  collection. The major T2 intracranial arterial flow voids appear grossly  normal. Midline structures are unremarkable.     There is no abnormal intracranial enhancement.     IMPRESSION:     No findings of acute intracranial pathology. Please note that portions  of the left inferior temporal lobe are obscured by overlying artifact.     This report was finalized on 2/17/2022 10:50 PM by Dr. Toy Cao M.D.       Narrative & Impression      Patient: MARIO GROVER  Time Out: 22:10  Exam(s): CT HEAD, CTA HEAD      EXAM:    CT Angiography Head With Intravenous Contrast     CLINICAL HISTORY:     Reason for exam: CT venogram per Dr. Nichols for new onset seizure.     TECHNIQUE:  AI analysis of LVO was utilized    Axial computed tomographic angiography images of the head with   intravenous contrast.  CTDI is 54.80 mGy and DLP is 1007.30 mGy-cm.  This   CT exam was performed according to the principle of ALARA (As Low As   Reasonably Achievable) by using one or more of the following dose   reduction techniques: automated exposure control, adjustment of the mA   and or kV according to patient size, and or use of iterative   reconstruction technique.    MIP reconstructed images were created and reviewed.     COMPARISON:    No relevant prior studies  available.     FINDINGS:   HEAD:    Right anterior cerebral artery:  Unremarkable.  No occlusion or   significant stenosis.  No aneurysm.    Right middle cerebral artery:  Unremarkable.  No occlusion or   significant stenosis.  No aneurysm.    Right posterior cerebral artery:  Unremarkable.  No occlusion or   significant stenosis.  No aneurysm.       Left anterior cerebral artery:  Unremarkable.  No occlusion or   significant stenosis.  No aneurysm.    Left middle cerebral artery:  Unremarkable.  No occlusion or   significant stenosis.  No aneurysm.    Left posterior cerebral artery:  Unremarkable.  No occlusion or   significant stenosis.  No aneurysm.       Basilar artery:  Unremarkable.  No occlusion or significant stenosis.    No aneurysm.    Right internal carotid artery:  Unremarkable.  No significant stenosis.    No dissection or occlusion.    Right vertebral artery:  Unremarkable.  No significant stenosis.  No   dissection or occlusion.    Left internal carotid artery:  Unremarkable.  No significant stenosis.    No dissection or occlusion.    Left vertebral artery:  Unremarkable.  No significant stenosis.  No   dissection or occlusion.       Veins: No thrombus identified.       CAROTID STENOSIS REFERENCE USING NASCET CRITERIA:    % ICA stenosis = (1 - narrowest ICA diameter diameter of distal   cervical ICA) x 100.    Mild - <50% stenosis.    Moderate - 50-69% stenosis.    Severe - 70-94% stenosis.    Near occlusion - 95-99% stenosis.    Occluded - 100% stenosis.     IMPRESSION:         No acute findings in the arteries or veins of the head.     _______________________________________________     EXAM:    CT Head Without Intravenous Contrast     CLINICAL HISTORY:     Reason for exam: CT venogram per Dr. Nichols for new onset seizure.     TECHNIQUE:  AI analysis of LVO was utilized    Axial computed tomography images of the head brain without intravenous   contrast.  CTDI is 54.80 mGy and DLP is 1007.30  mGy-cm.  This CT exam was   performed according to the principle of ALARA (As Low As Reasonably   Achievable) by using one or more of the following dose reduction   techniques: automated exposure control, adjustment of the mA and or kV   according to patient size, and or use of iterative reconstruction   technique.     COMPARISON:    No relevant prior studies available.     FINDINGS:    Brain:  No acute infarct or hemorrhage.    Ventricles:  Unremarkable.  No ventriculomegaly.    Bones joints:  Unremarkable.  No acute calvarial fracture.    Soft tissues:  Unremarkable.    Sinuses:  Mild mucosal thickening of the paranasal sinuses.    Mastoid air cells:  Unremarkable as visualized.     IMPRESSION:         No acute infarct or hemorrhage.     IMPRESSION:        Electronically signed by Juan A Ibarra MD on 02-17-22 at 2210         Impression:  1) New onset seizure  2) History of Migraine, reports 2 bad h/a a month. Managed by PCP, on nortriptyline for prophylaxis and maxalt for abortive therapy.  3) SHANE on CPAP.  4) anxiety/depression, recently started on wellbutrin xr 300 mg nightly.      PLAN:   Stop Wellbutrin as it can lower seizure threshold. F/U with PCP in short-term re: anxiety/dpression.   F/U EEG, no AED unless EEG abnormal.  Seizure precautions reviewed in detail with the patient (see below).  She will need to follow-up outpatient with epileptologist. I will arrange.  D/W Dr Soriano today.    Seizure Precautions: Patient is not to drive for at least 3 months until cleared by a physician, operate heavy machinery, take tub baths, swim unattended, climb heights, or perform any other activities that can bring harm to himself/herself or others during an episode of altered awareness.    Addendum:  EEG normal   OK for d/c. NO AED needed for now.     Date of onset: 2/18/2022  Date of offset: 2/18/2022     Indication: 36 year old female with history of anxiety, depression, hypertension, diabetes, GERD, and  migraines, who presented to the emergency department today after witnessed seizure activity at work.     Technical description:  This is a 21 channel digital EEG recording that began on 8:58 AM and ended on 9:26 AM.  Electrodes were placed based on the 10-20 international electrode placement system.       Background:  The EEG recording demonstrates normal background activity with mainly alpha frequencies with intermixed theta frequencies.  9 Hz frequencies are present posteriorly and symmetrically.  The patient enters both stage 1 and 2 sleep with architecture present including vertex sharp transients and sleep spindles.  Hyperventilation was not performed but photic stimulation was performed with no additional abnormalities noted.  There are no asymmetries between the two hemispheres.  No interictal activity is present.     The EKG monitor shows a heart rate that is around 90 beats per minute.     Clinical interpretation:  This routine awake and sleep EEG is normal.  No potentially epileptogenic activity, seizure activity, or focal slowing is present.  Careful clinical correlation is advised.

## 2022-02-18 NOTE — H&P
[facility]   HISTORY AND PHYSICAL    Patient Name: Valery Aguilar  : 1985  MRN: [id number]  Primary Care Physician:  Zandra Rios MD  Date of admission: 2022    Subjective   Subjective     Chief Complaint:   Chief Complaint   Patient presents with   • Seizures         HPI:    Valery Aguilar is a 36 y.o. female with history of anxiety, depression, hypertension, diabetes, GERD, and migraines, who presented to the emergency department today after witnessed seizure activity at work.  Coworkers reported to EMS that patient collapsed and had tonic-clonic activity.  At time of EMS arrival, patient was postictal.  The patient states she remembers standing talking with her coworkers and then woke up on the floor.  Patient denies personal history of seizures but states her maternal grandmother does have a seizure disorder and takes Keppra.  She denies any preceding symptoms or aura.  She was not incontinent of urine and she did not bite her tongue.  Patient states she uses alcohol once or twice a month and takes Ativan for anxiety but only uses this once or twice a month as well.  Patient states she has had an MRI and EEG in the past due to her history of migraines.  Patient states she did have a migraine this morning and took a Maxalt when she got to work.  Patient denies chest pain, shortness of breath, palpitations, nausea, vomiting, diarrhea, fever, chills, recent sick contacts.  ER evaluation has been unrevealing.  Emergency department provider spoke with neurology who requested CTA head as well as MRI brain with and without contrast.  Patient is in agreement with plan.    Review of Systems   All systems were reviewed and negative except for: What is discussed in the HPI    Personal History     Past Medical History:   Diagnosis Date   • Abdominal pain    • Anxiety    • Anxiety and depression 3/8/2021   • Arthritis     KNEE   • Chronic sinusitis, unspecified    • Chronic wound infection of abdomen  2019    Added automatically from request for surgery [id number]   • Depression    • GERD (gastroesophageal reflux disease)    • H/O echocardiogram      EF 56% NORMAL DIASTOLIC FUNCTION NO VALVULAR DISEASE   • H/O exercise stress test     REPORTEDLY NORMAL DONE FOR CHEST PAIN DX WITH ANXIETY   • Headache, tension-type    • History of Holter monitoring     REPORTEDLY NORMAL DONE FOR CHEST PAIN DX WITH ANXIETY   • History of MRI of brain and brain stem 2017    NORMAL DONE FOR MIGRAINES   • Hypertension     PRE-ECLAMPSIA WITH BOTH CHILDREN   • Incisional hernia     SCHEDULED FOR REPAIR   • Incisional hernia, without obstruction or gangrene 10/24/2018    Added automatically from request for surgery [id number]   • Infection following a procedure, unspecified, initial encounter    • Insulin resistance     D/T PCOS   • Lobar pneumonia, unspecified organism (HCC)    • Meralgia paresthetica, left lower limb    • Metabolic syndrome    • Migraine    • MRSA (methicillin resistant Staphylococcus aureus) infection 2019    Abdominal wound   • Nasal congestion    • Other injury of unspecified body region, initial encounter    • Panic attack    • Panic disorder    • Papanicolaou smear 2020    NORMAL WITH NEG HPV NEVER ABN   • PCOS (polycystic ovarian syndrome)    • Pneumonia 2018   • PONV (postoperative nausea and vomiting)    • Right lower quadrant abdominal pain 2019   • Seasonal allergies    • Spinal headache    • Unspecified contact dermatitis, unspecified cause    • Viral infection     UNSPECIFIED   • Vitamin D deficiency    • Wound infection 2018       Past Surgical History:   Procedure Laterality Date   • ABDOMINAL WALL ABSCESS INCISION AND DRAINAGE N/A 2018    Procedure: Debridement of abdominal wall;  Surgeon: Brigido Navarrete MD;  Location: [facility];  Service: General   • ADENOIDECTOMY     •  SECTION      X2   • CHOLECYSTECTOMY      LAP   • HERNIA REPAIR     • HX OVARIAN  CYSTECTOMY     • INCISION AND DRAINAGE TRUNK N/A 12/11/2018    Procedure: wound debridement, abdomen;  Surgeon: Rachel Dolan MD;  Location:  [facility] OR;  Service: General   • INCISION AND DRAINAGE TRUNK N/A 2/21/2019    Procedure: WOUND CLOSURE ABDOMINAL;  Surgeon: Rachel Dolan MD;  Location: [facility] OR;  Service: General   • KNEE ACL RECONSTRUCTION Left 2010    DR. CARRASCO   • OVARIAN CYST REMOVAL      EX LAP WITH OVARIAN CYST REMOVAL HERNIA REPAIR 2014   • TONSILLECTOMY      T&A   • TRUNK DEBRIDEMENT N/A 12/5/2018    Procedure: Abdominal wound debridement;  Surgeon: Rachel Dolan MD;  Location: [facility] OR;  Service: General   • VENTRAL/INCISIONAL HERNIA REPAIR N/A 11/15/2018    Procedure: Lateral Component Separation Herniorrhapy Repair with Mesh, Lysis of adhesions, and skin lesion excision of Right Side;  Surgeon: Rachel Dolan MD;  Location: [facility] OR;  Service: General       Family History: family history includes Cancer in her paternal grandmother; Dementia in her maternal grandmother; Diabetes in her father, maternal grandmother, and paternal grandfather; Heart disease in her father, mother, and paternal grandfather; Hypertension in her father, mother, and paternal grandmother; Stroke in her maternal grandmother, mother, and paternal grandmother. Otherwise pertinent FHx was reviewed and not pertinent to current issue.    Social History:  reports that she quit smoking about 18 years ago. She has a 0.50 pack-year smoking history. She has never used smokeless tobacco. She reports current alcohol use. She reports that she does not use drugs.    Home Medications:  Ascorbic Acid, [employer], LORazepam, Liraglutide, Magnesium, Multiple Vitamins-Minerals, Pen Needles, buPROPion XL, busPIRone, cetirizine, cholecalciferol, fluticasone, gabapentin, levonorgestrel-ethinyl estradiol, losartan-hydrochlorothiazide, meloxicam, metFORMIN ER, nortriptyline, omeprazole, rizatriptan MLT, and  "sertraline    Allergies:  Allergies   Allergen Reactions   • Penicillins Other (See Comments)     \"im resistant to any \"cillin\" drugs\"        Objective   Objective     Vitals:   Temp:  [98.4 °F (36.9 °C)-98.7 °F (37.1 °C)] 98.5 °F (36.9 °C)  Heart Rate:  [] 96  Resp:  [16] 16  BP: (108-150)/(60-92) 124/78  Physical Exam     GENERAL: 36 y.o. YO female a/o x 4, NAD  SKIN: Warm pink and dry   HEENT:  PERRLA, EOM intact, conjunctiva normal, sclera clear  NECK: supple, no JVD  LUNGS: Clear to auscultation bilaterally without wheezes, rales or rhonchi.  No accessory muscle use and no nasal flaring.  CARDIAC:  Regular rate and rhythm, S1-S2.  No murmurs, rubs or gallops.  No peripheral edema.  Equal pulses bilaterally.  ABDOMEN: Soft, nontender, nondistended.  No guarding or rebound tenderness.  Normal bowel sounds.  MUSCULOSKELETAL: Moves all extremities well.  No deformity.  NEURO: Cranial nerves II through XII grossly intact.  No gross focal deficits.  Alert.  Normal speech and motor.  PSYCH: Normal mood and affect      Result Review    Result Review:  I have personally reviewed the results from the time of this admission to 2/17/2022 20:49 EST and agree with these findings:  [x]  Laboratory  []  Microbiology  [x]  Radiology  []  EKG/Telemetry   []  Cardiology/Vascular   []  Pathology  []  Old records  []  Other:  Most notable findings include:     CT Head Without Contrast    Result Date: 2/17/2022  1.  No findings of acute intracranial abnormality.   This report was finalized on 2/17/2022 6:01 PM by Dr. Toy Cao M.D.          Assessment/Plan   Assessment / Plan     Brief Patient Summary:  Valery Aguilar is a 36 y.o. female who is being admitted to the observation unit for further evaluation of witnessed seizure-like activity.    Active Hospital Problems:  Active Hospital Problems    Diagnosis    • Seizure ([facility])      Plan:     1. Witnessed seizure-like activity  -seizure precautions  -Keppra 1.5g IV " now  -consult neurology  -CTA head, MRI WO/W pending  -EEG  -monitor     2. Diabetes  -POC glucose checks  -resume home regimen + insulin sliding scale  -hold metformin as patient is receiving IV contrast  -consistent carb diet    3. Migraine headache  -monitor for now, will readdress after patient receives keppra and treat symptomatically if needed     4. Hypertension/ GERD  -continue home medications as prescribed    DVT prophylaxis:  Mechanical DVT prophylaxis orders are present.    CODE STATUS:    Level Of Support Discussed With: Patient  Code Status (Patient has no pulse and is not breathing): CPR (Attempt to Resuscitate)  Medical Interventions (Patient has pulse or is breathing): Full Support    Admission Status:  I believe this patient meets observation status.    I wore an N95 mask, face shield, and gloves during this patient encounter. Patient also wearing a surgical mask throughout encounter. Hand hygeine performed before and after seeing the patient.    Electronically signed by RONIT Gómez, 02/17/22, 8:49 PM EST.

## 2022-02-18 NOTE — PROGRESS NOTES
[facility]     Progress Note    Name: Valery Aguilar ADMIT: 2022   : 1985  PCP: Zandra Rios MD    MRN: [id number] LOS: 0 days   AGE/SEX: 36 y.o. female  ROOM: [facility]/     Subjective   Subjective     Subjective   Patient reports witnessed seizure-like activity yesterday while at work. She denies biting tongue or loss of continence, no history of seizures prior. She does report recently starting new medication for her ovarian cyst and additionally has been taking Wellbutrin for a short time as well. She reports current headache but no other complaints at this time and is on her way to EEG. Neurology consult pending.    Review of Systems   All other systems reviewed and are negative.         Objective   Objective     Objective   Vital Signs  Temp:  [97.7 °F (36.5 °C)-98.7 °F (37.1 °C)] 97.7 °F (36.5 °C)  Heart Rate:  [] 87  Resp:  [16-18] 18  BP: (108-150)/(60-92) 132/81  SpO2:  [93 %-98 %] 94 %  on   ;   Device (Oxygen Therapy): room air  Body mass index is 49.78 kg/m².  Physical Exam  Vitals and nursing note reviewed.   Constitutional:       General: She is not in acute distress.     Appearance: Normal appearance. She is obese.   HENT:      Head: Normocephalic and atraumatic.      Nose: Nose normal.      Mouth/Throat:      Mouth: Mucous membranes are moist.   Eyes:      Extraocular Movements: Extraocular movements intact.   Cardiovascular:      Rate and Rhythm: Normal rate and regular rhythm.      Pulses: Normal pulses.      Heart sounds: Normal heart sounds.   Pulmonary:      Effort: Pulmonary effort is normal. No respiratory distress.      Breath sounds: Normal breath sounds.   Abdominal:      General: Abdomen is flat. Bowel sounds are normal.      Palpations: Abdomen is soft.   Musculoskeletal:         General: Normal range of motion.      Cervical back: Normal range of motion and neck supple. No rigidity.   Skin:     General: Skin is warm and dry.   Neurological:      General: No  focal deficit present.      Mental Status: She is alert and oriented to person, place, and time. Mental status is at baseline.   Psychiatric:         Mood and Affect: Mood normal.         Behavior: Behavior normal.         Thought Content: Thought content normal.         Judgment: Judgment normal.         Results Review     I reviewed the patient's new clinical results.  Results from last 7 days   Lab Units 02/18/22  0525 02/17/22  1421 02/13/22 1928   WBC 10*3/mm3 6.81 7.57 7.92   HEMOGLOBIN g/dL 13.1 15.4 14.3   PLATELETS 10*3/mm3 190 202 189     Results from last 7 days   Lab Units 02/18/22  0525 02/17/22  1421 02/13/22  1928   SODIUM mmol/L 137 137 140   POTASSIUM mmol/L 4.0 4.3 3.8   CHLORIDE mmol/L 102 101 103   CO2 mmol/L 24.0 22.0 23.7   BUN mg/dL 17 16 13   CREATININE mg/dL 0.73 0.87 0.79   GLUCOSE mg/dL 101* 87 94   Estimated Creatinine Clearance: 142.6 mL/min (by C-G formula based on SCr of 0.73 mg/dL).  Results from last 7 days   Lab Units 02/17/22  1421 02/13/22  1928   ALBUMIN g/dL 4.80 4.30   BILIRUBIN mg/dL 0.3 0.4   ALK PHOS U/L 58 55   AST (SGOT) U/L 27 22   ALT (SGPT) U/L 36* 34*     Results from last 7 days   Lab Units 02/18/22 0525 02/17/22  1421 02/13/22  1928   CALCIUM mg/dL 9.0 9.3 9.4   ALBUMIN g/dL  --  4.80 4.30   MAGNESIUM mg/dL  --  2.1  --    PHOSPHORUS mg/dL  --  2.9  --        COVID19   Date Value Ref Range Status   02/17/2022 Not Detected Not Detected - Ref. Range Final     SARS-CoV-2, SIMONA   Date Value Ref Range Status   09/13/2021 Not Detected Not Detected Final     Comment:     This nucleic acid amplification test was developed and its performance  characteristics determined by [employer]. Nucleic acid  amplification tests include RT-PCR and TMA. This test has not been  FDA cleared or approved. This test has been authorized by FDA under  an Emergency Use Authorization (EUA). This test is only authorized  for the duration of time the declaration that circumstances  exist  justifying the authorization of the emergency use of in vitro  diagnostic tests for detection of SARS-CoV-2 virus and/or diagnosis  of COVID-19 infection under section 564(b)(1) of the Act, 21 U.S.C.  360bbb-3(b) (1), unless the authorization is terminated or revoked  sooner.  When diagnostic testing is negative, the possibility of a false  negative result should be considered in the context of a patient's  recent exposures and the presence of clinical signs and symptoms  consistent with COVID-19. An individual without symptoms of COVID-19  and who is not shedding SARS-CoV-2 virus would expect to have a  negative (not detected) result in this assay.     Glucose   Date/Time Value Ref Range Status   02/18/2022 0734 92 70 - 130 mg/dL Final     Comment:     Meter: [id number] : [id number] Orquidea Toney SUELLEN   02/17/2022 2124 82 70 - 130 mg/dL Final     Comment:     Meter: [id number] : [id number] Natacha Villasenor RN       MRI Brain With & Without Contrast  Narrative: MRI BRAIN WITH AND WITHOUT CONTRAST     HISTORY:    Seizure     COMPARISON: CTA head 02/17/2022 and CT head 02/17/2022     TECHNIQUE: Multiplanar, multisequence MR imaging of the brain was  performed with and without intravenous contrast.     FINDINGS:     Please note that portions of the left temporal lobe are obscured by  artifact related likely related to overlying earrings.     There is no restricted diffusion.  The white matter demonstrates normal  signal characteristics.  There is no mass effect, midline shift,  hydrocephalus, parenchymal hemorrhage, or abnormal extra-axial fluid  collection. The major T2 intracranial arterial flow voids appear grossly  normal. Midline structures are unremarkable.     There is no abnormal intracranial enhancement.     Impression:    No findings of acute intracranial pathology. Please note that portions  of the left inferior temporal lobe are obscured by overlying artifact.     This report was finalized on  2/17/2022 10:50 PM by Dr. Toy Cao M.D.     CT Angiogram Head  Narrative: Patient: MARIO GROVER  Time Out: 22:10  Exam(s): CT HEAD, CTA HEAD     EXAM:    CT Angiography Head With Intravenous Contrast    CLINICAL HISTORY:     Reason for exam: CT venogram per Dr. Nichols for new onset seizure.    TECHNIQUE:  AI analysis of LVO was utilized    Axial computed tomographic angiography images of the head with   intravenous contrast.  CTDI is 54.80 mGy and DLP is 1007.30 mGy-cm.  This   CT exam was performed according to the principle of ALARA (As Low As   Reasonably Achievable) by using one or more of the following dose   reduction techniques: automated exposure control, adjustment of the mA   and or kV according to patient size, and or use of iterative   reconstruction technique.    MIP reconstructed images were created and reviewed.    COMPARISON:    No relevant prior studies available.    FINDINGS:   HEAD:    Right anterior cerebral artery:  Unremarkable.  No occlusion or   significant stenosis.  No aneurysm.    Right middle cerebral artery:  Unremarkable.  No occlusion or   significant stenosis.  No aneurysm.    Right posterior cerebral artery:  Unremarkable.  No occlusion or   significant stenosis.  No aneurysm.      Left anterior cerebral artery:  Unremarkable.  No occlusion or   significant stenosis.  No aneurysm.    Left middle cerebral artery:  Unremarkable.  No occlusion or   significant stenosis.  No aneurysm.    Left posterior cerebral artery:  Unremarkable.  No occlusion or   significant stenosis.  No aneurysm.      Basilar artery:  Unremarkable.  No occlusion or significant stenosis.    No aneurysm.    Right internal carotid artery:  Unremarkable.  No significant stenosis.    No dissection or occlusion.    Right vertebral artery:  Unremarkable.  No significant stenosis.  No   dissection or occlusion.    Left internal carotid artery:  Unremarkable.  No significant stenosis.    No dissection or  occlusion.    Left vertebral artery:  Unremarkable.  No significant stenosis.  No   dissection or occlusion.      Veins: No thrombus identified.      CAROTID STENOSIS REFERENCE USING NASCET CRITERIA:    % ICA stenosis = (1 - narrowest ICA diameter diameter of distal   cervical ICA) x 100.    Mild - <50% stenosis.    Moderate - 50-69% stenosis.    Severe - 70-94% stenosis.    Near occlusion - 95-99% stenosis.    Occluded - 100% stenosis.    IMPRESSION:         No acute findings in the arteries or veins of the head.    _______________________________________________    EXAM:    CT Head Without Intravenous Contrast    CLINICAL HISTORY:     Reason for exam: CT venogram per Dr. Nichols for new onset seizure.    TECHNIQUE:  AI analysis of LVO was utilized    Axial computed tomography images of the head brain without intravenous   contrast.  CTDI is 54.80 mGy and DLP is 1007.30 mGy-cm.  This CT exam was   performed according to the principle of ALARA (As Low As Reasonably   Achievable) by using one or more of the following dose reduction   techniques: automated exposure control, adjustment of the mA and or kV   according to patient size, and or use of iterative reconstruction   technique.    COMPARISON:    No relevant prior studies available.    FINDINGS:    Brain:  No acute infarct or hemorrhage.    Ventricles:  Unremarkable.  No ventriculomegaly.    Bones joints:  Unremarkable.  No acute calvarial fracture.    Soft tissues:  Unremarkable.    Sinuses:  Mild mucosal thickening of the paranasal sinuses.    Mastoid air cells:  Unremarkable as visualized.    IMPRESSION:         No acute infarct or hemorrhage.  Impression: Electronically signed by Juan A Ibarra MD on 02-17-22 at 2210  CT Head Without Contrast  Narrative: CT HEAD     HISTORY:New-onset seizure     TECHNIQUE: CT scan of the head was obtained with 3 mm axial images  without intravenous contrast.  Radiation dose reduction techniques were  utilized, including  automated exposure control and exposure modulation  based on body size.     COMPARISON:Brain MR 09/26/2016     FINDINGS:  There is no finding of acute infarct, hemorrhage, contusion or abnormal  extra-axial collection. No hydrocephalus is present.     Impression: 1.  No findings of acute intracranial abnormality.        This report was finalized on 2/17/2022 6:01 PM by Dr. Toy Cao M.D.     XR Chest 1 View  ONE VIEW PORTABLE CHEST     HISTORY: Seizure. Hypertension.     FINDINGS: The lungs are well-expanded and clear and the heart and hilar  structures are normal. There is no acute disease or change from  10/10/2020.     This report was finalized on 2/17/2022 3:08 PM by Dr. Ravinder Shankar M.D.       Scheduled Medications  busPIRone, 10 mg, Oral, TID  cetirizine, 10 mg, Oral, Nightly  cholecalciferol, 2,000 Units, Oral, Daily  fluticasone, 2 spray, Nasal, Daily  gabapentin, 300 mg, Oral, BID  insulin lispro, 0-9 Units, Subcutaneous, TID AC  losartan-HCTZ (HYZAAR) 50-12.5 combo dose, , Oral, Daily  nortriptyline, 50 mg, Oral, Nightly  pantoprazole, 40 mg, Oral, QAM  sertraline, 200 mg, Oral, Daily  sodium chloride, 10 mL, Intravenous, Q12H    Infusions   Diet  Diet Regular; Cardiac, Consistent Carbohydrate         Assessment/Plan   Assessment / Plan       Active Hospital Problems    Diagnosis  POA   • Seizure (HCC) [R56.9]  Yes      Resolved Hospital Problems   No resolved problems to display.       36 y.o. female admitted with seizure to observation unit for further workup.     1. Witnessed seizure-like activity  -seizure precautions  -Keppra 1.5g IV now  -consult neurology  -CTA head, MRI WO/W negative  -EEG  -monitor      2. Diabetes  -POC glucose checks  -resume home regimen + insulin sliding scale  -hold metformin as patient is receiving IV contrast  -consistent carb diet     3. Migraine headache  -monitor for now, will readdress after patient receives keppra and treat symptomatically if needed      4.  Hypertension/ GERD  -continue home medications as prescribed      · SCDs for DVT prophylaxis.  · Full code.  · Discussed with patient.  · Anticipate discharge today.    This progress note will additionally serve as discharge summary.     SYED Shin  [facility]  02/18/22  08:06 EST

## 2022-02-19 NOTE — OUTREACH NOTE
Prep Survey      Responses   [facility] facility patient discharged from? [facility]   Is LACE score < 7 ? Yes   Emergency Room discharge w/ pulse ox? No   Eligibility [facility]   Date of Admission 02/17/22   Date of Discharge 02/18/22   Discharge Disposition Home or Self Care   Discharge diagnosis new onset seizure, DM   Does the patient have one of the following disease processes/diagnoses(primary or secondary)? Other   Does the patient have Home health ordered? No   Is there a DME ordered? No   Prep survey completed? Yes          Sylvia Campos RN

## 2022-02-21 ENCOUNTER — TRANSITIONAL CARE MANAGEMENT TELEPHONE ENCOUNTER (OUTPATIENT)
Dept: CALL CENTER | Facility: HOSPITAL | Age: 37
End: 2022-02-21

## 2022-02-21 RX ORDER — MELOXICAM 15 MG/1
TABLET ORAL
Qty: 30 TABLET | Refills: 0 | Status: SHIPPED | OUTPATIENT
Start: 2022-02-21 | End: 2022-03-23

## 2022-02-21 NOTE — TELEPHONE ENCOUNTER
Rx Refill Note  Requested Prescriptions     Pending Prescriptions Disp Refills    meloxicam (MOBIC) 15 MG tablet [Pharmacy Med Name: MELOXICAM 15 MG TABLET] 30 tablet 0     Sig: TAKE ONE TABLET BY MOUTH DAILY      Last office visit with prescribing clinician: 9/16/2021      Next office visit with prescribing clinician: Visit date not found   Last Filled:01.19.2022    Left knee DJD         Lou Varma MA  02/21/22, 09:25 EST    {TIP  Encounters:    {TIP  Please add Last Relevant Lab Date if appropriate:  {TIP  Is Refill Pharmacy correct?:

## 2022-02-21 NOTE — OUTREACH NOTE
"Call Center TCM Note      Responses   [facility] patient discharged from? [place]   Does the patient have one of the following disease processes/diagnoses(primary or secondary)? Other   TCM attempt successful? Yes   Call start time 1555   Call end time 1559   Discharge diagnosis new onset seizure, DM   Meds reviewed with patient/caregiver? Yes   Is the patient having any side effects they believe may be caused by any medication additions or changes? No   Does the patient have all medications ordered at discharge? Yes   Is the patient taking all medications as directed (includes completed medication regime)? Yes   Does the patient have a primary care provider?  Yes   Does the patient have an appointment with their PCP within 7 days of discharge? Yes   Has the patient kept scheduled appointments due by today? N/A   Psychosocial issues? No   Did the patient receive a copy of their discharge instructions? Yes   Nursing interventions Reviewed instructions with patient   What is the patient's perception of their health status since discharge? Improving   Is the patient/caregiver able to teach back signs and symptoms related to disease process for when to call PCP? Yes   Is the patient/caregiver able to teach back signs and symptoms related to disease process for when to call 911? Yes   Is the patient/caregiver able to teach back the hierarchy of who to call/visit for symptoms/problems? PCP, Specialist, Home health nurse, Urgent Care, ED, 911 Yes   If the patient is a current smoker, are they able to teach back resources for cessation? Not a smoker   Additional teach back comments States she still feels a little weak and \"out of it\" but is back to work.  Has had headache off and on.  Has Neuro appt 4/22   TCM call completed? Yes   Wrap up additional comments Pt to follow up with PCP and Neuro.            Kathie Cisneros LPN    2/21/2022, 16:00 EST      "

## 2022-02-22 DIAGNOSIS — F32.A ANXIETY AND DEPRESSION: Chronic | ICD-10-CM

## 2022-02-22 DIAGNOSIS — F41.9 ANXIETY AND DEPRESSION: Chronic | ICD-10-CM

## 2022-02-22 RX ORDER — SERTRALINE HYDROCHLORIDE 100 MG/1
TABLET, FILM COATED ORAL
Qty: 180 TABLET | Refills: 1 | Status: SHIPPED | OUTPATIENT
Start: 2022-02-22 | End: 2022-08-21

## 2022-02-22 NOTE — TELEPHONE ENCOUNTER
Rx Refill Note  Requested Prescriptions     Pending Prescriptions Disp Refills   • sertraline (ZOLOFT) 100 MG tablet [Pharmacy Med Name: SERTRALINE  MG TABLET] 180 tablet 1     Sig: TAKE TWO TABLETS BY MOUTH DAILY      Last office visit with prescribing clinician: 2/11/2022      Next office visit with prescribing clinician: 3/4/2022            Melissa Cortez MA  02/22/22, 08:09 EST

## 2022-02-25 ENCOUNTER — OFFICE VISIT (OUTPATIENT)
Dept: INTERNAL MEDICINE | Facility: CLINIC | Age: 37
End: 2022-02-25

## 2022-02-25 VITALS
RESPIRATION RATE: 16 BRPM | SYSTOLIC BLOOD PRESSURE: 128 MMHG | TEMPERATURE: 97.3 F | HEART RATE: 103 BPM | OXYGEN SATURATION: 98 % | DIASTOLIC BLOOD PRESSURE: 72 MMHG | HEIGHT: 64 IN | WEIGHT: 287.4 LBS | BODY MASS INDEX: 49.06 KG/M2

## 2022-02-25 DIAGNOSIS — R56.9 SEIZURE: Primary | ICD-10-CM

## 2022-02-25 DIAGNOSIS — F41.9 ANXIETY AND DEPRESSION: Chronic | ICD-10-CM

## 2022-02-25 DIAGNOSIS — F32.A ANXIETY AND DEPRESSION: Chronic | ICD-10-CM

## 2022-02-25 DIAGNOSIS — N83.209 CYST OF OVARY, UNSPECIFIED LATERALITY: ICD-10-CM

## 2022-02-25 PROBLEM — M25.562 MECHANICAL KNEE PAIN, LEFT: Status: RESOLVED | Noted: 2021-07-20 | Resolved: 2022-02-25

## 2022-02-25 PROCEDURE — 99215 OFFICE O/P EST HI 40 MIN: CPT | Performed by: INTERNAL MEDICINE

## 2022-02-25 NOTE — PATIENT INSTRUCTIONS
No work Friday, Saturday, Sunday, Monday.   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  Then can return to working both jobs the following week  Should spend the next 3 days on bed rest with limited screen time and physical exertion.

## 2022-02-25 NOTE — PROGRESS NOTES
"Chief Complaint  Anxiety (HAVING A LOT OF ANXIETY LATELY), Seizures (LAST WEEK AT WORK AND OBSERVED OVERNIGHT AT /EVERYTHING FINE), Dizziness (WHILE STANDING AND WALKING/LEGS TINGLING), and Ovarian Cyst (RT OVARIAN CYST/ IMAGING DONE AT )    Subjective          Valery Aguilar presents to [facility] for DIZZINESS. Pt has been to ER twice in past week.   First ER visit was for LLQ pain and was found to have an enlarged 6 cm R ovarian cyst. GYN felt like this was related to her IUD, so was placed on OCPs to try to shrink the cyst and they are monitoring.   Second ER visit occurred following witnessed seizure at work. EMS was called by coworkers and she was post-ictal at the time of their evaluation. She did not have any preceding symptoms, was not incontinent of urine, no tongue biting. Neurology was called in the ER and she had an MRI and CTA done which were normal. She was loaded with Keppra in the ER, had EEG that was normal, and was DSC home without AED for routine monitoring. Wellbutrin was stopped as this was felt to lower seizure threshold. She has follow up planned with neurologist in April for ongoing management.   Anxiety is wildly uncontrolled currently related to both stopping hte wellbutrin and also from all of her recent medical changes.     Objective   Vital Signs:     /72   Pulse 103   Temp 97.3 °F (36.3 °C)   Resp 16   Ht 162.6 cm (64\")   Wt 130 kg (287 lb 6.4 oz)   SpO2 98%   BMI 49.33 kg/m²     Physical Exam  Vitals and nursing note reviewed.   Constitutional:       General: She is not in acute distress.     Appearance: Normal appearance.   Cardiovascular:      Rate and Rhythm: Normal rate and regular rhythm.      Pulses: Normal pulses.      Heart sounds: Normal heart sounds. No murmur heard.      Pulmonary:      Effort: Pulmonary effort is normal. No respiratory distress.      Breath sounds: Normal breath sounds.   Abdominal:      " General: Abdomen is flat. Bowel sounds are normal.      Palpations: Abdomen is soft.      Tenderness: There is no abdominal tenderness.   Musculoskeletal:      Right lower leg: No edema.      Left lower leg: No edema.   Neurological:      Mental Status: She is alert and oriented to person, place, and time. Mental status is at baseline.   Psychiatric:         Mood and Affect: Mood is anxious. Affect is tearful.         Speech: Speech is rapid and pressured and tangential.         Behavior: Behavior normal. Behavior is cooperative.         Thought Content: Thought content normal.         Cognition and Memory: Memory is impaired.          Result Review :   The following data was reviewed by: Zandra Rios MD on 02/25/2022:  CMP    CMP 2/13/22 2/17/22 2/18/22   Glucose 94 87 101 (A)   BUN 13 16 17   Creatinine 0.79 0.87 0.73   eGFR Non African Am 82 73 90   Sodium 140 137 137   Potassium 3.8 4.3 4.0   Chloride 103 101 102   Calcium 9.4 9.3 9.0   Albumin 4.30 4.80    Total Bilirubin 0.4 0.3    Alkaline Phosphatase 55 58    AST (SGOT) 22 27    ALT (SGPT) 34 (A) 36 (A)    (A) Abnormal value       Comments are available for some flowsheets but are not being displayed.           CBC w/diff    CBC w/Diff 2/13/22 2/17/22 2/18/22   WBC 7.92 7.57 6.81   RBC 5.32 (A) 5.73 (A) 4.92   Hemoglobin 14.3 15.4 13.1   Hematocrit 44.2 46.9 (A) 40.1   MCV 83.1 81.8 81.5   MCH 26.9 26.9 26.6   MCHC 32.4 32.8 32.7   RDW 14.9 14.7 14.5   Platelets 189 202 190   Neutrophil Rel % 68.1 74.8    Immature Granulocyte Rel % 0.9 (A) 0.8 (A)    Lymphocyte Rel % 22.2 17.7 (A)    Monocyte Rel % 5.1 4.8 (A)    Eosinophil Rel % 3.2 1.5    Basophil Rel % 0.5 0.4    (A) Abnormal value            Lipid Panel    Lipid Panel 8/30/21 1/10/22   Total Cholesterol 198 223 (A)   Triglycerides 145 117   HDL Cholesterol 40 49   VLDL Cholesterol 26 21   LDL Cholesterol  132 (A) 153 (A)   (A) Abnormal value       Comments are available for some flowsheets but are  "not being displayed.               Most Recent A1C    HGBA1C Most Recent 1/10/22   Hemoglobin A1C 5.3      Comments are available for some flowsheets but are not being displayed.           Data reviewed: Radiologic studies MRI Brain, CT Head CTA head and neck, Consultant notes neurology, EEG report and Recent hospitalization notes ER visits 12/14, 12/18.             Assessment and Plan      Diagnoses and all orders for this visit:    1. Seizure (HCC) (Primary)  2. Anxiety and depression  3. Cyst of ovary, unspecified laterality    All medical records reviewed from 2 different ER visits and pt discussed concerns, outcomes, and current plans related to each of these encounters. Her anxiety level has not been well controlled for several months, but she is not also off of one of psych medications with cessation of wellbutrin related to her recent seizure and also very concerned about her ongoing poor health and new diagnoses. Still uncertain if there was head injury associated with the seizure, but does seem to almost have a post-concussive syndrome with brain fog, fatigue, poor concentration, and suspect her anxiety is playing in to some of this as well. Do not want to add an additional medication for anxiety at this time as I believe it would only muddy the water with any neurologic changes and feel she is better served to take some time to rest and almost follow a \"return to play\" protocol following a concussion to see if we allow her to more slowly resume her activity level she will do better. Pt to follow a modified bed rest with limited screen time plan x 3-4 days, then can return back to her less stressful job only next week, then should have next weekend off again, then can attempt to return to both of her day jobs the following week if she feels up to it. If she struggles with returning and develops symptoms again, she should contact me with what symptoms are present and we will determine if this is anxiety " "based and she should \"push through\" or possibly related to neurologic issues and need to be addressed. She has follow up planned with GYN and Neurology in coming months. Remain without driving x 3 mos after seizure, pt is aware and following protocol.   Will follow up in 3 weeks, which should be the week after return to full normal activity, to re-evaluate, and if she is physically feeling better, will discuss medications for anxiety again at that time, may consider lamictal since she has tried numerous other anxiety/depression agents in the past.     I spent 45 minutes caring for Valery on this date of service. This time includes time spent by me in the following activities:preparing for the visit, reviewing tests, obtaining and/or reviewing a separately obtained history, performing a medically appropriate examination and/or evaluation , counseling and educating the patient/family/caregiver and documenting information in the medical record    Follow Up   Return in about 3 weeks (around 3/18/2022) for follow up.    Patient was given instructions and counseling regarding her condition or for health maintenance advice. Please see specific information pulled into the AVS if appropriate.     Yessy Rios MD  [facility]  Phone: [phone]  Fax: [phone]    "

## 2022-03-03 ENCOUNTER — APPOINTMENT (OUTPATIENT)
Dept: GENERAL RADIOLOGY | Facility: HOSPITAL | Age: 37
End: 2022-03-03

## 2022-03-03 ENCOUNTER — HOSPITAL ENCOUNTER (EMERGENCY)
Facility: HOSPITAL | Age: 37
Discharge: HOME | End: 2022-03-03
Attending: EMERGENCY MEDICINE | Admitting: EMERGENCY MEDICINE

## 2022-03-03 VITALS
DIASTOLIC BLOOD PRESSURE: 73 MMHG | HEIGHT: 64 IN | OXYGEN SATURATION: 96 % | WEIGHT: 286 LBS | BODY MASS INDEX: 48.83 KG/M2 | TEMPERATURE: 99.6 F | RESPIRATION RATE: 18 BRPM | HEART RATE: 95 BPM | SYSTOLIC BLOOD PRESSURE: 124 MMHG

## 2022-03-03 DIAGNOSIS — F41.0 ANXIETY ATTACK: ICD-10-CM

## 2022-03-03 DIAGNOSIS — K21.9 GASTROESOPHAGEAL REFLUX DISEASE, UNSPECIFIED WHETHER ESOPHAGITIS PRESENT: Primary | ICD-10-CM

## 2022-03-03 LAB
ALBUMIN SERPL-MCNC: 4.3 G/DL (ref 3.5–5.2)
ALBUMIN/GLOB SERPL: 1.6 G/DL
ALP SERPL-CCNC: 45 U/L (ref 39–117)
ALT SERPL W P-5'-P-CCNC: 42 U/L (ref 1–33)
ANION GAP SERPL CALCULATED.3IONS-SCNC: 10 MMOL/L (ref 5–15)
AST SERPL-CCNC: 22 U/L (ref 1–32)
BASOPHILS # BLD AUTO: 0.03 10*3/MM3 (ref 0–0.2)
BASOPHILS NFR BLD AUTO: 0.5 % (ref 0–1.5)
BILIRUB SERPL-MCNC: 0.3 MG/DL (ref 0–1.2)
BUN SERPL-MCNC: 18 MG/DL (ref 6–20)
BUN/CREAT SERPL: 21.4 (ref 7–25)
CALCIUM SPEC-SCNC: 9.5 MG/DL (ref 8.6–10.5)
CHLORIDE SERPL-SCNC: 102 MMOL/L (ref 98–107)
CO2 SERPL-SCNC: 25 MMOL/L (ref 22–29)
CREAT SERPL-MCNC: 0.84 MG/DL (ref 0.57–1)
D DIMER PPP FEU-MCNC: <0.27 MCGFEU/ML (ref 0–0.46)
DEPRECATED RDW RBC AUTO: 43.8 FL (ref 37–54)
EGFRCR SERPLBLD CKD-EPI 2021: 91.9 ML/MIN/1.73
EOSINOPHIL # BLD AUTO: 0.07 10*3/MM3 (ref 0–0.4)
EOSINOPHIL NFR BLD AUTO: 1.1 % (ref 0.3–6.2)
ERYTHROCYTE [DISTWIDTH] IN BLOOD BY AUTOMATED COUNT: 14.6 % (ref 12.3–15.4)
GLOBULIN UR ELPH-MCNC: 2.7 GM/DL
GLUCOSE SERPL-MCNC: 104 MG/DL (ref 65–99)
HCT VFR BLD AUTO: 41.5 % (ref 34–46.6)
HGB BLD-MCNC: 13.6 G/DL (ref 12–15.9)
IMM GRANULOCYTES # BLD AUTO: 0.02 10*3/MM3 (ref 0–0.05)
IMM GRANULOCYTES NFR BLD AUTO: 0.3 % (ref 0–0.5)
LYMPHOCYTES # BLD AUTO: 2.27 10*3/MM3 (ref 0.7–3.1)
LYMPHOCYTES NFR BLD AUTO: 36.4 % (ref 19.6–45.3)
MCH RBC QN AUTO: 27.3 PG (ref 26.6–33)
MCHC RBC AUTO-ENTMCNC: 32.8 G/DL (ref 31.5–35.7)
MCV RBC AUTO: 83.2 FL (ref 79–97)
MONOCYTES # BLD AUTO: 0.44 10*3/MM3 (ref 0.1–0.9)
MONOCYTES NFR BLD AUTO: 7.1 % (ref 5–12)
NEUTROPHILS NFR BLD AUTO: 3.41 10*3/MM3 (ref 1.7–7)
NEUTROPHILS NFR BLD AUTO: 54.6 % (ref 42.7–76)
NRBC BLD AUTO-RTO: 0 /100 WBC (ref 0–0.2)
PLATELET # BLD AUTO: 222 10*3/MM3 (ref 140–450)
PMV BLD AUTO: 10.3 FL (ref 6–12)
POTASSIUM SERPL-SCNC: 3.6 MMOL/L (ref 3.5–5.2)
PROT SERPL-MCNC: 7 G/DL (ref 6–8.5)
RBC # BLD AUTO: 4.99 10*6/MM3 (ref 3.77–5.28)
SODIUM SERPL-SCNC: 137 MMOL/L (ref 136–145)
TROPONIN T SERPL-MCNC: <0.01 NG/ML (ref 0–0.03)
WBC NRBC COR # BLD: 6.24 10*3/MM3 (ref 3.4–10.8)

## 2022-03-03 PROCEDURE — 93010 ELECTROCARDIOGRAM REPORT: CPT | Performed by: INTERNAL MEDICINE

## 2022-03-03 PROCEDURE — 85025 COMPLETE CBC W/AUTO DIFF WBC: CPT | Performed by: EMERGENCY MEDICINE

## 2022-03-03 PROCEDURE — 85379 FIBRIN DEGRADATION QUANT: CPT | Performed by: EMERGENCY MEDICINE

## 2022-03-03 PROCEDURE — 71045 X-RAY EXAM CHEST 1 VIEW: CPT

## 2022-03-03 PROCEDURE — 80053 COMPREHEN METABOLIC PANEL: CPT | Performed by: EMERGENCY MEDICINE

## 2022-03-03 PROCEDURE — 99283 EMERGENCY DEPT VISIT LOW MDM: CPT | Performed by: EMERGENCY MEDICINE

## 2022-03-03 PROCEDURE — 93005 ELECTROCARDIOGRAM TRACING: CPT | Performed by: EMERGENCY MEDICINE

## 2022-03-03 PROCEDURE — 99284 EMERGENCY DEPT VISIT MOD MDM: CPT

## 2022-03-03 PROCEDURE — 84484 ASSAY OF TROPONIN QUANT: CPT | Performed by: EMERGENCY MEDICINE

## 2022-03-03 RX ORDER — SODIUM CHLORIDE 0.9 % (FLUSH) 0.9 %
10 SYRINGE (ML) INJECTION AS NEEDED
Status: DISCONTINUED | OUTPATIENT
Start: 2022-03-03 | End: 2022-03-04 | Stop reason: HOSPADM

## 2022-03-03 RX ORDER — ALUMINA, MAGNESIA, AND SIMETHICONE 2400; 2400; 240 MG/30ML; MG/30ML; MG/30ML
15 SUSPENSION ORAL ONCE
Status: COMPLETED | OUTPATIENT
Start: 2022-03-03 | End: 2022-03-03

## 2022-03-03 RX ORDER — LIDOCAINE HYDROCHLORIDE 20 MG/ML
15 SOLUTION OROPHARYNGEAL ONCE
Status: COMPLETED | OUTPATIENT
Start: 2022-03-03 | End: 2022-03-03

## 2022-03-03 RX ADMIN — ALUMINUM HYDROXIDE, MAGNESIUM HYDROXIDE, AND DIMETHICONE 15 ML: 400; 400; 40 SUSPENSION ORAL at 21:17

## 2022-03-03 RX ADMIN — LIDOCAINE HYDROCHLORIDE 15 ML: 20 SOLUTION ORAL; TOPICAL at 21:17

## 2022-03-04 LAB — QT INTERVAL: 347 MS

## 2022-03-04 NOTE — DISCHARGE INSTRUCTIONS
Continue current medications. Follow-up with your PCP as above. Return to ED for medical emergencies.

## 2022-03-04 NOTE — ED PROVIDER NOTES
Subjective   Valery Aguilar is a 36-year-old white female who present secondary to substernal chest pain.  Onset of pain approximately 20 minutes prior to ER arrival.  Pain radiates from the lower sternal border up to the sternal notch.  It also radiates bilaterally across the anterior chest.  No aggravating or alleviating factors.  No recent illness or injury.  Patient had her first epileptic seizure approximately 2 weeks ago.  She states this was attributed to Wellbutrin.  This medication has been discontinued.  Patient presents for evaluation.          History provided by:  Patient      Review of Systems   Constitutional: Negative.  Negative for fever.   HENT: Negative.  Negative for rhinorrhea.    Eyes: Negative.  Negative for redness.   Respiratory: Negative for cough.    Cardiovascular: Positive for chest pain.   Gastrointestinal: Negative for abdominal pain.   Endocrine: Negative.    Genitourinary: Negative.  Negative for difficulty urinating.   Musculoskeletal: Negative.  Negative for back pain.   Skin: Negative.  Negative for color change.   Neurological: Negative.  Negative for syncope.   Hematological: Negative.    Psychiatric/Behavioral: The patient is nervous/anxious.    All other systems reviewed and are negative.      Past Medical History:   Diagnosis Date   • Abdominal pain    • Anxiety    • Anxiety and depression 3/8/2021   • Arthritis     KNEE   • Chronic sinusitis, unspecified    • Chronic wound infection of abdomen 2/20/2019    Added automatically from request for surgery [id number]   • Depression    • GERD (gastroesophageal reflux disease)    • H/O echocardiogram     2019 EF 56% NORMAL DIASTOLIC FUNCTION NO VALVULAR DISEASE   • H/O exercise stress test     REPORTEDLY NORMAL DONE FOR CHEST PAIN DX WITH ANXIETY   • Headache, tension-type    • History of Holter monitoring     REPORTEDLY NORMAL DONE FOR CHEST PAIN DX WITH ANXIETY   • History of MRI of brain and brain stem 01/01/2017    NORMAL DONE FOR  "MIGRAINES   • Hypertension     PRE-ECLAMPSIA WITH BOTH CHILDREN   • Incisional hernia     SCHEDULED FOR REPAIR   • Incisional hernia, without obstruction or gangrene 10/24/2018    Added automatically from request for surgery [id number]   • Infection following a procedure, unspecified, initial encounter    • Insulin resistance     D/T PCOS   • Lobar pneumonia, unspecified organism (HCC)    • Meralgia paresthetica, left lower limb    • Metabolic syndrome    • Migraine    • MRSA (methicillin resistant Staphylococcus aureus) infection 2019    Abdominal wound   • Nasal congestion    • Other injury of unspecified body region, initial encounter    • Panic attack    • Panic disorder    • Papanicolaou smear 2020    NORMAL WITH NEG HPV NEVER ABN   • PCOS (polycystic ovarian syndrome)    • Pneumonia 2018   • PONV (postoperative nausea and vomiting)    • Right lower quadrant abdominal pain 2019   • Seasonal allergies    • Seizure (HCC)    • Spinal headache    • Unspecified contact dermatitis, unspecified cause    • Viral infection     UNSPECIFIED   • Vitamin D deficiency    • Wound infection 2018       Allergies   Allergen Reactions   • Penicillins Other (See Comments)     \"im resistant to any \"cillin\" drugs\"        Past Surgical History:   Procedure Laterality Date   • ABDOMINAL WALL ABSCESS INCISION AND DRAINAGE N/A 2018    Procedure: Debridement of abdominal wall;  Surgeon: Brigido Navarrete MD;  Location: [facility] OR;  Service: General   • ADENOIDECTOMY     •  SECTION      X2   • CHOLECYSTECTOMY      LAP   • HERNIA REPAIR     • HX OVARIAN CYSTECTOMY     • INCISION AND DRAINAGE TRUNK N/A 2018    Procedure: wound debridement, abdomen;  Surgeon: Rachel Dolan MD;  Location: [facility] OR;  Service: General   • INCISION AND DRAINAGE TRUNK N/A 2019    Procedure: WOUND CLOSURE ABDOMINAL;  Surgeon: Rachel Dolan MD;  Location: [facility] OR;  Service: General   • KNEE ACL RECONSTRUCTION " Left     DR. CARRASCO   • OVARIAN CYST REMOVAL      EX LAP WITH OVARIAN CYST REMOVAL HERNIA REPAIR    • TONSILLECTOMY      T&A   • TRUNK DEBRIDEMENT N/A 2018    Procedure: Abdominal wound debridement;  Surgeon: Rachel Dolan MD;  Location: [facility] OR;  Service: General   • VENTRAL/INCISIONAL HERNIA REPAIR N/A 11/15/2018    Procedure: Lateral Component Separation Herniorrhapy Repair with Mesh, Lysis of adhesions, and skin lesion excision of Right Side;  Surgeon: Rachel Dolan MD;  Location: [facility] OR;  Service: General       Family History   Problem Relation Age of Onset   • Stroke Mother    • Heart disease Mother    • Hypertension Mother    • Heart disease Father    • Hypertension Father    • Diabetes Father    • Dementia Maternal Grandmother    • Stroke Maternal Grandmother    • Diabetes Maternal Grandmother    • Stroke Paternal Grandmother    • Hypertension Paternal Grandmother    • Cancer Paternal Grandmother    • Heart disease Paternal Grandfather    • Diabetes Paternal Grandfather        Social History     Socioeconomic History   • Marital status:    Tobacco Use   • Smoking status: Former Smoker     Packs/day: 0.50     Years: 1.00     Pack years: 0.50     Quit date:      Years since quittin.1   • Smokeless tobacco: Never Used   Vaping Use   • Vaping Use: Never used   Substance and Sexual Activity   • Alcohol use: Yes     Comment: occasional   • Drug use: No   • Sexual activity: Defer     Partners: Male     Birth control/protection: OCP     Comment: LNMP irregular           Objective   Physical Exam  Vitals and nursing note reviewed.   Constitutional:       Appearance: She is well-developed. She is obese.      Comments: 36-year-old white female laying in bed.  Patient is morbidly obese.  She otherwise appears in good health.  Vital signs unremarkable.  Patient is tearful and anxious.   HENT:      Head: Normocephalic and atraumatic.      Right Ear: External ear normal.       Left Ear: External ear normal.      Nose: Nose normal.   Eyes:      Conjunctiva/sclera: Conjunctivae normal.      Pupils: Pupils are equal, round, and reactive to light.   Neck:      Vascular: No JVD.      Trachea: No tracheal deviation.   Cardiovascular:      Rate and Rhythm: Normal rate and regular rhythm.      Heart sounds: Normal heart sounds. No murmur heard.  No friction rub. No gallop.    Pulmonary:      Effort: Pulmonary effort is normal. No respiratory distress.      Breath sounds: Normal breath sounds. No decreased breath sounds, wheezing, rhonchi or rales.   Chest:      Chest wall: No tenderness.   Abdominal:      General: Bowel sounds are normal. There is no distension.      Palpations: Abdomen is soft.      Tenderness: There is no abdominal tenderness. There is no guarding or rebound.   Musculoskeletal:         General: Normal range of motion.      Cervical back: Normal range of motion and neck supple.   Neurological:      Mental Status: She is alert and oriented to person, place, and time.      Deep Tendon Reflexes: Reflexes are normal and symmetric.   Psychiatric:         Attention and Perception: Attention and perception normal.         Mood and Affect: Mood is anxious. Affect is tearful.         Speech: Speech normal.         Behavior: Behavior normal.         Thought Content: Thought content normal.         Judgment: Judgment normal.         Procedures       EKG 12-lead  Date 3/3/2022  Time 20: 50  Normal sinus rhythm.  Normal rate.  Normal axis.  Normal intervals.  Normal ST segments.  Normal T waves.  Normal EKG.      ED Course  ED Course as of 03/03/22 2235   Thu Mar 03, 2022   2114 Patient's EKG is unremarkable.  Obtaining chest x-ray and lab work including BNP and troponin.  Giving GI cocktail. [SS]   2223 CBC, CMP, troponin and D-dimer all unremarkable. Chest x-ray unremarkable. Symptoms significantly improved after GI cocktail. I feel patient symptoms are combination of GERD or perhaps  esophageal spasm with associated anxiety. I do not find any indication of cardiac or pulmonary disease process. Discussed at length with patient all results, diagnoses, treatment. Will DC home. [SS]      ED Course User Index  [SS] Irving Rider MD      Labs Reviewed   COMPREHENSIVE METABOLIC PANEL - Abnormal; Notable for the following components:       Result Value    Glucose 104 (*)     ALT (SGPT) 42 (*)     All other components within normal limits    Narrative:     GFR Normal >60  Chronic Kidney Disease <60  Kidney Failure <15     D-DIMER, QUANTITATIVE - Normal    Narrative:     Can be elevated in, but is not diagnostic for deep vein thrombosis (DVT) or pulmonary embolis (PE).  It is also elevated in other medical conditions.  Clinical correlation is required.  The negative cut-off value for the D-Dimer is 0.50 mcg FEU/mL for DVT and PE.     TROPONIN (IN-HOUSE) - Normal    Narrative:     Troponin T Reference Range:  <= 0.03 ng/mL-   Negative for AMI  >0.03 ng/mL-     Abnormal for myocardial necrosis.  Clinicians would have to utilize clinical acumen, EKG, Troponin and serial changes to determine if it is an Acute Myocardial Infarction or myocardial injury due to an underlying chronic condition.       Results may be falsely decreased if patient taking Biotin.     CBC WITH AUTO DIFFERENTIAL - Normal   CBC AND DIFFERENTIAL    Narrative:     The following orders were created for panel order CBC & Differential.  Procedure                               Abnormality         Status                     ---------                               -----------         ------                     CBC Auto Differential[[id number]]        Normal              Final result                 Please view results for these tests on the individual orders.     EEG    Result Date: 2/18/2022  Narrative: Date of onset: 2/18/2022 Date of offset: 2/18/2022  Indication: 36 year old female with history of anxiety, depression, hypertension,  diabetes, GERD, and migraines, who presented to the emergency department today after witnessed seizure activity at work.  Technical description:  This is a 21 channel digital EEG recording that began on 8:58 AM and ended on 9:26 AM.  Electrodes were placed based on the 10-20 international electrode placement system.   Background:  The EEG recording demonstrates normal background activity with mainly alpha frequencies with intermixed theta frequencies.  9 Hz frequencies are present posteriorly and symmetrically.  The patient enters both stage 1 and 2 sleep with architecture present including vertex sharp transients and sleep spindles.  Hyperventilation was not performed but photic stimulation was performed with no additional abnormalities noted.  There are no asymmetries between the two hemispheres.  No interictal activity is present.  The EKG monitor shows a heart rate that is around 90 beats per minute.  Clinical interpretation:  This routine awake and sleep EEG is normal.  No potentially epileptogenic activity, seizure activity, or focal slowing is present.  Careful clinical correlation is advised.      CT Abdomen Pelvis Without Contrast    Result Date: 2/13/2022  Narrative: INDICATION: Left lower quadrant abdominal pain. History of previous ovarian cyst. Left lateral pain is chronic and intermittent. Epigastric pain today. Postcholecystectomy and hernia repair. TECHNIQUE: CT of the abdomen and pelvis without contrast. Coronal and sagittal reconstructions were obtained.  Radiation dose reduction techniques included automated exposure control or exposure modulation based on body size. Radiation audit for number of CT and nuclear cardiology exams performed in the last year: 0.  COMPARISON: CT abdomen pelvis from 1/19/2019. FINDINGS: Lung bases: There is minimal peripheral airspace disease at lung bases. Please correlate for any clinical evidence of Covid. There is a small hiatal hernia. Abdomen: Lack of IV contrast  media limits assessment for pathology other than urinary tract calculus disease. The noncontrast liver is mildly enlarged with diffuse hepatic steatosis. Configuration of liver is not changed. Spleen is borderline enlarged about 14.2 cm in length. This is mildly increased in size. The patient is postcholecystectomy. Pancreas is fatty replaced. There are no intrarenal calculi. There is no hydronephrosis. There is a moderate colonic stool burden. The appendix is radiographically within normal limits. There is no evidence for bowel obstruction. There is no free air or percutaneously drainable fluid collection. Pelvis: There is an IUD expected location within the uterus. There is a large probable right adnexal cyst about 6.2 cm in diameter. This is best characterized with follow-up pelvic ultrasound. Left ovary is unremarkable on CT scan. Bladder is decompressed without evidence for bladder calculus. There are postoperative changes consistent with previous anterior abdominal wall surgery.     Impression: 1. Subtle peripheral infiltrates at the lung bases. Please correlate for clinical evidence of Covid pneumonia. 2. Post cholecystectomy. 3. Hepatomegaly with diffuse hepatic steatosis redemonstrated. Borderline splenic enlargement increased from prior. 4. Appendix is radiographically within normal limits and there is no evidence for bowel obstruction. There is a moderate colonic stool burden. 5. There is a 6.2 cm in diameter probable right adnexal cyst. This is best further characterized with pelvic ultrasound. 4. IUD in expected location Signer Name: Reanna Harrison MD  Signed: 2/13/2022 8:24 PM  Workstation Name: [email or web address]  [facility]    CT Head Without Contrast    Result Date: 2/17/2022  Narrative: CT HEAD  HISTORY:New-onset seizure  TECHNIQUE: CT scan of the head was obtained with 3 mm axial images without intravenous contrast.  Radiation dose reduction techniques were utilized, including  automated exposure control and exposure modulation based on body size.  COMPARISON:Brain MR 09/26/2016  FINDINGS: There is no finding of acute infarct, hemorrhage, contusion or abnormal extra-axial collection. No hydrocephalus is present.      Impression: 1.  No findings of acute intracranial abnormality.   This report was finalized on 2/17/2022 6:01 PM by Dr. Toy Cao M.D.      MRI Brain With & Without Contrast    Result Date: 2/17/2022  Narrative: MRI BRAIN WITH AND WITHOUT CONTRAST  HISTORY:    Seizure  COMPARISON: CTA head 02/17/2022 and CT head 02/17/2022  TECHNIQUE: Multiplanar, multisequence MR imaging of the brain was performed with and without intravenous contrast.  FINDINGS:  Please note that portions of the left temporal lobe are obscured by artifact related likely related to overlying earrings.  There is no restricted diffusion.  The white matter demonstrates normal signal characteristics.  There is no mass effect, midline shift, hydrocephalus, parenchymal hemorrhage, or abnormal extra-axial fluid collection. The major T2 intracranial arterial flow voids appear grossly normal. Midline structures are unremarkable.  There is no abnormal intracranial enhancement.      Impression:  No findings of acute intracranial pathology. Please note that portions of the left inferior temporal lobe are obscured by overlying artifact.  This report was finalized on 2/17/2022 10:50 PM by Dr. Toy Cao M.D.       Non-ob Transvaginal    Result Date: 2/14/2022  Narrative: AV uterus with IUD in palce Normal left ovary Right ovary with 5.6 x 4.9cm simple cyst    XR Chest 1 View    Result Date: 3/3/2022  Narrative: PROCEDURE: CR Chest 1 Vw COMPARISON: August 2021 INDICATIONS: Chest pain TECHNIQUE: Single AP  view of the chest, portable technique FINDINGS: Study underpenetrated limited due to patient's overlying soft tissues. Cardiomediastinal silhouette is within normal limits given projection.  Lungs are relatively  clear. Osseous structures are intact.     Impression: No acute disease.  Signer Name: Olena Dinh MD  Signed: 3/3/2022 9:45 PM  Workstation Name: [facility]  [facility]    XR Chest 1 View    Result Date: 2/17/2022  Narrative: ONE VIEW PORTABLE CHEST  HISTORY: Seizure. Hypertension.  FINDINGS: The lungs are well-expanded and clear and the heart and hilar structures are normal. There is no acute disease or change from 10/10/2020.  This report was finalized on 2/17/2022 3:08 PM by Dr. Ravinder Shankar M.D.      CT Angiogram Head    Result Date: 2/17/2022  Narrative: Patient: MARIO GROVER  Time Out: 22:10 Exam(s): CT HEAD, CTA HEAD EXAM:   CT Angiography Head With Intravenous Contrast CLINICAL HISTORY:    Reason for exam: CT venogram per Dr. Nichols for new onset seizure. TECHNIQUE: AI analysis of LVO was utilized   Axial computed tomographic angiography images of the head with intravenous contrast.  CTDI is 54.80 mGy and DLP is 1007.30 mGy-cm.  This CT exam was performed according to the principle of ALARA (As Low As Reasonably Achievable) by using one or more of the following dose reduction techniques: automated exposure control, adjustment of the mA and or kV according to patient size, and or use of iterative reconstruction technique.   MIP reconstructed images were created and reviewed. COMPARISON:   No relevant prior studies available. FINDINGS:  HEAD:   Right anterior cerebral artery:  Unremarkable.  No occlusion or significant stenosis.  No aneurysm.   Right middle cerebral artery:  Unremarkable.  No occlusion or significant stenosis.  No aneurysm.   Right posterior cerebral artery:  Unremarkable.  No occlusion or significant stenosis.  No aneurysm.   Left anterior cerebral artery:  Unremarkable.  No occlusion or significant stenosis.  No aneurysm.   Left middle cerebral artery:  Unremarkable.  No occlusion or significant stenosis.  No aneurysm.   Left posterior cerebral artery:   Unremarkable.  No occlusion or significant stenosis.  No aneurysm.   Basilar artery:  Unremarkable.  No occlusion or significant stenosis.  No aneurysm.   Right internal carotid artery:  Unremarkable.  No significant stenosis.  No dissection or occlusion.   Right vertebral artery:  Unremarkable.  No significant stenosis.  No dissection or occlusion.   Left internal carotid artery:  Unremarkable.  No significant stenosis.  No dissection or occlusion.   Left vertebral artery:  Unremarkable.  No significant stenosis.  No dissection or occlusion.   Veins: No thrombus identified.  CAROTID STENOSIS REFERENCE USING NASCET CRITERIA:   % ICA stenosis = (1 - narrowest ICA diameter diameter of distal cervical ICA) x 100.   Mild - <50% stenosis.   Moderate - 50-69% stenosis.   Severe - 70-94% stenosis.   Near occlusion - 95-99% stenosis.   Occluded - 100% stenosis. IMPRESSION:       No acute findings in the arteries or veins of the head. _______________________________________________ EXAM:   CT Head Without Intravenous Contrast CLINICAL HISTORY:    Reason for exam: CT venogram per Dr. Nichols for new onset seizure. TECHNIQUE: AI analysis of LVO was utilized   Axial computed tomography images of the head brain without intravenous contrast.  CTDI is 54.80 mGy and DLP is 1007.30 mGy-cm.  This CT exam was performed according to the principle of ALARA (As Low As Reasonably Achievable) by using one or more of the following dose reduction techniques: automated exposure control, adjustment of the mA and or kV according to patient size, and or use of iterative reconstruction technique. COMPARISON:   No relevant prior studies available. FINDINGS:   Brain:  No acute infarct or hemorrhage.   Ventricles:  Unremarkable.  No ventriculomegaly.   Bones joints:  Unremarkable.  No acute calvarial fracture.   Soft tissues:  Unremarkable.   Sinuses:  Mild mucosal thickening of the paranasal sinuses.   Mastoid air cells:  Unremarkable as  visualized. IMPRESSION:       No acute infarct or hemorrhage.     Impression: Electronically signed by Juan A Ibarra MD on 02-17-22 at 2210       My differential diagnosis for chest pain includes but is not limited to:  Muscle strain, costochondritis, myositis, pleurisy, rib fracture, intercostal neuritis, herpes zoster, tumor, myocardial infarction, coronary syndrome, unstable angina, angina, aortic dissection, mitral valve prolapse, pericarditis, palpitations, pulmonary embolus, pneumonia, pneumothorax, lung cancer, GERD, esophagitis, esophageal spasm                                        MDM    Final diagnoses:   Gastroesophageal reflux disease, unspecified whether esophagitis present   Anxiety attack       ED Disposition  ED Disposition     ED Disposition Condition Comment    Discharge Stable           Zandra Rios MD  [address] [address]  [phone]    Call   9: 0 a.m. tomorrow morning to arrange follow-up within the next 2 to 3 days. Sooner if needed.         Medication List      No changes were made to your prescriptions during this visit.          Irving Rider MD  03/03/22 [id number]

## 2022-03-10 ENCOUNTER — PATIENT MESSAGE (OUTPATIENT)
Dept: INTERNAL MEDICINE | Facility: CLINIC | Age: 37
End: 2022-03-10

## 2022-03-10 DIAGNOSIS — F41.9 ANXIETY AND DEPRESSION: Chronic | ICD-10-CM

## 2022-03-10 DIAGNOSIS — F32.A ANXIETY AND DEPRESSION: Chronic | ICD-10-CM

## 2022-03-11 RX ORDER — LORAZEPAM 0.5 MG/1
0.5 TABLET ORAL EVERY 12 HOURS PRN
Qty: 30 TABLET | Refills: 1 | Status: SHIPPED | OUTPATIENT
Start: 2022-03-11 | End: 2022-10-13 | Stop reason: SDUPTHER

## 2022-03-11 NOTE — TELEPHONE ENCOUNTER
From: Valery Aguilar  To: Zandra Rios MD  Sent: 3/10/2022 7:47 PM EST  Subject: Anxiety medicine     Hey Dr Rios I was wondering your thoughts on something and didn’t know if I need to make an appointment to see you for this or if you can help! I started taking an Ativan in the morning because I am still having issues with foggy brain and I was thinking maybe my anxiety is playing a part so I started taking it and it is helping some! Didn’t know your thoughts on this but if you want me to continue with this till I meet you next week I may need a refill before I see you again! Just let me know! Thanks so much!     Valery Aguilar

## 2022-03-18 ENCOUNTER — OFFICE VISIT (OUTPATIENT)
Dept: INTERNAL MEDICINE | Facility: CLINIC | Age: 37
End: 2022-03-18

## 2022-03-18 ENCOUNTER — HOSPITAL ENCOUNTER (OUTPATIENT)
Dept: GENERAL RADIOLOGY | Facility: HOSPITAL | Age: 37
Discharge: HOME | End: 2022-03-18
Admitting: INTERNAL MEDICINE

## 2022-03-18 VITALS
WEIGHT: 288 LBS | SYSTOLIC BLOOD PRESSURE: 126 MMHG | OXYGEN SATURATION: 93 % | BODY MASS INDEX: 49.17 KG/M2 | DIASTOLIC BLOOD PRESSURE: 84 MMHG | TEMPERATURE: 96.8 F | HEART RATE: 104 BPM | HEIGHT: 64 IN | RESPIRATION RATE: 16 BRPM

## 2022-03-18 DIAGNOSIS — M25.521 RIGHT ELBOW PAIN: ICD-10-CM

## 2022-03-18 DIAGNOSIS — F41.9 ANXIETY AND DEPRESSION: Primary | Chronic | ICD-10-CM

## 2022-03-18 DIAGNOSIS — R56.9 SEIZURE: ICD-10-CM

## 2022-03-18 DIAGNOSIS — F32.A ANXIETY AND DEPRESSION: Primary | Chronic | ICD-10-CM

## 2022-03-18 PROCEDURE — 73080 X-RAY EXAM OF ELBOW: CPT

## 2022-03-18 PROCEDURE — 99214 OFFICE O/P EST MOD 30 MIN: CPT | Performed by: INTERNAL MEDICINE

## 2022-03-18 RX ORDER — METAXALONE 800 MG/1
800 TABLET ORAL 3 TIMES DAILY PRN
Qty: 30 TABLET | Refills: 1 | Status: SHIPPED | OUTPATIENT
Start: 2022-03-18 | End: 2022-04-22

## 2022-03-18 NOTE — PROGRESS NOTES
"Chief Complaint  Follow-up and Anxiety    Subjective          Valery Aguilar presents to [facility] for follow up on anxiety and post-seizure issues. After last appt she was given a \"post concussive protocol\" with restricted working x 2 weeks and slowly advancing duties as tolerated. Currently, she is back working both jobs, has had a couple \"bad day\" scattered in with slower processing or some mild disorientation, but no repeat seizures.   Is continuing to have lower abd pain and is pending eval regarding cyst removal with her GYN.   Has been to ER in past couple weeks for chest pains and was diagnosed with classic GERD and possible esophageal spasm.   Is having pain in her R arm ongoing since her seizure and fall. Still feels very sore with bending and feels like her abilities with lifting are limited related to pain in her elbow.   Overall, she does feel like she is doing better from when she was last seen but still not back to her previous baseline before the seizure.     Objective   Vital Signs:     /84   Pulse 104   Temp 96.8 °F (36 °C)   Resp 16   Ht 162.6 cm (64\")   Wt 131 kg (288 lb)   SpO2 93%   BMI 49.44 kg/m²     Physical Exam  Vitals and nursing note reviewed.   Constitutional:       General: She is not in acute distress.     Appearance: Normal appearance.   Cardiovascular:      Rate and Rhythm: Normal rate and regular rhythm.      Pulses: Normal pulses.      Heart sounds: Normal heart sounds. No murmur heard.  Pulmonary:      Effort: Pulmonary effort is normal. No respiratory distress.      Breath sounds: Normal breath sounds.   Abdominal:      General: Abdomen is flat. Bowel sounds are normal.      Palpations: Abdomen is soft.      Tenderness: There is no abdominal tenderness.   Musculoskeletal:      Right lower leg: No edema.      Left lower leg: No edema.      Comments: TTP over left lateral epicondyle with palpable muscle spasms into " bicep and down into forearm, pain with resisted arm flexion, some pain with supination and pronation   Neurological:      Mental Status: She is alert and oriented to person, place, and time. Mental status is at baseline.   Psychiatric:         Mood and Affect: Mood normal.         Behavior: Behavior normal.          Result Review :   The following data was reviewed by: Zandra Rios MD on 03/18/2022:  CMP    CMP 2/17/22 2/18/22 3/3/22   Glucose 87 101 (A) 104 (A)   BUN 16 17 18   Creatinine 0.87 0.73 0.84   eGFR Non African Am 73 90    Sodium 137 137 137   Potassium 4.3 4.0 3.6   Chloride 101 102 102   Calcium 9.3 9.0 9.5   Albumin 4.80  4.30   Total Bilirubin 0.3  0.3   Alkaline Phosphatase 58  45   AST (SGOT) 27  22   ALT (SGPT) 36 (A)  42 (A)   (A) Abnormal value            CBC w/diff    CBC w/Diff 2/17/22 2/18/22 3/3/22   WBC 7.57 6.81 6.24   RBC 5.73 (A) 4.92 4.99   Hemoglobin 15.4 13.1 13.6   Hematocrit 46.9 (A) 40.1 41.5   MCV 81.8 81.5 83.2   MCH 26.9 26.6 27.3   MCHC 32.8 32.7 32.8   RDW 14.7 14.5 14.6   Platelets 202 190 222   Neutrophil Rel % 74.8  54.6   Immature Granulocyte Rel % 0.8 (A)  0.3   Lymphocyte Rel % 17.7 (A)  36.4   Monocyte Rel % 4.8 (A)  7.1   Eosinophil Rel % 1.5  1.1   Basophil Rel % 0.4  0.5   (A) Abnormal value            Lipid Panel    Lipid Panel 8/30/21 1/10/22   Total Cholesterol 198 223 (A)   Triglycerides 145 117   HDL Cholesterol 40 49   VLDL Cholesterol 26 21   LDL Cholesterol  132 (A) 153 (A)   (A) Abnormal value       Comments are available for some flowsheets but are not being displayed.               Most Recent A1C    HGBA1C Most Recent 1/10/22   Hemoglobin A1C 5.3      Comments are available for some flowsheets but are not being displayed.           Data reviewed: Recent hospitalization notes ER notes 3/3     Assessment and Plan      Diagnoses and all orders for this visit:    1. Anxiety and depression (Primary)  Assessment & Plan:  IMPROVED  - cont on sertraline 200  mg  - cont on buspar 10 mg BID, occasionally TID  - wellbutrin had recently been added to her regimen but pt had witnessed seizure Feb 2022 shortly after adding this medication back so it has been stopped  - Reviewed potential side effects of medication including sexual performance changes, insomnia, fatigue, weight changes. Pt tolerating well without any issues.  - has lorazepam for prn use, has been using more routinely since her seizure due to daily struggles and anxiety about pain and recurrence, but does feel like she is now having more good days than bad days and is going to work on starting to cut back on this, either going to a half pill in the morning to starting to pick days to skip if she is feeling well in the morning  - Reviewed controlled nature of substance, potential for abuse/addiction, and possible adverse effects of medication. No red flags for abuse at this time.   - Controlled substances contract signed and in chart.   - Hang checked and appropriate.   - if this persists after resolution and improvement of her post-TBI/seizure type symptoms and resolution of her cyst evaluation, would consider addition of lamictal          2. Seizure (HCC)  Assessment & Plan:  STABLE  - no AEDs at this time, was loaded with Keppra in ER after initial seizure  - has not had any recurrences, wellbutrin was stopped at the time of her seizure which was felt to likely be contributory  - is aware that she cannot drive for 3 mos after her event, is currently not driving as instructed  - does still cont to have some brain fog and fatigue but does feel like she is slowly progressing and starting to have more good days than bad, does have to take it slow and working on limiting her work load currently to allow for more rest  - follow up with neurology planned end of April      3. Right elbow pain   - hit elbow with her fall following seizure   - will get XR due to bony point tenderness   - Rx sent for muscle relaxer for  prn use as obvious spasms present on exam above and below elbow joint     Orders:  -     XR Elbow 3+ View Right; Future  -     metaxalone (Skelaxin) 800 MG tablet; Take 1 tablet by mouth 3 (Three) Times a Day As Needed for Muscle Spasms.  Dispense: 30 tablet; Refill: 1    Follow Up   Return in about 4 weeks (around 4/15/2022) for follow up.    Patient was given instructions and counseling regarding her condition or for health maintenance advice. Please see specific information pulled into the AVS if appropriate.     Yessy Rios MD  [facility]  Phone: [phone]  Fax: [phone]

## 2022-03-18 NOTE — ASSESSMENT & PLAN NOTE
STABLE  - no AEDs at this time, was loaded with Keppra in ER after initial seizure  - has not had any recurrences, wellbutrin was stopped at the time of her seizure which was felt to likely be contributory  - is aware that she cannot drive for 3 mos after her event, is currently not driving as instructed  - does still cont to have some brain fog and fatigue but does feel like she is slowly progressing and starting to have more good days than bad, does have to take it slow and working on limiting her work load currently to allow for more rest  - follow up with neurology planned end of April

## 2022-03-18 NOTE — ASSESSMENT & PLAN NOTE
IMPROVED  - cont on sertraline 200 mg  - cont on buspar 10 mg BID, occasionally TID  - wellbutrin had recently been added to her regimen but pt had witnessed seizure Feb 2022 shortly after adding this medication back so it has been stopped  - Reviewed potential side effects of medication including sexual performance changes, insomnia, fatigue, weight changes. Pt tolerating well without any issues.  - has lorazepam for prn use, has been using more routinely since her seizure due to daily struggles and anxiety about pain and recurrence, but does feel like she is now having more good days than bad days and is going to work on starting to cut back on this, either going to a half pill in the morning to starting to pick days to skip if she is feeling well in the morning  - Reviewed controlled nature of substance, potential for abuse/addiction, and possible adverse effects of medication. No red flags for abuse at this time.   - Controlled substances contract signed and in chart.   - Hang checked and appropriate.   - if this persists after resolution and improvement of her post-TBI/seizure type symptoms and resolution of her cyst evaluation, would consider addition of lamictal

## 2022-03-23 DIAGNOSIS — G43.011 INTRACTABLE MIGRAINE WITHOUT AURA AND WITH STATUS MIGRAINOSUS: Chronic | ICD-10-CM

## 2022-03-23 RX ORDER — GABAPENTIN 600 MG/1
600 TABLET ORAL 2 TIMES DAILY
Qty: 180 TABLET | Refills: 1 | Status: SHIPPED | OUTPATIENT
Start: 2022-03-23 | End: 2022-10-04

## 2022-03-23 RX ORDER — MELOXICAM 15 MG/1
TABLET ORAL
Qty: 30 TABLET | Refills: 0 | Status: SHIPPED | OUTPATIENT
Start: 2022-03-23 | End: 2022-05-09

## 2022-03-23 NOTE — TELEPHONE ENCOUNTER
Rx Refill Note  Requested Prescriptions     Pending Prescriptions Disp Refills    meloxicam (MOBIC) 15 MG tablet [Pharmacy Med Name: MELOXICAM 15 MG TABLET] 30 tablet 0     Sig: TAKE ONE TABLET BY MOUTH DAILY      Last office visit with prescribing clinician: 9/16/2021      Next office visit with prescribing clinician: Visit date not found   Last Filled:02.21.2022    DX: Left knee DJD         Lou Varma MA  03/23/22, 09:28 EDT    {TIP  Encounters:    {TIP  Please add Last Relevant Lab Date if appropriate:  {TIP  Is Refill Pharmacy correct?:

## 2022-03-23 NOTE — TELEPHONE ENCOUNTER
Rx Refill Note  Requested Prescriptions     Pending Prescriptions Disp Refills   • gabapentin (NEURONTIN) 600 MG tablet 180 tablet 1     Sig: Take 1 tablet by mouth 2 (Two) Times a Day.      Last office visit with prescribing clinician: 3/18/2022      Next office visit with prescribing clinician: 4/25/2022            Zohreh Schmidt MA  03/23/22, 08:42 EDT

## 2022-03-31 ENCOUNTER — OFFICE VISIT (OUTPATIENT)
Dept: OBSTETRICS AND GYNECOLOGY | Facility: CLINIC | Age: 37
End: 2022-03-31

## 2022-03-31 VITALS
DIASTOLIC BLOOD PRESSURE: 80 MMHG | HEIGHT: 64 IN | SYSTOLIC BLOOD PRESSURE: 140 MMHG | WEIGHT: 288 LBS | BODY MASS INDEX: 49.17 KG/M2

## 2022-03-31 DIAGNOSIS — N83.201 RIGHT OVARIAN CYST: Primary | ICD-10-CM

## 2022-03-31 LAB
BILIRUB BLD-MCNC: NEGATIVE MG/DL
CLARITY, POC: CLEAR
COLOR UR: YELLOW
GLUCOSE UR STRIP-MCNC: NEGATIVE MG/DL
KETONES UR QL: NEGATIVE
LEUKOCYTE EST, POC: NEGATIVE
NITRITE UR-MCNC: NEGATIVE MG/ML
PH UR: 5 [PH] (ref 5–8)
PROT UR STRIP-MCNC: NEGATIVE MG/DL
RBC # UR STRIP: NEGATIVE /UL
SP GR UR: 1 (ref 1–1.03)
UROBILINOGEN UR QL: NORMAL

## 2022-03-31 PROCEDURE — 81002 URINALYSIS NONAUTO W/O SCOPE: CPT | Performed by: OBSTETRICS & GYNECOLOGY

## 2022-03-31 PROCEDURE — 99213 OFFICE O/P EST LOW 20 MIN: CPT | Performed by: OBSTETRICS & GYNECOLOGY

## 2022-03-31 NOTE — PROGRESS NOTES
"PROBLEM VISIT    Chief Complaint: right ovarian cyst      Valery Aguilar is a 36 y.o. patient who presents for follow up of a 6cm right ovarian cyst diagnosed on CT scan done in ER 2/13/22. Pt has a hx of a 16cm left tubal cyst in 2013. US revealed right ovary with 5.6 x 4.9cm simple cyst.  She was started on OCPs for cyst suppression. She presents for a repeat US today.  Pt had a seizure at work in 2/2022 due to Wellbutrin. Pt has an IUD in place. She is also on OCPs for cyst suppression. She is reporting brown discharge. She is reporting some LLQ discomfort.    Chief Complaint   Patient presents with   • Follow-up     US             The following portions of the patient's history were reviewed and updated as appropriate: allergies, current medications and problem list.    Review of Systems   Constitutional: Negative for appetite change, chills, fatigue, fever and unexpected weight change.   Gastrointestinal: Negative for abdominal distention, abdominal pain, anal bleeding, blood in stool, constipation, diarrhea, nausea and vomiting.   Genitourinary: Positive for pelvic pain and vaginal bleeding. Negative for dyspareunia, dysuria, menstrual problem, vaginal discharge and vaginal pain.       /80   Ht 162.6 cm (64.02\")   Wt 131 kg (288 lb)   Breastfeeding No   BMI 49.41 kg/m²     Physical Exam  Constitutional:       General: She is not in acute distress.     Appearance: She is obese. She is not ill-appearing, toxic-appearing or diaphoretic.   Neurological:      General: No focal deficit present.      Mental Status: She is alert and oriented to person, place, and time. Mental status is at baseline.      Motor: No weakness.      Gait: Gait normal.   Psychiatric:         Mood and Affect: Mood normal.         Thought Content: Thought content normal.         Judgment: Judgment normal.           Assessment/Plan   Diagnoses and all orders for this visit:    1. Right ovarian cyst (Primary)  -     POC Urinalysis " Dipstick      37yo with hx of right ovarian cyst    1) Right ovarian cyst: Cyst resolved on US today. IUD in place. Right ovary with no dominant cyst: several follicles all measuring less than 1cm. Left ovarian cyst 3.2 x 2.7cm. pt reporting some discomfort in LLQ. Pt desires to fu to see if cyst enlarges, If it does then discussed removing IUD and continuing OCPs. Stop OCPs for now since having BTB.             No follow-ups on file.      Karen Marvin DO    3/31/2022  10:12 EDT

## 2022-04-06 DIAGNOSIS — E28.2 PCOS (POLYCYSTIC OVARIAN SYNDROME): ICD-10-CM

## 2022-04-06 DIAGNOSIS — E66.01 MORBID (SEVERE) OBESITY DUE TO EXCESS CALORIES: Chronic | ICD-10-CM

## 2022-04-06 RX ORDER — LIRAGLUTIDE 6 MG/ML
3 INJECTION, SOLUTION SUBCUTANEOUS DAILY
Qty: 15 ML | Refills: 5 | Status: SHIPPED | OUTPATIENT
Start: 2022-04-06 | End: 2022-05-25 | Stop reason: SDUPTHER

## 2022-04-11 DIAGNOSIS — F41.9 ANXIETY AND DEPRESSION: Chronic | ICD-10-CM

## 2022-04-11 DIAGNOSIS — F32.A ANXIETY AND DEPRESSION: Chronic | ICD-10-CM

## 2022-04-11 RX ORDER — BUSPIRONE HYDROCHLORIDE 10 MG/1
TABLET ORAL
Qty: 270 TABLET | Refills: 1 | Status: SHIPPED | OUTPATIENT
Start: 2022-04-11 | End: 2022-04-25 | Stop reason: SDUPTHER

## 2022-04-11 NOTE — TELEPHONE ENCOUNTER
Rx Refill Note  Requested Prescriptions     Pending Prescriptions Disp Refills   • busPIRone (BUSPAR) 10 MG tablet [Pharmacy Med Name: busPIRone HCL 10 MG TABLET] 270 tablet 1     Sig: TAKE ONE TABLET BY MOUTH THREE TIMES A DAY      Last office visit with prescribing clinician: 3/18/2022      Next office visit with prescribing clinician: 4/25/2022            Melissa Cortez MA  04/11/22, 07:59 EDT

## 2022-04-22 ENCOUNTER — OFFICE VISIT (OUTPATIENT)
Dept: NEUROLOGY | Facility: CLINIC | Age: 37
End: 2022-04-22

## 2022-04-22 VITALS
SYSTOLIC BLOOD PRESSURE: 124 MMHG | WEIGHT: 280 LBS | BODY MASS INDEX: 47.8 KG/M2 | OXYGEN SATURATION: 95 % | DIASTOLIC BLOOD PRESSURE: 82 MMHG | HEART RATE: 90 BPM | HEIGHT: 64 IN

## 2022-04-22 DIAGNOSIS — R56.9 SEIZURE: Primary | ICD-10-CM

## 2022-04-22 PROCEDURE — 99215 OFFICE O/P EST HI 40 MIN: CPT | Performed by: PSYCHIATRY & NEUROLOGY

## 2022-04-22 NOTE — PROGRESS NOTES
Chief Complaint  Seizures (On 2/17/22 medication induced?)    Subjective          Valery Aguilar presents to [facility] for   HISTORY OF PRESENT ILLNESS:    Valery Aguilar is a 36 year old right handed woman who presents to neurology clinic for initial evaluation and follow up of hospitalization in 2/2022 for a seizure.  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  They turned her on her left side and protected her and the seizure lasted 45 seconds to a minute in duration and she fell a sleep and went into a deep sleep and was snoring.  When she woke up EMT had arrived and informed her that she had experienced a seizure and she was confused.  She was not sure what was going on till she was in the ambulance.  She had been started on Wellbutrin a couple weeks before this spell.  She was having some issues with her depression and was on Zoloft.  There is no family history of seizures.  Her son has had 2 febrile seizures.  She denies history of febrile seizures.  No history of meningitis or encephalitis.  No history of head trauma.  She was taken to the hospital where she had a head CT scan and a brain MRI scan which I reviewed the images independently on her visit today and do not demonstrate any acute intracranial abnormalities.  She also had a routine awake and sleep EEG done which I read as a normal study.  She denies any seizure like spells since that time.  She has had normal echocardiogram and normal EKG.      Past Medical History:   Diagnosis Date   • Abdominal pain    • Anxiety    • Anxiety and depression 3/8/2021   • Arthritis     KNEE   • Chronic sinusitis, unspecified    • Chronic wound infection of  abdomen 2/20/2019    Added automatically from request for surgery [id number]   • Depression    • GERD (gastroesophageal reflux disease)    • H/O echocardiogram     2019 EF 56% NORMAL DIASTOLIC FUNCTION NO VALVULAR DISEASE   • H/O exercise stress test     REPORTEDLY NORMAL DONE FOR CHEST PAIN DX WITH ANXIETY   • Headache, tension-type    • History of Holter monitoring     REPORTEDLY NORMAL DONE FOR CHEST PAIN DX WITH ANXIETY   • History of MRI of brain and brain stem 01/01/2017    NORMAL DONE FOR MIGRAINES   • Hypertension     PRE-ECLAMPSIA WITH BOTH CHILDREN   • Incisional hernia     SCHEDULED FOR REPAIR   • Incisional hernia, without obstruction or gangrene 10/24/2018    Added automatically from request for surgery [id number]   • Infection following a procedure, unspecified, initial encounter    • Insulin resistance     D/T PCOS   • Lobar pneumonia, unspecified organism (HCC)    • Meralgia paresthetica, left lower limb    • Metabolic syndrome    • Migraine    • MRSA (methicillin resistant Staphylococcus aureus) infection 01/2019    Abdominal wound   • Nasal congestion    • Other injury of unspecified body region, initial encounter    • Panic attack    • Panic disorder    • Papanicolaou smear 06/01/2020    NORMAL WITH NEG HPV NEVER ABN   • PCOS (polycystic ovarian syndrome)    • Pneumonia 07/2018   • PONV (postoperative nausea and vomiting)    • Right lower quadrant abdominal pain 1/20/2019   • Seasonal allergies    • Seizure (HCC)    • Spinal headache    • Unspecified contact dermatitis, unspecified cause    • Viral infection     UNSPECIFIED   • Vitamin D deficiency    • Wound infection 12/2/2018        Family History   Problem Relation Age of Onset   • Stroke Mother    • Heart disease Mother    • Hypertension Mother    • Heart disease Father    • Hypertension Father    • Diabetes Father    • Dementia Maternal Grandmother    • Stroke Maternal Grandmother    • Diabetes Maternal Grandmother    • Stroke Paternal Grandmother  "   • Hypertension Paternal Grandmother    • Cancer Paternal Grandmother    • Heart disease Paternal Grandfather    • Diabetes Paternal Grandfather         Social History     Socioeconomic History   • Marital status:    Tobacco Use   • Smoking status: Former Smoker     Packs/day: 0.50     Years: 1.00     Pack years: 0.50     Quit date:      Years since quittin.3   • Smokeless tobacco: Never Used   Vaping Use   • Vaping Use: Never used   Substance and Sexual Activity   • Alcohol use: Yes     Comment: occasional   • Drug use: No   • Sexual activity: Defer     Partners: Male     Birth control/protection: OCP     Comment: LNMP irregular        I have reviewed and confirmed the accuracy of the ROS as documented by the MA/LPN/RN Sheba Mueller MD    Review of Systems   Constitutional: Positive for fatigue. Negative for activity change and appetite change.   HENT: Negative for trouble swallowing and voice change.    Eyes: Negative for blurred vision, double vision and pain.   Gastrointestinal: Negative for nausea and vomiting.   Genitourinary: Positive for pelvic pain.   Musculoskeletal: Negative for back pain and gait problem.   Allergic/Immunologic: Positive for environmental allergies. Negative for food allergies.   Neurological: Positive for seizures and headache. Negative for dizziness, tremors, syncope, facial asymmetry, speech difficulty, weakness, light-headedness, numbness, memory problem and confusion.   Psychiatric/Behavioral: Positive for decreased concentration, sleep disturbance and depressed mood. Negative for agitation, behavioral problems, dysphoric mood, hallucinations, self-injury, suicidal ideas, negative for hyperactivity and stress. The patient is nervous/anxious.         Objective   Vital Signs:   /82 (BP Location: Right arm, Patient Position: Sitting)   Pulse 90   Ht 162.6 cm (64.02\")   Wt 127 kg (280 lb)   SpO2 95%   BMI 48.04 kg/m²       PHYSICAL EXAM:    General "   Mental Status - Alert. General Appearance - Over weight, Well groomed, Oriented and Cooperative. Orientation - Oriented X3.       Head and Neck  Head - normocephalic, atraumatic with no lesions or palpable masses.  Neck    Global Assessment - supple.       Eye   Sclera/Conjunctiva - Bilateral - Normal.    ENMT  Mouth and Throat   Oral Cavity/Oropharynx: Oropharynx - the soft palate,uvula and tongue are normal in appearance.    Chest and Lung Exam   Chest - lung clear to auscultation bilaterally.    Cardiovascular   Cardiovascular examination reveals  - normal heart sounds, regular rate and rhythm.    Neurologic   Mental Status: Speech - Normal. Cognitive function - appropriate fund of knowledge. No impairment of attention, Impairment of concentration, impairment of long term memory or impairment of short term memory.  Cranial Nerves:   II Optic: Visual acuity - Left - Normal. Right - Normal. Visual fields - Normal (to confrontation).  III Oculomotor: Pupillary constriction - Left - Normal. Right - Normal.  VII Facial: - Normal Bilaterally.   IX Glossopharyngeal / X Vagus - Normal.  XI Accessory: Trapezius - Bilateral - Normal. Sternocleidomastoid - Bilateral - Normal.  XII Hypoglossal - Bilateral - Normal.  Eye Movements: - Normal Bilaterally.  Sensory:   Light Touch: Intact - Globally.  Motor:   Bulk and Contour: - Normal.  Tone: - Normal.  Tremor: Not present.  Strength: 5/5 normal muscle strength - All Muscles.   General Assessment of Reflexes: - deep tendon reflexes are normal. Coordination - No Impairment of finger-to-nose or Impairment of rapid alternating movements. Gait - Normal.     Result Review :                 Assessment and Plan    Problem List Items Addressed This Visit        Neuro    Seizure (HCC) - Primary    Current Assessment & Plan     36 year old right handed woman with recent hospitalization in 2/2022 for a seizure.  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  They turned her on her left side and protected her and the seizure lasted 45 seconds to a minute in duration and she fell a sleep and went into a deep sleep and was snoring.  When she woke up EMT had arrived and informed her that she had experienced a seizure and she was confused.  She was not sure what was going on till she was in the ambulance.  She had been started on Welburin a couple weeks before this spell.  She was having some issues with her depression and was on Zoloft.  There is no family history of seizures.  Her son has had 2 febrile seizures.  She denies history of febrile seizures.  No history of meningitis or encephalitis.  No history of head trauma.  She was taken to the hospital where she had a head CT scan and a brain MRI scan which I reviewed the images independently on her visit today and do not demonstrate any acute intracranial abnormalities.  She also had a routine awake and sleep EEG done which I read as a normal study.  She denies any seizure like spells since that time.  She has had normal echocardiogram and normal EKG.  I spoke with her extensively with regards to potential etiology of her seizure including medication induced due to Wellbutrin vs underlying risk for seizures.  I spoke with her about performing a more prolonged study since the Wellbutrin has been discontinued to be sure she is not at risk for underlying epileptic seizures.  If this study is normal I would not recommend starting an anti-seizure medicine at this time.  I spoke with her about seizure precautions including not driving for 3 months of seizure freedom, taking showers as oppose to baths and staying off of elevated places and heights and provided patient education  information.             Relevant Orders    EEG          I spent 40 minutes caring for Valery on this date of service. This time includes time spent by me in the following activities:preparing for the visit, reviewing tests, obtaining and/or reviewing a separately obtained history, performing a medically appropriate examination and/or evaluation , counseling and educating the patient/family/caregiver, ordering medications, tests, or procedures, documenting information in the medical record, independently interpreting results and communicating that information with the patient/family/caregiver and care coordination    Follow Up   Return in about 3 months (around 7/22/2022).  Patient was given instructions and counseling regarding her condition or for health maintenance advice. Please see specific information pulled into the AVS if appropriate.

## 2022-04-22 NOTE — ASSESSMENT & PLAN NOTE
36 year old right handed woman with recent hospitalization in 2/2022 for a seizure.  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  They turned her on her left side and protected her and the seizure lasted 45 seconds to a minute in duration and she fell a sleep and went into a deep sleep and was snoring.  When she woke up EMT had arrived and informed her that she had experienced a seizure and she was confused.  She was not sure what was going on till she was in the ambulance.  She had been started on Welburin a couple weeks before this spell.  She was having some issues with her depression and was on Zoloft.  There is no family history of seizures.  Her son has had 2 febrile seizures.  She denies history of febrile seizures.  No history of meningitis or encephalitis.  No history of head trauma.  She was taken to the hospital where she had a head CT scan and a brain MRI scan which I reviewed the images independently on her visit today and do not demonstrate any acute intracranial abnormalities.  She also had a routine awake and sleep EEG done which I read as a normal study.  She denies any seizure like spells since that time.  She has had normal echocardiogram and normal EKG.  I spoke with her extensively with regards to potential etiology of her seizure including medication induced due to Wellbutrin vs underlying risk for seizures.  I spoke with her about performing a more prolonged study since the Wellbutrin has been discontinued to be sure she is not at risk for underlying epileptic seizures.  If this study is normal I would not recommend starting an anti-seizure medicine at this time.  I spoke with her about seizure precautions  including not driving for 3 months of seizure freedom, taking showers as oppose to baths and staying off of elevated places and heights and provided patient education information.

## 2022-04-22 NOTE — PATIENT INSTRUCTIONS
[facility]  Sheba Mueller MD  Neurology clinic  [phone]    With anti-seizure medications, you may initially notice side effects of fatigue, drowsiness, unsteadiness, and dizziness.  Other possible side effects include nausea, abdominal pain, headache, blurry or double vision, slurred speech and mood changes.  Generally, patients will noticed these symptoms when the medication is first started or with higher doses and will go away with time.    It is import to consistently take your medication every day.  Missing just one dose may put you at risk for a breakthrough seizure.  Consider using reminders on your phone or a pill box.    If you develop a rash, please call the neurology clinic immediately or notify another healthcare professional, as this may be potentially life-threatening.  If you are unable to reach a healthcare professional, go to the emergency room immediately for further evaluation.    If you develop thoughts of wanting to hurt yourself or others, please call the neurology clinic immediately to notify another healthcare professional.  If you are unable to reach a healthcare professional, go to the emergency room immediately for further evaluation.    It is the [place] state law that you cannot drive within 90 days of a seizure.    You should avoid certain activities that if you were to have a seizure, you could harm yourself or others. In general, it is recommended that you avoid operating heavy machinery or power tools, swimming or taking baths by yourself (showers are ok), don't stand over open flames, don't get on high ladders or the roof.  I also recommend to avoid sleeping on your stomach.    For further information on epilepsy and resources available to patients and their families, please visit the [facility] at www.efky.org or call [phone].    **Check out the [facility]'s monthly Art Group Gathering.  They are located  at [facility], [address] [place] [address].  Call Domitila Holloway at [phone] or email her at [email or web address] for the dates of future gatherings.**      **If you have having memory problems, consider HOBSCOTCH (Home-Based Self-management and Cognitive Training Changes lives).  It is an 8 week self-management program for adults with epilepsy and memory problems.  The program is free at the [facility].  Contact Anca Choudhary at [phone] or juan [email or web address].**         In general, we recommend using good judgement when you are doing certain activities and to avoid those activities that if you were to have a seizure, you could harm yourself or others. In the [facility], it is the law that you cannot drive within 90 days of a seizure. We also recommend not standing over open flames, not getting on high ladders or the roof, not swimming or taking baths by yourself (showers are ok) and not operating heavy machinery or power tools.

## 2022-04-25 ENCOUNTER — OFFICE VISIT (OUTPATIENT)
Dept: INTERNAL MEDICINE | Facility: CLINIC | Age: 37
End: 2022-04-25

## 2022-04-25 ENCOUNTER — PATIENT MESSAGE (OUTPATIENT)
Dept: INTERNAL MEDICINE | Facility: CLINIC | Age: 37
End: 2022-04-25

## 2022-04-25 VITALS
DIASTOLIC BLOOD PRESSURE: 86 MMHG | SYSTOLIC BLOOD PRESSURE: 124 MMHG | TEMPERATURE: 96.6 F | RESPIRATION RATE: 16 BRPM | BODY MASS INDEX: 47.29 KG/M2 | WEIGHT: 277 LBS | HEART RATE: 103 BPM | OXYGEN SATURATION: 95 % | HEIGHT: 64 IN

## 2022-04-25 DIAGNOSIS — R56.9 SEIZURE: ICD-10-CM

## 2022-04-25 DIAGNOSIS — F41.9 ANXIETY AND DEPRESSION: Primary | Chronic | ICD-10-CM

## 2022-04-25 DIAGNOSIS — R00.2 PALPITATIONS: ICD-10-CM

## 2022-04-25 DIAGNOSIS — F32.A ANXIETY AND DEPRESSION: Primary | Chronic | ICD-10-CM

## 2022-04-25 DIAGNOSIS — E66.01 MORBID (SEVERE) OBESITY DUE TO EXCESS CALORIES: Chronic | ICD-10-CM

## 2022-04-25 DIAGNOSIS — E78.2 MODERATE MIXED HYPERLIPIDEMIA NOT REQUIRING STATIN THERAPY: Chronic | ICD-10-CM

## 2022-04-25 DIAGNOSIS — I10 ESSENTIAL HYPERTENSION: Chronic | ICD-10-CM

## 2022-04-25 PROCEDURE — 99214 OFFICE O/P EST MOD 30 MIN: CPT | Performed by: INTERNAL MEDICINE

## 2022-04-25 RX ORDER — BUSPIRONE HYDROCHLORIDE 15 MG/1
15 TABLET ORAL 3 TIMES DAILY
Qty: 270 TABLET | Refills: 1 | Status: SHIPPED | OUTPATIENT
Start: 2022-04-25 | End: 2023-03-27 | Stop reason: SDUPTHER

## 2022-04-25 NOTE — ASSESSMENT & PLAN NOTE
CONTROLLED  - BP in goal range today  - will cont current dose of ARB-thiazide, no refills needed at this time  - Cont checking BP at home daily, call if values trend outside of goal range

## 2022-04-25 NOTE — ASSESSMENT & PLAN NOTE
IMPROVING  - has lost 25 lbs since starting saxenda 3 mos ago  - currently on saxenda and tolerating at max dose currently  - encouraged pt to cont working on diet and exercise to maximize impact of saxenda

## 2022-04-25 NOTE — ASSESSMENT & PLAN NOTE
"STABLE  - no AEDs at this time, was loaded with Keppra in ER after initial seizure  - has not had any recurrences, wellbutrin was stopped at the time of her seizure which was felt to likely be contributory  - is aware that she cannot drive for 3 mos after her event, is currently not driving as instructed--> current return to driving date set for mid-May  - has followed up with neurology, repeat prolonged EEG testing planned off wellbutrin and if normal will be \"cleared\"  "

## 2022-04-25 NOTE — PROGRESS NOTES
"Chief Complaint  Follow-up, Anxiety, and Depression    Subjective          Valery Aguilar presents to [facility] for follow up on anxiety, seizure disorder, abdominal pain.   Repeat US showed resolution of cyst on R ovary but new cyst on L ovary. OCPs were stopped and she has follow up planned to monitor the L ovarian cyst, if still present will plan to remove the IUD.   Neurology has plans for prolonged EEG monitoring since cessation of wellbutrin to look for any abnormal background activity. No further seizure activity. No AEDs currently.   Has been having more palpitations lately, may be anxiety driven she is unsure. Happening multiple times throughout the day then episode will self resolve. No chest pain or SOB associated.   New swooshing sound in her ear, can get it to resolve with hard swallow.   R arm pain has resolved, no longer taking muscle relaxers.   Mood wise she is feeling better, she has stopped needing ativan daily more on just a prn plan which is 1-2 times weekly.   She was started on saxenda following her last appt and she is down almost 25 pounds since that time. She feels good on this medication and does not have any side effects. She is taking full dose of 3 mg daily.     Objective   Vital Signs:     /86   Pulse 103   Temp 96.6 °F (35.9 °C)   Resp 16   Ht 162.6 cm (64\")   Wt 126 kg (277 lb)   SpO2 95%   BMI 47.55 kg/m²     Physical Exam  Vitals and nursing note reviewed.   Constitutional:       General: She is not in acute distress.     Appearance: Normal appearance.   Cardiovascular:      Rate and Rhythm: Normal rate and regular rhythm.      Pulses: Normal pulses.      Heart sounds: Normal heart sounds. No murmur heard.  Pulmonary:      Effort: Pulmonary effort is normal. No respiratory distress.      Breath sounds: Normal breath sounds.   Abdominal:      General: Abdomen is flat. Bowel sounds are normal.      Palpations: Abdomen is " soft.      Tenderness: There is no abdominal tenderness.   Musculoskeletal:      Right lower leg: No edema.      Left lower leg: No edema.   Neurological:      Mental Status: She is alert and oriented to person, place, and time. Mental status is at baseline.   Psychiatric:         Mood and Affect: Mood normal.         Behavior: Behavior normal.          Result Review :   The following data was reviewed by: Zandra Rios MD on 04/25/2022:  CMP    CMP 2/17/22 2/18/22 3/3/22   Glucose 87 101 (A) 104 (A)   BUN 16 17 18   Creatinine 0.87 0.73 0.84   eGFR Non African Am 73 90    Sodium 137 137 137   Potassium 4.3 4.0 3.6   Chloride 101 102 102   Calcium 9.3 9.0 9.5   Albumin 4.80  4.30   Total Bilirubin 0.3  0.3   Alkaline Phosphatase 58  45   AST (SGOT) 27  22   ALT (SGPT) 36 (A)  42 (A)   (A) Abnormal value            CBC w/diff    CBC w/Diff 2/17/22 2/18/22 3/3/22   WBC 7.57 6.81 6.24   RBC 5.73 (A) 4.92 4.99   Hemoglobin 15.4 13.1 13.6   Hematocrit 46.9 (A) 40.1 41.5   MCV 81.8 81.5 83.2   MCH 26.9 26.6 27.3   MCHC 32.8 32.7 32.8   RDW 14.7 14.5 14.6   Platelets 202 190 222   Neutrophil Rel % 74.8  54.6   Immature Granulocyte Rel % 0.8 (A)  0.3   Lymphocyte Rel % 17.7 (A)  36.4   Monocyte Rel % 4.8 (A)  7.1   Eosinophil Rel % 1.5  1.1   Basophil Rel % 0.4  0.5   (A) Abnormal value            Lipid Panel    Lipid Panel 8/30/21 1/10/22   Total Cholesterol 198 223 (A)   Triglycerides 145 117   HDL Cholesterol 40 49   VLDL Cholesterol 26 21   LDL Cholesterol  132 (A) 153 (A)   (A) Abnormal value       Comments are available for some flowsheets but are not being displayed.               Most Recent A1C    HGBA1C Most Recent 1/10/22   Hemoglobin A1C 5.3      Comments are available for some flowsheets but are not being displayed.           A1C Last 3 Results    HGBA1C Last 3 Results 8/30/21 1/10/22   Hemoglobin A1C 5.30 5.3      Comments are available for some flowsheets but are not being displayed.           Data  "reviewed: Consultant notes neurology, OBGYN            Assessment and Plan      Diagnoses and all orders for this visit:    1. Anxiety and depression (Primary)  Assessment & Plan:  IMPROVING  - cont on sertraline 200 mg  - will increase buspar dose to 15 mg BID-TID today for ongoing improvements  - wellbutrin had recently been added to her regimen but pt had witnessed seizure Feb 2022 shortly after adding this medication back so it has been stopped  - Reviewed potential side effects of medication including sexual performance changes, insomnia, fatigue, weight changes. Pt tolerating well without any issues.  - has lorazepam for prn use, had needed daily for short term following seizure with spiked anxiety but now back to juse prn  - Reviewed controlled nature of substance, potential for abuse/addiction, and possible adverse effects of medication. No red flags for abuse at this time.   - Controlled substances contract signed and in chart.   - Hang checked and appropriate.           Orders:  -     busPIRone (BUSPAR) 15 MG tablet; Take 1 tablet by mouth 3 (Three) Times a Day.  Dispense: 270 tablet; Refill: 1    2. Seizure (HCC)  Assessment & Plan:  STABLE  - no AEDs at this time, was loaded with Keppra in ER after initial seizure  - has not had any recurrences, wellbutrin was stopped at the time of her seizure which was felt to likely be contributory  - is aware that she cannot drive for 3 mos after her event, is currently not driving as instructed--> current return to driving date set for mid-May  - has followed up with neurology, repeat prolonged EEG testing planned off wellbutrin and if normal will be \"cleared\"      3. Morbid (severe) obesity due to excess calories (HCC)  Assessment & Plan:  IMPROVING  - has lost 25 lbs since starting saxenda 3 mos ago  - currently on saxenda and tolerating at max dose currently  - encouraged pt to cont working on diet and exercise to maximize impact of saxenda    Orders:  -     " Comprehensive Metabolic Panel  -     Hemoglobin A1c  -     Lipid Panel    4. Essential hypertension  Assessment & Plan:  CONTROLLED  - BP in goal range today  - will cont current dose of ARB-thiazide, no refills needed at this time  - Cont checking BP at home daily, call if values trend outside of goal range      Orders:  -     Comprehensive Metabolic Panel    5. Moderate mixed hyperlipidemia not requiring statin therapy  -     Comprehensive Metabolic Panel  -     Lipid Panel    6. Palpitations  -     Holter Monitor - 48 Hour; Future        Follow Up   Return in about 3 months (around 7/25/2022) for follow up.    Patient was given instructions and counseling regarding her condition or for health maintenance advice. Please see specific information pulled into the AVS if appropriate.     Yessy Rios MD  [facility]  Phone: [phone]  Fax: [phone]

## 2022-04-25 NOTE — ASSESSMENT & PLAN NOTE
IMPROVING  - cont on sertraline 200 mg  - will increase buspar dose to 15 mg BID-TID today for ongoing improvements  - wellbutrin had recently been added to her regimen but pt had witnessed seizure Feb 2022 shortly after adding this medication back so it has been stopped  - Reviewed potential side effects of medication including sexual performance changes, insomnia, fatigue, weight changes. Pt tolerating well without any issues.  - has lorazepam for prn use, had needed daily for short term following seizure with spiked anxiety but now back to juse prn  - Reviewed controlled nature of substance, potential for abuse/addiction, and possible adverse effects of medication. No red flags for abuse at this time.   - Controlled substances contract signed and in chart.   - Hang checked and appropriate.

## 2022-04-26 LAB
ALBUMIN SERPL-MCNC: 4.3 G/DL (ref 3.8–4.8)
ALBUMIN/GLOB SERPL: 1.7 {RATIO} (ref 1.2–2.2)
ALP SERPL-CCNC: 55 IU/L (ref 44–121)
ALT SERPL-CCNC: 39 IU/L (ref 0–32)
AST SERPL-CCNC: 22 IU/L (ref 0–40)
BILIRUB SERPL-MCNC: 0.3 MG/DL (ref 0–1.2)
BUN SERPL-MCNC: 22 MG/DL (ref 6–20)
BUN/CREAT SERPL: 25 (ref 9–23)
CALCIUM SERPL-MCNC: 9.5 MG/DL (ref 8.7–10.2)
CHLORIDE SERPL-SCNC: 102 MMOL/L (ref 96–106)
CHOLEST SERPL-MCNC: 192 MG/DL (ref 100–199)
CO2 SERPL-SCNC: 19 MMOL/L (ref 20–29)
CREAT SERPL-MCNC: 0.88 MG/DL (ref 0.57–1)
EGFRCR SERPLBLD CKD-EPI 2021: 87 ML/MIN/1.73
GLOBULIN SER CALC-MCNC: 2.5 G/DL (ref 1.5–4.5)
GLUCOSE SERPL-MCNC: ABNORMAL MG/DL
HBA1C MFR BLD: 5.3 % (ref 4.8–5.6)
HDLC SERPL-MCNC: 48 MG/DL
LDLC SERPL CALC-MCNC: 127 MG/DL (ref 0–99)
POTASSIUM SERPL-SCNC: ABNORMAL MMOL/L
PROT SERPL-MCNC: 6.8 G/DL (ref 6–8.5)
SODIUM SERPL-SCNC: 140 MMOL/L (ref 134–144)
TRIGL SERPL-MCNC: 91 MG/DL (ref 0–149)
VLDLC SERPL CALC-MCNC: 17 MG/DL (ref 5–40)

## 2022-04-26 NOTE — TELEPHONE ENCOUNTER
From: Valery Aguilar  To: Zandra Rios MD  Sent: 4/25/2022 8:59 PM EDT  Subject: Job    Hey Dr Rios I forgot to mention today about my third job and whether you think I should do it again or if you think it’s best for my anxiety and health to step back from it? 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 I’m just worried that it’s too much and even though I love working with her I wonder if it’s putting too much on my plate! Just let me know what you think! And thanks so much for giving me the positivity today! You are right I need to focus on all the victories I have received and to be able to continue to work on those and work on the rest of the things I need to work on! So sorry for the long message!    Valery Aguilar

## 2022-05-09 ENCOUNTER — TELEPHONE (OUTPATIENT)
Dept: NEUROLOGY | Facility: CLINIC | Age: 37
End: 2022-05-09

## 2022-05-09 RX ORDER — MELOXICAM 15 MG/1
TABLET ORAL
Qty: 30 TABLET | Refills: 0 | Status: SHIPPED | OUTPATIENT
Start: 2022-05-09 | End: 2022-06-06

## 2022-05-09 NOTE — TELEPHONE ENCOUNTER
Rx Refill Note  Requested Prescriptions     Pending Prescriptions Disp Refills    meloxicam (MOBIC) 15 MG tablet [Pharmacy Med Name: MELOXICAM 15 MG TABLET] 30 tablet 0     Sig: TAKE ONE TABLET BY MOUTH DAILY      Last office visit with prescribing clinician: 9/16/2021      Next office visit with prescribing clinician: Visit date not found   Last Filled:3.23.222    DX:Left knee DJD       Lou Varma MA  05/09/22, 09:10 EDT    {TIP  Encounters:    {TIP  Please add Last Relevant Lab Date if appropriate:  {TIP  Is Refill Pharmacy correct?:

## 2022-05-09 NOTE — TELEPHONE ENCOUNTER
Provider: BILLY  Caller: ELLI SONG  Relationship to Patient: N/A  Pharmacy: N/A  Phone Number: [phone]  Reason for Call: TECHNICIAN HAS ADDITIONAL QUESTIONS RE: EEG ORDERS.    PLEASE CALL HER.    PATIENT IS SCHEDULED FOR 5/10/22 @ 7:00 A.M.    THANK YOU.

## 2022-05-10 ENCOUNTER — HOSPITAL ENCOUNTER (OUTPATIENT)
Dept: PULMONOLOGY | Facility: HOSPITAL | Age: 37
Discharge: HOME | End: 2022-05-10

## 2022-05-10 ENCOUNTER — HOSPITAL ENCOUNTER (OUTPATIENT)
Dept: CARDIOLOGY | Facility: HOSPITAL | Age: 37
Discharge: HOME | End: 2022-05-10

## 2022-05-10 DIAGNOSIS — R00.2 PALPITATIONS: ICD-10-CM

## 2022-05-10 DIAGNOSIS — R56.9 SEIZURE: ICD-10-CM

## 2022-05-10 PROCEDURE — 95813 EEG EXTND MNTR 61-119 MIN: CPT | Performed by: PSYCHIATRY & NEUROLOGY

## 2022-05-10 PROCEDURE — 93225 XTRNL ECG REC<48 HRS REC: CPT

## 2022-05-10 PROCEDURE — 93226 XTRNL ECG REC<48 HR SCAN A/R: CPT

## 2022-05-10 PROCEDURE — 95813 EEG EXTND MNTR 61-119 MIN: CPT

## 2022-05-12 ENCOUNTER — OFFICE VISIT (OUTPATIENT)
Dept: OBSTETRICS AND GYNECOLOGY | Facility: CLINIC | Age: 37
End: 2022-05-12

## 2022-05-12 VITALS
WEIGHT: 274 LBS | DIASTOLIC BLOOD PRESSURE: 100 MMHG | BODY MASS INDEX: 46.78 KG/M2 | SYSTOLIC BLOOD PRESSURE: 140 MMHG | HEIGHT: 64 IN

## 2022-05-12 DIAGNOSIS — Z87.42 HISTORY OF OVARIAN CYST: Primary | ICD-10-CM

## 2022-05-12 PROCEDURE — 99212 OFFICE O/P EST SF 10 MIN: CPT | Performed by: OBSTETRICS & GYNECOLOGY

## 2022-05-12 PROCEDURE — 81002 URINALYSIS NONAUTO W/O SCOPE: CPT | Performed by: OBSTETRICS & GYNECOLOGY

## 2022-05-12 NOTE — PROGRESS NOTES
"PROBLEM VISIT    Chief Complaint: left ovarian cyst      Valery Aguilar is a 36 y.o. patient who presents for follow up of left ovarian cyst. Pt denies any pain. She reports her BTB has resolved. She stopped OCPs.  Chief Complaint   Patient presents with   • Follow-up     US             The following portions of the patient's history were reviewed and updated as appropriate: allergies, current medications and problem list.    Review of Systems   Constitutional: Negative for appetite change, chills, fatigue, fever and unexpected weight change.   Gastrointestinal: Negative for abdominal distention, abdominal pain, anal bleeding, blood in stool, constipation, diarrhea, nausea and vomiting.   Genitourinary: Negative for dyspareunia, dysuria, menstrual problem, pelvic pain, vaginal bleeding, vaginal discharge and vaginal pain.       /100   Ht 162.6 cm (64.02\")   Wt 124 kg (274 lb)   Breastfeeding No   BMI 47.01 kg/m²     Physical Exam  Vitals reviewed.   Constitutional:       General: She is not in acute distress.     Appearance: Normal appearance. She is not ill-appearing, toxic-appearing or diaphoretic.   Neurological:      General: No focal deficit present.      Mental Status: She is alert and oriented to person, place, and time. Mental status is at baseline.      Motor: No weakness.   Psychiatric:         Mood and Affect: Mood normal.         Behavior: Behavior normal.         Thought Content: Thought content normal.         Judgment: Judgment normal.           Assessment & Plan   Diagnoses and all orders for this visit:    1. History of ovarian cyst (Primary)  -     POC Urinalysis Dipstick      35yo with left ovarian cyst    3/31/22 TVUS for fu of right ovarian cyst revealed resolution of right cyst and new left ovarian cyst 3.2 x 2.7cm. Repeat TVUS done today reveals AV uterus with IUD in place. Normal ovaries with no cysts.  Findings reviewed with pt.  Pt to fu prn/annual         No follow-ups on " file.      Karen Marvin DO    5/12/2022  11:01 EDT

## 2022-05-16 LAB
MAXIMAL PREDICTED HEART RATE: 183 BPM
STRESS TARGET HR: 156 BPM

## 2022-05-16 PROCEDURE — 93227 XTRNL ECG REC<48 HR R&I: CPT | Performed by: INTERNAL MEDICINE

## 2022-05-24 DIAGNOSIS — E66.01 MORBID (SEVERE) OBESITY DUE TO EXCESS CALORIES: Chronic | ICD-10-CM

## 2022-05-25 ENCOUNTER — TELEPHONE (OUTPATIENT)
Dept: INTERNAL MEDICINE | Facility: CLINIC | Age: 37
End: 2022-05-25

## 2022-05-25 DIAGNOSIS — E28.2 PCOS (POLYCYSTIC OVARIAN SYNDROME): ICD-10-CM

## 2022-05-25 DIAGNOSIS — E66.01 MORBID (SEVERE) OBESITY DUE TO EXCESS CALORIES: Chronic | ICD-10-CM

## 2022-05-25 NOTE — TELEPHONE ENCOUNTER
Caller: Valery Aguilar    Relationship: Self    Best call back number:    Requested Prescriptions:   Requested Prescriptions     Pending Prescriptions Disp Refills   • Liraglutide (Saxenda) 18 MG/3ML injection pen 15 mL 5     Sig: Inject 3 mg under the skin into the appropriate area as directed Daily.        Pharmacy where request should be sent:  [facility]  [address] [address]      Additional details provided by patient: PATIENT SAYS THIS WILL NEED A PRIOR AUTHORIZATION BEFORE IT CAN BE REFILLED    Does the patient have less than a 3 day supply:  [x] Yes  [] No    Gunner Fisher Rep   05/25/22 13:45 EDT

## 2022-05-26 RX ORDER — LIRAGLUTIDE 6 MG/ML
3 INJECTION, SOLUTION SUBCUTANEOUS DAILY
Qty: 15 ML | Refills: 5 | Status: SHIPPED | OUTPATIENT
Start: 2022-05-26 | End: 2022-10-16

## 2022-05-26 NOTE — TELEPHONE ENCOUNTER
Rx Refill Note  Requested Prescriptions     Pending Prescriptions Disp Refills   • Liraglutide (Saxenda) 18 MG/3ML injection pen 15 mL 5     Sig: Inject 3 mg under the skin into the appropriate area as directed Daily.      Last office visit with prescribing clinician: 4/25/2022      Next office visit with prescribing clinician: 8/4/2022            Melissa Cortez MA  05/26/22, 08:25 EDT

## 2022-06-06 RX ORDER — MELOXICAM 15 MG/1
TABLET ORAL
Qty: 30 TABLET | Refills: 0 | Status: SHIPPED | OUTPATIENT
Start: 2022-06-06 | End: 2022-07-05 | Stop reason: SDUPTHER

## 2022-06-06 NOTE — TELEPHONE ENCOUNTER
Rx Refill Note  Requested Prescriptions     Pending Prescriptions Disp Refills    meloxicam (MOBIC) 15 MG tablet [Pharmacy Med Name: MELOXICAM 15 MG TABLET] 30 tablet 0     Sig: TAKE ONE TABLET BY MOUTH DAILY      Last office visit with prescribing clinician: 9/16/2021      Next office visit with prescribing clinician: Visit date not found   Last Filled:05.09.2022    DX:Left knee DJD    Dr. Napier patient he is out on [place].    Lou Varma MA  06/06/22, 08:35 EDT    {TIP  Encounters:    {TIP  Please add Last Relevant Lab Date if appropriate:  {TIP  Is Refill Pharmacy correct?:

## 2022-06-07 ENCOUNTER — APPOINTMENT (OUTPATIENT)
Dept: GENERAL RADIOLOGY | Facility: HOSPITAL | Age: 37
End: 2022-06-07

## 2022-06-07 ENCOUNTER — HOSPITAL ENCOUNTER (EMERGENCY)
Facility: HOSPITAL | Age: 37
Discharge: HOME | End: 2022-06-07
Attending: EMERGENCY MEDICINE | Admitting: EMERGENCY MEDICINE

## 2022-06-07 VITALS
TEMPERATURE: 98.8 F | RESPIRATION RATE: 18 BRPM | BODY MASS INDEX: 46.95 KG/M2 | SYSTOLIC BLOOD PRESSURE: 134 MMHG | DIASTOLIC BLOOD PRESSURE: 87 MMHG | WEIGHT: 275 LBS | HEIGHT: 64 IN | OXYGEN SATURATION: 100 % | HEART RATE: 97 BPM

## 2022-06-07 DIAGNOSIS — M79.672 LEFT FOOT PAIN: Primary | ICD-10-CM

## 2022-06-07 PROCEDURE — 99282 EMERGENCY DEPT VISIT SF MDM: CPT

## 2022-06-07 PROCEDURE — 73630 X-RAY EXAM OF FOOT: CPT

## 2022-06-07 PROCEDURE — 99283 EMERGENCY DEPT VISIT LOW MDM: CPT

## 2022-06-07 RX ORDER — METHOCARBAMOL 500 MG/1
750 TABLET, FILM COATED ORAL 4 TIMES DAILY
Status: DISCONTINUED | OUTPATIENT
Start: 2022-06-07 | End: 2022-06-07 | Stop reason: HOSPADM

## 2022-06-07 RX ORDER — METHOCARBAMOL 750 MG/1
750 TABLET, FILM COATED ORAL 3 TIMES DAILY PRN
Qty: 15 TABLET | Refills: 0 | Status: SHIPPED | OUTPATIENT
Start: 2022-06-07 | End: 2022-06-12

## 2022-06-07 RX ADMIN — METHOCARBAMOL 750 MG: 500 TABLET ORAL at 21:34

## 2022-06-08 NOTE — ED PROVIDER NOTES
EMERGENCY DEPARTMENT ENCOUNTER      Room Number: 13/13    History is provided by the patient, no translation services needed    HPI:    Chief complaint: Foot pain    Location: Left foot    Quality/Severity: Patient describes the pain as a sharp, stabbing pain.  She rates the severity an 8 out of 10.    Timing/Duration: 2 weeks    Modifying Factors: Walking makes the pain worse.    Associated Symptoms: None    Narrative: Pt is a 36 y.o. female who presents complaining of left foot pain x2 weeks.  She describes the pain as a sharp, stabbing pain that she rates an 8 out of 10.  Patient advises that she does not remember any known injury to the left foot.  She states that the pain runs along the lateral aspect of her foot from her pinky toe to her heel.  She advises that any activity makes the pain worse.  Patient denies any pain in her calf.  She advises that occasionally the foot pain will run across her ankle as well.  Patient denies any shortness of breath, headaches, abdominal pain.      PMD: Zandra Rios MD    REVIEW OF SYSTEMS  Review of Systems   Constitutional: Negative for fever.   Respiratory: Negative for cough and shortness of breath.    Cardiovascular: Negative for chest pain and leg swelling.   Gastrointestinal: Negative for abdominal pain, nausea and vomiting.   Musculoskeletal: Positive for gait problem (Due to left foot pain). Negative for back pain and neck pain.   Skin: Negative for color change, pallor, rash and wound.   Neurological: Negative for dizziness, syncope and headaches.   Psychiatric/Behavioral: Negative for suicidal ideas. The patient is not nervous/anxious.          PAST MEDICAL HISTORY  Active Ambulatory Problems     Diagnosis Date Noted   • Chronic migraine without aura without status migrainosus, not intractable 08/31/2016   • Cervicogenic headache 08/31/2016   • Secondary oligomenorrhea 08/20/2018   • Family history of breast cancer 10/09/2018   • Obstructive sleep apnea,  adult 11/27/2018   • GERD with apnea 11/27/2018   • Vitamin D deficiency    • PCOS (polycystic ovarian syndrome)    • Panic disorder    • Insulin resistance    • Essential hypertension    • GERD (gastroesophageal reflux disease)    • Anxiety and depression 03/08/2021   • Tendonitis, Achilles, left 05/10/2021   • S/P ACL reconstruction 07/20/2021   • Primary osteoarthritis of left knee 07/20/2021   • ACL graft tear, sequela 09/22/2021   • Morbid (severe) obesity due to excess calories ([facility]) 01/10/2022   • Seizure ([facility]) 02/17/2022   • Moderate mixed hyperlipidemia not requiring statin therapy 04/25/2022     Resolved Ambulatory Problems     Diagnosis Date Noted   • Incisional hernia, without obstruction or gangrene 10/24/2018   • Incisional hernia 11/15/2018   • Wound infection 12/02/2018   • Right lower quadrant abdominal pain 01/20/2019   • Chronic wound infection of abdomen 02/20/2019   • Mechanical knee pain, left 07/20/2021     Past Medical History:   Diagnosis Date   • Abdominal pain    • Anxiety    • Arthritis    • Chronic sinusitis, unspecified    • Depression    • H/O echocardiogram    • H/O exercise stress test    • Headache, tension-type    • History of Holter monitoring    • History of MRI of brain and brain stem 01/01/2017   • Hypertension    • Infection following a procedure, unspecified, initial encounter    • Lobar pneumonia, unspecified organism ([facility])    • Meralgia paresthetica, left lower limb    • Metabolic syndrome    • Migraine    • MRSA (methicillin resistant Staphylococcus aureus) infection 01/2019   • Nasal congestion    • Other injury of unspecified body region, initial encounter    • Panic attack    • Papanicolaou smear 06/01/2020   • Pneumonia 07/2018   • PONV (postoperative nausea and vomiting)    • Seasonal allergies    • Spinal headache    • Unspecified contact dermatitis, unspecified cause    • Viral infection        PAST SURGICAL HISTORY  Past Surgical History:   Procedure Laterality  Date   • ABDOMINAL WALL ABSCESS INCISION AND DRAINAGE N/A 2018    Procedure: Debridement of abdominal wall;  Surgeon: Brigido Navarrete MD;  Location: [facility] OR;  Service: General   • ADENOIDECTOMY     •  SECTION      X2   • CHOLECYSTECTOMY      LAP   • HERNIA REPAIR     • HX OVARIAN CYSTECTOMY     • INCISION AND DRAINAGE TRUNK N/A 2018    Procedure: wound debridement, abdomen;  Surgeon: Rachel Dolan MD;  Location:  [facility] OR;  Service: General   • INCISION AND DRAINAGE TRUNK N/A 2019    Procedure: WOUND CLOSURE ABDOMINAL;  Surgeon: Rachel Dolan MD;  Location: [facility] OR;  Service: General   • KNEE ACL RECONSTRUCTION Left     DR. CARRASCO   • OVARIAN CYST REMOVAL      EX LAP WITH OVARIAN CYST REMOVAL HERNIA REPAIR    • TONSILLECTOMY      T&A   • TRUNK DEBRIDEMENT N/A 2018    Procedure: Abdominal wound debridement;  Surgeon: Rachel Dolan MD;  Location: [facility] OR;  Service: General   • VENTRAL/INCISIONAL HERNIA REPAIR N/A 11/15/2018    Procedure: Lateral Component Separation Herniorrhapy Repair with Mesh, Lysis of adhesions, and skin lesion excision of Right Side;  Surgeon: Rachel Dolan MD;  Location: [facility] OR;  Service: General       FAMILY HISTORY  Family History   Problem Relation Age of Onset   • Stroke Mother    • Heart disease Mother    • Hypertension Mother    • Heart disease Father    • Hypertension Father    • Diabetes Father    • Dementia Maternal Grandmother    • Stroke Maternal Grandmother    • Diabetes Maternal Grandmother    • Stroke Paternal Grandmother    • Hypertension Paternal Grandmother    • Cancer Paternal Grandmother    • Heart disease Paternal Grandfather    • Diabetes Paternal Grandfather        SOCIAL HISTORY  Social History     Socioeconomic History   • Marital status:    Tobacco Use   • Smoking status: Former Smoker     Packs/day: 0.50     Years: 1.00     Pack years: 0.50     Quit date:      Years since quittin.4    • Smokeless tobacco: Never Used   Vaping Use   • Vaping Use: Never used   Substance and Sexual Activity   • Alcohol use: Yes     Comment: occasional   • Drug use: No   • Sexual activity: Defer     Partners: Male     Birth control/protection: OCP     Comment: LNMP irregular       ALLERGIES  Penicillins and Wellbutrin [bupropion]      Current Facility-Administered Medications:   •  methocarbamol (ROBAXIN) tablet 750 mg, 750 mg, Oral, 4x Daily, Susi Nicholas PA-C, 750 mg at 06/07/22 7022    Current Outpatient Medications:   •  Ascorbic Acid (DESHAWN-C PO), Take  by mouth., Disp: , Rfl:   •  busPIRone (BUSPAR) 15 MG tablet, Take 1 tablet by mouth 3 (Three) Times a Day., Disp: 270 tablet, Rfl: 1  •  cetirizine (zyrTEC) 10 MG tablet, Take 10 mg by mouth Every Night., Disp: , Rfl:   •  cholecalciferol (VITAMIN D3) 1000 units tablet, Take 2,000 Units by mouth Daily., Disp: , Rfl:   •  fluticasone (FLONASE) 50 MCG/ACT nasal spray, 2 sprays into the nostril(s) as directed by provider Daily., Disp: , Rfl:   •  gabapentin (NEURONTIN) 600 MG tablet, Take 1 tablet by mouth 2 (Two) Times a Day., Disp: 180 tablet, Rfl: 1  •  Insulin Pen Needle (Pen Needles) 31G X 5 MM misc, 1 each Daily., Disp: 100 each, Rfl: 10  •  Liraglutide (Saxenda) 18 MG/3ML injection pen, Inject 3 mg under the skin into the appropriate area as directed Daily., Disp: 15 mL, Rfl: 5  •  LORazepam (ATIVAN) 0.5 MG tablet, Take 1 tablet by mouth Every 12 (Twelve) Hours As Needed for Anxiety., Disp: 30 tablet, Rfl: 1  •  losartan-hydrochlorothiazide (Hyzaar) 50-12.5 MG per tablet, Take 1 tablet by mouth Daily., Disp: 90 tablet, Rfl: 1  •  Magnesium 500 MG capsule, Take  by mouth., Disp: , Rfl:   •  meloxicam (MOBIC) 15 MG tablet, TAKE ONE TABLET BY MOUTH DAILY, Disp: 30 tablet, Rfl: 0  •  metFORMIN ER (GLUCOPHAGE-XR) 500 MG 24 hr tablet, TAKE TWO TABLETS BY MOUTH EVERY MORNING, Disp: 180 tablet, Rfl: 1  •  methocarbamol (ROBAXIN) 750 MG tablet, Take 1 tablet by  mouth 3 (Three) Times a Day As Needed for Muscle Spasms for up to 5 days., Disp: 15 tablet, Rfl: 0  •  Multiple Vitamins-Minerals (ZINC PO), Take  by mouth., Disp: , Rfl:   •  nortriptyline (PAMELOR) 50 MG capsule, TAKE TWO CAPSULES BY MOUTH NIGHTLY, Disp: 180 capsule, Rfl: 1  •  rizatriptan MLT (MAXALT-MLT) 10 MG disintegrating tablet, May repeat in 2 hours if needed, Disp: 15 tablet, Rfl: 3  •  sertraline (ZOLOFT) 100 MG tablet, TAKE TWO TABLETS BY MOUTH DAILY, Disp: 180 tablet, Rfl: 1  •  Sharps Container (GUARDIAN Sharps ) misc, USE AS DIRECTED, Disp: 1 each, Rfl: 0    PHYSICAL EXAM  ED Triage Vitals [06/07/22 2107]   Temp Heart Rate Resp BP SpO2   98.8 °F (37.1 °C) 97 18 (!) 166/102 100 %      Temp src Heart Rate Source Patient Position BP Location FiO2 (%)   -- -- -- -- --       Physical Exam  Vitals and nursing note reviewed.   Constitutional:       General: She is not in acute distress.     Appearance: Normal appearance. She is not ill-appearing, toxic-appearing or diaphoretic.   HENT:      Head: Normocephalic and atraumatic.   Eyes:      Extraocular Movements: Extraocular movements intact.      Conjunctiva/sclera: Conjunctivae normal.      Pupils: Pupils are equal, round, and reactive to light.   Cardiovascular:      Rate and Rhythm: Normal rate and regular rhythm.      Pulses: Normal pulses.      Heart sounds: Normal heart sounds.   Pulmonary:      Effort: Pulmonary effort is normal. No respiratory distress.      Breath sounds: Normal breath sounds.   Abdominal:      General: Bowel sounds are normal. There is no distension.      Palpations: Abdomen is soft.      Tenderness: There is no abdominal tenderness.   Musculoskeletal:         General: Tenderness (To left foot) present. No swelling, deformity or signs of injury. Normal range of motion.      Cervical back: Normal range of motion and neck supple.      Right lower leg: No edema.      Left lower leg: No edema.   Skin:     General: Skin is warm  and dry.      Coloration: Skin is not jaundiced or pale.      Findings: No bruising, erythema, lesion or rash.   Neurological:      Mental Status: She is alert and oriented to person, place, and time.   Psychiatric:         Mood and Affect: Mood and affect normal.           LAB RESULTS  Lab Results (last 24 hours)     ** No results found for the last 24 hours. **            RADIOLOGY  XR Foot 3+ View Left    Result Date: 6/7/2022  CR Foot Comp Min 3 Vws LT INDICATION: Lateral left foot pain. No specific injury. COMPARISON: None available FINDINGS: AP, lateral, and oblique views of the left foot.  No fracture or dislocation. Mild degenerative change of the first metatarsal phalangeal joint. Small plantar calcaneal spur. Chronic ossification at the Achilles tendon insertion. No bone erosion or destruction.  No foreign body.     1. No acute fracture or dislocation. 2. Mild arthrosis of the first MTP joint. Signer Name: Bismark Cameron MD  Signed: 6/7/2022 9:31 PM  Workstation Name: [email or web address]  [facility]      I ordered the above radiologic testing and reviewed the results    PROCEDURES  Procedures      PROGRESS AND CONSULTS           MEDICAL DECISION MAKING    MDM     My diagnosis for lower extremity pain and injury includes but is not limited to hip fracture, femur fracture, hip dislocation, hip contusion, hip sprain, hip strain, pelvic fracture, ischio-tibial band pain, ischio-tibial band bursitis, knee sprain, patella dislocation, knee dislocation, internal derangement of knee, fractures of the femur, tibia, fibula, ankle, foot and digits, ankle sprain, ankle dislocation, Lisfranc fracture, fracture dislocations of the digits, pulmonary embolism, claudication, peripheral vascular disease, gout, osteoarthritis, rheumatoid arthritis, bursitis, septic joint, poly-rheumatica, polyarthralgia and other inflammatory or infectious disease processes.  DIAGNOSIS  Final diagnoses:   Left foot pain        Latest Documented Vital Signs:  As of 21:39 EDT  BP- 134/87 HR- 97 Temp- 98.8 °F (37.1 °C) O2 sat- 100%    DISPOSITION  Pt discharged     Discussed pertinent findings with the patient/family.  Patient/Family voiced understanding of need to follow-up for recheck and further testing as needed.  Return to the Emergency Department warnings were given.         Medication List      New Prescriptions    methocarbamol 750 MG tablet  Commonly known as: ROBAXIN  Take 1 tablet by mouth 3 (Three) Times a Day As Needed for Muscle Spasms for up to 5 days.           Where to Get Your Medications      These medications were sent to [address] - [address] [address] - [phone]  - [phone] FX  [address] [address]    Phone: [phone]   · methocarbamol 750 MG tablet              Follow-up Information     Zandra Rios MD. Call today.    Specialty: Internal Medicine & Pediatrics  Why: to schedule follow up  Contact information:  [address] [facility] [address]  [facility] [address] [phone]             Go to  [facility].    Specialty: Emergency Medicine  Why: If symptoms worsen  Contact information:  [address] [facility] [address] [phone]           Patrick Napier MD. Call today.    Specialties: Orthopedic Surgery, Sports Medicine  Why: to schedule follow up  Contact information:  [address] [facility] [address]  [facility] [id number] [phone]             Valdemar Mazariegos DPM. Call today.    Specialty: Podiatry  Why: to schedule follow up  Contact information:  [address] [facility] [address] [facility] [address]  [facility] [address]  [phone]                           Dictated utilizing Dragon dictation     Susi Nicholas PA-C  06/07/22 2135       Susi Nicholas PA-C  06/07/22 2139

## 2022-06-08 NOTE — DISCHARGE INSTRUCTIONS
Medication as directed.  Apply ice as needed for pain.  Apply heat to help relax muscles and increased blood flow.  Gentle stretching.  Follow-up with your PCP or orthopedist as above.  Return to ED for medical emergencies.

## 2022-06-19 RX ORDER — NORTRIPTYLINE HYDROCHLORIDE 50 MG/1
CAPSULE ORAL
Qty: 180 CAPSULE | Refills: 1 | Status: SHIPPED | OUTPATIENT
Start: 2022-06-19 | End: 2022-12-15

## 2022-06-19 RX ORDER — METFORMIN HYDROCHLORIDE 500 MG/1
TABLET, EXTENDED RELEASE ORAL
Qty: 180 TABLET | Refills: 1 | Status: SHIPPED | OUTPATIENT
Start: 2022-06-19 | End: 2022-12-15

## 2022-06-23 ENCOUNTER — CLINICAL SUPPORT (OUTPATIENT)
Dept: ORTHOPEDIC SURGERY | Facility: CLINIC | Age: 37
End: 2022-06-23

## 2022-06-23 VITALS — BODY MASS INDEX: 46.95 KG/M2 | HEIGHT: 64 IN | WEIGHT: 275 LBS

## 2022-06-23 DIAGNOSIS — M17.12 PRIMARY OSTEOARTHRITIS OF LEFT KNEE: Primary | ICD-10-CM

## 2022-06-23 PROCEDURE — 20610 DRAIN/INJ JOINT/BURSA W/O US: CPT | Performed by: ORTHOPAEDIC SURGERY

## 2022-06-23 RX ORDER — LIDOCAINE HYDROCHLORIDE 10 MG/ML
8 INJECTION, SOLUTION EPIDURAL; INFILTRATION; INTRACAUDAL; PERINEURAL
Status: COMPLETED | OUTPATIENT
Start: 2022-06-23 | End: 2022-06-23

## 2022-06-23 RX ORDER — TRIAMCINOLONE ACETONIDE 40 MG/ML
80 INJECTION, SUSPENSION INTRA-ARTICULAR; INTRAMUSCULAR
Status: COMPLETED | OUTPATIENT
Start: 2022-06-23 | End: 2022-06-23

## 2022-06-23 RX ADMIN — LIDOCAINE HYDROCHLORIDE 8 ML: 10 INJECTION, SOLUTION EPIDURAL; INFILTRATION; INTRACAUDAL; PERINEURAL at 15:00

## 2022-06-23 RX ADMIN — TRIAMCINOLONE ACETONIDE 80 MG: 40 INJECTION, SUSPENSION INTRA-ARTICULAR; INTRAMUSCULAR at 15:00

## 2022-06-23 NOTE — PROGRESS NOTES
Procedure   Large Joint Arthrocentesis: L knee  Date/Time: 6/23/2022 3:00 PM  Consent given by: patient  Site marked: site marked  Timeout: Immediately prior to procedure a time out was called to verify the correct patient, procedure, equipment, support staff and site/side marked as required   Supporting Documentation  Indications: pain   Procedure Details  Location: knee - L knee  Preparation: Patient was prepped and draped in the usual sterile fashion  Needle size: 22 G  Approach: anterolateral  Medications administered: 80 mg triamcinolone acetonide 40 MG/ML; 8 mL lidocaine PF 1% 1 %  Patient tolerance: patient tolerated the procedure well with no immediate complications              Cc: Left knee DJD    Patient presents to clinic today for left knee intra-articular cortisone injection(s). I explained details of injections as well as risks, benefits and alternatives with the patient today, had all questions answered, wished to proceed with injections. Patient was instructed to watch for signs or symptoms of infection including redness, swelling, warmth to the touch, or significant increased pain and to contact our office immediately if any of these issues were noted.

## 2022-07-05 RX ORDER — MELOXICAM 15 MG/1
15 TABLET ORAL DAILY
Qty: 30 TABLET | Refills: 0 | Status: SHIPPED | OUTPATIENT
Start: 2022-07-05 | End: 2022-08-03

## 2022-07-05 NOTE — TELEPHONE ENCOUNTER
Rx Refill Note  Requested Prescriptions     Pending Prescriptions Disp Refills   • meloxicam (MOBIC) 15 MG tablet 30 tablet 0     Sig: Take 1 tablet by mouth Daily.      Last office visit with prescribing clinician: 9/16/2021      Next office visit with prescribing clinician: Visit date not found   Last Filled:06.06.2022    DX:Primary osteoarthritis of left knee    Lou Varma MA  07/05/22, 16:51 EDT    Previous RX pended for your approval, change or denial.     {TIP  Encounters:    {TIP  Please add Last Relevant Lab Date if appropriate:  {TIP  Is Refill Pharmacy correct?:

## 2022-07-17 ENCOUNTER — APPOINTMENT (OUTPATIENT)
Dept: GENERAL RADIOLOGY | Facility: HOSPITAL | Age: 37
End: 2022-07-17

## 2022-07-17 ENCOUNTER — HOSPITAL ENCOUNTER (EMERGENCY)
Facility: HOSPITAL | Age: 37
Discharge: HOME | End: 2022-07-17
Attending: EMERGENCY MEDICINE | Admitting: EMERGENCY MEDICINE

## 2022-07-17 VITALS
HEIGHT: 64 IN | OXYGEN SATURATION: 98 % | DIASTOLIC BLOOD PRESSURE: 83 MMHG | TEMPERATURE: 98.4 F | RESPIRATION RATE: 18 BRPM | SYSTOLIC BLOOD PRESSURE: 121 MMHG | HEART RATE: 100 BPM | BODY MASS INDEX: 46.95 KG/M2 | WEIGHT: 275 LBS

## 2022-07-17 DIAGNOSIS — S92.355A CLOSED NONDISPLACED FRACTURE OF FIFTH METATARSAL BONE OF LEFT FOOT, INITIAL ENCOUNTER: Primary | ICD-10-CM

## 2022-07-17 PROCEDURE — 73610 X-RAY EXAM OF ANKLE: CPT

## 2022-07-17 PROCEDURE — 99283 EMERGENCY DEPT VISIT LOW MDM: CPT

## 2022-07-17 PROCEDURE — 73630 X-RAY EXAM OF FOOT: CPT

## 2022-07-17 RX ORDER — HYDROCODONE BITARTRATE AND ACETAMINOPHEN 5; 325 MG/1; MG/1
1 TABLET ORAL ONCE
Status: COMPLETED | OUTPATIENT
Start: 2022-07-17 | End: 2022-07-17

## 2022-07-17 RX ORDER — HYDROCODONE BITARTRATE AND ACETAMINOPHEN 5; 325 MG/1; MG/1
1 TABLET ORAL EVERY 6 HOURS PRN
Qty: 6 TABLET | Refills: 0 | Status: SHIPPED | OUTPATIENT
Start: 2022-07-17 | End: 2022-11-03

## 2022-07-17 RX ADMIN — HYDROCODONE BITARTRATE AND ACETAMINOPHEN 1 TABLET: 5; 325 TABLET ORAL at 21:30

## 2022-07-18 RX ORDER — LOSARTAN POTASSIUM AND HYDROCHLOROTHIAZIDE 12.5; 5 MG/1; MG/1
TABLET ORAL
Qty: 90 TABLET | Refills: 0 | Status: SHIPPED | OUTPATIENT
Start: 2022-07-18 | End: 2022-09-19

## 2022-07-18 NOTE — ED PROVIDER NOTES
Subjective   Patient states she missed a step and fell forward landing on her planted left foot.  She has been dealing with some left foot pain already and has been told she has a sprained ligament.  She states after the fall she developed more pain in the same area but denies any other injuries.  Pain is reported in the distal lateral left foot.          Review of Systems   Constitutional: Negative for chills, diaphoresis and fever.   HENT: Negative for congestion, sore throat and trouble swallowing.    Eyes: Negative for visual disturbance.   Respiratory: Negative for cough and shortness of breath.    Cardiovascular: Negative for chest pain and palpitations.   Gastrointestinal: Negative for abdominal pain, constipation, diarrhea, nausea and vomiting.   Genitourinary: Negative for dysuria, frequency, hematuria and urgency.   Skin: Negative for rash.   Neurological: Negative for weakness, light-headedness, numbness and headaches.   Psychiatric/Behavioral: Negative for confusion.       Past Medical History:   Diagnosis Date   • Abdominal pain    • Anxiety    • Anxiety and depression 3/8/2021   • Arthritis     KNEE   • Chronic sinusitis, unspecified    • Chronic wound infection of abdomen 2/20/2019    Added automatically from request for surgery [id number]   • Depression    • GERD (gastroesophageal reflux disease)    • H/O echocardiogram     2019 EF 56% NORMAL DIASTOLIC FUNCTION NO VALVULAR DISEASE   • H/O exercise stress test     REPORTEDLY NORMAL DONE FOR CHEST PAIN DX WITH ANXIETY   • Headache, tension-type    • History of Holter monitoring     REPORTEDLY NORMAL DONE FOR CHEST PAIN DX WITH ANXIETY   • History of MRI of brain and brain stem 01/01/2017    NORMAL DONE FOR MIGRAINES   • Hypertension     PRE-ECLAMPSIA WITH BOTH CHILDREN   • Incisional hernia     SCHEDULED FOR REPAIR   • Incisional hernia, without obstruction or gangrene 10/24/2018    Added automatically from request for surgery [id number]   • Infection  "following a procedure, unspecified, initial encounter    • Insulin resistance     D/T PCOS   • Lobar pneumonia, unspecified organism (HCC)    • Meralgia paresthetica, left lower limb    • Metabolic syndrome    • Migraine    • Moderate mixed hyperlipidemia not requiring statin therapy 2022   • MRSA (methicillin resistant Staphylococcus aureus) infection 2019    Abdominal wound   • Nasal congestion    • Other injury of unspecified body region, initial encounter    • Panic attack    • Panic disorder    • Papanicolaou smear 2020    NORMAL WITH NEG HPV NEVER ABN   • PCOS (polycystic ovarian syndrome)    • Pneumonia 2018   • PONV (postoperative nausea and vomiting)    • Right lower quadrant abdominal pain 2019   • Seasonal allergies    • Seizure (HCC)    • Spinal headache    • Unspecified contact dermatitis, unspecified cause    • Viral infection     UNSPECIFIED   • Vitamin D deficiency    • Wound infection 2018       Allergies   Allergen Reactions   • Penicillins Other (See Comments)     \"im resistant to any \"cillin\" drugs\"    • Wellbutrin [Bupropion] Seizure   • Tramadol Unknown - Low Severity       Past Surgical History:   Procedure Laterality Date   • ABDOMINAL WALL ABSCESS INCISION AND DRAINAGE N/A 2018    Procedure: Debridement of abdominal wall;  Surgeon: Brigido Navarrete MD;  Location: [facility] OR;  Service: General   • ADENOIDECTOMY     •  SECTION      X2   • CHOLECYSTECTOMY      LAP   • HERNIA REPAIR     • HX OVARIAN CYSTECTOMY     • INCISION AND DRAINAGE TRUNK N/A 2018    Procedure: wound debridement, abdomen;  Surgeon: Rachel Dolan MD;  Location: [facility] OR;  Service: General   • INCISION AND DRAINAGE TRUNK N/A 2019    Procedure: WOUND CLOSURE ABDOMINAL;  Surgeon: Rachel Dolan MD;  Location: [facility] OR;  Service: General   • KNEE ACL RECONSTRUCTION Left     DR. CARRASCO   • OVARIAN CYST REMOVAL      EX LAP WITH OVARIAN CYST REMOVAL HERNIA REPAIR " 2014   • TONSILLECTOMY      T&A   • TRUNK DEBRIDEMENT N/A 2018    Procedure: Abdominal wound debridement;  Surgeon: Rachel Dolan MD;  Location:  [facility] OR;  Service: General   • VENTRAL/INCISIONAL HERNIA REPAIR N/A 11/15/2018    Procedure: Lateral Component Separation Herniorrhapy Repair with Mesh, Lysis of adhesions, and skin lesion excision of Right Side;  Surgeon: Rachel Dolan MD;  Location:  [facility] OR;  Service: General       Family History   Problem Relation Age of Onset   • Stroke Mother    • Heart disease Mother    • Hypertension Mother    • Heart disease Father    • Hypertension Father    • Diabetes Father    • Dementia Maternal Grandmother    • Stroke Maternal Grandmother    • Diabetes Maternal Grandmother    • Stroke Paternal Grandmother    • Hypertension Paternal Grandmother    • Cancer Paternal Grandmother    • Heart disease Paternal Grandfather    • Diabetes Paternal Grandfather        Social History     Socioeconomic History   • Marital status:    Tobacco Use   • Smoking status: Former Smoker     Packs/day: 0.50     Years: 1.00     Pack years: 0.50     Quit date:      Years since quittin.5   • Smokeless tobacco: Never Used   Vaping Use   • Vaping Use: Never used   Substance and Sexual Activity   • Alcohol use: Yes     Comment: occasional   • Drug use: No   • Sexual activity: Defer     Partners: Male     Birth control/protection: OCP     Comment: LNMP irregular           Objective   Physical Exam  Constitutional:       General: She is not in acute distress.     Appearance: She is well-developed. She is not diaphoretic.   HENT:      Head: Normocephalic and atraumatic.      Nose: Nose normal.   Eyes:      Conjunctiva/sclera: Conjunctivae normal.      Pupils: Pupils are equal, round, and reactive to light.   Neck:      Thyroid: No thyromegaly.      Vascular: No JVD.      Trachea: No tracheal deviation.   Cardiovascular:      Rate and Rhythm: Normal rate and regular rhythm.       Heart sounds: Normal heart sounds. No murmur heard.    No friction rub. No gallop.   Pulmonary:      Effort: Pulmonary effort is normal. No respiratory distress.      Breath sounds: Normal breath sounds. No stridor. No wheezing or rales.   Abdominal:      General: Bowel sounds are normal. There is no distension.      Palpations: Abdomen is soft. There is no mass.      Tenderness: There is no abdominal tenderness. There is no guarding or rebound.      Hernia: No hernia is present.   Musculoskeletal:         General: No tenderness or deformity. Normal range of motion.      Cervical back: Normal range of motion and neck supple.      Comments: Mild pain without deformity in the left distal metatarsal, no ankle tenderness or proximal foot tenderness   Skin:     General: Skin is warm and dry.      Capillary Refill: Capillary refill takes less than 2 seconds.      Coloration: Skin is not pale.      Findings: No erythema or rash.   Neurological:      Mental Status: She is alert and oriented to person, place, and time.      Cranial Nerves: No cranial nerve deficit.      Sensory: No sensory deficit.      Motor: No abnormal muscle tone.      Coordination: Coordination normal.   Psychiatric:         Behavior: Behavior normal.         Thought Content: Thought content normal.         Judgment: Judgment normal.         Procedures           ED Course                                           MDM    Final diagnoses:   Closed nondisplaced fracture of fifth metatarsal bone of left foot, initial encounter       ED Disposition  ED Disposition     ED Disposition   Discharge    Condition   Stable    Comment   --             Rayshawn Boykin MD  [address] [id number] [phone]    In 2 days           Medication List      New Prescriptions    HYDROcodone-acetaminophen 5-325 MG per tablet  Commonly known as: NORCO  Take 1 tablet by mouth Every 6 (Six) Hours As Needed for Severe Pain .           Where to Get  Your Medications      These medications were sent to [facility] [place] - [address] [facility] [address] - [phone]  - [phone] FX  [address] [place] [address]    Phone: [phone]   · HYDROcodone-acetaminophen 5-325 MG per tablet          Jin Tavarez DO  07/17/22 2124

## 2022-07-18 NOTE — DISCHARGE INSTRUCTIONS
Follow instructions given and use of medication for breakthrough pain.  Follow-up with the orthopedic surgeon listed and wear your walking boot.  Return immediately to the ER with any worsening pain or any other concerns.

## 2022-07-19 ENCOUNTER — OFFICE VISIT (OUTPATIENT)
Dept: NEUROLOGY | Facility: CLINIC | Age: 37
End: 2022-07-19

## 2022-07-19 VITALS
SYSTOLIC BLOOD PRESSURE: 120 MMHG | BODY MASS INDEX: 49 KG/M2 | HEART RATE: 96 BPM | HEIGHT: 64 IN | DIASTOLIC BLOOD PRESSURE: 86 MMHG | OXYGEN SATURATION: 97 % | WEIGHT: 287 LBS

## 2022-07-19 DIAGNOSIS — R56.9 SEIZURE: Primary | ICD-10-CM

## 2022-07-19 PROCEDURE — 99212 OFFICE O/P EST SF 10 MIN: CPT | Performed by: PSYCHIATRY & NEUROLOGY

## 2022-07-19 NOTE — PATIENT INSTRUCTIONS
[facility]  Sheba Mueller MD  Neurology clinic  [phone]    With anti-seizure medications, you may initially notice side effects of fatigue, drowsiness, unsteadiness, and dizziness.  Other possible side effects include nausea, abdominal pain, headache, blurry or double vision, slurred speech and mood changes.  Generally, patients will noticed these symptoms when the medication is first started or with higher doses and will go away with time.    It is import to consistently take your medication every day.  Missing just one dose may put you at risk for a breakthrough seizure.  Consider using reminders on your phone or a pill box.    If you develop a rash, please call the neurology clinic immediately or notify another healthcare professional, as this may be potentially life-threatening.  If you are unable to reach a healthcare professional, go to the emergency room immediately for further evaluation.    If you develop thoughts of wanting to hurt yourself or others, please call the neurology clinic immediately to notify another healthcare professional.  If you are unable to reach a healthcare professional, go to the emergency room immediately for further evaluation.    It is the [place] state law that you cannot drive within 90 days of a seizure.    You should avoid certain activities that if you were to have a seizure, you could harm yourself or others. In general, it is recommended that you avoid operating heavy machinery or power tools, swimming or taking baths by yourself (showers are ok), don't stand over open flames, don't get on high ladders or the roof.  I also recommend to avoid sleeping on your stomach.    For further information on epilepsy and resources available to patients and their families, please visit the [facility] at www.efky.org or call [phone].    **Check out the [facility]'s monthly Art Group Gathering.  They are located  at [facility], [address] [place] [address].  Call Domitila Holloway at [phone] or email her at [email or web address] for the dates of future gatherings.**      **If you have having memory problems, consider HOBSCOTCH (Home-Based Self-management and Cognitive Training Changes lives).  It is an 8 week self-management program for adults with epilepsy and memory problems.  The program is free at the [facility].  Contact Anca Choudhary at [phone] or [email or web address].**         In general, we recommend using good judgement when you are doing certain activities and to avoid those activities that if you were to have a seizure, you could harm yourself or others. In the [facility], it is the law that you cannot drive within 90 days of a seizure. We also recommend not standing over open flames, not getting on high ladders or the roof, not swimming or taking baths by yourself (showers are ok) and not operating heavy machinery or power tools.

## 2022-07-19 NOTE — ASSESSMENT & PLAN NOTE
37 year old right handed woman who returns to neurology clinic for follow up evaluation for a seizure.  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  They turned her on her left side and protected her and the seizure lasted 45 seconds to a minute in duration and she fell a sleep and went into a deep sleep and was snoring.  When she woke up EMT had arrived and informed her that she had experienced a seizure and she was confused.  She was not sure what was going on till she was in the ambulance.  She had been started on Wellbutrin a couple weeks before this spell.  She was having some issues with her depression and was on Zoloft.  There is no family history of seizures.  Her son has had 2 febrile seizures.  She denies history of febrile seizures.  No history of meningitis or encephalitis.  No history of head trauma.  She was taken to the hospital where she had a head CT scan and a brain MRI scan which demonstrate any acute intracranial abnormalities.  She also had a routine awake and sleep EEG done which I read as a normal study.  She had a prolonged awake and sleep EEG which was a normal study since her last visit.  She denies any seizure like spells since that time.  She has had normal echocardiogram and normal EKG.  I informed her of these results.  At this time as she has not had any further seizures I will not start her on any other AED.  She is already on gabapentin at this time for occipital neuralgia/migraines.  I will see her back in 1 year for reevaluation and sooner if needed.

## 2022-07-19 NOTE — PROGRESS NOTES
Chief Complaint  Seizures    Subjective          Valery Aguilar presents to [facility] NEUROLOGY for   HISTORY OF PRESENT ILLNESS:    Valery Aguilar is a 37 year old right handed woman who returns to neurology clinic for follow up evaluation for a seizure.  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  They turned her on her left side and protected her and the seizure lasted 45 seconds to a minute in duration and she fell a sleep and went into a deep sleep and was snoring.  When she woke up EMT had arrived and informed her that she had experienced a seizure and she was confused.  She was not sure what was going on till she was in the ambulance.  She had been started on Wellbutrin a couple weeks before this spell.  She was having some issues with her depression and was on Zoloft.  There is no family history of seizures.  Her son has had 2 febrile seizures.  She denies history of febrile seizures.  No history of meningitis or encephalitis.  No history of head trauma.  She was taken to the hospital where she had a head CT scan and a brain MRI scan which demonstrate any acute intracranial abnormalities.  She also had a routine awake and sleep EEG done which I read as a normal study.  She had a prolonged awake and sleep EEG which was a normal study since her last visit.  She denies any seizure like spells since that time.  She has had normal echocardiogram and normal EKG.  I informed her of these results.  Otherwise doing well.      Past Medical History:   Diagnosis Date   • Abdominal pain    • Anxiety    • Anxiety and depression 3/8/2021   • Arthritis     KNEE   • Chronic sinusitis, unspecified    • Chronic wound infection of  abdomen 2/20/2019    Added automatically from request for surgery [id number]   • Depression    • GERD (gastroesophageal reflux disease)    • H/O echocardiogram     2019 EF 56% NORMAL DIASTOLIC FUNCTION NO VALVULAR DISEASE   • H/O exercise stress test     REPORTEDLY NORMAL DONE FOR CHEST PAIN DX WITH ANXIETY   • Headache, tension-type    • History of Holter monitoring     REPORTEDLY NORMAL DONE FOR CHEST PAIN DX WITH ANXIETY   • History of MRI of brain and brain stem 01/01/2017    NORMAL DONE FOR MIGRAINES   • Hypertension     PRE-ECLAMPSIA WITH BOTH CHILDREN   • Incisional hernia     SCHEDULED FOR REPAIR   • Incisional hernia, without obstruction or gangrene 10/24/2018    Added automatically from request for surgery [id number]   • Infection following a procedure, unspecified, initial encounter    • Insulin resistance     D/T PCOS   • Lobar pneumonia, unspecified organism (HCC)    • Meralgia paresthetica, left lower limb    • Metabolic syndrome    • Migraine    • Moderate mixed hyperlipidemia not requiring statin therapy 4/25/2022   • MRSA (methicillin resistant Staphylococcus aureus) infection 01/2019    Abdominal wound   • Nasal congestion    • Other injury of unspecified body region, initial encounter    • Panic attack    • Panic disorder    • Papanicolaou smear 06/01/2020    NORMAL WITH NEG HPV NEVER ABN   • PCOS (polycystic ovarian syndrome)    • Pneumonia 07/2018   • PONV (postoperative nausea and vomiting)    • Right lower quadrant abdominal pain 1/20/2019   • Seasonal allergies    • Seizure (HCC)    • Spinal headache    • Unspecified contact dermatitis, unspecified cause    • Viral infection     UNSPECIFIED   • Vitamin D deficiency    • Wound infection 12/2/2018        Family History   Problem Relation Age of Onset   • Stroke Mother    • Heart disease Mother    • Hypertension Mother    • Heart disease Father    • Hypertension Father    • Diabetes Father    • Dementia Maternal Grandmother    • Stroke Maternal  "Grandmother    • Diabetes Maternal Grandmother    • Stroke Paternal Grandmother    • Hypertension Paternal Grandmother    • Cancer Paternal Grandmother    • Heart disease Paternal Grandfather    • Diabetes Paternal Grandfather         Social History     Socioeconomic History   • Marital status:    Tobacco Use   • Smoking status: Former Smoker     Packs/day: 0.50     Years: 1.00     Pack years: 0.50     Quit date:      Years since quittin.5   • Smokeless tobacco: Never Used   Vaping Use   • Vaping Use: Never used   Substance and Sexual Activity   • Alcohol use: Yes     Comment: occasional   • Drug use: No   • Sexual activity: Defer     Partners: Male     Birth control/protection: OCP     Comment: LNMP irregular        I have reviewed and confirmed the accuracy of the ROS as documented by the MA/LPN/RN Sheba Mueller MD    Review of Systems   Musculoskeletal: Negative for gait problem.   Neurological: Positive for headache. Negative for dizziness, tremors, seizures, syncope, facial asymmetry, speech difficulty, weakness, light-headedness, numbness, memory problem and confusion.   Psychiatric/Behavioral: Negative for agitation, behavioral problems, decreased concentration, dysphoric mood, hallucinations, self-injury, sleep disturbance, suicidal ideas, negative for hyperactivity, depressed mood and stress. The patient is not nervous/anxious.         Objective   Vital Signs:   /86 (BP Location: Right arm, Patient Position: Sitting, Cuff Size: Large Adult)   Pulse 96   Ht 162.6 cm (64\")   Wt 130 kg (287 lb)   SpO2 97%   BMI 49.26 kg/m²       PHYSICAL EXAM:    General   Mental Status - Alert. General Appearance - Over weight, Well groomed, Oriented and Cooperative. Orientation - Oriented X3.       Head and Neck  Head - normocephalic, atraumatic with no lesions or palpable masses.  Neck    Global Assessment - supple.       Eye   Sclera/Conjunctiva - Bilateral - Normal.    ENMT  Mouth and Throat "   Oral Cavity/Oropharynx: Oropharynx - the soft palate,uvula and tongue are normal in appearance.    Chest and Lung Exam   Chest - lung clear to auscultation bilaterally.    Cardiovascular   Cardiovascular examination reveals  - normal heart sounds, regular rate and rhythm.    Neurologic   Mental Status: Speech - Normal. Cognitive function - appropriate fund of knowledge. No impairment of attention, Impairment of concentration, impairment of long term memory or impairment of short term memory.  Cranial Nerves:   II Optic: Visual acuity - Left - Normal. Right - Normal. Visual fields - Normal (to confrontation).  III Oculomotor: Pupillary constriction - Left - Normal. Right - Normal.  VII Facial: - Normal Bilaterally.   IX Glossopharyngeal / X Vagus - Normal.  XI Accessory: Trapezius - Bilateral - Normal. Sternocleidomastoid - Bilateral - Normal.  XII Hypoglossal - Bilateral - Normal.  Eye Movements: - Normal Bilaterally.  Sensory:   Light Touch: Intact - Globally.  Motor:   Bulk and Contour: - Normal.  Tone: - Normal.  Tremor: Not present.  Strength: 5/5 normal muscle strength - All Muscles.   General Assessment of Reflexes: - deep tendon reflexes are normal. Coordination - No Impairment of finger-to-nose or Impairment of rapid alternating movements. Gait - Normal.      Result Review :                 Assessment and Plan    Problem List Items Addressed This Visit        Neuro    Seizure (HCC) - Primary    Current Assessment & Plan     37 year old right handed woman who returns to neurology clinic for follow up evaluation for a seizure.  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  They turned her on her left side and protected her and  the seizure lasted 45 seconds to a minute in duration and she fell a sleep and went into a deep sleep and was snoring.  When she woke up EMT had arrived and informed her that she had experienced a seizure and she was confused.  She was not sure what was going on till she was in the ambulance.  She had been started on Wellbutrin a couple weeks before this spell.  She was having some issues with her depression and was on Zoloft.  There is no family history of seizures.  Her son has had 2 febrile seizures.  She denies history of febrile seizures.  No history of meningitis or encephalitis.  No history of head trauma.  She was taken to the hospital where she had a head CT scan and a brain MRI scan which demonstrate any acute intracranial abnormalities.  She also had a routine awake and sleep EEG done which I read as a normal study.  She had a prolonged awake and sleep EEG which was a normal study since her last visit.  She denies any seizure like spells since that time.  She has had normal echocardiogram and normal EKG.  I informed her of these results.  At this time as she has not had any further seizures I will not start her on any other AED.  She is already on gabapentin at this time for occipital neuralgia/migraines.  I will see her back in 1 year for reevaluation and sooner if needed.                  Follow Up   Return in about 1 year (around 7/19/2023).  Patient was given instructions and counseling regarding her condition or for health maintenance advice. Please see specific information pulled into the AVS if appropriate.

## 2022-07-22 ENCOUNTER — APPOINTMENT (OUTPATIENT)
Dept: PHYSICAL THERAPY | Facility: HOSPITAL | Age: 37
End: 2022-07-22

## 2022-07-26 DIAGNOSIS — S92.355D CLOSED NONDISPLACED FRACTURE OF FIFTH METATARSAL BONE OF LEFT FOOT WITH ROUTINE HEALING, SUBSEQUENT ENCOUNTER: Primary | ICD-10-CM

## 2022-08-03 RX ORDER — MELOXICAM 15 MG/1
TABLET ORAL
Qty: 30 TABLET | Refills: 0 | Status: SHIPPED | OUTPATIENT
Start: 2022-08-03 | End: 2022-09-01 | Stop reason: SDUPTHER

## 2022-08-03 NOTE — TELEPHONE ENCOUNTER
Rx Refill Note  Requested Prescriptions     Pending Prescriptions Disp Refills    meloxicam (MOBIC) 15 MG tablet [Pharmacy Med Name: MELOXICAM 15 MG TABLET] 30 tablet 0     Sig: TAKE ONE TABLET BY MOUTH DAILY      Last office visit with prescribing clinician: 9/16/2021      Next office visit with prescribing clinician: Visit date not found   Last Filled:07.05.2022    Dx:Left knee DJD    Dr. Napier patient he is out on [place].      Lou Varma MA  08/03/22, 10:58 EDT    {TIP  Encounters:    {TIP  Please add Last Relevant Lab Date if appropriate:  {TIP  Is Refill Pharmacy correct?:

## 2022-08-04 ENCOUNTER — OFFICE VISIT (OUTPATIENT)
Dept: INTERNAL MEDICINE | Facility: CLINIC | Age: 37
End: 2022-08-04

## 2022-08-04 VITALS
WEIGHT: 281.2 LBS | SYSTOLIC BLOOD PRESSURE: 122 MMHG | HEART RATE: 94 BPM | DIASTOLIC BLOOD PRESSURE: 82 MMHG | BODY MASS INDEX: 48.01 KG/M2 | TEMPERATURE: 97.1 F | HEIGHT: 64 IN | OXYGEN SATURATION: 98 %

## 2022-08-04 DIAGNOSIS — F32.A ANXIETY AND DEPRESSION: Primary | Chronic | ICD-10-CM

## 2022-08-04 DIAGNOSIS — G43.709 CHRONIC MIGRAINE WITHOUT AURA WITHOUT STATUS MIGRAINOSUS, NOT INTRACTABLE: Chronic | ICD-10-CM

## 2022-08-04 DIAGNOSIS — E66.01 MORBID (SEVERE) OBESITY DUE TO EXCESS CALORIES: Chronic | ICD-10-CM

## 2022-08-04 DIAGNOSIS — I10 ESSENTIAL HYPERTENSION: Chronic | ICD-10-CM

## 2022-08-04 DIAGNOSIS — F41.9 ANXIETY AND DEPRESSION: Primary | Chronic | ICD-10-CM

## 2022-08-04 PROCEDURE — 99214 OFFICE O/P EST MOD 30 MIN: CPT | Performed by: INTERNAL MEDICINE

## 2022-08-04 PROCEDURE — 96372 THER/PROPH/DIAG INJ SC/IM: CPT | Performed by: INTERNAL MEDICINE

## 2022-08-04 RX ORDER — KETOROLAC TROMETHAMINE 30 MG/ML
60 INJECTION, SOLUTION INTRAMUSCULAR; INTRAVENOUS ONCE
Status: COMPLETED | OUTPATIENT
Start: 2022-08-04 | End: 2022-08-04

## 2022-08-04 RX ORDER — RIZATRIPTAN BENZOATE 10 MG/1
TABLET, ORALLY DISINTEGRATING ORAL
Qty: 15 TABLET | Refills: 3 | Status: SHIPPED | OUTPATIENT
Start: 2022-08-04 | End: 2022-11-18 | Stop reason: SDUPTHER

## 2022-08-04 RX ORDER — PROMETHAZINE HYDROCHLORIDE 25 MG/1
25 TABLET ORAL EVERY 6 HOURS PRN
Qty: 20 TABLET | Refills: 0 | Status: SHIPPED | OUTPATIENT
Start: 2022-08-04

## 2022-08-04 RX ADMIN — KETOROLAC TROMETHAMINE 60 MG: 30 INJECTION, SOLUTION INTRAMUSCULAR; INTRAVENOUS at 08:48

## 2022-08-04 NOTE — ASSESSMENT & PLAN NOTE
IMPROVING- STABLE  - cont on sertraline 200 mg  - cont buspar dose to 15 mg BID-TID today for ongoing improvements  - wellbutrin had recently been added to her regimen but pt had witnessed seizure Feb 2022 shortly after adding this medication back so it has been stopped  - Reviewed potential side effects of medication including sexual performance changes, insomnia, fatigue, weight changes. Pt tolerating well without any issues.  - has lorazepam for prn use, not needing routinely at this time  - Reviewed controlled nature of substance, potential for abuse/addiction, and possible adverse effects of medication. No red flags for abuse at this time.   - Controlled substances contract signed and in chart.   - Hang checked and appropriate.

## 2022-08-04 NOTE — PROGRESS NOTES
"Chief Complaint  Anxiety    Subjective          Valery Aguilar presents to [facility] for anxiety follow up. Taking her meds daily, does feel like she has been in a very stressful place the last coule of months so has not felt great but also notes she has not had any panic attacks or tearfulness as a response to those events so overall feels she is managing ok.   Has been having more migraines recently as well, both more frequent and more intense. Has maxalt but has not been as effective lately.   Has been cleared by neurology related to her seizures, repeat EEG was normal.   Still taking saxenda, was more effective for her but broke her foot recently so has not been able to be active and has seen weight gain back.     Objective   Vital Signs:     /82 (BP Location: Left arm, Patient Position: Sitting)   Pulse 94   Temp 97.1 °F (36.2 °C) (Temporal)   Ht 162.6 cm (64.02\")   Wt 128 kg (281 lb 3.2 oz)   SpO2 98%   BMI 48.24 kg/m²     Physical Exam  Vitals and nursing note reviewed.   Constitutional:       General: She is not in acute distress.     Appearance: Normal appearance.   Cardiovascular:      Rate and Rhythm: Normal rate and regular rhythm.      Pulses: Normal pulses.      Heart sounds: Normal heart sounds. No murmur heard.  Pulmonary:      Effort: Pulmonary effort is normal. No respiratory distress.      Breath sounds: Normal breath sounds.   Abdominal:      General: Abdomen is flat. Bowel sounds are normal.      Palpations: Abdomen is soft.      Tenderness: There is no abdominal tenderness.   Musculoskeletal:      Right lower leg: No edema.      Left lower leg: No edema.   Neurological:      Mental Status: She is alert and oriented to person, place, and time. Mental status is at baseline.   Psychiatric:         Mood and Affect: Mood normal.         Behavior: Behavior normal.          Result Review :   The following data was reviewed by: Zandra COSME" MD Gabriel on 08/04/2022:  CMP    CMP 2/18/22 3/3/22 4/25/22   Glucose 101 (A) 104 (A) CANCELED   BUN 17 18 22 (A)   Creatinine 0.73 0.84 0.88   eGFR Non African Am 90     Sodium 137 137 140   Potassium 4.0 3.6 CANCELED   Chloride 102 102 102   Calcium 9.0 9.5 9.5   Total Protein   6.8   Albumin  4.30 4.3   Globulin   2.5   Total Bilirubin  0.3 0.3   Alkaline Phosphatase  45 55   AST (SGOT)  22 22   ALT (SGPT)  42 (A) 39 (A)   (A) Abnormal value       Comments are available for some flowsheets but are not being displayed.           CBC w/diff    CBC w/Diff 2/17/22 2/18/22 3/3/22   WBC 7.57 6.81 6.24   RBC 5.73 (A) 4.92 4.99   Hemoglobin 15.4 13.1 13.6   Hematocrit 46.9 (A) 40.1 41.5   MCV 81.8 81.5 83.2   MCH 26.9 26.6 27.3   MCHC 32.8 32.7 32.8   RDW 14.7 14.5 14.6   Platelets 202 190 222   Neutrophil Rel % 74.8  54.6   Immature Granulocyte Rel % 0.8 (A)  0.3   Lymphocyte Rel % 17.7 (A)  36.4   Monocyte Rel % 4.8 (A)  7.1   Eosinophil Rel % 1.5  1.1   Basophil Rel % 0.4  0.5   (A) Abnormal value            Lipid Panel    Lipid Panel 8/30/21 1/10/22 4/25/22   Total Cholesterol 198 223 (A) 192   Triglycerides 145 117 91   HDL Cholesterol 40 49 48   VLDL Cholesterol 26 21 17   LDL Cholesterol  132 (A) 153 (A) 127 (A)   (A) Abnormal value       Comments are available for some flowsheets but are not being displayed.               Most Recent A1C    HGBA1C Most Recent 4/25/22   Hemoglobin A1C 5.3      Comments are available for some flowsheets but are not being displayed.           A1C Last 3 Results    HGBA1C Last 3 Results 8/30/21 1/10/22 4/25/22   Hemoglobin A1C 5.30 5.3 5.3      Comments are available for some flowsheets but are not being displayed.           Assessment and Plan      Diagnoses and all orders for this visit:    1. Anxiety and depression (Primary)  Assessment & Plan:  IMPROVING- STABLE  - cont on sertraline 200 mg  - cont buspar dose to 15 mg BID-TID today for ongoing improvements  - wellbutrin had  recently been added to her regimen but pt had witnessed seizure Feb 2022 shortly after adding this medication back so it has been stopped  - Reviewed potential side effects of medication including sexual performance changes, insomnia, fatigue, weight changes. Pt tolerating well without any issues.  - has lorazepam for prn use, not needing routinely at this time  - Reviewed controlled nature of substance, potential for abuse/addiction, and possible adverse effects of medication. No red flags for abuse at this time.   - Controlled substances contract signed and in chart.   - Hang checked and appropriate.             2. Essential hypertension  Assessment & Plan:  CONTROLLED  - BP in goal range today  - will cont current dose of ARB-thiazide, no refills needed at this time  - Cont checking BP at home daily, call if values trend outside of goal range        3. Morbid (severe) obesity due to excess calories (HCC)  Assessment & Plan:  STABLE  - has lost 25 lbs after initiation of saxenda earlier this year, has gained a few pounds back since breaking her foot and becoming more immobile  - currently on saxenda and tolerating at max dose currently, will cont for now and hopefully as she gets more mobile this will tip back to weight loss again  - encouraged pt to cont working on diet and exercise to maximize impact of saxenda      4. Chronic migraine without aura without status migrainosus, not intractable  Assessment & Plan:  UNCONTROLLED  - cont nortripyline nightly  - cont gabapentin 600 mg BID, refills not needed yet  - cont with maxalt for abortive medication--> refills sent  - avoid migraine triggers, get enough sleep, improve hydration      Orders:  -     ketorolac (TORADOL) injection 60 mg  -     promethazine (PHENERGAN) 25 MG tablet; Take 1 tablet by mouth Every 6 (Six) Hours As Needed for Nausea or Vomiting.  Dispense: 20 tablet; Refill: 0  -     rizatriptan MLT (MAXALT-MLT) 10 MG disintegrating tablet; May repeat  in 2 hours if needed  Dispense: 15 tablet; Refill: 3      Follow Up   Return in about 3 months (around 11/4/2022) for follow up.    Patient was given instructions and counseling regarding her condition or for health maintenance advice. Please see specific information pulled into the AVS if appropriate.     Yessy Rios MD  [facility] [place] [facility]  Phone: [phone]  Fax: [phone]

## 2022-08-04 NOTE — ASSESSMENT & PLAN NOTE
STABLE  - has lost 25 lbs after initiation of saxenda earlier this year, has gained a few pounds back since breaking her foot and becoming more immobile  - currently on saxenda and tolerating at max dose currently, will cont for now and hopefully as she gets more mobile this will tip back to weight loss again  - encouraged pt to cont working on diet and exercise to maximize impact of saxenda

## 2022-08-04 NOTE — ASSESSMENT & PLAN NOTE
UNCONTROLLED  - cont nortripyline nightly  - cont gabapentin 600 mg BID, refills not needed yet  - cont with maxalt for abortive medication--> refills sent  - avoid migraine triggers, get enough sleep, improve hydration

## 2022-08-15 ENCOUNTER — OFFICE VISIT (OUTPATIENT)
Dept: OBSTETRICS AND GYNECOLOGY | Facility: CLINIC | Age: 37
End: 2022-08-15

## 2022-08-15 VITALS
DIASTOLIC BLOOD PRESSURE: 86 MMHG | HEIGHT: 64 IN | SYSTOLIC BLOOD PRESSURE: 136 MMHG | BODY MASS INDEX: 49.54 KG/M2 | WEIGHT: 290.2 LBS

## 2022-08-15 DIAGNOSIS — Z11.51 ENCOUNTER FOR SCREENING FOR HUMAN PAPILLOMAVIRUS (HPV): ICD-10-CM

## 2022-08-15 DIAGNOSIS — Z01.419 PAP SMEAR, LOW-RISK: ICD-10-CM

## 2022-08-15 DIAGNOSIS — Z01.419 ROUTINE GYNECOLOGICAL EXAMINATION: Primary | ICD-10-CM

## 2022-08-15 PROCEDURE — 99395 PREV VISIT EST AGE 18-39: CPT | Performed by: OBSTETRICS & GYNECOLOGY

## 2022-08-15 PROCEDURE — 81002 URINALYSIS NONAUTO W/O SCOPE: CPT | Performed by: OBSTETRICS & GYNECOLOGY

## 2022-08-15 NOTE — PROGRESS NOTES
GYN Annual Exam     CC- Here for annual exam.     Valery Aguilar is a 37 y.o. female who presents for annual well woman exam. Pt has a Mirena IUD in place since 10/2018. Pt has a hx of PCOS with oligomenorrhea that is controlled with IUD. Pt has no bleeding. Pt had a ventral hernia repair in 2018 that had to be healed by secondary intention with Wound vac and 2 debridements due to post op infection. Pt reports her mother   due to heart attack. Pt is on Zoloft and Buspar and Ativan prn. Started on gabapentin for occipital migraines. Pt is on Metformin for PCOS. Pt recently broke her left foot.     OB History        2    Para   2    Term   2            AB        Living   2       SAB        IAB        Ectopic        Molar        Multiple        Live Births   2                  Current contraception: none  History of abnormal Pap smear: no  History of abnormal mammogram: no  Family history of uterine, colon or ovarian cancer: no  Family history of breast cancer: yes - paternal aunt with breast cancer, paternal GM    Health Maintenance   Topic Date Due   • ANNUAL PHYSICAL  Never done   • HEPATITIS C SCREENING  Never done   • Annual Gynecologic Pelvic and Breast Exam  2022   • INFLUENZA VACCINE  10/01/2022   • LIPID PANEL  2023   • PAP SMEAR  2024   • TDAP/TD VACCINES (2 - Td or Tdap) 2028   • COVID-19 Vaccine  Completed   • Pneumococcal Vaccine 0-64  Aged Out       Past Medical History:   Diagnosis Date   • Abdominal pain    • Anxiety    • Anxiety and depression 3/8/2021   • Arthritis     KNEE   • Chronic sinusitis, unspecified    • Chronic wound infection of abdomen 2019    Added automatically from request for surgery [id number]   • Depression    • GERD (gastroesophageal reflux disease)    • H/O echocardiogram      EF 56% NORMAL DIASTOLIC FUNCTION NO VALVULAR DISEASE   • H/O exercise stress test     REPORTEDLY NORMAL DONE FOR CHEST PAIN DX WITH ANXIETY   •  Headache, tension-type    • History of Holter monitoring     REPORTEDLY NORMAL DONE FOR CHEST PAIN DX WITH ANXIETY   • History of MRI of brain and brain stem 2017    NORMAL DONE FOR MIGRAINES   • Hypertension     PRE-ECLAMPSIA WITH BOTH CHILDREN   • Incisional hernia     SCHEDULED FOR REPAIR   • Incisional hernia, without obstruction or gangrene 10/24/2018    Added automatically from request for surgery [id number]   • Infection following a procedure, unspecified, initial encounter    • Insulin resistance     D/T PCOS   • Lobar pneumonia, unspecified organism (HCC)    • Meralgia paresthetica, left lower limb    • Metabolic syndrome    • Migraine    • Moderate mixed hyperlipidemia not requiring statin therapy 2022   • MRSA (methicillin resistant Staphylococcus aureus) infection 2019    Abdominal wound   • Nasal congestion    • Other injury of unspecified body region, initial encounter    • Panic attack    • Panic disorder    • Papanicolaou smear 2020    NORMAL WITH NEG HPV NEVER ABN   • PCOS (polycystic ovarian syndrome)    • Pneumonia 2018   • PONV (postoperative nausea and vomiting)    • Right lower quadrant abdominal pain 2019   • Seasonal allergies    • Seizure (HCC)    • Spinal headache    • Unspecified contact dermatitis, unspecified cause    • Viral infection     UNSPECIFIED   • Vitamin D deficiency    • Wound infection 2018       Past Surgical History:   Procedure Laterality Date   • ABDOMINAL WALL ABSCESS INCISION AND DRAINAGE N/A 2018    Procedure: Debridement of abdominal wall;  Surgeon: Brigido Navarrete MD;  Location: [facility] OR;  Service: General   • ADENOIDECTOMY     •  SECTION      X2   • CHOLECYSTECTOMY      LAP   • HERNIA REPAIR     • HX OVARIAN CYSTECTOMY     • INCISION AND DRAINAGE TRUNK N/A 2018    Procedure: wound debridement, abdomen;  Surgeon: Rachel Dolan MD;  Location: [facility] OR;  Service: General   • INCISION AND DRAINAGE TRUNK N/A  2/21/2019    Procedure: WOUND CLOSURE ABDOMINAL;  Surgeon: Rachel Dolan MD;  Location:  [facility] OR;  Service: General   • KNEE ACL RECONSTRUCTION Left 2010    DR. CARRASCO   • OVARIAN CYST REMOVAL      EX LAP WITH OVARIAN CYST REMOVAL HERNIA REPAIR 2014   • TONSILLECTOMY      T&A   • TRUNK DEBRIDEMENT N/A 12/5/2018    Procedure: Abdominal wound debridement;  Surgeon: Rachel Dolan MD;  Location:  [facility] OR;  Service: General   • VENTRAL/INCISIONAL HERNIA REPAIR N/A 11/15/2018    Procedure: Lateral Component Separation Herniorrhapy Repair with Mesh, Lysis of adhesions, and skin lesion excision of Right Side;  Surgeon: Rachel Dolan MD;  Location:  [facility] OR;  Service: General         Current Outpatient Medications:   •  Ascorbic Acid (DESHAWN-C PO), Take  by mouth., Disp: , Rfl:   •  busPIRone (BUSPAR) 15 MG tablet, Take 1 tablet by mouth 3 (Three) Times a Day., Disp: 270 tablet, Rfl: 1  •  cetirizine (zyrTEC) 10 MG tablet, Take 10 mg by mouth Every Night., Disp: , Rfl:   •  cholecalciferol (VITAMIN D3) 1000 units tablet, Take 2,000 Units by mouth Daily., Disp: , Rfl:   •  fluticasone (FLONASE) 50 MCG/ACT nasal spray, 2 sprays into the nostril(s) as directed by provider Daily., Disp: , Rfl:   •  gabapentin (NEURONTIN) 600 MG tablet, Take 1 tablet by mouth 2 (Two) Times a Day., Disp: 180 tablet, Rfl: 1  •  HYDROcodone-acetaminophen (NORCO) 5-325 MG per tablet, Take 1 tablet by mouth Every 6 (Six) Hours As Needed for Severe Pain ., Disp: 6 tablet, Rfl: 0  •  Insulin Pen Needle (Pen Needles) 31G X 5 MM misc, 1 each Daily., Disp: 100 each, Rfl: 10  •  Liraglutide (Saxenda) 18 MG/3ML injection pen, Inject 3 mg under the skin into the appropriate area as directed Daily., Disp: 15 mL, Rfl: 5  •  LORazepam (ATIVAN) 0.5 MG tablet, Take 1 tablet by mouth Every 12 (Twelve) Hours As Needed for Anxiety., Disp: 30 tablet, Rfl: 1  •  losartan-hydrochlorothiazide (HYZAAR) 50-12.5 MG per tablet, TAKE ONE TABLET BY MOUTH  "DAILY, Disp: 90 tablet, Rfl: 0  •  Magnesium 500 MG capsule, Take  by mouth., Disp: , Rfl:   •  meloxicam (MOBIC) 15 MG tablet, TAKE ONE TABLET BY MOUTH DAILY, Disp: 30 tablet, Rfl: 0  •  metFORMIN ER (GLUCOPHAGE-XR) 500 MG 24 hr tablet, TAKE TWO TABLETS BY MOUTH EVERY MORNING, Disp: 180 tablet, Rfl: 1  •  Multiple Vitamins-Minerals (ZINC PO), Take  by mouth., Disp: , Rfl:   •  nortriptyline (PAMELOR) 50 MG capsule, TAKE TWO CAPSULES BY MOUTH ONCE NIGHTLY, Disp: 180 capsule, Rfl: 1  •  promethazine (PHENERGAN) 25 MG tablet, Take 1 tablet by mouth Every 6 (Six) Hours As Needed for Nausea or Vomiting., Disp: 20 tablet, Rfl: 0  •  rizatriptan MLT (MAXALT-MLT) 10 MG disintegrating tablet, May repeat in 2 hours if needed, Disp: 15 tablet, Rfl: 3  •  sertraline (ZOLOFT) 100 MG tablet, TAKE TWO TABLETS BY MOUTH DAILY, Disp: 180 tablet, Rfl: 1  •  Sharps Container (GUARDIAN Sharps ) misc, USE AS DIRECTED, Disp: 1 each, Rfl: 0    Allergies   Allergen Reactions   • Penicillins Other (See Comments)     \"im resistant to any \"cillin\" drugs\"    • Wellbutrin [Bupropion] Seizure   • Tramadol Unknown - Low Severity       Social History     Tobacco Use   • Smoking status: Former Smoker     Packs/day: 0.50     Years: 1.00     Pack years: 0.50     Quit date:      Years since quittin.6   • Smokeless tobacco: Never Used   Vaping Use   • Vaping Use: Never used   Substance Use Topics   • Alcohol use: Yes     Comment: occasional   • Drug use: No       Family History   Problem Relation Age of Onset   • Stroke Mother    • Heart disease Mother    • Hypertension Mother    • Heart disease Father    • Hypertension Father    • Diabetes Father    • Dementia Maternal Grandmother    • Stroke Maternal Grandmother    • Diabetes Maternal Grandmother    • Stroke Paternal Grandmother    • Hypertension Paternal Grandmother    • Cancer Paternal Grandmother    • Heart disease Paternal Grandfather    • Diabetes Paternal Grandfather  " "      Review of Systems   Constitutional: Negative for appetite change, chills, fatigue, fever and unexpected weight change.   Gastrointestinal: Negative for abdominal distention, abdominal pain, anal bleeding, blood in stool, constipation, diarrhea, nausea and vomiting.   Genitourinary: Negative for dyspareunia, dysuria, menstrual problem, pelvic pain, vaginal bleeding, vaginal discharge and vaginal pain.       /86   Ht 162.6 cm (64.02\")   Wt 132 kg (290 lb 3.2 oz)   Breastfeeding No   BMI 49.79 kg/m²     Physical Exam  Vitals reviewed.   Constitutional:       General: She is not in acute distress.     Appearance: She is well-developed. She is obese. She is not ill-appearing, toxic-appearing or diaphoretic.   HENT:      Mouth/Throat:      Dentition: Normal dentition. No dental caries.   Cardiovascular:      Rate and Rhythm: Normal rate and regular rhythm.      Heart sounds: Normal heart sounds.   Pulmonary:      Effort: Pulmonary effort is normal. No respiratory distress.      Breath sounds: Normal breath sounds. No stridor. No wheezing.   Chest:   Breasts:      Right: No inverted nipple, mass, nipple discharge, skin change or tenderness.      Left: No inverted nipple, mass, nipple discharge, skin change or tenderness.       Abdominal:      General: There is no distension.      Palpations: Abdomen is soft. There is no mass.      Tenderness: There is no abdominal tenderness.   Genitourinary:     Labia:         Right: No rash, tenderness or lesion.         Left: No rash, tenderness or lesion.       Vagina: Foreign body present. No vaginal discharge, tenderness or bleeding.      Cervix: No cervical motion tenderness, discharge or friability.      Uterus: Not deviated, not enlarged, not fixed and not tender.       Adnexa:         Right: No mass, tenderness or fullness.          Left: No mass, tenderness or fullness.     Musculoskeletal:         General: No tenderness. Normal range of motion.   Skin:     " General: Skin is warm.      Findings: No erythema or rash.   Neurological:      General: No focal deficit present.      Mental Status: She is alert and oriented to person, place, and time.      Cranial Nerves: No cranial nerve deficit.      Coordination: Coordination normal.   Psychiatric:         Mood and Affect: Mood normal.         Behavior: Behavior normal.         Thought Content: Thought content normal.         Judgment: Judgment normal.            Assessment/Plan    1) GYN HM: Check pap smear. SBE demonstrated and encouraged. MMG at age 39.  2) PCOS: Being managed by primary care physician. Pt is on metformin XR 1,000mg daily.  3) Contraception: none. Mirena IUD 10/2018.  4) Family Planning: .  5) Diet and Exercise discussed. Pt broke her left foot but she doesn't need surgery. Pt has a goal of 250. She plans to get a nose piercing when she gets to 89. When she gets to 210 she will get a tatoo  6) Smoking Status: nonsmoker  7) Hx of Oligomenorrhea: Pt is at high risk for endometrial hyperplasia. Has Mirena IUD in place 10/2018  8) Follow up prn and 1 year.       Diagnoses and all orders for this visit:    Routine gynecological examination    Pap smear, low-risk  -     POC Urinalysis Dipstick  -     IgP, Aptima HPV    Encounter for screening for human papillomavirus (HPV)  -     POC Urinalysis Dipstick  -     IgP, Aptima HPV          Karen Marvin DO  8/15/2022  13:09 EDT

## 2022-08-20 DIAGNOSIS — F41.9 ANXIETY AND DEPRESSION: Chronic | ICD-10-CM

## 2022-08-20 DIAGNOSIS — F32.A ANXIETY AND DEPRESSION: Chronic | ICD-10-CM

## 2022-08-21 RX ORDER — SERTRALINE HYDROCHLORIDE 100 MG/1
TABLET, FILM COATED ORAL
Qty: 180 TABLET | Refills: 1 | Status: SHIPPED | OUTPATIENT
Start: 2022-08-21 | End: 2023-02-16

## 2022-08-23 ENCOUNTER — HOSPITAL ENCOUNTER (OUTPATIENT)
Dept: GENERAL RADIOLOGY | Facility: HOSPITAL | Age: 37
Discharge: HOME | End: 2022-08-23
Admitting: STUDENT IN AN ORGANIZED HEALTH CARE EDUCATION/TRAINING PROGRAM

## 2022-08-23 DIAGNOSIS — S92.355D CLOSED NONDISPLACED FRACTURE OF FIFTH METATARSAL BONE OF LEFT FOOT WITH ROUTINE HEALING, SUBSEQUENT ENCOUNTER: ICD-10-CM

## 2022-08-23 PROCEDURE — 73630 X-RAY EXAM OF FOOT: CPT

## 2022-08-26 DIAGNOSIS — S92.352K CLOSED DISPLACED FRACTURE OF FIFTH METATARSAL BONE OF LEFT FOOT WITH NONUNION: Primary | ICD-10-CM

## 2022-09-01 ENCOUNTER — HOSPITAL ENCOUNTER (OUTPATIENT)
Dept: GENERAL RADIOLOGY | Facility: HOSPITAL | Age: 37
Discharge: HOME | End: 2022-09-01
Admitting: STUDENT IN AN ORGANIZED HEALTH CARE EDUCATION/TRAINING PROGRAM

## 2022-09-01 DIAGNOSIS — S92.352K CLOSED DISPLACED FRACTURE OF FIFTH METATARSAL BONE OF LEFT FOOT WITH NONUNION: ICD-10-CM

## 2022-09-01 PROCEDURE — 73630 X-RAY EXAM OF FOOT: CPT

## 2022-09-01 RX ORDER — MELOXICAM 15 MG/1
15 TABLET ORAL DAILY
Qty: 30 TABLET | Refills: 0 | Status: SHIPPED | OUTPATIENT
Start: 2022-09-01 | End: 2022-09-28

## 2022-09-01 NOTE — TELEPHONE ENCOUNTER
Rx Refill Note  Requested Prescriptions     Pending Prescriptions Disp Refills   • meloxicam (MOBIC) 15 MG tablet 30 tablet 0     Sig: Take 1 tablet by mouth Daily.      Last office visit with prescribing clinician:6/23/2022  Next office visit with prescribing clinician: Visit date not found   Last Filled:8/3/22     Dx:Left knee DJD  Date of Surgery:      Arleth Delgado MA  09/01/22, 08:42 EDT    Previous RX pended for your approval, change or denial.     {TIP  Encounters:    {TIP  Please add Last Relevant Lab Date if appropriate:  {TIP  Is Refill Pharmacy correct?:

## 2022-09-19 RX ORDER — LOSARTAN POTASSIUM AND HYDROCHLOROTHIAZIDE 12.5; 5 MG/1; MG/1
TABLET ORAL
Qty: 90 TABLET | Refills: 0 | Status: SHIPPED | OUTPATIENT
Start: 2022-09-19 | End: 2023-04-05

## 2022-09-28 RX ORDER — MELOXICAM 15 MG/1
TABLET ORAL
Qty: 30 TABLET | Refills: 0 | Status: SHIPPED | OUTPATIENT
Start: 2022-09-28 | End: 2022-10-25

## 2022-09-29 ENCOUNTER — OFFICE VISIT (OUTPATIENT)
Dept: INTERNAL MEDICINE | Facility: CLINIC | Age: 37
End: 2022-09-29

## 2022-09-29 VITALS
DIASTOLIC BLOOD PRESSURE: 80 MMHG | TEMPERATURE: 96.3 F | BODY MASS INDEX: 49.81 KG/M2 | SYSTOLIC BLOOD PRESSURE: 132 MMHG | OXYGEN SATURATION: 100 % | HEIGHT: 64 IN | RESPIRATION RATE: 16 BRPM | HEART RATE: 101 BPM

## 2022-09-29 DIAGNOSIS — J01.40 ACUTE NON-RECURRENT PANSINUSITIS: Primary | ICD-10-CM

## 2022-09-29 PROCEDURE — 99213 OFFICE O/P EST LOW 20 MIN: CPT | Performed by: INTERNAL MEDICINE

## 2022-09-29 RX ORDER — AZELASTINE HYDROCHLORIDE 137 UG/1
2 SPRAY, METERED NASAL 2 TIMES DAILY
Qty: 30 ML | Refills: 2 | Status: SHIPPED | OUTPATIENT
Start: 2022-09-29 | End: 2022-12-09

## 2022-09-29 RX ORDER — DOXYCYCLINE HYCLATE 100 MG/1
100 CAPSULE ORAL 2 TIMES DAILY
Qty: 14 CAPSULE | Refills: 0 | Status: SHIPPED | OUTPATIENT
Start: 2022-09-29 | End: 2023-03-27

## 2022-09-29 NOTE — PROGRESS NOTES
"Chief Complaint  Sore Throat, Nasal Congestion, and Headache    Subjective          Valery Aguilar presents to [facility] for sore throat, headache, congestion x 3 days. Noticed it first with her CPAP and she was not able to keep it on because she wasn't able to breath. Worsening sinus pressure over both sides of her face over past 48 hours. Now with bad headaches. Has had some oncoming allergy symptoms and pressure migraine headaches x 1 week.     Objective   Vital Signs:     /80   Pulse 101   Temp 96.3 °F (35.7 °C)   Resp 16   Ht 162.6 cm (64\")   SpO2 100%   BMI 49.81 kg/m²     Physical Exam  Vitals and nursing note reviewed.   Constitutional:       General: She is not in acute distress.     Appearance: Normal appearance.   HENT:      Right Ear: Ear canal and external ear normal. A middle ear effusion is present. Tympanic membrane is not erythematous.      Left Ear: Ear canal and external ear normal. A middle ear effusion is present. Tympanic membrane is not erythematous.      Nose: Congestion present.      Right Sinus: Maxillary sinus tenderness and frontal sinus tenderness present.      Left Sinus: Maxillary sinus tenderness and frontal sinus tenderness present.      Mouth/Throat:      Pharynx: Pharyngeal swelling and posterior oropharyngeal erythema present. No oropharyngeal exudate.      Comments: + cobbelstoning  Cardiovascular:      Rate and Rhythm: Normal rate and regular rhythm.      Pulses: Normal pulses.      Heart sounds: Normal heart sounds. No murmur heard.  Pulmonary:      Effort: Pulmonary effort is normal. No respiratory distress.      Breath sounds: Normal breath sounds.   Abdominal:      General: Bowel sounds are normal. There is no distension.      Palpations: Abdomen is soft.   Lymphadenopathy:      Cervical: Cervical adenopathy present.   Skin:     General: Skin is warm.      Capillary Refill: Capillary refill takes less than 2 " seconds.   Neurological:      Mental Status: She is alert and oriented to person, place, and time. Mental status is at baseline.          Result Review : : None     Assessment and Plan      Diagnoses and all orders for this visit:    1. Acute non-recurrent pansinusitis (Primary)  - Doxycycline as below for acute sinusitis  - increase fluids and rest  - cont OTC meds like tylenol and ibuprofen as needed for fever/pain  - should take OTC antihistamine, nasal corticosteroid, and decongestant  - Rx sent for nasal antihistamine as below  - can use Afrin for 2-3 days as needed prior to nasal sprays to improve adminstration  - call back if not improving after 48-72 hours       Orders:  -     doxycycline (VIBRAMYCIN) 100 MG capsule; Take 1 capsule by mouth 2 (Two) Times a Day.  Dispense: 14 capsule; Refill: 0  -     Azelastine HCl 137 MCG/SPRAY solution; 2 sprays into the nostril(s) as directed by provider 2 (Two) Times a Day.  Dispense: 30 mL; Refill: 2          Follow Up   Return if symptoms worsen or fail to improve.    Patient was given instructions and counseling regarding her condition or for health maintenance advice. Please see specific information pulled into the AVS if appropriate.     Yessy Rios MD  [facility] [place] [facility]  Phone: [phone]  Fax: [phone]

## 2022-10-04 DIAGNOSIS — G43.011 INTRACTABLE MIGRAINE WITHOUT AURA AND WITH STATUS MIGRAINOSUS: Chronic | ICD-10-CM

## 2022-10-04 RX ORDER — BUSPIRONE HYDROCHLORIDE 10 MG/1
TABLET ORAL
Qty: 270 TABLET | Refills: 1 | Status: SHIPPED | OUTPATIENT
Start: 2022-10-04 | End: 2022-11-03

## 2022-10-04 RX ORDER — GABAPENTIN 600 MG/1
TABLET ORAL
Qty: 180 TABLET | Refills: 1 | Status: SHIPPED | OUTPATIENT
Start: 2022-10-04 | End: 2023-03-27 | Stop reason: SDUPTHER

## 2022-10-04 NOTE — TELEPHONE ENCOUNTER
Rx Refill Note  Requested Prescriptions     Pending Prescriptions Disp Refills   • busPIRone (BUSPAR) 10 MG tablet [Pharmacy Med Name: BUSPIRONE HCL 10 MG TABLET] 270 tablet      Sig: TAKE ONE TABLET BY MOUTH THREE TIMES A DAY   • gabapentin (NEURONTIN) 600 MG tablet [Pharmacy Med Name: GABAPENTIN 600 MG TABLET] 180 tablet      Sig: TAKE ONE TABLET BY MOUTH TWICE A DAY      Last office visit with prescribing clinician: 9/29/2022      Next office visit with prescribing clinician: 11/7/2022            Melissa Cortez MA  10/04/22, 07:58 EDT

## 2022-10-06 DIAGNOSIS — S92.352K CLOSED DISPLACED FRACTURE OF FIFTH METATARSAL BONE OF LEFT FOOT WITH NONUNION: Primary | ICD-10-CM

## 2022-10-12 ENCOUNTER — HOSPITAL ENCOUNTER (OUTPATIENT)
Dept: GENERAL RADIOLOGY | Facility: HOSPITAL | Age: 37
Discharge: HOME | End: 2022-10-12
Admitting: STUDENT IN AN ORGANIZED HEALTH CARE EDUCATION/TRAINING PROGRAM

## 2022-10-12 DIAGNOSIS — S92.352K CLOSED DISPLACED FRACTURE OF FIFTH METATARSAL BONE OF LEFT FOOT WITH NONUNION: ICD-10-CM

## 2022-10-12 PROCEDURE — 73630 X-RAY EXAM OF FOOT: CPT

## 2022-10-13 DIAGNOSIS — F41.9 ANXIETY AND DEPRESSION: Chronic | ICD-10-CM

## 2022-10-13 DIAGNOSIS — F32.A ANXIETY AND DEPRESSION: Chronic | ICD-10-CM

## 2022-10-13 RX ORDER — LORAZEPAM 0.5 MG/1
0.5 TABLET ORAL EVERY 12 HOURS PRN
Qty: 30 TABLET | Refills: 1 | Status: SHIPPED | OUTPATIENT
Start: 2022-10-13

## 2022-10-13 NOTE — TELEPHONE ENCOUNTER
Rx Refill Note  Requested Prescriptions     Pending Prescriptions Disp Refills   • LORazepam (ATIVAN) 0.5 MG tablet 30 tablet 1     Sig: Take 1 tablet by mouth Every 12 (Twelve) Hours As Needed for Anxiety.      Last office visit with prescribing clinician: 9/29/2022      Next office visit with prescribing clinician: 11/7/2022            Melissa Cortez MA  10/13/22, 11:12 EDT

## 2022-10-14 DIAGNOSIS — S92.352K CLOSED DISPLACED FRACTURE OF FIFTH METATARSAL BONE OF LEFT FOOT WITH NONUNION: Primary | ICD-10-CM

## 2022-10-15 DIAGNOSIS — E28.2 PCOS (POLYCYSTIC OVARIAN SYNDROME): ICD-10-CM

## 2022-10-15 DIAGNOSIS — E66.01 MORBID (SEVERE) OBESITY DUE TO EXCESS CALORIES: Chronic | ICD-10-CM

## 2022-10-16 RX ORDER — LIRAGLUTIDE 6 MG/ML
3 INJECTION, SOLUTION SUBCUTANEOUS DAILY
Qty: 15 ML | Refills: 5 | Status: SHIPPED | OUTPATIENT
Start: 2022-10-16

## 2022-10-25 RX ORDER — MELOXICAM 15 MG/1
TABLET ORAL
Qty: 30 TABLET | Refills: 0 | Status: SHIPPED | OUTPATIENT
Start: 2022-10-25 | End: 2022-10-26

## 2022-10-26 ENCOUNTER — OFFICE VISIT (OUTPATIENT)
Dept: INTERNAL MEDICINE | Facility: CLINIC | Age: 37
End: 2022-10-26

## 2022-10-26 VITALS
HEIGHT: 64 IN | DIASTOLIC BLOOD PRESSURE: 76 MMHG | HEART RATE: 93 BPM | RESPIRATION RATE: 18 BRPM | BODY MASS INDEX: 48.65 KG/M2 | WEIGHT: 285 LBS | SYSTOLIC BLOOD PRESSURE: 134 MMHG | OXYGEN SATURATION: 98 % | TEMPERATURE: 97.1 F

## 2022-10-26 DIAGNOSIS — G43.809 OTHER MIGRAINE WITHOUT STATUS MIGRAINOSUS, NOT INTRACTABLE: Primary | ICD-10-CM

## 2022-10-26 PROCEDURE — 96372 THER/PROPH/DIAG INJ SC/IM: CPT | Performed by: INTERNAL MEDICINE

## 2022-10-26 PROCEDURE — 99214 OFFICE O/P EST MOD 30 MIN: CPT | Performed by: INTERNAL MEDICINE

## 2022-10-26 RX ORDER — KETOROLAC TROMETHAMINE 30 MG/ML
60 INJECTION, SOLUTION INTRAMUSCULAR; INTRAVENOUS ONCE
Status: DISCONTINUED | OUTPATIENT
Start: 2022-10-26 | End: 2022-10-26

## 2022-10-26 RX ORDER — PROMETHAZINE HYDROCHLORIDE 25 MG/ML
25 INJECTION, SOLUTION INTRAMUSCULAR; INTRAVENOUS ONCE
Status: COMPLETED | OUTPATIENT
Start: 2022-10-26 | End: 2022-10-26

## 2022-10-26 RX ORDER — PROMETHAZINE HYDROCHLORIDE 25 MG/ML
25 INJECTION, SOLUTION INTRAMUSCULAR; INTRAVENOUS ONCE
Status: DISCONTINUED | OUTPATIENT
Start: 2022-10-26 | End: 2022-10-26

## 2022-10-26 RX ORDER — KETOROLAC TROMETHAMINE 30 MG/ML
60 INJECTION, SOLUTION INTRAMUSCULAR; INTRAVENOUS ONCE
Status: COMPLETED | OUTPATIENT
Start: 2022-10-26 | End: 2022-10-26

## 2022-10-26 RX ADMIN — KETOROLAC TROMETHAMINE 60 MG: 30 INJECTION, SOLUTION INTRAMUSCULAR; INTRAVENOUS at 11:39

## 2022-10-26 RX ADMIN — PROMETHAZINE HYDROCHLORIDE 25 MG: 25 INJECTION, SOLUTION INTRAMUSCULAR; INTRAVENOUS at 12:10

## 2022-10-27 ENCOUNTER — OFFICE VISIT (OUTPATIENT)
Dept: ORTHOPEDIC SURGERY | Facility: CLINIC | Age: 37
End: 2022-10-27

## 2022-10-27 VITALS
BODY MASS INDEX: 48.65 KG/M2 | DIASTOLIC BLOOD PRESSURE: 80 MMHG | SYSTOLIC BLOOD PRESSURE: 124 MMHG | WEIGHT: 285 LBS | HEIGHT: 64 IN | HEART RATE: 84 BPM

## 2022-10-27 DIAGNOSIS — M25.561 RIGHT KNEE PAIN, UNSPECIFIED CHRONICITY: Primary | ICD-10-CM

## 2022-10-27 DIAGNOSIS — M94.261 CHONDROMALACIA OF KNEE, RIGHT: ICD-10-CM

## 2022-10-27 DIAGNOSIS — Z98.890 S/P ACL RECONSTRUCTION: ICD-10-CM

## 2022-10-27 DIAGNOSIS — M17.12 PRIMARY OSTEOARTHRITIS OF LEFT KNEE: ICD-10-CM

## 2022-10-27 PROCEDURE — 73562 X-RAY EXAM OF KNEE 3: CPT | Performed by: ORTHOPAEDIC SURGERY

## 2022-10-27 PROCEDURE — 99214 OFFICE O/P EST MOD 30 MIN: CPT | Performed by: ORTHOPAEDIC SURGERY

## 2022-10-27 PROCEDURE — 20610 DRAIN/INJ JOINT/BURSA W/O US: CPT | Performed by: ORTHOPAEDIC SURGERY

## 2022-10-27 RX ORDER — LIDOCAINE HYDROCHLORIDE 10 MG/ML
8 INJECTION, SOLUTION EPIDURAL; INFILTRATION; INTRACAUDAL; PERINEURAL
Status: COMPLETED | OUTPATIENT
Start: 2022-10-27 | End: 2022-10-27

## 2022-10-27 RX ORDER — TRIAMCINOLONE ACETONIDE 40 MG/ML
80 INJECTION, SUSPENSION INTRA-ARTICULAR; INTRAMUSCULAR
Status: COMPLETED | OUTPATIENT
Start: 2022-10-27 | End: 2022-10-27

## 2022-10-27 RX ADMIN — LIDOCAINE HYDROCHLORIDE 8 ML: 10 INJECTION, SOLUTION EPIDURAL; INFILTRATION; INTRACAUDAL; PERINEURAL at 14:19

## 2022-10-27 RX ADMIN — TRIAMCINOLONE ACETONIDE 80 MG: 40 INJECTION, SUSPENSION INTRA-ARTICULAR; INTRAMUSCULAR at 14:19

## 2022-10-27 NOTE — PROGRESS NOTES
Subjective:     Patient ID: Valery Aguilar is a 37 y.o. female.    Chief Complaint:  Right knee pain, new problem  Follow-up left knee pain, DJD, prior ACL reconstruction-last injection-left knee-6/23/2022-Kenalog  History of Present Illness  Valery Aguilar presents to the clinic for evaluation of right knee pain. She states her right knee started bothering her in particular as soon as the weather change. She rates her pain as mild to moderate intensity, 5 out of 10, aching and dull in nature, associated with clicking and popping noted, as well as stiffness, worse with standing, walking, and climbing stairs. Mild improvement with ice and anti-inflammatory medications. She has not had any prior injections to her right knee.    The patient states her right knee has been particularly worse over the last several months. She has been in a boot due to a foot fracture and feels like this may be causing increased load and pressure for her. Bilateral knees, she notes her primary pain to be primarily over her lateral joint line, does note associated 8 and 2 anterolateral bilateral knees. Her pain does be associated primarily with walking, getting up from a seated position, and deep flexion activities. She denies any hugo buckling or giving way, though she does note some still intermittently on her left. The patient did respond very well to her last injection of the left knee back in 06/2022 and she denies any associated numbness or tingling. The patient reports mild radiation of pain down the medial aspect of bilateral legs and notes moderate swelling that does wax and wane, minimal improvement with anti-inflammatory medications.       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    • Occupation: [employer]   Tobacco Use   • Smoking status: Former     Packs/day: 0.50     Years: 1.00     Pack years: 0.50     Types: Cigarettes     Quit date: 2004     Years since quitting:  18.8   • Smokeless tobacco: Never   Vaping Use   • Vaping Use: Never used   Substance and Sexual Activity   • Alcohol use: Yes     Comment: occasional   • Drug use: No   • Sexual activity: Defer     Partners: Male     Birth control/protection: OCP     Comment: LNMP irregular      Past Medical History:   Diagnosis Date   • Abdominal pain    • Anxiety    • Anxiety and depression 3/8/2021   • Arthritis     KNEE   • Chronic sinusitis, unspecified    • Chronic wound infection of abdomen 2/20/2019    Added automatically from request for surgery [id number]   • Depression    • GERD (gastroesophageal reflux disease)    • H/O echocardiogram     2019 EF 56% NORMAL DIASTOLIC FUNCTION NO VALVULAR DISEASE   • H/O exercise stress test     REPORTEDLY NORMAL DONE FOR CHEST PAIN DX WITH ANXIETY   • Headache, tension-type    • History of Holter monitoring     REPORTEDLY NORMAL DONE FOR CHEST PAIN DX WITH ANXIETY   • History of MRI of brain and brain stem 01/01/2017    NORMAL DONE FOR MIGRAINES   • Hypertension     PRE-ECLAMPSIA WITH BOTH CHILDREN   • Incisional hernia     SCHEDULED FOR REPAIR   • Incisional hernia, without obstruction or gangrene 10/24/2018    Added automatically from request for surgery [id number]   • Infection following a procedure, unspecified, initial encounter    • Insulin resistance     D/T PCOS   • Lobar pneumonia, unspecified organism (HCC)    • Meralgia paresthetica, left lower limb    • Metabolic syndrome    • Migraine    • Moderate mixed hyperlipidemia not requiring statin therapy 4/25/2022   • MRSA (methicillin resistant Staphylococcus aureus) infection 01/2019    Abdominal wound   • Nasal congestion    • Other injury of unspecified body region, initial encounter    • Panic attack    • Panic disorder    • Papanicolaou smear 06/01/2020    NORMAL WITH NEG HPV NEVER ABN   • PCOS (polycystic ovarian syndrome)    • Pneumonia 07/2018   • PONV (postoperative nausea and vomiting)    • Right lower quadrant abdominal pain  2019   • Seasonal allergies    • Seizure (HCC)    • Spinal headache    • Unspecified contact dermatitis, unspecified cause    • Viral infection     UNSPECIFIED   • Vitamin D deficiency    • Wound infection 2018     Past Surgical History:   Procedure Laterality Date   • ABDOMINAL WALL ABSCESS INCISION AND DRAINAGE N/A 2018    Procedure: Debridement of abdominal wall;  Surgeon: Brigido Navarrete MD;  Location: [facility] OR;  Service: General   • ADENOIDECTOMY     •  SECTION      X2   • CHOLECYSTECTOMY      LAP   • HERNIA REPAIR     • HX OVARIAN CYSTECTOMY     • INCISION AND DRAINAGE TRUNK N/A 2018    Procedure: wound debridement, abdomen;  Surgeon: Rachel Dolan MD;  Location: [facility] OR;  Service: General   • INCISION AND DRAINAGE TRUNK N/A 2019    Procedure: WOUND CLOSURE ABDOMINAL;  Surgeon: Rachel Dolan MD;  Location: [facility] OR;  Service: General   • KNEE ACL RECONSTRUCTION Left     DR. CARRASCO   • OVARIAN CYST REMOVAL      EX LAP WITH OVARIAN CYST REMOVAL HERNIA REPAIR    • TONSILLECTOMY      T&A   • TRUNK DEBRIDEMENT N/A 2018    Procedure: Abdominal wound debridement;  Surgeon: Rachel Dolan MD;  Location: [facility] OR;  Service: General   • VENTRAL/INCISIONAL HERNIA REPAIR N/A 11/15/2018    Procedure: Lateral Component Separation Herniorrhapy Repair with Mesh, Lysis of adhesions, and skin lesion excision of Right Side;  Surgeon: Rachel Dolan MD;  Location: [facility] OR;  Service: General       Family History   Problem Relation Age of Onset   • Stroke Mother    • Heart disease Mother    • Hypertension Mother    • Heart disease Father    • Hypertension Father    • Diabetes Father    • Dementia Maternal Grandmother    • Stroke Maternal Grandmother    • Diabetes Maternal Grandmother    • Stroke Paternal Grandmother    • Hypertension Paternal Grandmother    • Cancer Paternal Grandmother    • Heart disease Paternal Grandfather    • Diabetes Paternal  "Grandfather          Review of Systems        Objective:  Vitals:    10/27/22 1325   BP: 124/80   Pulse: 84   Weight: 129 kg (285 lb)   Height: 162.6 cm (64\")         10/27/22  1325   Weight: 129 kg (285 lb)     Body mass index is 48.92 kg/m².  Physical Exam    Vital signs reviewed.   General: No acute distress, alert and oriented  Eyes: conjunctiva clear; pupils equally round and reactive  ENT: external ears and nose atraumatic; oropharynx clear  CV: no peripheral edema  Resp: normal respiratory effort  Skin: no rashes or wounds; normal turgor  Psych: mood and affect appropriate; recent and remote memory intact          Ortho Exam       Bilateral Knee     Active range of motion is 0 to 125 degrees.   Flexion strength- 4+/5.   Extension strength- 4+/5.   Moderate valgus alignment bilaterally, right slightly worse than the left.   Maximal tenderness to palpation along the lateral joint line.   Halie's exam- Positive with pain along the lateral joint line, no click.   Lachman's test left knee: Grade 2A.   Lachman's test right knee: Grade 2A.   Stable opening on varus and valgus stress at 0 and 30 degrees.   Log roll test- Minimal hip pain.   Stinchfield's test- Minimal hip pain.   Positive sensation light touch all distributions symmetric to contralateral side.   Brisk cap refill all digits, 1+ dorsalis pedis pulse.     Imaging:  Right Knee X-Ray  Indication: Pain    AP, Lateral, and [place] views    Findings:  No fracture  No bony lesion  Normal soft tissues  Mild to moderate lateral compartment joint space narrowing with no significant reactive osteophyte formation, moderate valgus alignment    No prior studies were available for comparison.    Assessment:        1. Right knee pain, unspecified chronicity    2. Chondromalacia of knee, right    3. Primary osteoarthritis of left knee    4. S/P ACL reconstruction           Plan:  Large Joint Arthrocentesis  Date/Time: 10/27/2022 2:19 PM  Consent given by: " patient  Site marked: site marked  Timeout: Immediately prior to procedure a time out was called to verify the correct patient, procedure, equipment, support staff and site/side marked as required   Supporting Documentation  Indications: pain   Procedure Details  Location: knee (bilateral) -   Preparation: Patient was prepped and draped in the usual sterile fashion  Needle size: 22 G  Approach: anterolateral  Medications administered: 8 mL lidocaine PF 1% 1 %; 80 mg triamcinolone acetonide 40 MG/ML  Patient tolerance: patient tolerated the procedure well with no immediate complications              1. I discussed treatment options at length with patient at today's visit. At this point in time, given significant pain on her right as well as recurrence of her life, we discussed options and she would like to proceed with intra-articular injection on 10/27/2022.   2. The patient would like to proceed with cortisone injection today to the bilateral knees. I recommended limited use of affected extremity for the next 24 hours to only essential activites other than work on general active and passive motion. I recommended supplementing with ice and soft tissue massage. I discussed with patient that they should see results in 5-7 days, if no improvement in 5-6 weeks I have asked them to call the office to review other options. The patient should call office immediately if they notice redness, warmth, fevers, chills, or residual numbness or tingling for greater than 6 hours after injection.   3. The patient is to continue with hip, core, and quad strengthening exercises as tolerated.  4. The patient will follow up as needed.      Valery Aguilar was in agreement with plan and had all questions answered.     Orders:  Orders Placed This Encounter   Procedures   • Large Joint Arthrocentesis   • XR Knee 3+ View With [place] Right       Medications:  No orders of the defined types were placed in this  encounter.      Followup:  Return if symptoms worsen or fail to improve.    Diagnoses and all orders for this visit:    1. Right knee pain, unspecified chronicity (Primary)  -     XR Knee 3+ View With [place] Right    2. Chondromalacia of knee, right    3. Primary osteoarthritis of left knee    4. S/P ACL reconstruction    Other orders  -     Large Joint Arthrocentesis        Transcribed from ambient dictation for Partick Napier MD by Bailee Vegas.  10/27/22   15:04 EDT

## 2022-11-07 ENCOUNTER — OFFICE VISIT (OUTPATIENT)
Dept: INTERNAL MEDICINE | Facility: CLINIC | Age: 37
End: 2022-11-07

## 2022-11-07 VITALS
DIASTOLIC BLOOD PRESSURE: 82 MMHG | OXYGEN SATURATION: 100 % | SYSTOLIC BLOOD PRESSURE: 124 MMHG | TEMPERATURE: 97 F | HEART RATE: 91 BPM | BODY MASS INDEX: 47.94 KG/M2 | RESPIRATION RATE: 16 BRPM | WEIGHT: 280.8 LBS | HEIGHT: 64 IN

## 2022-11-07 DIAGNOSIS — I10 ESSENTIAL HYPERTENSION: Primary | Chronic | ICD-10-CM

## 2022-11-07 DIAGNOSIS — G43.709 CHRONIC MIGRAINE WITHOUT AURA WITHOUT STATUS MIGRAINOSUS, NOT INTRACTABLE: Chronic | ICD-10-CM

## 2022-11-07 DIAGNOSIS — F32.A ANXIETY AND DEPRESSION: Chronic | ICD-10-CM

## 2022-11-07 DIAGNOSIS — E78.2 MODERATE MIXED HYPERLIPIDEMIA NOT REQUIRING STATIN THERAPY: Chronic | ICD-10-CM

## 2022-11-07 DIAGNOSIS — E55.9 VITAMIN D DEFICIENCY: ICD-10-CM

## 2022-11-07 DIAGNOSIS — E28.2 PCOS (POLYCYSTIC OVARIAN SYNDROME): ICD-10-CM

## 2022-11-07 DIAGNOSIS — F41.9 ANXIETY AND DEPRESSION: Chronic | ICD-10-CM

## 2022-11-07 PROCEDURE — 99214 OFFICE O/P EST MOD 30 MIN: CPT | Performed by: INTERNAL MEDICINE

## 2022-11-07 RX ORDER — ELETRIPTAN HYDROBROMIDE 20 MG/1
20 TABLET, FILM COATED ORAL ONCE AS NEEDED
Qty: 15 TABLET | Refills: 2 | Status: SHIPPED | OUTPATIENT
Start: 2022-11-07

## 2022-11-07 RX ORDER — PROPRANOLOL HCL 60 MG
60 CAPSULE, EXTENDED RELEASE 24HR ORAL DAILY
Qty: 30 CAPSULE | Refills: 1 | Status: SHIPPED | OUTPATIENT
Start: 2022-11-07 | End: 2022-12-12 | Stop reason: SDUPTHER

## 2022-11-07 NOTE — ASSESSMENT & PLAN NOTE
UNCONTROLLED  - cont nortripyline nightly  - cont gabapentin 600 mg BID, refills not needed yet  - add propranolol for ongoing migraines  - will trial new abortifactant medication with eletriptan  - avoid migraine triggers, get enough sleep, improve hydration

## 2022-11-07 NOTE — PROGRESS NOTES
"Chief Complaint  Follow-up, Anxiety, Depression, and Hypertension    Subjective          Valery Aguilar presents to [facility] for follow up on anxiety, depression, HTN.   BP improved, does feel better overall. Has been doing more exercise and is taking her saxenda inj and is down 5 lbs over past month.   Migraines have been horrible, has had almost daily migraines will of the weather changes.     Objective   Vital Signs:     /82   Pulse 91   Temp 97 °F (36.1 °C)   Resp 16   Ht 162.6 cm (64\")   Wt 127 kg (280 lb 12.8 oz)   SpO2 100%   BMI 48.20 kg/m²     Physical Exam  Vitals and nursing note reviewed.   Constitutional:       General: She is not in acute distress.     Appearance: Normal appearance.   Cardiovascular:      Rate and Rhythm: Normal rate and regular rhythm.      Pulses: Normal pulses.      Heart sounds: Normal heart sounds. No murmur heard.  Pulmonary:      Effort: Pulmonary effort is normal. No respiratory distress.      Breath sounds: Normal breath sounds.   Abdominal:      General: Abdomen is flat. Bowel sounds are normal.      Palpations: Abdomen is soft.      Tenderness: There is no abdominal tenderness.   Musculoskeletal:      Right lower leg: No edema.      Left lower leg: No edema.   Neurological:      Mental Status: She is alert and oriented to person, place, and time. Mental status is at baseline.   Psychiatric:         Mood and Affect: Mood normal.         Behavior: Behavior normal.          Result Review :   The following data was reviewed by: Zandra Rios MD on 11/07/2022:  CMP    CMP 2/18/22 3/3/22 4/25/22   Glucose 101 (A) 104 (A) CANCELED   BUN 17 18 22 (A)   Creatinine 0.73 0.84 0.88   eGFR Non African Am 90     Sodium 137 137 140   Potassium 4.0 3.6 CANCELED   Chloride 102 102 102   Calcium 9.0 9.5 9.5   Total Protein   6.8   Albumin  4.30 4.3   Globulin   2.5   Total Bilirubin  0.3 0.3   Alkaline Phosphatase  45 55 "   AST (SGOT)  22 22   ALT (SGPT)  42 (A) 39 (A)   (A) Abnormal value       Comments are available for some flowsheets but are not being displayed.           CBC w/diff    CBC w/Diff 2/17/22 2/18/22 3/3/22   WBC 7.57 6.81 6.24   RBC 5.73 (A) 4.92 4.99   Hemoglobin 15.4 13.1 13.6   Hematocrit 46.9 (A) 40.1 41.5   MCV 81.8 81.5 83.2   MCH 26.9 26.6 27.3   MCHC 32.8 32.7 32.8   RDW 14.7 14.5 14.6   Platelets 202 190 222   Neutrophil Rel % 74.8  54.6   Immature Granulocyte Rel % 0.8 (A)  0.3   Lymphocyte Rel % 17.7 (A)  36.4   Monocyte Rel % 4.8 (A)  7.1   Eosinophil Rel % 1.5  1.1   Basophil Rel % 0.4  0.5   (A) Abnormal value            Lipid Panel    Lipid Panel 1/10/22 4/25/22   Total Cholesterol 223 (A) 192   Triglycerides 117 91   HDL Cholesterol 49 48   VLDL Cholesterol 21 17   LDL Cholesterol  153 (A) 127 (A)   (A) Abnormal value                Most Recent A1C    HGBA1C Most Recent 4/25/22   Hemoglobin A1C 5.3      Comments are available for some flowsheets but are not being displayed.                Assessment and Plan      Diagnoses and all orders for this visit:    1. Essential hypertension (Primary)  Assessment & Plan:  CONTROLLED  - BP in goal range today  - will cont current dose of ARB-thiazide, no refills needed at this time  - Cont checking BP at home daily, call if values trend outside of goal range      Orders:  -     Comprehensive Metabolic Panel    2. Anxiety and depression  Assessment & Plan:  IMPROVING- STABLE  - cont on sertraline 200 mg  - cont buspar dose to 15 mg BID-TID today for ongoing improvements  - wellbutrin had recently been added to her regimen but pt had witnessed seizure Feb 2022 shortly after adding this medication back so it has been stopped  - Reviewed potential side effects of medication including sexual performance changes, insomnia, fatigue, weight changes. Pt tolerating well without any issues.  - has lorazepam for prn use, not needing routinely at this time  - Reviewed  controlled nature of substance, potential for abuse/addiction, and possible adverse effects of medication. No red flags for abuse at this time.   - Controlled substances contract signed and in chart.   - Hang checked and appropriate.             3. Chronic migraine without aura without status migrainosus, not intractable  Assessment & Plan:  UNCONTROLLED  - cont nortripyline nightly  - cont gabapentin 600 mg BID, refills not needed yet  - add propranolol for ongoing migraines  - will trial new abortifactant medication with eletriptan  - avoid migraine triggers, get enough sleep, improve hydration      Orders:  -     propranolol LA (INDERAL LA) 60 MG 24 hr capsule; Take 1 capsule by mouth Daily.  Dispense: 30 capsule; Refill: 1  -     eletriptan (RELPAX) 20 MG tablet; Take 1 tablet by mouth 1 (One) Time As Needed for Migraine for up to 1 dose. may repeat in 2 hours if necessary  Dispense: 15 tablet; Refill: 2    4. PCOS (polycystic ovarian syndrome)  Assessment & Plan:  IMPROVING  - will get monitoring labs today  - cont on metformin and saxenda at current doses  - has been successfully losing weight slowly since adding saxenda, hopeful to be adding more exercise as her foot continues to improve    Orders:  -     Comprehensive Metabolic Panel  -     Hemoglobin A1c    5. Moderate mixed hyperlipidemia not requiring statin therapy  -     Comprehensive Metabolic Panel  -     Lipid Panel    6. Vitamin D deficiency  -     Vitamin D,25-Hydroxy        Follow Up   Return in about 1 month (around 12/7/2022) for follow up.    Patient was given instructions and counseling regarding her condition or for health maintenance advice. Please see specific information pulled into the AVS if appropriate.     Yessy Rios MD  [facility] [place] [facility]  Phone: [phone]  Fax: [phone]

## 2022-11-07 NOTE — ASSESSMENT & PLAN NOTE
IMPROVING  - will get monitoring labs today  - cont on metformin and saxenda at current doses  - has been successfully losing weight slowly since adding saxenda, hopeful to be adding more exercise as her foot continues to improve

## 2022-11-08 LAB
25(OH)D3+25(OH)D2 SERPL-MCNC: 29.8 NG/ML (ref 30–100)
ALBUMIN SERPL-MCNC: 3.8 G/DL (ref 3.5–5.2)
ALBUMIN/GLOB SERPL: 1.6 G/DL
ALP SERPL-CCNC: 45 U/L (ref 39–117)
ALT SERPL-CCNC: 22 U/L (ref 1–33)
AST SERPL-CCNC: 15 U/L (ref 1–32)
BILIRUB SERPL-MCNC: 0.6 MG/DL (ref 0–1.2)
BUN SERPL-MCNC: 17 MG/DL (ref 6–20)
BUN/CREAT SERPL: 23.3 (ref 7–25)
CALCIUM SERPL-MCNC: 9.1 MG/DL (ref 8.6–10.5)
CHLORIDE SERPL-SCNC: 104 MMOL/L (ref 98–107)
CHOLEST SERPL-MCNC: 182 MG/DL (ref 0–200)
CO2 SERPL-SCNC: 28.6 MMOL/L (ref 22–29)
CREAT SERPL-MCNC: 0.73 MG/DL (ref 0.57–1)
EGFRCR SERPLBLD CKD-EPI 2021: 108.1 ML/MIN/1.73
GLOBULIN SER CALC-MCNC: 2.4 GM/DL
GLUCOSE SERPL-MCNC: 81 MG/DL (ref 65–99)
HBA1C MFR BLD: 5.5 % (ref 4.8–5.6)
HDLC SERPL-MCNC: 45 MG/DL (ref 40–60)
LDLC SERPL CALC-MCNC: 114 MG/DL (ref 0–100)
POTASSIUM SERPL-SCNC: 4.6 MMOL/L (ref 3.5–5.2)
PROT SERPL-MCNC: 6.2 G/DL (ref 6–8.5)
SODIUM SERPL-SCNC: 141 MMOL/L (ref 136–145)
TRIGL SERPL-MCNC: 127 MG/DL (ref 0–150)
VLDLC SERPL CALC-MCNC: 23 MG/DL (ref 5–40)

## 2022-11-09 ENCOUNTER — HOSPITAL ENCOUNTER (OUTPATIENT)
Dept: GENERAL RADIOLOGY | Facility: HOSPITAL | Age: 37
Discharge: HOME | End: 2022-11-09
Admitting: STUDENT IN AN ORGANIZED HEALTH CARE EDUCATION/TRAINING PROGRAM

## 2022-11-09 DIAGNOSIS — S92.352K CLOSED DISPLACED FRACTURE OF FIFTH METATARSAL BONE OF LEFT FOOT WITH NONUNION: ICD-10-CM

## 2022-11-09 PROCEDURE — 73630 X-RAY EXAM OF FOOT: CPT

## 2022-11-11 DIAGNOSIS — S92.352K CLOSED DISPLACED FRACTURE OF FIFTH METATARSAL BONE OF LEFT FOOT WITH NONUNION: Primary | ICD-10-CM

## 2022-11-18 ENCOUNTER — OFFICE VISIT (OUTPATIENT)
Dept: INTERNAL MEDICINE | Facility: CLINIC | Age: 37
End: 2022-11-18

## 2022-11-18 VITALS
HEART RATE: 94 BPM | TEMPERATURE: 97.1 F | OXYGEN SATURATION: 97 % | DIASTOLIC BLOOD PRESSURE: 74 MMHG | BODY MASS INDEX: 47.97 KG/M2 | WEIGHT: 281 LBS | SYSTOLIC BLOOD PRESSURE: 120 MMHG | RESPIRATION RATE: 16 BRPM | HEIGHT: 64 IN

## 2022-11-18 DIAGNOSIS — G43.709 CHRONIC MIGRAINE WITHOUT AURA WITHOUT STATUS MIGRAINOSUS, NOT INTRACTABLE: Chronic | ICD-10-CM

## 2022-11-18 DIAGNOSIS — U07.1 COVID: Primary | ICD-10-CM

## 2022-11-18 LAB
EXPIRATION DATE: ABNORMAL
FLUAV AG UPPER RESP QL IA.RAPID: NOT DETECTED
FLUBV AG UPPER RESP QL IA.RAPID: NOT DETECTED
INTERNAL CONTROL: ABNORMAL
Lab: ABNORMAL
SARS-COV-2 RNA RESP QL NAA+PROBE: DETECTED

## 2022-11-18 PROCEDURE — 99214 OFFICE O/P EST MOD 30 MIN: CPT | Performed by: INTERNAL MEDICINE

## 2022-11-18 PROCEDURE — 87428 SARSCOV & INF VIR A&B AG IA: CPT | Performed by: INTERNAL MEDICINE

## 2022-11-18 RX ORDER — RIZATRIPTAN BENZOATE 10 MG/1
TABLET, ORALLY DISINTEGRATING ORAL
Qty: 15 TABLET | Refills: 3 | Status: SHIPPED | OUTPATIENT
Start: 2022-11-18

## 2022-11-18 NOTE — PROGRESS NOTES
"Chief Complaint  Cough and Headache    Subjective          Valery Aguilar presents to [facility] for cough and headache. Started 2 days ago. No fevers. Cough is not productive. More sinus pressure than anything else.   at home sick before her.     Objective   Vital Signs:     /74   Pulse 94   Temp 97.1 °F (36.2 °C)   Resp 16   Ht 162.6 cm (64\")   Wt 127 kg (281 lb)   SpO2 97%   BMI 48.23 kg/m²     Physical Exam  Vitals and nursing note reviewed.   Constitutional:       General: She is not in acute distress.     Appearance: Normal appearance.   HENT:      Head: Normocephalic and atraumatic.      Right Ear: Ear canal and external ear normal. No middle ear effusion.      Left Ear: Ear canal and external ear normal.  No middle ear effusion.      Nose: Congestion present. No nasal deformity.      Right Turbinates: Swollen.      Left Turbinates: Swollen.      Mouth/Throat:      Mouth: Mucous membranes are moist.      Pharynx: Posterior oropharyngeal erythema present. No oropharyngeal exudate.      Tonsils: No tonsillar exudate. 2+ on the right. 2+ on the left.      Comments: + cobblestoning  Eyes:      General:         Right eye: No discharge.         Left eye: No discharge.      Extraocular Movements: Extraocular movements intact.      Conjunctiva/sclera: Conjunctivae normal.      Pupils: Pupils are equal, round, and reactive to light.   Cardiovascular:      Rate and Rhythm: Normal rate and regular rhythm.      Heart sounds: No murmur heard.  Pulmonary:      Effort: Pulmonary effort is normal. No respiratory distress.      Breath sounds: Normal breath sounds. No wheezing or rhonchi.   Abdominal:      General: Bowel sounds are normal. There is no distension.      Palpations: Abdomen is soft. There is no mass.      Tenderness: There is no abdominal tenderness.   Musculoskeletal:         General: Normal range of motion.      Cervical back: Normal range of " motion and neck supple.   Lymphadenopathy:      Cervical: Cervical adenopathy present.   Skin:     General: Skin is warm and dry.      Capillary Refill: Capillary refill takes less than 2 seconds.      Findings: No rash.   Neurological:      General: No focal deficit present.      Mental Status: She is alert and oriented to person, place, and time.   Psychiatric:         Mood and Affect: Mood normal.         Behavior: Behavior normal.          Result Review :   The following data was reviewed by: Zandra Rios MD on 11/18/2022:  CMP    CMP 3/3/22 4/25/22 11/7/22   Glucose 104 (A) CANCELED 81   BUN 18 22 (A) 17   Creatinine 0.84 0.88 0.73   Sodium 137 140 141   Potassium 3.6 CANCELED 4.6   Chloride 102 102 104   Calcium 9.5 9.5 9.1   Total Protein  6.8 6.2   Albumin 4.30 4.3 3.80   Globulin  2.5 2.4   Total Bilirubin 0.3 0.3 0.6   Alkaline Phosphatase 45 55 45   AST (SGOT) 22 22 15   ALT (SGPT) 42 (A) 39 (A) 22   (A) Abnormal value       Comments are available for some flowsheets but are not being displayed.           BMP    BMP 3/3/22 4/25/22 11/7/22   BUN 18 22 (A) 17   Creatinine 0.84 0.88 0.73   Sodium 137 140 141   Potassium 3.6 CANCELED 4.6   Chloride 102 102 104   CO2 25.0 19 (A) 28.6   Calcium 9.5 9.5 9.1   (A) Abnormal value       Comments are available for some flowsheets but are not being displayed.           Covid-19 + Flu A&B AG, Veritor (11/18/2022 14:05)- POSITIVE FOR COVID       Assessment and Plan {CC Problem List  Visit Diagnosis  ROS  Review (Popup)  Health Maintenance  Quality  BestPractice  Medications  SmartSets  SnapShot Encounters  Media :23}     Diagnoses and all orders for this visit:    1. COVID (Primary)  - symptoms started 11/16, positive test 11/18  - discussed paxlovid, risks vs benefits, EUA status of medication--> medication sent   - Drug interactions reviewed: decrease buspar dose to 5 mg daily, no relpax while on paxlovid  - pt to remain isolated from family as much  as possible, masking in home when contact is necessary  - plan to isolate for 5 days from onset of symptoms and 24 hours symptom free, return to work/school date tentatively set at 11/21, this should then be followed by 5 days of strict masking both in home and in any other context  - household members should all quarantine for 10 days from last exposure to known contact  - ok for symptom treatment with any OTC meds including tylenol, ibuprofen, decongestants, cough medicines, etc  - reviewed red flag symptoms and home oxygen/HR monitoring, when to call back for further instructions, when to proceed to ER for further evaluation  - RTC or call back if worsening or if any concerns for complications or secondary infections       Orders:  -     Covid-19 + Flu A&B AG, Veritor  -     Nirmatrelvir&Ritonavir 300/100 (PAXLOVID) 20 x 150 MG & 10 x 100MG tablet therapy pack tablet; Take 3 tablets by mouth 2 (Two) Times a Day for 5 days.  Dispense: 30 tablet; Refill: 0      Follow Up   Return if symptoms worsen or fail to improve.    Patient was given instructions and counseling regarding her condition or for health maintenance advice. Please see specific information pulled into the AVS if appropriate.     Yessy Rios MD  [facility] [place] [facility]  Phone: [phone]  Fax: [phone]

## 2022-11-19 ENCOUNTER — PATIENT MESSAGE (OUTPATIENT)
Dept: INTERNAL MEDICINE | Facility: CLINIC | Age: 37
End: 2022-11-19

## 2022-11-21 RX ORDER — MELOXICAM 15 MG/1
TABLET ORAL
Qty: 30 TABLET | Refills: 2 | Status: SHIPPED | OUTPATIENT
Start: 2022-11-21 | End: 2023-03-27

## 2022-11-21 NOTE — TELEPHONE ENCOUNTER
Rx Refill Note  Requested Prescriptions     Pending Prescriptions Disp Refills    meloxicam (MOBIC) 15 MG tablet [Pharmacy Med Name: MELOXICAM 15 MG TABLET] 30 tablet      Sig: TAKE ONE TABLET BY MOUTH DAILY      Last office visit with prescribing clinician: 10/27/2022      Next office visit with prescribing clinician: Visit date not found   Last Filled:10.25.2022    DX:  1. Right knee pain, unspecified chronicity    2. Chondromalacia of knee, right    3. Primary osteoarthritis of left knee    4. S/P ACL reconstruction           Lou Varma MA  11/21/22, 09:52 EST    {TIP  Encounters:    {TIP  Please add Last Relevant Lab Date if appropriate:  {TIP  Is Refill Pharmacy correct?:

## 2022-11-22 NOTE — TELEPHONE ENCOUNTER
From: Valery Aguilar  To: Zandra Rios MD  Sent: 11/19/2022 10:28 AM EST  Subject: Isolation     Hey Dr Rios I just wanted to check with you again and make sure I understand the rules as far as traveling goes? I should be done with isolation on Monday or Tuesday? And I should be ok to stay in the same house as everyone as long as I have a mask on and eat in another room or outside? Talat also tested positive with a home test and his symptoms started the same day as me! So it would be both of us! I am still worried I am going to contaminate everyone we are supposed to stay with! And what symptoms do you need to be free of completely before you can come out of isolation but still wear a mask around everyone? I know that fever is one of them but I haven’t ran a fever at all! My  is 3 days ahead of me so he should be good to leave the house now but can he get it again from one of us that is still in the most severe time of being contagious? I just want to make sure I do everything correctly? Before I leave for my trip! Thanks for helping me out with everything!

## 2022-12-02 ENCOUNTER — HOSPITAL ENCOUNTER (OUTPATIENT)
Dept: PHYSICAL THERAPY | Facility: HOSPITAL | Age: 37
Setting detail: THERAPIES SERIES
Discharge: HOME | End: 2022-12-02

## 2022-12-02 DIAGNOSIS — S92.355A CLOSED NONDISPLACED FRACTURE OF FIFTH METATARSAL BONE OF LEFT FOOT, INITIAL ENCOUNTER: Primary | ICD-10-CM

## 2022-12-02 PROCEDURE — 97161 PT EVAL LOW COMPLEX 20 MIN: CPT

## 2022-12-02 NOTE — THERAPY EVALUATION
Outpatient Physical Therapy Ortho Initial Evaluation   Angelina Werner     Patient Name: Valery Aguilar  : 1985  MRN: [id number]  Today's Date: 2022      Visit Date: 2022    Patient Active Problem List   Diagnosis   • Chronic migraine without aura without status migrainosus, not intractable   • Cervicogenic headache   • Secondary oligomenorrhea   • Family history of breast cancer   • Obstructive sleep apnea, adult   • GERD with apnea   • Vitamin D deficiency   • PCOS (polycystic ovarian syndrome)   • Panic disorder   • Insulin resistance   • Essential hypertension   • GERD (gastroesophageal reflux disease)   • Anxiety and depression   • Tendonitis, Achilles, left   • S/P ACL reconstruction   • Primary osteoarthritis of left knee   • ACL graft tear, sequela   • Morbid (severe) obesity due to excess calories (HCC)   • Seizure (HCC)   • Moderate mixed hyperlipidemia not requiring statin therapy        Past Medical History:   Diagnosis Date   • Abdominal pain    • Anxiety    • Anxiety and depression 3/8/2021   • Arthritis     KNEE   • Chronic sinusitis, unspecified    • Chronic wound infection of abdomen 2019    Added automatically from request for surgery [id number]   • Depression    • GERD (gastroesophageal reflux disease)    • H/O echocardiogram      EF 56% NORMAL DIASTOLIC FUNCTION NO VALVULAR DISEASE   • H/O exercise stress test     REPORTEDLY NORMAL DONE FOR CHEST PAIN DX WITH ANXIETY   • Headache, tension-type    • History of Holter monitoring     REPORTEDLY NORMAL DONE FOR CHEST PAIN DX WITH ANXIETY   • History of MRI of brain and brain stem 2017    NORMAL DONE FOR MIGRAINES   • Hypertension     PRE-ECLAMPSIA WITH BOTH CHILDREN   • Incisional hernia     SCHEDULED FOR REPAIR   • Incisional hernia, without obstruction or gangrene 10/24/2018    Added automatically from request for surgery [id number]   • Infection following a procedure, unspecified, initial encounter    • Insulin  resistance     D/T PCOS   • Lobar pneumonia, unspecified organism (HCC)    • Meralgia paresthetica, left lower limb    • Metabolic syndrome    • Migraine    • Moderate mixed hyperlipidemia not requiring statin therapy 2022   • MRSA (methicillin resistant Staphylococcus aureus) infection 2019    Abdominal wound   • Nasal congestion    • Other injury of unspecified body region, initial encounter    • Panic attack    • Panic disorder    • Papanicolaou smear 2020    NORMAL WITH NEG HPV NEVER ABN   • PCOS (polycystic ovarian syndrome)    • Pneumonia 2018   • PONV (postoperative nausea and vomiting)    • Right lower quadrant abdominal pain 2019   • Seasonal allergies    • Seizure (HCC)    • Spinal headache    • Unspecified contact dermatitis, unspecified cause    • Viral infection     UNSPECIFIED   • Vitamin D deficiency    • Wound infection 2018        Past Surgical History:   Procedure Laterality Date   • ABDOMINAL WALL ABSCESS INCISION AND DRAINAGE N/A 2018    Procedure: Debridement of abdominal wall;  Surgeon: Brigido Navarrete MD;  Location: [facility] OR;  Service: General   • ADENOIDECTOMY     •  SECTION      X2   • CHOLECYSTECTOMY      LAP   • HERNIA REPAIR     • HX OVARIAN CYSTECTOMY     • INCISION AND DRAINAGE TRUNK N/A 2018    Procedure: wound debridement, abdomen;  Surgeon: Rachel Dolan MD;  Location: [facility] OR;  Service: General   • INCISION AND DRAINAGE TRUNK N/A 2019    Procedure: WOUND CLOSURE ABDOMINAL;  Surgeon: Rachel Dolan MD;  Location: [facility] OR;  Service: General   • KNEE ACL RECONSTRUCTION Left     DR. CARRASCO   • OVARIAN CYST REMOVAL      EX LAP WITH OVARIAN CYST REMOVAL HERNIA REPAIR    • TONSILLECTOMY      T&A   • TRUNK DEBRIDEMENT N/A 2018    Procedure: Abdominal wound debridement;  Surgeon: Rachel Dolan MD;  Location: [facility] OR;  Service: General   • VENTRAL/INCISIONAL HERNIA REPAIR N/A 11/15/2018    Procedure:  Lateral Component Separation Herniorrhapy Repair with Mesh, Lysis of adhesions, and skin lesion excision of Right Side;  Surgeon: Rachel Dolan MD;  Location: [facility];  Service: General       Visit Dx:     ICD-10-CM ICD-9-CM   1. Closed nondisplaced fracture of fifth metatarsal bone of left foot, initial encounter  S92.355A 825.25          Patient History     Row Name 12/02/22 0600             History    Chief Complaint Difficulty Walking;Difficulty with daily activities;Joint stiffness;Joint swelling;Muscle tenderness;Muscle weakness;Pain;Swelling;Tightness  -AS      Type of Pain Foot pain  -AS      Brief Description of Current Complaint Patients states she fractured her foot back in July but has had difficulty healing. She states she has been in a walking boot for months and does use it at times if she is walking long distances. She reports she is using a bone stimulator 3 hrs per day. She is scheduled to see her MD next week with an x-ray to assess her healing. She states she really is not in any pain.  -AS      Patient/Caregiver Goals Relieve pain;Return to prior level of function;Improve mobility;Improve strength  -AS      Patient's Rating of General Health Fair  -AS      Occupation/sports/leisure activities [employer], [employer]  -AS      Patient seeing anyone else for problem(s)? Alcides Mazariegos DPM  -AS      How has patient tried to help current problem? rest, boot, medication  -AS      What clinical tests have you had for this problem? X-ray  -AS      Results of Clinical Tests fx of 5th metatarsal on left foot  -AS         Pain     Pain Location Foot  -AS      Pain at Present 0  -AS      Pain at Best 0  -AS      Pain at Worst 4  -AS      Pain Frequency Intermittent  -AS      Pain Description Aching  -AS      What Performance Factors Make the Current Problem(s) WORSE? Activity  -AS      What Performance Factors Make the Current Problem(s) BETTER? Rest  -AS         Daily Activities    Primary Language English   -AS      Are you able to read Yes  -AS      Are you able to write Yes  -AS      How does patient learn best? Listening;Reading;Demonstration  -AS      Teaching needs identified Home Exercise Program;Management of Condition  -AS      Patient is concerned about/has problems with Flexibility;Performing home management (household chores, shopping, care of dependents);Performing job responsibilities/community activities (work, school,;Performing sports, recreation, and play activities;Walking;Standing  -AS      Does patient have problems with the following? None  -AS      Barriers to learning None  -AS      Pt Participated in POC and Goals Yes  -AS         Safety    Are you being hurt, hit, or frightened by anyone at home or in your life? No  -AS      Are you being neglected by a caregiver No  -AS            User Key  (r) = Recorded By, (t) = Taken By, (c) = Cosigned By    Initials Name Provider Type    AS Franklin Adler, PT Physical Therapist                 PT Ortho     Row Name 12/02/22 0600       Precautions and Contraindications    Precautions/Limitations no known precautions/limitations  -AS       Subjective Pain    Able to rate subjective pain? yes  -AS    Pre-Treatment Pain Level 0  -AS    Post-Treatment Pain Level 0  -AS       Posture/Observations    Posture- WNL Posture is WNL  -AS    Observations Edema;Muscle atrophy  -AS       Foot/Ankle Palpation    Lateral Foot Left:;Tender  -AS       Left Lower Ext    Lt Ankle Dorsiflexion AROM 6  -AS    Lt Ankle Plantarflexion AROM 56  -AS    Lt Ankle Inversion AROM 40  -AS    Lt Ankle Eversion AROM 8  -AS       MMT Left Lower Ext    Lt Ankle Plantarflexion MMT, Gross Movement (4+/5) good plus  -AS    Lt Ankle Dorsiflexion MMT, Gross Movement (4+/5) good plus  -AS    Lt Ankle Subtalar Inversion MMT, Gross Movement (4-/5) good minus  -AS    Lt Ankle Subtalar Eversion MMT, Gross Movement (4-/5) good minus  -AS       Sensation    Sensation WNL? WNL  -AS    Light  Touch No apparent deficits  -AS       Lower Extremity Flexibility    Gastrocnemius Left:;Moderately limited  -AS    Soleus Left:;Moderately limited  -AS          User Key  (r) = Recorded By, (t) = Taken By, (c) = Cosigned By    Initials Name Provider Type    AS Franklin Adler, PT Physical Therapist                            Therapy Education  Given: HEP, Symptoms/condition management, Pain management, Mobility training, Edema management  Program: New  How Provided: Verbal, Demonstration, Written  Provided to: Patient  Level of Understanding: Teach back education performed, Verbalized, Demonstrated      PT OP Goals     Row Name 12/02/22 0600          PT Short Term Goals    STG Date to Achieve 12/23/22  -AS     STG 1 Patient to demonstrate compliance with her initial HEP for flexibility, ROM and strengthening.  -AS     STG 2 Patient to report left ankle pain on VAS of 4-5/10 at worst with activity.  -AS     STG 3 Patient to demonstrate improved left ankle strength to 4/5 in all planes.  -AS        Long Term Goals    LTG Date to Achieve 01/13/23  -AS     LTG 1 Patient to demonstrate compliance with her advanced HEP for flexibility, ROM and strengthening.  -AS     LTG 2 Patient to report left ankle pain on VAS of 0-1/10 at worst with activity.  -AS     LTG 3 Patient to demonstrate improved left ankle ROM to WNL in all planes.  -AS     LTG 4 Patient to demonstrate improved left ankle strength to 4+/5 in all planes.  -AS     LTG 5 Patient to report improved function on LEFS to 60/80 points.  -AS     LTG 6 Patient to ambulate community distances without a walking boot and a normal gait pattern and speed.  -AS        Time Calculation    PT Goal Re-Cert Due Date 12/30/22  -AS           User Key  (r) = Recorded By, (t) = Taken By, (c) = Cosigned By    Initials Name Provider Type    AS Franklin Adler, PT Physical Therapist                 PT Assessment/Plan     Row Name 12/02/22 0600          PT Assessment     Functional Limitations Impaired gait;Impaired locomotion;Limitation in home management;Limitations in community activities;Performance in leisure activities;Performance in sport activities;Performance in work activities  -AS     Impairments Edema;Gait;Impaired flexibility;Muscle strength;Pain;Range of motion  -AS     Assessment Comments Patient presents to outpatient PT with fx of 5th metatarsal on left foot. Patient is in a regular tennis shoe but does continue to use walking boot when working or walking long distances. Patient has limited left ankle ROM, limited left ankle strength, and increased pain and discomfort with activity. Patient has limited function at this time secondary to the above.  -AS     Please refer to paper survey for additional self-reported information Yes  -AS     Rehab Potential Good  -AS     Patient/caregiver participated in establishment of treatment plan and goals Yes  -AS     Patient would benefit from skilled therapy intervention Yes  -AS        PT Plan    PT Frequency 1x/week;2x/week  -AS     Predicted Duration of Therapy Intervention (PT) 4-6 weeks  -AS     Planned CPT's? PT RE-EVAL: 57817;PT THER PROC EA 15 MIN: 34286;PT THER ACT EA 15 MIN: 61270;PT MANUAL THERAPY EA 15 MIN: 76554;PT NEUROMUSC RE-EDUCATION EA 15 MIN: 25816;PT GAIT TRAINING EA 15 MIN: 72010  -AS           User Key  (r) = Recorded By, (t) = Taken By, (c) = Cosigned By    Initials Name Provider Type    AS Franklin Adler, PT Physical Therapist                   OP Exercises     Row Name 12/02/22 0600             Subjective Pain    Able to rate subjective pain? yes  -AS      Pre-Treatment Pain Level 0  -AS      Post-Treatment Pain Level 0  -AS         Exercise 1    Exercise Name 1 NWB Gastroc/Soleus Stretch  -AS      Reps 1 10  -AS      Time 1 10 sec hold each  -AS         Exercise 2    Exercise Name 2 --  -AS      Reps 2 --  -AS         Exercise 3    Exercise Name 3 Ankle INV/EV vs Band  -AS      Reps 3 25 each   -AS      Time 3 Green  -AS         Exercise 4    Exercise Name 4 Ankle DF/PF vs Band  -AS      Reps 4 25 each  -AS      Time 4 Silver  -AS         Exercise 5    Exercise Name 5 Pro Stretch  -AS      Time 5 3 min  -AS         Exercise 6    Exercise Name 6 --  -AS         Exercise 7    Exercise Name 7 Towel Curls  -AS      Time 7 5 min  -AS         Exercise 8    Exercise Name 8 DL Heel Raises  -AS      Reps 8 25  -AS         Exercise 9    Exercise Name 9 SL Stance on Floor  -AS      Reps 9 3  -AS      Time 9 1 min each  -AS         Exercise 10    Exercise Name 10 WBing Gastroc/Soleus Stretch  -AS            User Key  (r) = Recorded By, (t) = Taken By, (c) = Cosigned By    Initials Name Provider Type    AS Franklin Adler, PT Physical Therapist                              Outcome Measure Options: Lower Extremity Functional Scale (LEFS)  Lower Extremity Functional Index  Any of your usual work, housework or school activities: Moderate difficulty  Your usual hobbies, recreational or sporting activities: Quite a bit of difficulty  Getting into or out of the bath: Moderate difficulty  Walking between rooms: Moderate difficulty  Putting on your shoes or socks: A little bit of difficulty  Squatting: Moderate difficulty  Lifting an object, like a bag of groceries from the floor: A little bit of difficulty  Performing light activities around your home: No difficulty  Performing heavy activities around your home: Quite a bit of difficulty  Getting into or out of a car: No difficulty  Walking 2 blocks: Quite a bit of difficulty  Walking a mile: Quite a bit of difficulty  Going up or down 10 stairs (about 1 flight of stairs): Quite a bit of difficulty  Standing for 1 hour: Quite a bit of difficulty  Sitting for 1 hour: No difficulty  Running on even ground: Extreme difficulty or unable to perform activity  Running on uneven ground: Extreme difficulty or unable to perform activity  Making sharp turns while running fast:  Extreme difficulty or unable to perform activity  Hopping: Extreme difficulty or unable to perform activity  Rolling over in bed: No difficulty  Total: 36      Time Calculation:     Start Time: 0600  Stop Time: 0658  Time Calculation (min): 58 min     Therapy Charges for Today     Code Description Service Date Service Provider Modifiers Qty    [id number]  PT EVAL LOW COMPLEXITY 4 12/2/2022 Franklin Adler, PT GP 1          PT G-Codes  Outcome Measure Options: Lower Extremity Functional Scale (LEFS)  Total: 36         Franklin Adler, PT  12/2/2022

## 2022-12-07 ENCOUNTER — HOSPITAL ENCOUNTER (OUTPATIENT)
Dept: GENERAL RADIOLOGY | Facility: HOSPITAL | Age: 37
Discharge: HOME | End: 2022-12-07
Admitting: STUDENT IN AN ORGANIZED HEALTH CARE EDUCATION/TRAINING PROGRAM

## 2022-12-07 DIAGNOSIS — S92.352K CLOSED DISPLACED FRACTURE OF FIFTH METATARSAL BONE OF LEFT FOOT WITH NONUNION: ICD-10-CM

## 2022-12-07 PROCEDURE — 73630 X-RAY EXAM OF FOOT: CPT

## 2022-12-09 ENCOUNTER — HOSPITAL ENCOUNTER (OUTPATIENT)
Dept: PHYSICAL THERAPY | Facility: HOSPITAL | Age: 37
Setting detail: THERAPIES SERIES
Discharge: HOME | End: 2022-12-09

## 2022-12-09 DIAGNOSIS — J01.40 ACUTE NON-RECURRENT PANSINUSITIS: ICD-10-CM

## 2022-12-09 DIAGNOSIS — S92.355A CLOSED NONDISPLACED FRACTURE OF FIFTH METATARSAL BONE OF LEFT FOOT, INITIAL ENCOUNTER: Primary | ICD-10-CM

## 2022-12-09 PROCEDURE — 97110 THERAPEUTIC EXERCISES: CPT

## 2022-12-09 RX ORDER — AZELASTINE HYDROCHLORIDE 137 UG/1
SPRAY, METERED NASAL
Qty: 30 ML | Refills: 2 | Status: SHIPPED | OUTPATIENT
Start: 2022-12-09 | End: 2023-02-21

## 2022-12-09 NOTE — TELEPHONE ENCOUNTER
Rx Refill Note  Requested Prescriptions     Pending Prescriptions Disp Refills   • Azelastine HCl 137 MCG/SPRAY solution [Pharmacy Med Name: AZELASTINE 0.1% (137 MCG) SPRY] 30 mL 2     Sig: INSTILL 2 SPRAYS INTO EACH NOSTRIL TWO TIMES A DAY AS DIRECTED BY PROVIDER      Last office visit with prescribing clinician: 11/18/2022   Last telemedicine visit with prescribing clinician: 12/12/2022   Next office visit with prescribing clinician: 12/12/2022                         Would you like a call back once the refill request has been completed: [] Yes [] No    If the office needs to give you a call back, can they leave a voicemail: [] Yes [] No    Melissa Cortez MA  12/09/22, 07:59 EST

## 2022-12-09 NOTE — THERAPY TREATMENT NOTE
Outpatient Physical Therapy Ortho Treatment Note  HEBER Casillas     Patient Name: Valery Aguilar  : 1985  MRN: [id number]  Today's Date: 2022      Visit Date: 2022    Visit Dx:    ICD-10-CM ICD-9-CM   1. Closed nondisplaced fracture of fifth metatarsal bone of left foot, initial encounter  S92.355A 825.25       Patient Active Problem List   Diagnosis   • Chronic migraine without aura without status migrainosus, not intractable   • Cervicogenic headache   • Secondary oligomenorrhea   • Family history of breast cancer   • Obstructive sleep apnea, adult   • GERD with apnea   • Vitamin D deficiency   • PCOS (polycystic ovarian syndrome)   • Panic disorder   • Insulin resistance   • Essential hypertension   • GERD (gastroesophageal reflux disease)   • Anxiety and depression   • Tendonitis, Achilles, left   • S/P ACL reconstruction   • Primary osteoarthritis of left knee   • ACL graft tear, sequela   • Morbid (severe) obesity due to excess calories (HCC)   • Seizure (HCC)   • Moderate mixed hyperlipidemia not requiring statin therapy        Past Medical History:   Diagnosis Date   • Abdominal pain    • Anxiety    • Anxiety and depression 3/8/2021   • Arthritis     KNEE   • Chronic sinusitis, unspecified    • Chronic wound infection of abdomen 2019    Added automatically from request for surgery [id number]   • Depression    • GERD (gastroesophageal reflux disease)    • H/O echocardiogram      EF 56% NORMAL DIASTOLIC FUNCTION NO VALVULAR DISEASE   • H/O exercise stress test     REPORTEDLY NORMAL DONE FOR CHEST PAIN DX WITH ANXIETY   • Headache, tension-type    • History of Holter monitoring     REPORTEDLY NORMAL DONE FOR CHEST PAIN DX WITH ANXIETY   • History of MRI of brain and brain stem 2017    NORMAL DONE FOR MIGRAINES   • Hypertension     PRE-ECLAMPSIA WITH BOTH CHILDREN   • Incisional hernia     SCHEDULED FOR REPAIR   • Incisional hernia, without obstruction or gangrene 10/24/2018     Added automatically from request for surgery [id number]   • Infection following a procedure, unspecified, initial encounter    • Insulin resistance     D/T PCOS   • Lobar pneumonia, unspecified organism (HCC)    • Meralgia paresthetica, left lower limb    • Metabolic syndrome    • Migraine    • Moderate mixed hyperlipidemia not requiring statin therapy 2022   • MRSA (methicillin resistant Staphylococcus aureus) infection 2019    Abdominal wound   • Nasal congestion    • Other injury of unspecified body region, initial encounter    • Panic attack    • Panic disorder    • Papanicolaou smear 2020    NORMAL WITH NEG HPV NEVER ABN   • PCOS (polycystic ovarian syndrome)    • Pneumonia 2018   • PONV (postoperative nausea and vomiting)    • Right lower quadrant abdominal pain 2019   • Seasonal allergies    • Seizure (HCC)    • Spinal headache    • Unspecified contact dermatitis, unspecified cause    • Viral infection     UNSPECIFIED   • Vitamin D deficiency    • Wound infection 2018        Past Surgical History:   Procedure Laterality Date   • ABDOMINAL WALL ABSCESS INCISION AND DRAINAGE N/A 2018    Procedure: Debridement of abdominal wall;  Surgeon: Brigido Navarrete MD;  Location: [facility] OR;  Service: General   • ADENOIDECTOMY     •  SECTION      X2   • CHOLECYSTECTOMY      LAP   • HERNIA REPAIR     • HX OVARIAN CYSTECTOMY     • INCISION AND DRAINAGE TRUNK N/A 2018    Procedure: wound debridement, abdomen;  Surgeon: Rachel Dolan MD;  Location: [facility] OR;  Service: General   • INCISION AND DRAINAGE TRUNK N/A 2019    Procedure: WOUND CLOSURE ABDOMINAL;  Surgeon: Rachel Dolan MD;  Location: [facility] OR;  Service: General   • KNEE ACL RECONSTRUCTION Left     DR. CARRASCO   • OVARIAN CYST REMOVAL      EX LAP WITH OVARIAN CYST REMOVAL HERNIA REPAIR    • TONSILLECTOMY      T&A   • TRUNK DEBRIDEMENT N/A 2018    Procedure: Abdominal wound debridement;   "Surgeon: Rachel Dolan MD;  Location:  [facility] OR;  Service: General   • VENTRAL/INCISIONAL HERNIA REPAIR N/A 11/15/2018    Procedure: Lateral Component Separation Herniorrhapy Repair with Mesh, Lysis of adhesions, and skin lesion excision of Right Side;  Surgeon: Rachel Dolan MD;  Location:  [facility] OR;  Service: General                        PT Assessment/Plan     Row Name 12/09/22 0600          PT Assessment    Assessment Comments Progressed patient with ROM and strengthening exercises today and she tolerated this well.  -AS        PT Plan    PT Plan Comments Continue with current treatment plan.  -AS           User Key  (r) = Recorded By, (t) = Taken By, (c) = Cosigned By    Initials Name Provider Type    AS Franklin Adler, PT Physical Therapist                   OP Exercises     Row Name 12/09/22 0703 12/09/22 0600          Subjective Comments    Subjective Comments -- Patient states she is doing well. She states her foot and ankle is a little sore \"but not too bad\".  -AS        Total Minutes    23965 - PT Therapeutic Exercise Minutes 35  -AS --        Exercise 1    Exercise Name 1 -- NWB Gastroc/Soleus Stretch  -AS     Reps 1 -- 10  -AS     Time 1 -- 10 sec hold each  -AS        Exercise 3    Exercise Name 3 -- Ankle INV/EV vs Band  -AS     Reps 3 -- 30 each  -AS     Time 3 -- Green  -AS        Exercise 4    Exercise Name 4 -- Ankle DF/PF vs Band  -AS     Reps 4 -- 30 each  -AS     Time 4 -- Silver  -AS        Exercise 5    Exercise Name 5 -- Pro Stretch  -AS     Time 5 -- 3 min  -AS        Exercise 7    Exercise Name 7 -- Towel Curls  -AS     Time 7 -- 5 min  -AS     Additional Comments -- 1#  -AS        Exercise 8    Exercise Name 8 -- DL Heel Raises  -AS     Reps 8 -- 30  -AS        Exercise 9    Exercise Name 9 -- SL Stance on Floor  -AS     Reps 9 -- 3  -AS     Time 9 -- 1 min each  -AS        Exercise 10    Exercise Name 10 -- WBing Gastroc/Soleus Stretch  -AS     Reps 10 -- 1  -AS     " Time 10 -- 1 min each  -AS           User Key  (r) = Recorded By, (t) = Taken By, (c) = Cosigned By    Initials Name Provider Type    AS Franklin Adler, PT Physical Therapist                                                Time Calculation:   Start Time: 0628  Stop Time: 0703  Time Calculation (min): 35 min  Timed Charges  92405 - PT Therapeutic Exercise Minutes: 35  Total Minutes  Timed Charges Total Minutes: 35   Total Minutes: 35  Therapy Charges for Today     Code Description Service Date Service Provider Modifiers Qty    [id number]  PT THER PROC EA 15 MIN 12/9/2022 Franklin Adler, PT GP 2                    Franklin Adler, PT  12/9/2022

## 2022-12-12 ENCOUNTER — OFFICE VISIT (OUTPATIENT)
Dept: INTERNAL MEDICINE | Facility: CLINIC | Age: 37
End: 2022-12-12

## 2022-12-12 VITALS
DIASTOLIC BLOOD PRESSURE: 76 MMHG | SYSTOLIC BLOOD PRESSURE: 122 MMHG | HEIGHT: 64 IN | BODY MASS INDEX: 48.23 KG/M2 | HEART RATE: 88 BPM | TEMPERATURE: 95 F | OXYGEN SATURATION: 96 % | RESPIRATION RATE: 16 BRPM

## 2022-12-12 DIAGNOSIS — F32.A ANXIETY AND DEPRESSION: Chronic | ICD-10-CM

## 2022-12-12 DIAGNOSIS — I10 ESSENTIAL HYPERTENSION: Primary | Chronic | ICD-10-CM

## 2022-12-12 DIAGNOSIS — G43.709 CHRONIC MIGRAINE WITHOUT AURA WITHOUT STATUS MIGRAINOSUS, NOT INTRACTABLE: Chronic | ICD-10-CM

## 2022-12-12 DIAGNOSIS — F41.9 ANXIETY AND DEPRESSION: Chronic | ICD-10-CM

## 2022-12-12 PROCEDURE — 99214 OFFICE O/P EST MOD 30 MIN: CPT | Performed by: INTERNAL MEDICINE

## 2022-12-12 RX ORDER — PROPRANOLOL HYDROCHLORIDE 80 MG/1
80 CAPSULE, EXTENDED RELEASE ORAL DAILY
Qty: 90 CAPSULE | Refills: 1 | Status: SHIPPED | OUTPATIENT
Start: 2022-12-12

## 2022-12-12 NOTE — ASSESSMENT & PLAN NOTE
IMPROVING- STABLE  - cont on sertraline 200 mg  - cont buspar dose to 15 mg BID-TID today for ongoing improvements  - have recently added propranolol for migraines which has lessened panic attacks, will cont  - Reviewed potential side effects of medication including sexual performance changes, insomnia, fatigue, weight changes. Pt tolerating well without any issues.  - has lorazepam for prn use, not needing routinely at this time, use has actually decreased since adding propranolol for migraines  - Reviewed controlled nature of substance, potential for abuse/addiction, and possible adverse effects of medication. No red flags for abuse at this time.   - Controlled substances contract signed and in chart.   - Hang checked and appropriate.   - Meds previously tried: wellbutrin (seizure)

## 2022-12-12 NOTE — ASSESSMENT & PLAN NOTE
IMPROVING  - cont nortripyline nightly  - cont gabapentin 600 mg BID, refills not needed yet  - recently added propranolol and has seen improvement in migraine frequency since adding this, will continue but increase dose slightly to 80 mg to continue working on less frequent headaches but also see if we can decrease palpitaitons  - trialed eletriptan which does make her more sedated but is effective, has had this transient effect with each triptan she has tried in the past  - avoid migraine triggers, get enough sleep, improve hydration

## 2022-12-15 RX ORDER — METFORMIN HYDROCHLORIDE 500 MG/1
TABLET, EXTENDED RELEASE ORAL
Qty: 180 TABLET | Refills: 1 | Status: SHIPPED | OUTPATIENT
Start: 2022-12-15

## 2022-12-15 RX ORDER — NORTRIPTYLINE HYDROCHLORIDE 50 MG/1
CAPSULE ORAL
Qty: 180 CAPSULE | Refills: 1 | Status: SHIPPED | OUTPATIENT
Start: 2022-12-15 | End: 2023-03-27

## 2022-12-15 NOTE — TELEPHONE ENCOUNTER
Rx Refill Note  Requested Prescriptions     Pending Prescriptions Disp Refills   • nortriptyline (PAMELOR) 50 MG capsule [Pharmacy Med Name: NORTRIPTYLINE HCL 50 MG CAP] 180 capsule 1     Sig: TAKE TWO CAPSULES BY MOUTH ONCE NIGHTLY   • metFORMIN ER (GLUCOPHAGE-XR) 500 MG 24 hr tablet [Pharmacy Med Name: METFORMIN HCL  MG TABLET] 180 tablet 1     Sig: TAKE TWO TABLETS BY MOUTH EVERY MORNING      Last office visit with prescribing clinician: 12/12/2022   Last telemedicine visit with prescribing clinician: 3/13/2023   Next office visit with prescribing clinician: 3/13/2023                         Would you like a call back once the refill request has been completed: [] Yes [] No    If the office needs to give you a call back, can they leave a voicemail: [] Yes [] No    Melissa Cortez MA  12/15/22, 08:19 EST

## 2022-12-16 ENCOUNTER — HOSPITAL ENCOUNTER (OUTPATIENT)
Dept: PHYSICAL THERAPY | Facility: HOSPITAL | Age: 37
Setting detail: THERAPIES SERIES
Discharge: HOME | End: 2022-12-16

## 2022-12-16 DIAGNOSIS — S92.355A CLOSED NONDISPLACED FRACTURE OF FIFTH METATARSAL BONE OF LEFT FOOT, INITIAL ENCOUNTER: Primary | ICD-10-CM

## 2022-12-16 PROCEDURE — 97110 THERAPEUTIC EXERCISES: CPT

## 2022-12-16 NOTE — THERAPY TREATMENT NOTE
Outpatient Physical Therapy Ortho Treatment Note  HEBER Casillas     Patient Name: Valery Aguilar  : 1985  MRN: [id number]  Today's Date: 2022      Visit Date: 2022    Visit Dx:    ICD-10-CM ICD-9-CM   1. Closed nondisplaced fracture of fifth metatarsal bone of left foot, initial encounter  S92.355A 825.25       Patient Active Problem List   Diagnosis   • Chronic migraine without aura without status migrainosus, not intractable   • Cervicogenic headache   • Secondary oligomenorrhea   • Family history of breast cancer   • Obstructive sleep apnea, adult   • GERD with apnea   • Vitamin D deficiency   • PCOS (polycystic ovarian syndrome)   • Panic disorder   • Insulin resistance   • Essential hypertension   • GERD (gastroesophageal reflux disease)   • Anxiety and depression   • Tendonitis, Achilles, left   • S/P ACL reconstruction   • Primary osteoarthritis of left knee   • ACL graft tear, sequela   • Morbid (severe) obesity due to excess calories (HCC)   • Seizure (HCC)   • Moderate mixed hyperlipidemia not requiring statin therapy        Past Medical History:   Diagnosis Date   • Abdominal pain    • Anxiety    • Anxiety and depression 3/8/2021   • Arthritis     KNEE   • Chronic sinusitis, unspecified    • Chronic wound infection of abdomen 2019    Added automatically from request for surgery [id number]   • Depression    • GERD (gastroesophageal reflux disease)    • H/O echocardiogram      EF 56% NORMAL DIASTOLIC FUNCTION NO VALVULAR DISEASE   • H/O exercise stress test     REPORTEDLY NORMAL DONE FOR CHEST PAIN DX WITH ANXIETY   • Headache, tension-type    • History of Holter monitoring     REPORTEDLY NORMAL DONE FOR CHEST PAIN DX WITH ANXIETY   • History of MRI of brain and brain stem 2017    NORMAL DONE FOR MIGRAINES   • Hypertension     PRE-ECLAMPSIA WITH BOTH CHILDREN   • Incisional hernia     SCHEDULED FOR REPAIR   • Incisional hernia, without obstruction or gangrene 10/24/2018     Added automatically from request for surgery [id number]   • Infection following a procedure, unspecified, initial encounter    • Insulin resistance     D/T PCOS   • Lobar pneumonia, unspecified organism (HCC)    • Meralgia paresthetica, left lower limb    • Metabolic syndrome    • Migraine    • Moderate mixed hyperlipidemia not requiring statin therapy 2022   • MRSA (methicillin resistant Staphylococcus aureus) infection 2019    Abdominal wound   • Nasal congestion    • Other injury of unspecified body region, initial encounter    • Panic attack    • Panic disorder    • Papanicolaou smear 2020    NORMAL WITH NEG HPV NEVER ABN   • PCOS (polycystic ovarian syndrome)    • Pneumonia 2018   • PONV (postoperative nausea and vomiting)    • Right lower quadrant abdominal pain 2019   • Seasonal allergies    • Seizure (HCC)    • Spinal headache    • Unspecified contact dermatitis, unspecified cause    • Viral infection     UNSPECIFIED   • Vitamin D deficiency    • Wound infection 2018        Past Surgical History:   Procedure Laterality Date   • ABDOMINAL WALL ABSCESS INCISION AND DRAINAGE N/A 2018    Procedure: Debridement of abdominal wall;  Surgeon: Brigido Navarrete MD;  Location: [facility] OR;  Service: General   • ADENOIDECTOMY     •  SECTION      X2   • CHOLECYSTECTOMY      LAP   • HERNIA REPAIR     • HX OVARIAN CYSTECTOMY     • INCISION AND DRAINAGE TRUNK N/A 2018    Procedure: wound debridement, abdomen;  Surgeon: Rachel Dolan MD;  Location: [facility] OR;  Service: General   • INCISION AND DRAINAGE TRUNK N/A 2019    Procedure: WOUND CLOSURE ABDOMINAL;  Surgeon: Rachel Dolan MD;  Location: [facility] OR;  Service: General   • KNEE ACL RECONSTRUCTION Left     DR. CARRASCO   • OVARIAN CYST REMOVAL      EX LAP WITH OVARIAN CYST REMOVAL HERNIA REPAIR    • TONSILLECTOMY      T&A   • TRUNK DEBRIDEMENT N/A 2018    Procedure: Abdominal wound debridement;   Surgeon: Rachel Dolan MD;  Location:  [facility] OR;  Service: General   • VENTRAL/INCISIONAL HERNIA REPAIR N/A 11/15/2018    Procedure: Lateral Component Separation Herniorrhapy Repair with Mesh, Lysis of adhesions, and skin lesion excision of Right Side;  Surgeon: Rachel Dolan MD;  Location:  [facility] OR;  Service: General                        PT Assessment/Plan     Row Name 12/16/22 0600          PT Assessment    Assessment Comments Progressed patient with OKC and CKC strengthening exercises today and she tolerated this well.  -AS        PT Plan    PT Plan Comments Continue with current treatment plan.  -AS           User Key  (r) = Recorded By, (t) = Taken By, (c) = Cosigned By    Initials Name Provider Type    AS Franklin Adler, PT Physical Therapist                   OP Exercises     Row Name 12/16/22 0656 12/16/22 0600          Subjective Comments    Subjective Comments -- Patient states her left foot is sore at times but states she is doing better.  -AS        Total Minutes    03807 - PT Therapeutic Exercise Minutes 35  -AS --        Exercise 1    Exercise Name 1 -- NWB Gastroc/Soleus Stretch  -AS     Reps 1 -- 10  -AS     Time 1 -- 10 sec hold each  -AS        Exercise 3    Exercise Name 3 -- Ankle INV/EV vs Band  -AS     Reps 3 -- 25 each  -AS     Time 3 -- Blue  -AS        Exercise 4    Exercise Name 4 -- Ankle DF/PF vs Band  -AS     Reps 4 -- 25 each  -AS     Time 4 -- Gold  -AS        Exercise 5    Exercise Name 5 -- Pro Stretch  -AS     Time 5 -- 3 min  -AS        Exercise 7    Exercise Name 7 -- Towel Curls  -AS     Time 7 -- 5 min  -AS     Additional Comments -- 1#  -AS        Exercise 8    Exercise Name 8 -- DL Heel Raises  -AS     Reps 8 -- 40  -AS        Exercise 9    Exercise Name 9 -- SL Stance on Floor  -AS     Reps 9 -- 3  -AS     Time 9 -- 1 min each  -AS        Exercise 10    Exercise Name 10 -- WBing Gastroc/Soleus Stretch  -AS     Reps 10 -- 1  -AS     Time 10 -- 1 min each   "-AS        Exercise 11    Exercise Name 11 -- Mini Squats  -AS     Reps 11 -- 25  -AS        Exercise 12    Exercise Name 12 -- 6\" FWD Step Ups  -AS     Reps 12 -- 25 each leg  -AS        Exercise 13    Exercise Name 13 -- 6\" Lateral Step Overs  -AS     Reps 13 -- 25  -AS           User Key  (r) = Recorded By, (t) = Taken By, (c) = Cosigned By    Initials Name Provider Type    AS Franklin Adler, PT Physical Therapist                                                Time Calculation:   Start Time: 0619  Stop Time: 0656  Time Calculation (min): 37 min  Timed Charges  58978 - PT Therapeutic Exercise Minutes: 35  Total Minutes  Timed Charges Total Minutes: 35   Total Minutes: 35  Therapy Charges for Today     Code Description Service Date Service Provider Modifiers Qty    [id number]  PT THER PROC EA 15 MIN 12/16/2022 Franklin Adler, PT GP 2                    Franklin Adler, PT  12/16/2022     "

## 2022-12-30 ENCOUNTER — HOSPITAL ENCOUNTER (OUTPATIENT)
Dept: PHYSICAL THERAPY | Facility: HOSPITAL | Age: 37
Setting detail: THERAPIES SERIES
Discharge: HOME | End: 2022-12-30

## 2022-12-30 DIAGNOSIS — S92.355A CLOSED NONDISPLACED FRACTURE OF FIFTH METATARSAL BONE OF LEFT FOOT, INITIAL ENCOUNTER: Primary | ICD-10-CM

## 2022-12-30 PROCEDURE — 97110 THERAPEUTIC EXERCISES: CPT

## 2022-12-30 NOTE — THERAPY TREATMENT NOTE
Outpatient Physical Therapy Ortho Treatment Note  HEBER Casillas     Patient Name: Valery Aguilar  : 1985  MRN: [id number]  Today's Date: 2022      Visit Date: 2022    Visit Dx:    ICD-10-CM ICD-9-CM   1. Closed nondisplaced fracture of fifth metatarsal bone of left foot, initial encounter  S92.355A 825.25       Patient Active Problem List   Diagnosis   • Chronic migraine without aura without status migrainosus, not intractable   • Cervicogenic headache   • Secondary oligomenorrhea   • Family history of breast cancer   • Obstructive sleep apnea, adult   • GERD with apnea   • Vitamin D deficiency   • PCOS (polycystic ovarian syndrome)   • Panic disorder   • Insulin resistance   • Essential hypertension   • GERD (gastroesophageal reflux disease)   • Anxiety and depression   • Tendonitis, Achilles, left   • S/P ACL reconstruction   • Primary osteoarthritis of left knee   • ACL graft tear, sequela   • Morbid (severe) obesity due to excess calories (HCC)   • Seizure (HCC)   • Moderate mixed hyperlipidemia not requiring statin therapy        Past Medical History:   Diagnosis Date   • Abdominal pain    • Anxiety    • Anxiety and depression 3/8/2021   • Arthritis     KNEE   • Chronic sinusitis, unspecified    • Chronic wound infection of abdomen 2019    Added automatically from request for surgery [id number]   • Depression    • GERD (gastroesophageal reflux disease)    • H/O echocardiogram      EF 56% NORMAL DIASTOLIC FUNCTION NO VALVULAR DISEASE   • H/O exercise stress test     REPORTEDLY NORMAL DONE FOR CHEST PAIN DX WITH ANXIETY   • Headache, tension-type    • History of Holter monitoring     REPORTEDLY NORMAL DONE FOR CHEST PAIN DX WITH ANXIETY   • History of MRI of brain and brain stem 2017    NORMAL DONE FOR MIGRAINES   • Hypertension     PRE-ECLAMPSIA WITH BOTH CHILDREN   • Incisional hernia     SCHEDULED FOR REPAIR   • Incisional hernia, without obstruction or gangrene 10/24/2018     Added automatically from request for surgery [id number]   • Infection following a procedure, unspecified, initial encounter    • Insulin resistance     D/T PCOS   • Lobar pneumonia, unspecified organism (HCC)    • Meralgia paresthetica, left lower limb    • Metabolic syndrome    • Migraine    • Moderate mixed hyperlipidemia not requiring statin therapy 2022   • MRSA (methicillin resistant Staphylococcus aureus) infection 2019    Abdominal wound   • Nasal congestion    • Other injury of unspecified body region, initial encounter    • Panic attack    • Panic disorder    • Papanicolaou smear 2020    NORMAL WITH NEG HPV NEVER ABN   • PCOS (polycystic ovarian syndrome)    • Pneumonia 2018   • PONV (postoperative nausea and vomiting)    • Right lower quadrant abdominal pain 2019   • Seasonal allergies    • Seizure (HCC)    • Spinal headache    • Unspecified contact dermatitis, unspecified cause    • Viral infection     UNSPECIFIED   • Vitamin D deficiency    • Wound infection 2018        Past Surgical History:   Procedure Laterality Date   • ABDOMINAL WALL ABSCESS INCISION AND DRAINAGE N/A 2018    Procedure: Debridement of abdominal wall;  Surgeon: Brigido Navarrete MD;  Location: [facility] OR;  Service: General   • ADENOIDECTOMY     •  SECTION      X2   • CHOLECYSTECTOMY      LAP   • HERNIA REPAIR     • HX OVARIAN CYSTECTOMY     • INCISION AND DRAINAGE TRUNK N/A 2018    Procedure: wound debridement, abdomen;  Surgeon: Rachel Dolan MD;  Location: [facility] OR;  Service: General   • INCISION AND DRAINAGE TRUNK N/A 2019    Procedure: WOUND CLOSURE ABDOMINAL;  Surgeon: Rachel Dolan MD;  Location: [facility] OR;  Service: General   • KNEE ACL RECONSTRUCTION Left     DR. CARRASCO   • OVARIAN CYST REMOVAL      EX LAP WITH OVARIAN CYST REMOVAL HERNIA REPAIR    • TONSILLECTOMY      T&A   • TRUNK DEBRIDEMENT N/A 2018    Procedure: Abdominal wound debridement;   "Surgeon: Rachel Dolan MD;  Location:  [facility] OR;  Service: General   • VENTRAL/INCISIONAL HERNIA REPAIR N/A 11/15/2018    Procedure: Lateral Component Separation Herniorrhapy Repair with Mesh, Lysis of adhesions, and skin lesion excision of Right Side;  Surgeon: Rachel Dolan MD;  Location:  [facility] OR;  Service: General                        PT Assessment/Plan     Row Name 12/30/22 0600          PT Assessment    Assessment Comments Patient continues to do very well with PT at this time.  -AS        PT Plan    PT Plan Comments Continue with current treatment plan.  -AS           User Key  (r) = Recorded By, (t) = Taken By, (c) = Cosigned By    Initials Name Provider Type    AS Franklin Adler, PT Physical Therapist                   OP Exercises     Row Name 12/30/22 0702 12/30/22 0600          Subjective Comments    Subjective Comments -- Patient states her foot/ankle is doing well. She states it is sore \"on the sides.\" She states \"I don't know if I am developing arthritis or not.\"  -AS        Total Minutes    52730 - PT Therapeutic Exercise Minutes 30  -AS --        Exercise 1    Exercise Name 1 -- NWB Gastroc/Soleus Stretch  -AS     Reps 1 -- 10  -AS     Time 1 -- 10 sec hold each  -AS        Exercise 3    Exercise Name 3 -- Ankle INV/EV vs Band  -AS     Reps 3 -- 30 each  -AS     Time 3 -- Blue  -AS        Exercise 4    Exercise Name 4 -- Ankle DF/PF vs Band  -AS     Reps 4 -- 30 each  -AS     Time 4 -- Gold  -AS        Exercise 5    Exercise Name 5 -- Pro Stretch  -AS     Time 5 -- 3 min  -AS        Exercise 7    Exercise Name 7 -- Towel Curls  -AS     Time 7 -- 5 min  -AS     Additional Comments -- 1#  -AS        Exercise 8    Exercise Name 8 -- SL Heel Raises  -AS     Reps 8 -- 25  -AS        Exercise 9    Exercise Name 9 -- SL Stance on Foam  -AS     Reps 9 -- 3  -AS     Time 9 -- 1 min each  -AS        Exercise 10    Exercise Name 10 -- WBing Gastroc/Soleus Stretch  -AS     Reps 10 -- 1  " "-AS     Time 10 -- 1 min each  -AS        Exercise 11    Exercise Name 11 -- Mini Squats  -AS     Reps 11 -- 25  -AS        Exercise 12    Exercise Name 12 -- 6\" FWD Step Ups  -AS     Reps 12 -- 25 each leg  -AS        Exercise 13    Exercise Name 13 -- 6\" Lateral Step Overs  -AS     Reps 13 -- 25  -AS           User Key  (r) = Recorded By, (t) = Taken By, (c) = Cosigned By    Initials Name Provider Type    AS Franklin Adler, PT Physical Therapist                                                Time Calculation:   Start Time: 0628  Stop Time: 0702  Time Calculation (min): 34 min  Timed Charges  08072 - PT Therapeutic Exercise Minutes: 30  Total Minutes  Timed Charges Total Minutes: 30   Total Minutes: 30  Therapy Charges for Today     Code Description Service Date Service Provider Modifiers Qty    [id number]  PT THER PROC EA 15 MIN 12/30/2022 Franklin Adler, PT GP 2                    Franklin Adler, PT  12/30/2022     "

## 2023-02-01 ENCOUNTER — TELEPHONE (OUTPATIENT)
Dept: ORTHOPEDIC SURGERY | Facility: CLINIC | Age: 38
End: 2023-02-01

## 2023-02-01 NOTE — TELEPHONE ENCOUNTER
Caller: MARIO GRACIA GROSS     Relationship to patient: PATIENT     Best call back number: [phone]    Chief complaint: OUMOU KNEE     Type of visit:  INJECTIONS

## 2023-02-06 ENCOUNTER — OFFICE VISIT (OUTPATIENT)
Dept: ORTHOPEDIC SURGERY | Facility: CLINIC | Age: 38
End: 2023-02-06
Payer: COMMERCIAL

## 2023-02-06 VITALS — BODY MASS INDEX: 48.65 KG/M2 | WEIGHT: 285 LBS | HEIGHT: 64 IN

## 2023-02-06 DIAGNOSIS — M94.261 CHONDROMALACIA OF KNEE, RIGHT: Primary | ICD-10-CM

## 2023-02-06 DIAGNOSIS — Z98.890 S/P ACL RECONSTRUCTION: ICD-10-CM

## 2023-02-06 DIAGNOSIS — M17.12 PRIMARY OSTEOARTHRITIS OF LEFT KNEE: ICD-10-CM

## 2023-02-06 DIAGNOSIS — T84.89XS ACL GRAFT TEAR, SEQUELA: ICD-10-CM

## 2023-02-06 PROCEDURE — 20610 DRAIN/INJ JOINT/BURSA W/O US: CPT | Performed by: ORTHOPAEDIC SURGERY

## 2023-02-06 PROCEDURE — 99213 OFFICE O/P EST LOW 20 MIN: CPT | Performed by: ORTHOPAEDIC SURGERY

## 2023-02-06 PROCEDURE — 73562 X-RAY EXAM OF KNEE 3: CPT | Performed by: ORTHOPAEDIC SURGERY

## 2023-02-06 RX ADMIN — LIDOCAINE HYDROCHLORIDE 8 ML: 10 INJECTION, SOLUTION EPIDURAL; INFILTRATION; INTRACAUDAL; PERINEURAL at 09:29

## 2023-02-06 RX ADMIN — TRIAMCINOLONE ACETONIDE 80 MG: 40 INJECTION, SUSPENSION INTRA-ARTICULAR; INTRAMUSCULAR at 09:29

## 2023-02-06 NOTE — PROGRESS NOTES
Subjective:     Patient ID: Valery Aguilar is a 37 y.o. female.    Chief Complaint:  Follow-up left knee pain, DJD, prior ACL reconstruction  F/u right knee pain, chondromalacia    Last injection-bilateral knee-10/27/2022-cortisone  History of Present Illness  Valery Aguilar returns to clinic today for evaluation of bilateral knee pain, left worse than right.    Overall, she did very well with her last set of injections that was over 3 months ago. She has had moderate recurrence of pain over the last several weeks. She rates pain at this point in time as 7/10 to 8/10 on the left and 5/10 to 6/10 on the right. Her pain is localized to the anteromedial and anterolateral aspect of bilateral knees, worse when getting up from a seated position, as well as prolonged walking and deep flexion activities with moderate associated swelling that does wax and wane. She denies any associated numbness or tingling. She denies any significant radiation of symptoms. No fevers, chills, or sweats at this time.       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    • Occupation: [employer]   Tobacco Use   • Smoking status: Former     Packs/day: 0.50     Years: 1.00     Pack years: 0.50     Types: Cigarettes     Quit date:      Years since quittin.1   • Smokeless tobacco: Never   Vaping Use   • Vaping Use: Never used   Substance and Sexual Activity   • Alcohol use: Yes     Comment: occasional   • Drug use: No   • Sexual activity: Defer     Partners: Male     Birth control/protection: OCP     Comment: LNMP irregular      Past Medical History:   Diagnosis Date   • Abdominal pain    • Anxiety    • Anxiety and depression 3/8/2021   • Arthritis     KNEE   • Chronic sinusitis, unspecified    • Chronic wound infection of abdomen 2019    Added automatically from request for surgery [id number]   • Depression    • GERD (gastroesophageal reflux disease)    • H/O echocardiogram     2019  EF 56% NORMAL DIASTOLIC FUNCTION NO VALVULAR DISEASE   • H/O exercise stress test     REPORTEDLY NORMAL DONE FOR CHEST PAIN DX WITH ANXIETY   • Headache, tension-type    • History of Holter monitoring     REPORTEDLY NORMAL DONE FOR CHEST PAIN DX WITH ANXIETY   • History of MRI of brain and brain stem 2017    NORMAL DONE FOR MIGRAINES   • Hypertension     PRE-ECLAMPSIA WITH BOTH CHILDREN   • Incisional hernia     SCHEDULED FOR REPAIR   • Incisional hernia, without obstruction or gangrene 10/24/2018    Added automatically from request for surgery [id number]   • Infection following a procedure, unspecified, initial encounter    • Insulin resistance     D/T PCOS   • Lobar pneumonia, unspecified organism (HCC)    • Meralgia paresthetica, left lower limb    • Metabolic syndrome    • Migraine    • Moderate mixed hyperlipidemia not requiring statin therapy 2022   • MRSA (methicillin resistant Staphylococcus aureus) infection 2019    Abdominal wound   • Nasal congestion    • Other injury of unspecified body region, initial encounter    • Panic attack    • Panic disorder    • Papanicolaou smear 2020    NORMAL WITH NEG HPV NEVER ABN   • PCOS (polycystic ovarian syndrome)    • Pneumonia 2018   • PONV (postoperative nausea and vomiting)    • Right lower quadrant abdominal pain 2019   • Seasonal allergies    • Seizure (HCC)    • Spinal headache    • Unspecified contact dermatitis, unspecified cause    • Viral infection     UNSPECIFIED   • Vitamin D deficiency    • Wound infection 2018     Past Surgical History:   Procedure Laterality Date   • ABDOMINAL WALL ABSCESS INCISION AND DRAINAGE N/A 2018    Procedure: Debridement of abdominal wall;  Surgeon: Brigido Navarrete MD;  Location: [facility];  Service: General   • ADENOIDECTOMY     •  SECTION      X2   • CHOLECYSTECTOMY      LAP   • HERNIA REPAIR     • HX OVARIAN CYSTECTOMY     • INCISION AND DRAINAGE TRUNK N/A 2018    Procedure:  "wound debridement, abdomen;  Surgeon: Rachel Dolan MD;  Location: [facility] OR;  Service: General   • INCISION AND DRAINAGE TRUNK N/A 2/21/2019    Procedure: WOUND CLOSURE ABDOMINAL;  Surgeon: Rachel Dolan MD;  Location: [facility] OR;  Service: General   • KNEE ACL RECONSTRUCTION Left 2010    DR. CARRASCO   • OVARIAN CYST REMOVAL      EX LAP WITH OVARIAN CYST REMOVAL HERNIA REPAIR 2014   • TONSILLECTOMY      T&A   • TRUNK DEBRIDEMENT N/A 12/5/2018    Procedure: Abdominal wound debridement;  Surgeon: Rachel Dolan MD;  Location: [facility] OR;  Service: General   • VENTRAL/INCISIONAL HERNIA REPAIR N/A 11/15/2018    Procedure: Lateral Component Separation Herniorrhapy Repair with Mesh, Lysis of adhesions, and skin lesion excision of Right Side;  Surgeon: Rachel Dolan MD;  Location: [facility] OR;  Service: General       Family History   Problem Relation Age of Onset   • Stroke Mother    • Heart disease Mother    • Hypertension Mother    • Heart disease Father    • Hypertension Father    • Diabetes Father    • Dementia Maternal Grandmother    • Stroke Maternal Grandmother    • Diabetes Maternal Grandmother    • Stroke Paternal Grandmother    • Hypertension Paternal Grandmother    • Cancer Paternal Grandmother    • Heart disease Paternal Grandfather    • Diabetes Paternal Grandfather          Review of Systems        Objective:  Vitals:    02/06/23 0833   Weight: 129 kg (285 lb)   Height: 162.6 cm (64\")         02/06/23  0833   Weight: 129 kg (285 lb)     Body mass index is 48.92 kg/m².  Physical Exam    Vital signs reviewed.   General: No acute distress, alert and oriented  Eyes: conjunctiva clear; pupils equally round and reactive  ENT: external ears and nose atraumatic; oropharynx clear  CV: no peripheral edema  Resp: normal respiratory effort  Skin: no rashes or wounds; normal turgor  Psych: mood and affect appropriate; recent and remote memory intact          Ortho Exam     Bilateral Knee-    ROM 3 to " 115 degrees  4+/5 on flexion  4+/5 on extension  Maximal tenderness to palpation medial and lateral joint line    Effusion- Moderate on the left, mild on the right  Grade 2B [place] on the left, stable on the right  Stable opening on varus and valgus stress at 3 and 30  Active patellar compression test- Positive    Imaging:  Left Knee X-Ray  Indication: Pain    AP, Lateral, and [place] views    Findings:  No fracture  No bony lesion  Normal soft tissues  Moderate tricompartmental osteoarthritis with significant reactive osteophyte formation of medial and lateral compartments with overall valgus alignment appreciated, retained hardware from prior ACL reconstruction noted, significant increased lateral tilt and subluxation noted on patella axial view    Compared to prior office x-rays    Assessment:        1. Chondromalacia of knee, right    2. Primary osteoarthritis of left knee    3. S/P ACL reconstruction    4. ACL graft tear, sequela           Plan:  Large Joint Arthrocentesis  Date/Time: 2/6/2023 9:29 AM  Consent given by: patient  Site marked: site marked  Timeout: Immediately prior to procedure a time out was called to verify the correct patient, procedure, equipment, support staff and site/side marked as required   Supporting Documentation  Indications: pain   Procedure Details  Location: knee - Knee joint: BILATERAL KNEE.  Preparation: Patient was prepped and draped in the usual sterile fashion  Needle size: 22 G  Approach: anterolateral  Medications administered: 80 mg triamcinolone acetonide 40 MG/ML; 8 mL lidocaine PF 1% 1 %  Patient tolerance: patient tolerated the procedure well with no immediate complications                1. Discussed treatment options at length with patient at today's visit including not limited to activity modification, continued efforts at weight loss, which she is continuing to pursue at this point in time, repeat injection, viscosupplement series, and knee arthroplasty options.  At this point in time, we will proceed with repeat cortisone injection today.  2. Patient would like to proceed with cortisone injection today to the bilateral knees. Recommended limited use of affected extremity for the next 24 hours to only essential activites other than work on general active and passive motion. Recommended supplementing with ice and soft tissue massage. Discussed with patient that they should see results in 5-7 days, if no improvement in 5-6 weeks I have asked them to call the office to review other options. Patient should call office immediately if they notice redness, warmth, fevers, chills, or residual numbness or tingling for greater than 6 hours after injection.   3. Continue with hip, core, and quad strengthening as well as weight loss endeavors.  4. Follow up in 3 months or sooner if needed.    Valery Aguilar was in agreement with plan and had all questions answered.     Orders:  Orders Placed This Encounter   Procedures   • Large Joint Arthrocentesis   • XR Knee 3+ View With [place] Left       Medications:  No orders of the defined types were placed in this encounter.      Followup:  Return in about 3 months (around 5/6/2023).    Diagnoses and all orders for this visit:    1. Chondromalacia of knee, right (Primary)  -     XR Knee 3+ View With [place] Left    2. Primary osteoarthritis of left knee    3. S/P ACL reconstruction    4. ACL graft tear, sequela    Other orders  -     Large Joint Arthrocentesis      Transcribed from ambient dictation for Patrick Napier MD by Mayra Cabral.  02/06/23   09:54 EST    Patient or patient representative verbalized consent to the visit recording.  I have personally performed the services described in this document as transcribed by the above individual, and it is both accurate and complete.      Dictated utilizing Dragon dictation

## 2023-02-16 DIAGNOSIS — F32.A ANXIETY AND DEPRESSION: Chronic | ICD-10-CM

## 2023-02-16 DIAGNOSIS — F41.9 ANXIETY AND DEPRESSION: Chronic | ICD-10-CM

## 2023-02-16 RX ORDER — SERTRALINE HYDROCHLORIDE 100 MG/1
TABLET, FILM COATED ORAL
Qty: 180 TABLET | Refills: 1 | Status: SHIPPED | OUTPATIENT
Start: 2023-02-16

## 2023-02-16 NOTE — TELEPHONE ENCOUNTER
Rx Refill Note  Requested Prescriptions     Pending Prescriptions Disp Refills   • sertraline (ZOLOFT) 100 MG tablet [Pharmacy Med Name: SERTRALINE  MG TABLET] 180 tablet 1     Sig: TAKE TWO TABLETS BY MOUTH DAILY      Last office visit with prescribing clinician: 12/12/2022   Last telemedicine visit with prescribing clinician: 3/13/2023   Next office visit with prescribing clinician: 3/13/2023                         Would you like a call back once the refill request has been completed: [] Yes [] No    If the office needs to give you a call back, can they leave a voicemail: [] Yes [] No    Melissa Cortez MA  02/16/23, 07:59 EST

## 2023-02-21 DIAGNOSIS — J01.40 ACUTE NON-RECURRENT PANSINUSITIS: ICD-10-CM

## 2023-02-21 RX ORDER — AZELASTINE HYDROCHLORIDE 137 UG/1
SPRAY, METERED NASAL
Qty: 30 ML | Refills: 2 | Status: SHIPPED | OUTPATIENT
Start: 2023-02-21

## 2023-02-21 NOTE — TELEPHONE ENCOUNTER
Rx Refill Note  Requested Prescriptions     Pending Prescriptions Disp Refills   • Azelastine HCl 137 MCG/SPRAY solution [Pharmacy Med Name: AZELASTINE 0.1% (137 MCG) SPRY] 30 mL 2     Sig: SPRAY TWO SPRAYS IN EACH NOSTRIL TWICE DAILY AS DIRECTED BY PROVIDER      Last office visit with prescribing clinician: 12/12/2022   Last telemedicine visit with prescribing clinician: 3/13/2023   Next office visit with prescribing clinician: 3/13/2023                         Would you like a call back once the refill request has been completed: [] Yes [] No    If the office needs to give you a call back, can they leave a voicemail: [] Yes [] No    Melissa Cortez MA  02/21/23, 07:58 EST

## 2023-02-23 RX ORDER — LIDOCAINE HYDROCHLORIDE 10 MG/ML
8 INJECTION, SOLUTION EPIDURAL; INFILTRATION; INTRACAUDAL; PERINEURAL
Status: COMPLETED | OUTPATIENT
Start: 2023-02-06 | End: 2023-02-06

## 2023-02-23 RX ORDER — TRIAMCINOLONE ACETONIDE 40 MG/ML
80 INJECTION, SUSPENSION INTRA-ARTICULAR; INTRAMUSCULAR
Status: COMPLETED | OUTPATIENT
Start: 2023-02-06 | End: 2023-02-06

## 2023-03-27 ENCOUNTER — TELEMEDICINE (OUTPATIENT)
Dept: INTERNAL MEDICINE | Facility: CLINIC | Age: 38
End: 2023-03-27
Payer: COMMERCIAL

## 2023-03-27 DIAGNOSIS — M17.0 PRIMARY OSTEOARTHRITIS OF BOTH KNEES: ICD-10-CM

## 2023-03-27 DIAGNOSIS — G43.709 CHRONIC MIGRAINE WITHOUT AURA WITHOUT STATUS MIGRAINOSUS, NOT INTRACTABLE: Chronic | ICD-10-CM

## 2023-03-27 DIAGNOSIS — E78.2 MODERATE MIXED HYPERLIPIDEMIA NOT REQUIRING STATIN THERAPY: Chronic | ICD-10-CM

## 2023-03-27 DIAGNOSIS — F41.9 ANXIETY AND DEPRESSION: Chronic | ICD-10-CM

## 2023-03-27 DIAGNOSIS — I10 ESSENTIAL HYPERTENSION: Primary | Chronic | ICD-10-CM

## 2023-03-27 DIAGNOSIS — E28.2 PCOS (POLYCYSTIC OVARIAN SYNDROME): ICD-10-CM

## 2023-03-27 DIAGNOSIS — F32.A ANXIETY AND DEPRESSION: Chronic | ICD-10-CM

## 2023-03-27 DIAGNOSIS — M54.16 LUMBAR RADICULOPATHY: ICD-10-CM

## 2023-03-27 RX ORDER — CELECOXIB 100 MG/1
100 CAPSULE ORAL 2 TIMES DAILY PRN
Qty: 180 CAPSULE | Refills: 1 | Status: SHIPPED | OUTPATIENT
Start: 2023-03-27

## 2023-03-27 RX ORDER — NORTRIPTYLINE HYDROCHLORIDE 25 MG/1
25 CAPSULE ORAL NIGHTLY
Qty: 30 CAPSULE | Refills: 1 | Status: SHIPPED | OUTPATIENT
Start: 2023-03-27

## 2023-03-27 RX ORDER — GABAPENTIN 800 MG/1
800 TABLET ORAL 2 TIMES DAILY
Qty: 180 TABLET | Refills: 1 | Status: SHIPPED | OUTPATIENT
Start: 2023-03-27

## 2023-03-27 RX ORDER — BUSPIRONE HYDROCHLORIDE 15 MG/1
15 TABLET ORAL 3 TIMES DAILY
Qty: 270 TABLET | Refills: 1 | Status: SHIPPED | OUTPATIENT
Start: 2023-03-27

## 2023-03-27 NOTE — ASSESSMENT & PLAN NOTE
STABLE  - will get monitoring labs today  - cont on metformin--> refills sent  - has been on saxenda but does not feel like this has been very helpful to her and is having GI side effects, wants to try to stop this at this time  - going to work more aggressively on lifestyle changes with lower calorie, high protein, low carb diet, considering Weight Watchers

## 2023-03-27 NOTE — ASSESSMENT & PLAN NOTE
IMPROVING  - will increase gabapentin to 800 mg BID from her current 600 mg BID to see if we are able to improve her migraine control and also try to improve her radicular pains in her LE's  - cont propranolol at current dose of 80 mg (recently increased)  - currently on TCA but has been on this for a long time and does not feel like it is working at all anymore, wants to try to wean off and see how she feels  - trialed eletriptan which does make her more sedated but is effective, has had this transient effect with each triptan she has tried in the past  - avoid migraine triggers, get enough sleep, improve hydration

## 2023-03-27 NOTE — PROGRESS NOTES
Chief Complaint  Follow up migraines, anxiety, depression, PCOS    You have chosen to receive care through a telehealth visit.  Do you consent to use a video/audio connection for your medical care today? Yes    Pt and physician and both in [place] at the time of the evaluation.       Subjective          Valery Aguilar presents to [facility] for follow up and med refills. Pt taking all medications daily as prescribed with good reported compliance. No issues or side effects with meds.   Had exposure to COVID over the weekend so is staying home for now.   Is having some nerve pain down her R inner leg from her thigh down to her calf. Also having more L knee pain again. She had steroid inj 4-6 weeks ago but no longer helpful at this time, following with ortho.       Objective     Physical Exam  Vitals and nursing note reviewed.   Constitutional:       General: She is not in acute distress.     Appearance: Normal appearance.   Cardiovascular:      Rate and Rhythm: Normal rate and regular rhythm.      Pulses: Normal pulses.      Heart sounds: Normal heart sounds. No murmur heard.  Pulmonary:      Effort: Pulmonary effort is normal. No respiratory distress.      Breath sounds: Normal breath sounds.   Abdominal:      General: Abdomen is flat. Bowel sounds are normal.      Palpations: Abdomen is soft.      Tenderness: There is no abdominal tenderness.   Musculoskeletal:      Right lower leg: No edema.      Left lower leg: No edema.   Neurological:      Mental Status: She is alert and oriented to person, place, and time. Mental status is at baseline.   Psychiatric:         Mood and Affect: Mood normal.         Behavior: Behavior normal.          Result Review :   The following data was reviewed by: Zandra Rios MD on 03/27/2023:  CMP    CMP 4/25/22 11/7/22   Glucose CANCELED 81   BUN 22 (A) 17   Creatinine 0.88 0.73   Sodium 140 141   Potassium CANCELED 4.6   Chloride 102 104    Calcium 9.5 9.1   Total Protein 6.8 6.2   Albumin 4.3 3.80   Globulin 2.5 2.4   Total Bilirubin 0.3 0.6   Alkaline Phosphatase 55 45   AST (SGOT) 22 15   ALT (SGPT) 39 (A) 22   BUN/Creatinine Ratio 25 (A) 23.3   (A) Abnormal value       Comments are available for some flowsheets but are not being displayed.             Lipid Panel    Lipid Panel 4/25/22 11/7/22   Total Cholesterol 192 182   Triglycerides 91 127   HDL Cholesterol 48 45   VLDL Cholesterol 17 23   LDL Cholesterol  127 (A) 114 (A)   (A) Abnormal value       Comments are available for some flowsheets but are not being displayed.               Most Recent A1C    HGBA1C Most Recent 11/7/22   Hemoglobin A1C 5.50      Comments are available for some flowsheets but are not being displayed.                Assessment and Plan      Diagnoses and all orders for this visit:    1. Essential hypertension (Primary)  Assessment & Plan:  CONTROLLED  - BP in goal range today  - will cont current dose of ARB-thiazide, no refills needed at this time  - Cont checking BP at home daily, call if values trend outside of goal range      Orders:  -     Comprehensive Metabolic Panel; Future    2. Anxiety and depression  Assessment & Plan:  IMPROVING- STABLE  - cont on sertraline 200 mg  - cont buspar dose to 15 mg BID-TID today for ongoing improvements  - have recently added propranolol for migraines which has lessened panic attacks, will cont  - Reviewed potential side effects of medication including sexual performance changes, insomnia, fatigue, weight changes. Pt tolerating well without any issues.  - has lorazepam for prn use, not needing routinely at this time, use has actually decreased since adding propranolol for migraines  - Reviewed controlled nature of substance, potential for abuse/addiction, and possible adverse effects of medication. No red flags for abuse at this time.   - Controlled substances contract signed and in chart.   - Hang checked and appropriate.   -  Meds previously tried: wellbutrin (seizure)          Orders:  -     busPIRone (BUSPAR) 15 MG tablet; Take 1 tablet by mouth 3 (Three) Times a Day.  Dispense: 270 tablet; Refill: 1    3. Chronic migraine without aura without status migrainosus, not intractable  Assessment & Plan:  IMPROVING  - will increase gabapentin to 800 mg BID from her current 600 mg BID to see if we are able to improve her migraine control and also try to improve her radicular pains in her LE's  - cont propranolol at current dose of 80 mg (recently increased)  - currently on TCA but has been on this for a long time and does not feel like it is working at all anymore, wants to try to wean off and see how she feels  - trialed eletriptan which does make her more sedated but is effective, has had this transient effect with each triptan she has tried in the past  - avoid migraine triggers, get enough sleep, improve hydration      Orders:  -     nortriptyline (Pamelor) 25 MG capsule; Take 1 capsule by mouth Every Night.  Dispense: 30 capsule; Refill: 1  -     gabapentin (NEURONTIN) 800 MG tablet; Take 1 tablet by mouth 2 (Two) Times a Day.  Dispense: 180 tablet; Refill: 1  -     Cancel: Ambulatory Referral to Physical Therapy Evaluate and treat    4. Moderate mixed hyperlipidemia not requiring statin therapy  -     Comprehensive Metabolic Panel; Future  -     Lipid Panel; Future    5. PCOS (polycystic ovarian syndrome)  Assessment & Plan:  STABLE  - will get monitoring labs today  - cont on metformin--> refills sent  - has been on saxenda but does not feel like this has been very helpful to her and is having GI side effects, wants to try to stop this at this time  - going to work more aggressively on lifestyle changes with lower calorie, high protein, low carb diet, considering Weight Watchers      Orders:  -     Comprehensive Metabolic Panel; Future  -     Hemoglobin A1c; Future    6. Lumbar radiculopathy  -     Ambulatory Referral to Physical  Therapy Evaluate and treat  -     celecoxib (CeleBREX) 100 MG capsule; Take 1 capsule by mouth 2 (Two) Times a Day As Needed for Moderate Pain.  Dispense: 180 capsule; Refill: 1    7. Primary osteoarthritis of both knees  -     Ambulatory Referral to Physical Therapy Evaluate and treat  -     celecoxib (CeleBREX) 100 MG capsule; Take 1 capsule by mouth 2 (Two) Times a Day As Needed for Moderate Pain.  Dispense: 180 capsule; Refill: 1        Follow Up   Return in about 3 months (around 6/27/2023) for follow up with labs.    Patient was given instructions and counseling regarding her condition or for health maintenance advice. Please see specific information pulled into the AVS if appropriate.     Yessy Rios MD  [facility]  Phone: [phone]  Fax: [phone]

## 2023-04-05 RX ORDER — LOSARTAN POTASSIUM AND HYDROCHLOROTHIAZIDE 12.5; 5 MG/1; MG/1
TABLET ORAL
Qty: 90 TABLET | Refills: 0 | Status: SHIPPED | OUTPATIENT
Start: 2023-04-05

## 2023-04-05 NOTE — TELEPHONE ENCOUNTER
Rx Refill Note  Requested Prescriptions     Pending Prescriptions Disp Refills   • losartan-hydrochlorothiazide (HYZAAR) 50-12.5 MG per tablet [Pharmacy Med Name: LOSARTAN-HCTZ 50-12.5 MG TAB] 90 tablet 0     Sig: TAKE ONE TABLET BY MOUTH DAILY      Last office visit with prescribing clinician: 10/11/2021   Last telemedicine visit with prescribing clinician: Visit date not found   Next office visit with prescribing clinician: Visit date not found                         Would you like a call back once the refill request has been completed: [] Yes [] No    If the office needs to give you a call back, can they leave a voicemail: [] Yes [] No    Salazar Moreno MA  04/05/23, 08:02 EDT

## 2023-04-10 ENCOUNTER — HOSPITAL ENCOUNTER (OUTPATIENT)
Dept: PHYSICAL THERAPY | Facility: HOSPITAL | Age: 38
Setting detail: THERAPIES SERIES
Discharge: HOME | End: 2023-04-10
Payer: COMMERCIAL

## 2023-04-10 DIAGNOSIS — M54.16 LUMBAR RADICULOPATHY: Primary | ICD-10-CM

## 2023-04-10 DIAGNOSIS — G89.29 CHRONIC PAIN OF BOTH KNEES: ICD-10-CM

## 2023-04-10 DIAGNOSIS — M25.562 CHRONIC PAIN OF BOTH KNEES: ICD-10-CM

## 2023-04-10 DIAGNOSIS — M25.561 CHRONIC PAIN OF BOTH KNEES: ICD-10-CM

## 2023-04-10 PROCEDURE — 97161 PT EVAL LOW COMPLEX 20 MIN: CPT

## 2023-04-10 NOTE — THERAPY EVALUATION
Outpatient Physical Therapy Ortho Initial Evaluation   Angelina Werner     Patient Name: Valery Aguilar  : 1985  MRN: [id number]  Today's Date: 4/10/2023      Visit Date: 04/10/2023    Patient Active Problem List   Diagnosis   • Chronic migraine without aura without status migrainosus, not intractable   • Cervicogenic headache   • Secondary oligomenorrhea   • Family history of breast cancer   • Obstructive sleep apnea, adult   • GERD with apnea   • Vitamin D deficiency   • PCOS (polycystic ovarian syndrome)   • Panic disorder   • Insulin resistance   • Essential hypertension   • GERD (gastroesophageal reflux disease)   • Anxiety and depression   • Tendonitis, Achilles, left   • S/P ACL reconstruction   • Primary osteoarthritis of left knee   • ACL graft tear, sequela   • Morbid (severe) obesity due to excess calories   • Seizure   • Moderate mixed hyperlipidemia not requiring statin therapy        Past Medical History:   Diagnosis Date   • Abdominal pain    • Anxiety    • Anxiety and depression 3/8/2021   • Arthritis     KNEE   • Chronic sinusitis, unspecified    • Chronic wound infection of abdomen 2019    Added automatically from request for surgery [id number]   • Depression    • GERD (gastroesophageal reflux disease)    • H/O echocardiogram      EF 56% NORMAL DIASTOLIC FUNCTION NO VALVULAR DISEASE   • H/O exercise stress test     REPORTEDLY NORMAL DONE FOR CHEST PAIN DX WITH ANXIETY   • Headache, tension-type    • History of Holter monitoring     REPORTEDLY NORMAL DONE FOR CHEST PAIN DX WITH ANXIETY   • History of MRI of brain and brain stem 2017    NORMAL DONE FOR MIGRAINES   • Hypertension     PRE-ECLAMPSIA WITH BOTH CHILDREN   • Incisional hernia     SCHEDULED FOR REPAIR   • Incisional hernia, without obstruction or gangrene 10/24/2018    Added automatically from request for surgery [id number]   • Infection following a procedure, unspecified, initial encounter    • Insulin resistance     D/T  PCOS   • Lobar pneumonia, unspecified organism    • Meralgia paresthetica, left lower limb    • Metabolic syndrome    • Migraine    • Moderate mixed hyperlipidemia not requiring statin therapy 2022   • MRSA (methicillin resistant Staphylococcus aureus) infection 2019    Abdominal wound   • Nasal congestion    • Other injury of unspecified body region, initial encounter    • Panic attack    • Panic disorder    • Papanicolaou smear 2020    NORMAL WITH NEG HPV NEVER ABN   • PCOS (polycystic ovarian syndrome)    • Pneumonia 2018   • PONV (postoperative nausea and vomiting)    • Right lower quadrant abdominal pain 2019   • Seasonal allergies    • Seizure    • Spinal headache    • Unspecified contact dermatitis, unspecified cause    • Viral infection     UNSPECIFIED   • Vitamin D deficiency    • Wound infection 2018        Past Surgical History:   Procedure Laterality Date   • ABDOMINAL WALL ABSCESS INCISION AND DRAINAGE N/A 2018    Procedure: Debridement of abdominal wall;  Surgeon: Brigido Navarrete MD;  Location: [facility] OR;  Service: General   • ADENOIDECTOMY     •  SECTION      X2   • CHOLECYSTECTOMY      LAP   • HERNIA REPAIR     • HX OVARIAN CYSTECTOMY     • INCISION AND DRAINAGE TRUNK N/A 2018    Procedure: wound debridement, abdomen;  Surgeon: Rachel Dolan MD;  Location: [facility] OR;  Service: General   • INCISION AND DRAINAGE TRUNK N/A 2019    Procedure: WOUND CLOSURE ABDOMINAL;  Surgeon: Rachel Dolan MD;  Location: [facility] OR;  Service: General   • KNEE ACL RECONSTRUCTION Left     DR. CARRASCO   • OVARIAN CYST REMOVAL      EX LAP WITH OVARIAN CYST REMOVAL HERNIA REPAIR    • TONSILLECTOMY      T&A   • TRUNK DEBRIDEMENT N/A 2018    Procedure: Abdominal wound debridement;  Surgeon: Rachel Dolan MD;  Location: [facility] OR;  Service: General   • VENTRAL/INCISIONAL HERNIA REPAIR N/A 11/15/2018    Procedure: Lateral Component Separation  Herniorrhapy Repair with Mesh, Lysis of adhesions, and skin lesion excision of Right Side;  Surgeon: Rachel Dolan MD;  Location: [facility];  Service: General       Visit Dx:     ICD-10-CM ICD-9-CM   1. Lumbar radiculopathy  M54.16 724.4   2. Chronic pain of both knees  M25.561 719.46    M25.562 338.29    G89.29           Patient History     Row Name 04/10/23 0600             History    Chief Complaint Difficulty Walking;Difficulty with daily activities;Muscle tenderness;Muscle weakness;Pain;Numbness;Tinglings  -AS      Type of Pain Back pain;Knee pain  -AS      Brief Description of Current Complaint Valery Aguilar presents to outpatient PT with reports of bilateral knee pain as well as low back pain. Patient reports in regards to her knees, she did very well with her last set of injections that was over 3 months ago. Patient did receive another injection on 2-6-23 which she states is no longer giving her relief of her symptoms. She has had moderate recurrence of pain over the last several weeks. She rates pain at this point in time as 7/10 to 8/10 on the left and 5/10 to 6/10 on the right. Her pain is localized to the anteromedial and anterolateral aspect of bilateral knees, worse when getting up from a seated position, as well as prolonged walking and deep flexion activities with moderate associated swelling that does wax and wane. In regards to her back pain, she states she is having some nerve pain down her right inner leg from her thigh down to her calf.  -AS      Previous treatment for THIS PROBLEM Injections;Rehabilitation;Medication  -AS      Patient/Caregiver Goals Relieve pain;Improve mobility;Improve strength  -AS      Patient's Rating of General Health Fair  -AS      Patient seeing anyone else for problem(s)? Dr. Rios & Dr. Napier  -AS      How has patient tried to help current problem? rest, injections, medication  -AS         Pain     Pain Location Back;Knee  -AS      Pain at Present 6  -AS       Pain at Best 2  -AS      Pain at Worst 10  -AS      Pain Frequency Constant/continuous  -AS      Pain Description Aching;Numbness;Tingling  -AS      What Performance Factors Make the Current Problem(s) WORSE? Activity  -AS      What Performance Factors Make the Current Problem(s) BETTER? Rest  -AS         Daily Activities    Primary Language English  -AS      Are you able to read Yes  -AS      Are you able to write Yes  -AS      How does patient learn best? Listening;Reading;Demonstration  -AS      Teaching needs identified Home Exercise Program;Management of Condition  -AS      Patient is concerned about/has problems with Flexibility;Performing home management (household chores, shopping, care of dependents);Performing job responsibilities/community activities (work, school,;Performing sports, recreation, and play activities;Walking  -AS      Does patient have problems with the following? Depression;Anxiety;Panic Attack  -AS      Barriers to learning None  -AS      Pt Participated in POC and Goals Yes  -AS         Safety    Are you being hurt, hit, or frightened by anyone at home or in your life? No  -AS      Are you being neglected by a caregiver No  -AS            User Key  (r) = Recorded By, (t) = Taken By, (c) = Cosigned By    Initials Name Provider Type    AS Franklin Adler, PT Physical Therapist                 PT Ortho     Row Name 04/10/23 0600       Precautions and Contraindications    Precautions/Limitations no known precautions/limitations  -AS       Subjective Pain    Able to rate subjective pain? yes  -AS       Posture/Observations    Posture- WNL Posture is WNL  -AS       Lumbar/SI Special Tests    Standing Flexion Test (SI Dysfunction) Right:;Positive  -AS    Stork Test (SI Dysfunction) Right:;Positive  -AS    SLR (Neural Tension) Bilateral:;Negative  -AS       Head/Neck/Trunk    Trunk Extension AROM WFL  -AS    Trunk Flexion AROM WFl  -AS    Trunk Lt Lateral Flexion AROM WFL  -AS    Trunk  Rt Lateral Flexion AROM WFL  -AS    Trunk Lt Rotation AROM WFL  -AS    Trunk Rt Rotation AROM WFL  -AS       Right Lower Ext    Rt Knee Extension/Flexion AROM WFL  -AS       Left Lower Ext    Lt Knee Extension/Flexion AROM WFL  -AS       MMT Neck/Trunk    Trunk Flexion MMT, Gross Movement (3+/5) fair plus  -AS    Trunk Extension MMT, Gross Movement (3+/5) fair plus  -AS       MMT Right Lower Ext    Rt Hip Flexion MMT, Gross Movement (4-/5) good minus  -AS    Rt Hip Extension MMT, Gross Movement (4-/5) good minus  -AS    Rt Hip ABduction MMT, Gross Movement (4-/5) good minus  -AS    Rt Knee Extension MMT, Gross Movement (4-/5) good minus  -AS    Rt Knee Flexion MMT, Gross Movement (4-/5) good minus  -AS       MMT Left Lower Ext    Lt Hip Flexion MMT, Gross Movement (4-/5) good minus  -AS    Lt Hip Extension MMT, Gross Movement (4-/5) good minus  -AS    Lt Hip ABduction MMT, Gross Movement (4-/5) good minus  -AS    Lt Knee Extension MMT, Gross Movement (4-/5) good minus  -AS    Lt Knee Flexion MMT, Gross Movement (4-/5) good minus  -AS       Sensation    Sensation WNL? WNL  -AS    Light Touch No apparent deficits  -AS       Lower Extremity Flexibility    Hamstrings Bilateral:;Moderately limited  -AS    Hip External Rotators Bilateral:;Moderately limited  -AS          User Key  (r) = Recorded By, (t) = Taken By, (c) = Cosigned By    Initials Name Provider Type    AS Franklin Adler, PT Physical Therapist                            Therapy Education  Given: HEP, Symptoms/condition management, Pain management  Program: New  How Provided: Verbal, Demonstration, Written  Provided to: Patient  Level of Understanding: Teach back education performed, Verbalized, Demonstrated      PT OP Goals     Row Name 04/10/23 0600          PT Short Term Goals    STG Date to Achieve 04/24/23  -AS     STG 1 Patient to demonstrate compliance with her initial HEP for flexibility, ROM and strengthening.  -AS     STG 2 Patient to report  low back and bilateral knee pain on VAS of 4-5/10 at worst with activity.  -AS     STG 3 Patient to demonstrate improved trunk and Ramsey. LE strength to 4/5 in all planes.  -AS        Long Term Goals    LTG Date to Achieve 05/08/23  -AS     LTG 1 Patient to demonstrate compliance with her advanced HEP for flexibility, ROM and strengthening.  -AS     LTG 2 Patient to report low back and bilateral knee pain on VAS of 2-3/10 at worst with activity.  -AS     LTG 3 Patient to demonstrate improved trunk and Ramsey. LE strength to 4+/5 in all planes.  -AS     LTG 4 Patient to report improved function on Back Index by 10-15 points.  -AS     LTG 5 Patient to report improved function and decreased pain on LEFS to 55/80.  -AS        Time Calculation    PT Goal Re-Cert Due Date 05/08/23  -AS           User Key  (r) = Recorded By, (t) = Taken By, (c) = Cosigned By    Initials Name Provider Type    AS Franklin Adler, PT Physical Therapist                 PT Assessment/Plan     Row Name 04/10/23 0600          PT Assessment    Functional Limitations Limitation in home management;Limitations in community activities;Performance in leisure activities;Performance in sport activities;Performance in work activities  -AS     Impairments Impaired flexibility;Muscle strength;Pain;Range of motion  -AS     Assessment Comments Patient presents to outpatient PT with reports of bilateral knee pain as well as low back pain. Patient has limited bilateral knee and trunk ROM, limited bilateral LE and trunk strength, and increased pain with activity. Patient has decreased activity level and limited function at this time secondary to the above.  -AS     Please refer to paper survey for additional self-reported information Yes  -AS     Rehab Potential Good  -AS     Patient/caregiver participated in establishment of treatment plan and goals Yes  -AS     Patient would benefit from skilled therapy intervention Yes  -AS        PT Plan    PT Frequency  2x/week  -AS     Predicted Duration of Therapy Intervention (PT) 4 weeks  -AS     Planned CPT's? PT RE-EVAL: 95514;PT THER PROC EA 15 MIN: 81701;PT THER ACT EA 15 MIN: 04270;PT MANUAL THERAPY EA 15 MIN: 81790;PT NEUROMUSC RE-EDUCATION EA 15 MIN: 16850  -AS           User Key  (r) = Recorded By, (t) = Taken By, (c) = Cosigned By    Initials Name Provider Type    AS Franklin Adler, PT Physical Therapist                   OP Exercises     Row Name 04/10/23 0600             Subjective Pain    Able to rate subjective pain? yes  -AS      Pre-Treatment Pain Level 6  -AS      Post-Treatment Pain Level 6  -AS         Exercise 1    Exercise Name 1 Ramsey. HS Stretch  -AS      Reps 1 10  -AS      Time 1 10 sec hold each  -AS         Exercise 2    Exercise Name 2 Ramsey. Piriformis Stretch  -AS      Reps 2 10  -AS      Time 2 10 sec hold each  -AS         Exercise 3    Exercise Name 3 Ramsey. QS  -AS      Reps 3 25  -AS      Time 3 5 sec hold each  -AS         Exercise 4    Exercise Name 4 Ramsey. SLR  -AS      Reps 4 25 each  -AS         Exercise 5    Exercise Name 5 Ramsey. S/L Hip ABD  -AS      Reps 5 25 each  -AS         Exercise 6    Exercise Name 6 Ramsey. Prone Hip EXT  -AS      Reps 6 25 each  -AS         Exercise 7    Exercise Name 7 Static Prone on Elbows  -AS      Time 7 2 min  -AS         Exercise 8    Exercise Name 8 Prone Trunk EXT  -AS         Exercise 9    Exercise Name 9 SAQ Ball Squeeze  -AS      Reps 9 25  -AS         Exercise 10    Exercise Name 10 LAQ Ball Squeeze  -AS      Reps 10 25  -AS         Exercise 11    Exercise Name 11 Ramsey. TKE  -AS         Exercise 12    Exercise Name 12 Mini Squats  -AS            User Key  (r) = Recorded By, (t) = Taken By, (c) = Cosigned By    Initials Name Provider Type    AS Franklin Adler, PT Physical Therapist                              Outcome Measure Options: Other Outcome Measure  Lower Extremity Functional Index  Any of your usual work, housework or school activities:  Moderate difficulty  Your usual hobbies, recreational or sporting activities: Moderate difficulty  Getting into or out of the bath: Moderate difficulty  Walking between rooms: A little bit of difficulty  Putting on your shoes or socks: Moderate difficulty  Squatting: Quite a bit of difficulty  Lifting an object, like a bag of groceries from the floor: Moderate difficulty  Performing light activities around your home: A little bit of difficulty  Performing heavy activities around your home: Quite a bit of difficulty  Getting into or out of a car: A little bit of difficulty  Walking 2 blocks: Moderate difficulty  Walking a mile: Moderate difficulty  Going up or down 10 stairs (about 1 flight of stairs): Quite a bit of difficulty  Standing for 1 hour: Quite a bit of difficulty  Sitting for 1 hour: Moderate difficulty  Running on even ground: Extreme difficulty or unable to perform activity  Running on uneven ground: Extreme difficulty or unable to perform activity  Making sharp turns while running fast: Extreme difficulty or unable to perform activity  Hopping: Quite a bit of difficulty  Rolling over in bed: Moderate difficulty  Total: 32  Other Outcome Measure Tool Used  Other Outcome Measure Tool Comments: Back Index - 22      Time Calculation:     Start Time: 0630  Stop Time: 0736  Time Calculation (min): 66 min     Therapy Charges for Today     Code Description Service Date Service Provider Modifiers Qty    05096344776 HC PT EVAL LOW COMPLEXITY 4 4/10/2023 Franklin Adler, PT GP 1          PT G-Codes  Outcome Measure Options: Other Outcome Measure  Total: 32         Franklin Adler, PT  4/10/2023

## 2023-04-17 ENCOUNTER — APPOINTMENT (OUTPATIENT)
Dept: PHYSICAL THERAPY | Facility: HOSPITAL | Age: 38
End: 2023-04-17
Payer: COMMERCIAL

## 2023-04-24 ENCOUNTER — APPOINTMENT (OUTPATIENT)
Dept: PHYSICAL THERAPY | Facility: HOSPITAL | Age: 38
End: 2023-04-24
Payer: COMMERCIAL

## 2023-04-27 ENCOUNTER — HOSPITAL ENCOUNTER (OUTPATIENT)
Dept: PHYSICAL THERAPY | Facility: HOSPITAL | Age: 38
Setting detail: THERAPIES SERIES
Discharge: HOME | End: 2023-04-27
Payer: COMMERCIAL

## 2023-04-27 DIAGNOSIS — M54.16 LUMBAR RADICULOPATHY: Primary | ICD-10-CM

## 2023-04-27 DIAGNOSIS — M25.561 CHRONIC PAIN OF BOTH KNEES: ICD-10-CM

## 2023-04-27 DIAGNOSIS — G89.29 CHRONIC PAIN OF BOTH KNEES: ICD-10-CM

## 2023-04-27 DIAGNOSIS — M25.562 CHRONIC PAIN OF BOTH KNEES: ICD-10-CM

## 2023-04-27 PROCEDURE — 97110 THERAPEUTIC EXERCISES: CPT

## 2023-04-27 NOTE — THERAPY TREATMENT NOTE
Outpatient Physical Therapy Ortho Treatment Note  HEBER Casillas     Patient Name: Valery Aguilar  : 1985  MRN: [id number]  Today's Date: 2023      Visit Date: 2023    Visit Dx:    ICD-10-CM ICD-9-CM   1. Lumbar radiculopathy  M54.16 724.4   2. Chronic pain of both knees  M25.561 719.46    M25.562 338.29    G89.29        Patient Active Problem List   Diagnosis   • Chronic migraine without aura without status migrainosus, not intractable   • Cervicogenic headache   • Secondary oligomenorrhea   • Family history of breast cancer   • Obstructive sleep apnea, adult   • GERD with apnea   • Vitamin D deficiency   • PCOS (polycystic ovarian syndrome)   • Panic disorder   • Insulin resistance   • Essential hypertension   • GERD (gastroesophageal reflux disease)   • Anxiety and depression   • Tendonitis, Achilles, left   • S/P ACL reconstruction   • Primary osteoarthritis of left knee   • ACL graft tear, sequela   • Morbid (severe) obesity due to excess calories   • Seizure   • Moderate mixed hyperlipidemia not requiring statin therapy        Past Medical History:   Diagnosis Date   • Abdominal pain    • Anxiety    • Anxiety and depression 3/8/2021   • Arthritis     KNEE   • Chronic sinusitis, unspecified    • Chronic wound infection of abdomen 2019    Added automatically from request for surgery [id number]   • Depression    • GERD (gastroesophageal reflux disease)    • H/O echocardiogram      EF 56% NORMAL DIASTOLIC FUNCTION NO VALVULAR DISEASE   • H/O exercise stress test     REPORTEDLY NORMAL DONE FOR CHEST PAIN DX WITH ANXIETY   • Headache, tension-type    • History of Holter monitoring     REPORTEDLY NORMAL DONE FOR CHEST PAIN DX WITH ANXIETY   • History of MRI of brain and brain stem 2017    NORMAL DONE FOR MIGRAINES   • Hypertension     PRE-ECLAMPSIA WITH BOTH CHILDREN   • Incisional hernia     SCHEDULED FOR REPAIR   • Incisional hernia, without obstruction or gangrene 10/24/2018     Added automatically from request for surgery [id number]   • Infection following a procedure, unspecified, initial encounter    • Insulin resistance     D/T PCOS   • Lobar pneumonia, unspecified organism    • Meralgia paresthetica, left lower limb    • Metabolic syndrome    • Migraine    • Moderate mixed hyperlipidemia not requiring statin therapy 2022   • MRSA (methicillin resistant Staphylococcus aureus) infection 2019    Abdominal wound   • Nasal congestion    • Other injury of unspecified body region, initial encounter    • Panic attack    • Panic disorder    • Papanicolaou smear 2020    NORMAL WITH NEG HPV NEVER ABN   • PCOS (polycystic ovarian syndrome)    • Pneumonia 2018   • PONV (postoperative nausea and vomiting)    • Right lower quadrant abdominal pain 2019   • Seasonal allergies    • Seizure    • Spinal headache    • Unspecified contact dermatitis, unspecified cause    • Viral infection     UNSPECIFIED   • Vitamin D deficiency    • Wound infection 2018        Past Surgical History:   Procedure Laterality Date   • ABDOMINAL WALL ABSCESS INCISION AND DRAINAGE N/A 2018    Procedure: Debridement of abdominal wall;  Surgeon: Brigido Navarrete MD;  Location: [facility] OR;  Service: General   • ADENOIDECTOMY     •  SECTION      X2   • CHOLECYSTECTOMY      LAP   • HERNIA REPAIR     • HX OVARIAN CYSTECTOMY     • INCISION AND DRAINAGE TRUNK N/A 2018    Procedure: wound debridement, abdomen;  Surgeon: Rachel Dolan MD;  Location: [facility] OR;  Service: General   • INCISION AND DRAINAGE TRUNK N/A 2019    Procedure: WOUND CLOSURE ABDOMINAL;  Surgeon: Rachel Dolan MD;  Location: [facility] OR;  Service: General   • KNEE ACL RECONSTRUCTION Left     DR. CARRASCO   • OVARIAN CYST REMOVAL      EX LAP WITH OVARIAN CYST REMOVAL HERNIA REPAIR    • TONSILLECTOMY      T&A   • TRUNK DEBRIDEMENT N/A 2018    Procedure: Abdominal wound debridement;  Surgeon: Magdaleno  Rachel CHANG MD;  Location:  [facility] OR;  Service: General   • VENTRAL/INCISIONAL HERNIA REPAIR N/A 11/15/2018    Procedure: Lateral Component Separation Herniorrhapy Repair with Mesh, Lysis of adhesions, and skin lesion excision of Right Side;  Surgeon: Rachel Dolan MD;  Location:  [facility] OR;  Service: General                        PT Assessment/Plan     Row Name 04/27/23 0600          PT Assessment    Assessment Comments Adjusted patient's exercises in order to decrease her right piriformis discomfort. Also, progressed patient with LE strengthening exercises today and she tolerated this well. Plan to continue to progress patient as tolerated.  -AS        PT Plan    PT Plan Comments Continue with current treatment plan.  -AS           User Key  (r) = Recorded By, (t) = Taken By, (c) = Cosigned By    Initials Name Provider Type    AS Franklin Adler, PT Physical Therapist                   OP Exercises     Row Name 04/27/23 0648 04/27/23 0600          Subjective Comments    Subjective Comments -- Patient states her knees are not that bad but she is having some discomfort in her right buttock area. She states the exercises hurt her in that area.  -AS        Total Minutes    50740 - PT Therapeutic Exercise Minutes 45  -AS --        Exercise 1    Exercise Name 1 -- Ramsey. HS Stretch  -AS     Reps 1 -- 10  -AS     Time 1 -- 10 sec hold each  -AS        Exercise 2    Exercise Name 2 -- Ramsey. Piriformis Stretch  -AS     Reps 2 -- 10  -AS     Time 2 -- 10 sec hold each  -AS        Exercise 3    Exercise Name 3 -- Ramsey. QS  -AS     Reps 3 -- 25  -AS     Time 3 -- 5 sec hold each  -AS        Exercise 4    Exercise Name 4 -- Ramsey. SLR  -AS     Reps 4 -- 25 each  -AS        Exercise 5    Exercise Name 5 -- Ramsey. S/L Hip ABD  -AS     Reps 5 -- 25 each  -AS        Exercise 6    Exercise Name 6 -- Ramsey. Prone Hip EXT  -AS     Reps 6 -- 25 each  -AS        Exercise 7    Exercise Name 7 -- Static Prone on Elbows  -AS     Time 7  -- 2 min  -AS        Exercise 8    Exercise Name 8 -- Prone Trunk EXT  -AS        Exercise 9    Exercise Name 9 -- SAQ Ball Squeeze  -AS     Reps 9 -- 25  -AS        Exercise 10    Exercise Name 10 -- LAQ Ball Squeeze  -AS     Reps 10 -- 25  -AS        Exercise 11    Exercise Name 11 -- Ramsey. TKE  -AS     Reps 11 -- 25  -AS     Time 11 -- Gold  -AS        Exercise 12    Exercise Name 12 -- Mini Squats  -AS        Exercise 13    Exercise Name 13 -- Supine Clams  -AS     Reps 13 -- 25  -AS     Time 13 -- Gold  -AS        Exercise 14    Exercise Name 14 -- Bridge vs Band  -AS     Reps 14 -- 25  -AS     Time 14 -- Gold  -AS           User Key  (r) = Recorded By, (t) = Taken By, (c) = Cosigned By    Initials Name Provider Type    AS Franklin Adler, PT Physical Therapist                                                Time Calculation:   Start Time: 0600  Stop Time: 0648  Time Calculation (min): 48 min  Timed Charges  61608 - PT Therapeutic Exercise Minutes: 45  Total Minutes  Timed Charges Total Minutes: 45   Total Minutes: 45  Therapy Charges for Today     Code Description Service Date Service Provider Modifiers Qty    10169624877  PT THER PROC EA 15 MIN 4/27/2023 Franklin Adler, PT GP 2                    Franklin Adler, PT  4/27/2023

## 2023-05-01 ENCOUNTER — HOSPITAL ENCOUNTER (OUTPATIENT)
Dept: PHYSICAL THERAPY | Facility: HOSPITAL | Age: 38
Setting detail: THERAPIES SERIES
Discharge: HOME | End: 2023-05-01
Payer: COMMERCIAL

## 2023-05-01 DIAGNOSIS — M25.562 CHRONIC PAIN OF BOTH KNEES: ICD-10-CM

## 2023-05-01 DIAGNOSIS — M25.561 CHRONIC PAIN OF BOTH KNEES: ICD-10-CM

## 2023-05-01 DIAGNOSIS — G89.29 CHRONIC PAIN OF BOTH KNEES: ICD-10-CM

## 2023-05-01 DIAGNOSIS — M54.16 LUMBAR RADICULOPATHY: Primary | ICD-10-CM

## 2023-05-01 PROCEDURE — 97110 THERAPEUTIC EXERCISES: CPT

## 2023-05-01 NOTE — THERAPY TREATMENT NOTE
Outpatient Physical Therapy Ortho Treatment Note  HEBER Casillas     Patient Name: Valery Aguilar  : 1985  MRN: [id number]  Today's Date: 2023      Visit Date: 2023    Visit Dx:    ICD-10-CM ICD-9-CM   1. Lumbar radiculopathy  M54.16 724.4   2. Chronic pain of both knees  M25.561 719.46    M25.562 338.29    G89.29        Patient Active Problem List   Diagnosis   • Chronic migraine without aura without status migrainosus, not intractable   • Cervicogenic headache   • Secondary oligomenorrhea   • Family history of breast cancer   • Obstructive sleep apnea, adult   • GERD with apnea   • Vitamin D deficiency   • PCOS (polycystic ovarian syndrome)   • Panic disorder   • Insulin resistance   • Essential hypertension   • GERD (gastroesophageal reflux disease)   • Anxiety and depression   • Tendonitis, Achilles, left   • S/P ACL reconstruction   • Primary osteoarthritis of left knee   • ACL graft tear, sequela   • Morbid (severe) obesity due to excess calories   • Seizure   • Moderate mixed hyperlipidemia not requiring statin therapy        Past Medical History:   Diagnosis Date   • Abdominal pain    • Anxiety    • Anxiety and depression 3/8/2021   • Arthritis     KNEE   • Chronic sinusitis, unspecified    • Chronic wound infection of abdomen 2019    Added automatically from request for surgery [id number]   • Depression    • GERD (gastroesophageal reflux disease)    • H/O echocardiogram      EF 56% NORMAL DIASTOLIC FUNCTION NO VALVULAR DISEASE   • H/O exercise stress test     REPORTEDLY NORMAL DONE FOR CHEST PAIN DX WITH ANXIETY   • Headache, tension-type    • History of Holter monitoring     REPORTEDLY NORMAL DONE FOR CHEST PAIN DX WITH ANXIETY   • History of MRI of brain and brain stem 2017    NORMAL DONE FOR MIGRAINES   • Hypertension     PRE-ECLAMPSIA WITH BOTH CHILDREN   • Incisional hernia     SCHEDULED FOR REPAIR   • Incisional hernia, without obstruction or gangrene 10/24/2018     Added automatically from request for surgery [id number]   • Infection following a procedure, unspecified, initial encounter    • Insulin resistance     D/T PCOS   • Lobar pneumonia, unspecified organism    • Meralgia paresthetica, left lower limb    • Metabolic syndrome    • Migraine    • Moderate mixed hyperlipidemia not requiring statin therapy 2022   • MRSA (methicillin resistant Staphylococcus aureus) infection 2019    Abdominal wound   • Nasal congestion    • Other injury of unspecified body region, initial encounter    • Panic attack    • Panic disorder    • Papanicolaou smear 2020    NORMAL WITH NEG HPV NEVER ABN   • PCOS (polycystic ovarian syndrome)    • Pneumonia 2018   • PONV (postoperative nausea and vomiting)    • Right lower quadrant abdominal pain 2019   • Seasonal allergies    • Seizure    • Spinal headache    • Unspecified contact dermatitis, unspecified cause    • Viral infection     UNSPECIFIED   • Vitamin D deficiency    • Wound infection 2018        Past Surgical History:   Procedure Laterality Date   • ABDOMINAL WALL ABSCESS INCISION AND DRAINAGE N/A 2018    Procedure: Debridement of abdominal wall;  Surgeon: Brigido Navarrete MD;  Location: [facility] OR;  Service: General   • ADENOIDECTOMY     •  SECTION      X2   • CHOLECYSTECTOMY      LAP   • HERNIA REPAIR     • HX OVARIAN CYSTECTOMY     • INCISION AND DRAINAGE TRUNK N/A 2018    Procedure: wound debridement, abdomen;  Surgeon: Rachel Dolan MD;  Location: [facility] OR;  Service: General   • INCISION AND DRAINAGE TRUNK N/A 2019    Procedure: WOUND CLOSURE ABDOMINAL;  Surgeon: Rachel Dolan MD;  Location: [facility] OR;  Service: General   • KNEE ACL RECONSTRUCTION Left     DR. CARRASCO   • OVARIAN CYST REMOVAL      EX LAP WITH OVARIAN CYST REMOVAL HERNIA REPAIR    • TONSILLECTOMY      T&A   • TRUNK DEBRIDEMENT N/A 2018    Procedure: Abdominal wound debridement;  Surgeon: Magdaleno  Rachel CHANG MD;  Location:  [facility] OR;  Service: General   • VENTRAL/INCISIONAL HERNIA REPAIR N/A 11/15/2018    Procedure: Lateral Component Separation Herniorrhapy Repair with Mesh, Lysis of adhesions, and skin lesion excision of Right Side;  Surgeon: Rachel Dolan MD;  Location:  [facility] OR;  Service: General                        PT Assessment/Plan     Row Name 05/01/23 0600          PT Assessment    Assessment Comments Patient with improved symptoms at this time. Plan to continue to progress patient as tolerated.  -AS        PT Plan    PT Plan Comments Continue with current treatment plan.  -AS           User Key  (r) = Recorded By, (t) = Taken By, (c) = Cosigned By    Initials Name Provider Type    AS Franklin Adler, PT Physical Therapist                   OP Exercises     Row Name 05/01/23 0639 05/01/23 0600          Subjective Comments    Subjective Comments -- Patient states she is doing well this morning.  -AS        Total Minutes    15781 - PT Therapeutic Exercise Minutes 40  -AS --        Exercise 1    Exercise Name 1 -- Ramsey. HS Stretch  -AS     Reps 1 -- 10  -AS     Time 1 -- 10 sec hold each  -AS        Exercise 2    Exercise Name 2 -- Ramsey. Piriformis Stretch  -AS     Reps 2 -- 10  -AS     Time 2 -- 10 sec hold each  -AS        Exercise 3    Exercise Name 3 -- Ramsey. QS  -AS     Reps 3 -- 25  -AS     Time 3 -- 5 sec hold each  -AS        Exercise 4    Exercise Name 4 -- Ramsey. SLR  -AS     Reps 4 -- 25 each  -AS        Exercise 5    Exercise Name 5 -- Ramsey. S/L Hip ABD  -AS     Reps 5 -- 25 each  -AS        Exercise 6    Exercise Name 6 -- Ramsey. Prone Hip EXT  -AS     Reps 6 -- 25 each  -AS        Exercise 7    Exercise Name 7 -- Static Prone on Elbows  -AS     Time 7 -- 2 min  -AS        Exercise 8    Exercise Name 8 -- Prone Trunk EXT  -AS        Exercise 9    Exercise Name 9 -- SAQ Ball Squeeze  -AS     Reps 9 -- 25  -AS        Exercise 10    Exercise Name 10 -- LAQ Ball Squeeze  -AS     Reps 10  -- 25  -AS        Exercise 11    Exercise Name 11 -- Ramsey. TKE  -AS     Reps 11 -- 25  -AS     Time 11 -- Gold  -AS        Exercise 12    Exercise Name 12 -- Seated Hip IR/ER vs Band  -AS     Reps 12 -- --  -AS        Exercise 13    Exercise Name 13 -- Supine Clams  -AS     Reps 13 -- 25  -AS     Time 13 -- Gold  -AS        Exercise 14    Exercise Name 14 -- Bridge vs Band  -AS     Reps 14 -- 25  -AS     Time 14 -- Gold  -AS        Exercise 15    Exercise Name 15 -- Standing Hip ABD & EXT  -AS     Reps 15 -- 25 each leg, each direction  -AS           User Key  (r) = Recorded By, (t) = Taken By, (c) = Cosigned By    Initials Name Provider Type    AS Franklin Adler, PT Physical Therapist                                                Time Calculation:   Start Time: 0555  Stop Time: 0639  Time Calculation (min): 44 min  Timed Charges  78192 - PT Therapeutic Exercise Minutes: 40  Total Minutes  Timed Charges Total Minutes: 40   Total Minutes: 40  Therapy Charges for Today     Code Description Service Date Service Provider Modifiers Qty    55967783834 HC PT THER PROC EA 15 MIN 5/1/2023 Franklin Adler, PT GP 2                    Franklin Adler, PT  5/1/2023

## 2023-05-08 ENCOUNTER — OFFICE VISIT (OUTPATIENT)
Dept: ORTHOPEDIC SURGERY | Facility: CLINIC | Age: 38
End: 2023-05-08
Payer: COMMERCIAL

## 2023-05-08 ENCOUNTER — HOSPITAL ENCOUNTER (OUTPATIENT)
Dept: PHYSICAL THERAPY | Facility: HOSPITAL | Age: 38
Setting detail: THERAPIES SERIES
Discharge: HOME | End: 2023-05-08
Payer: COMMERCIAL

## 2023-05-08 VITALS — HEIGHT: 64 IN | BODY MASS INDEX: 48.9 KG/M2 | WEIGHT: 286.4 LBS

## 2023-05-08 DIAGNOSIS — G89.29 CHRONIC PAIN OF BOTH KNEES: ICD-10-CM

## 2023-05-08 DIAGNOSIS — M25.561 CHRONIC PAIN OF BOTH KNEES: ICD-10-CM

## 2023-05-08 DIAGNOSIS — M54.16 LUMBAR RADICULOPATHY: Primary | ICD-10-CM

## 2023-05-08 DIAGNOSIS — M94.261 CHONDROMALACIA OF KNEE, RIGHT: Primary | ICD-10-CM

## 2023-05-08 DIAGNOSIS — M17.12 PRIMARY OSTEOARTHRITIS OF LEFT KNEE: ICD-10-CM

## 2023-05-08 DIAGNOSIS — M17.11 PRIMARY OSTEOARTHRITIS OF RIGHT KNEE: ICD-10-CM

## 2023-05-08 DIAGNOSIS — T84.89XS ACL GRAFT TEAR, SEQUELA: ICD-10-CM

## 2023-05-08 DIAGNOSIS — Z98.890 S/P ACL RECONSTRUCTION: ICD-10-CM

## 2023-05-08 DIAGNOSIS — M25.562 CHRONIC PAIN OF BOTH KNEES: ICD-10-CM

## 2023-05-08 PROCEDURE — 97110 THERAPEUTIC EXERCISES: CPT

## 2023-05-08 RX ADMIN — TRIAMCINOLONE ACETONIDE 80 MG: 40 INJECTION, SUSPENSION INTRA-ARTICULAR; INTRAMUSCULAR at 09:06

## 2023-05-08 RX ADMIN — LIDOCAINE HYDROCHLORIDE 8 ML: 10 INJECTION, SOLUTION EPIDURAL; INFILTRATION; INTRACAUDAL; PERINEURAL at 09:06

## 2023-05-08 NOTE — PROGRESS NOTES
Subjective:     Patient ID: Valery Aguilar is a 37 y.o. female.    Chief Complaint:  Follow-up left pain, DJD, prior ACL reconstruction with subsequent graft tear  Follow-up right knee pain, chondromalacia  Last injection-bilateral knee-2023-cortisone    History of Present Illness  Valery Aguilar returns to clinic today for evaluation of bilateral knee pain, left greater than right.    The patient reports significant recurrence of pain, left knee greater than right. She rates her pain moderate to severe in intensity, exacerbated with prolonged ambulation, weightbearing, and deep flexion activities. She localizes her pain to the anterior and medial aspect of bilateral knees. The patient notes minimal improvement with activity modification and rest. She has been transitioned to Celebrex, with minimal improvement. She has started with physical therapy, and has made some progress with this in regards to strength and functional activities. She denies any locking or catching, fevers, chills, or sweats.       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    • Occupation: [employer]   Tobacco Use   • Smoking status: Former     Packs/day: 0.50     Years: 1.00     Pack years: 0.50     Types: Cigarettes     Quit date:      Years since quittin.3   • Smokeless tobacco: Never   Vaping Use   • Vaping Use: Never used   Substance and Sexual Activity   • Alcohol use: Yes     Comment: occasional   • Drug use: No   • Sexual activity: Defer     Partners: Male     Birth control/protection: OCP     Comment: LNMP irregular      Past Medical History:   Diagnosis Date   • Abdominal pain    • Anxiety    • Anxiety and depression 3/8/2021   • Arthritis     KNEE   • Chronic sinusitis, unspecified    • Chronic wound infection of abdomen 2019    Added automatically from request for surgery [id number]   • Depression    • GERD (gastroesophageal reflux disease)    • H/O  echocardiogram      EF 56% NORMAL DIASTOLIC FUNCTION NO VALVULAR DISEASE   • H/O exercise stress test     REPORTEDLY NORMAL DONE FOR CHEST PAIN DX WITH ANXIETY   • Headache, tension-type    • History of Holter monitoring     REPORTEDLY NORMAL DONE FOR CHEST PAIN DX WITH ANXIETY   • History of MRI of brain and brain stem 2017    NORMAL DONE FOR MIGRAINES   • Hypertension     PRE-ECLAMPSIA WITH BOTH CHILDREN   • Incisional hernia     SCHEDULED FOR REPAIR   • Incisional hernia, without obstruction or gangrene 10/24/2018    Added automatically from request for surgery [id number]   • Infection following a procedure, unspecified, initial encounter    • Insulin resistance     D/T PCOS   • Lobar pneumonia, unspecified organism    • Meralgia paresthetica, left lower limb    • Metabolic syndrome    • Migraine    • Moderate mixed hyperlipidemia not requiring statin therapy 2022   • MRSA (methicillin resistant Staphylococcus aureus) infection 2019    Abdominal wound   • Nasal congestion    • Other injury of unspecified body region, initial encounter    • Panic attack    • Panic disorder    • Papanicolaou smear 2020    NORMAL WITH NEG HPV NEVER ABN   • PCOS (polycystic ovarian syndrome)    • Pneumonia 2018   • PONV (postoperative nausea and vomiting)    • Right lower quadrant abdominal pain 2019   • Seasonal allergies    • Seizure    • Spinal headache    • Unspecified contact dermatitis, unspecified cause    • Viral infection     UNSPECIFIED   • Vitamin D deficiency    • Wound infection 2018     Past Surgical History:   Procedure Laterality Date   • ABDOMINAL WALL ABSCESS INCISION AND DRAINAGE N/A 2018    Procedure: Debridement of abdominal wall;  Surgeon: Brigido Navarrete MD;  Location: [facility];  Service: General   • ADENOIDECTOMY     •  SECTION      X2   • CHOLECYSTECTOMY      LAP   • HERNIA REPAIR     • HX OVARIAN CYSTECTOMY     • INCISION AND DRAINAGE TRUNK N/A 2018     "Procedure: wound debridement, abdomen;  Surgeon: Rachel Dolan MD;  Location: [facility] OR;  Service: General   • INCISION AND DRAINAGE TRUNK N/A 2/21/2019    Procedure: WOUND CLOSURE ABDOMINAL;  Surgeon: Rachel Dolan MD;  Location: [facility] OR;  Service: General   • KNEE ACL RECONSTRUCTION Left 2010    DR. CARRASCO   • OVARIAN CYST REMOVAL      EX LAP WITH OVARIAN CYST REMOVAL HERNIA REPAIR 2014   • TONSILLECTOMY      T&A   • TRUNK DEBRIDEMENT N/A 12/5/2018    Procedure: Abdominal wound debridement;  Surgeon: Rachel Dolna MD;  Location: [facility] OR;  Service: General   • VENTRAL/INCISIONAL HERNIA REPAIR N/A 11/15/2018    Procedure: Lateral Component Separation Herniorrhapy Repair with Mesh, Lysis of adhesions, and skin lesion excision of Right Side;  Surgeon: Rachel Dolan MD;  Location: [facility] OR;  Service: General       Family History   Problem Relation Age of Onset   • Stroke Mother    • Heart disease Mother    • Hypertension Mother    • Heart disease Father    • Hypertension Father    • Diabetes Father    • Dementia Maternal Grandmother    • Stroke Maternal Grandmother    • Diabetes Maternal Grandmother    • Stroke Paternal Grandmother    • Hypertension Paternal Grandmother    • Cancer Paternal Grandmother    • Heart disease Paternal Grandfather    • Diabetes Paternal Grandfather          Review of Systems        Objective:  Vitals:    05/08/23 0822   Weight: 130 kg (286 lb 6.4 oz)   Height: 162.6 cm (64\")         05/08/23 0822   Weight: 130 kg (286 lb 6.4 oz)     Body mass index is 49.16 kg/m².  General: No acute distress.  Resp: normal respiratory effort  Skin: no rashes or wounds; normal turgor  Psych: mood and affect appropriate; recent and remote memory intact          Ortho Exam     Bilateral Knee-    Active range of motion is 5 to 95 degrees.  Flexion strength- 4/5.  Extension strength- 4/5.  Maximal tenderness to palpation along lateral and anterior aspects of bilateral knees as well " as medial and lateral patellar facet.  Patellar compression test- Positive.  Stable to varus and valgus stress at 5 and 30 degrees.  Grade 2A Lachman on the left.  Grade 1A Lachman on the right.  Log roll test- Mild hip pain, localized primarily to the groin region.  Stinchfield's test- Mild hip pain, localized primarily to the groin region.  Brisk cap refill all digits, 1+ dorsalis pedis pulse.    Imaging:  None today  Assessment:        1. Chondromalacia of knee, right    2. Primary osteoarthritis of left knee    3. S/P ACL reconstruction    4. ACL graft tear, sequela    5. Primary osteoarthritis of right knee           Plan:      Large Joint Arthrocentesis  Date/Time: 5/8/2023 9:06 AM  Consent given by: patient  Site marked: site marked  Timeout: Immediately prior to procedure a time out was called to verify the correct patient, procedure, equipment, support staff and site/side marked as required   Supporting Documentation  Indications: pain   Procedure Details  Location: knee - Knee joint: herve knee.  Preparation: Patient was prepped and draped in the usual sterile fashion  Needle size: 22 G  Approach: anterolateral  Medications administered: 8 mL lidocaine PF 1% 1 %; 80 mg triamcinolone acetonide 40 MG/ML  Patient tolerance: patient tolerated the procedure well with no immediate complications            1. Discussed treatment options at length with patient at today's visit. She is getting some pain in her hips as well at this point in time, but for now we are going to focus on her knees to try to get her pain better there because this seems to be the primary spot of her issue.  2. Continue with physical therapy.  3. We did discuss options including repeat cortisone injection, viscosupplementation injection, or other treatments. At this point in time, she wants to proceed with repeat cortisone injection today, but we will apply for viscosupplement with referral sent in at this time.  4. Patient would like to  proceed with cortisone injection today to the bilateral knees. Recommended limited use of affected extremity for the next 24 hours to only essential activites other than work on general active and passive motion. Recommended supplementing with ice and soft tissue massage. Discussed with patient that they should see results in 5-7 days, if no improvement in 5-6 weeks I have asked them to call the office to review other options. Patient should call office immediately if they notice redness, warmth, fevers, chills, or residual numbness or tingling for greater than 6 hours after injection.   5. Continue to work on home exercise for strength, range of motion, and weight loss.      Valery Aguilar was in agreement with plan and had all questions answered.     Orders:  Orders Placed This Encounter   Procedures   • Large Joint Arthrocentesis   • Visco Treatment       Medications:  No orders of the defined types were placed in this encounter.      Followup:  Return for viscosupplement injections.    Diagnoses and all orders for this visit:    1. Chondromalacia of knee, right (Primary)    2. Primary osteoarthritis of left knee  -     Visco Treatment; Future    3. S/P ACL reconstruction    4. ACL graft tear, sequela    5. Primary osteoarthritis of right knee  -     Visco Treatment; Future    Other orders  -     Large Joint Arthrocentesis        Transcribed from ambient dictation for Patrick Napier MD by Mayra Cabral.  05/08/23   09:26 EDT    Patient or patient representative verbalized consent to the visit recording.  I have personally performed the services described in this document as transcribed by the above individual, and it is both accurate and complete.      Dictated utilizing Dragon dictation

## 2023-05-08 NOTE — THERAPY TREATMENT NOTE
Outpatient Physical Therapy Ortho Treatment Note  HEBER Casillas     Patient Name: Valery Aguilar  : 1985  MRN: [id number]  Today's Date: 2023      Visit Date: 2023    Visit Dx:    ICD-10-CM ICD-9-CM   1. Lumbar radiculopathy  M54.16 724.4   2. Chronic pain of both knees  M25.561 719.46    M25.562 338.29    G89.29        Patient Active Problem List   Diagnosis   • Chronic migraine without aura without status migrainosus, not intractable   • Cervicogenic headache   • Secondary oligomenorrhea   • Family history of breast cancer   • Obstructive sleep apnea, adult   • GERD with apnea   • Vitamin D deficiency   • PCOS (polycystic ovarian syndrome)   • Panic disorder   • Insulin resistance   • Essential hypertension   • GERD (gastroesophageal reflux disease)   • Anxiety and depression   • Tendonitis, Achilles, left   • S/P ACL reconstruction   • Primary osteoarthritis of left knee   • ACL graft tear, sequela   • Morbid (severe) obesity due to excess calories   • Seizure   • Moderate mixed hyperlipidemia not requiring statin therapy        Past Medical History:   Diagnosis Date   • Abdominal pain    • Anxiety    • Anxiety and depression 3/8/2021   • Arthritis     KNEE   • Chronic sinusitis, unspecified    • Chronic wound infection of abdomen 2019    Added automatically from request for surgery [id number]   • Depression    • GERD (gastroesophageal reflux disease)    • H/O echocardiogram      EF 56% NORMAL DIASTOLIC FUNCTION NO VALVULAR DISEASE   • H/O exercise stress test     REPORTEDLY NORMAL DONE FOR CHEST PAIN DX WITH ANXIETY   • Headache, tension-type    • History of Holter monitoring     REPORTEDLY NORMAL DONE FOR CHEST PAIN DX WITH ANXIETY   • History of MRI of brain and brain stem 2017    NORMAL DONE FOR MIGRAINES   • Hypertension     PRE-ECLAMPSIA WITH BOTH CHILDREN   • Incisional hernia     SCHEDULED FOR REPAIR   • Incisional hernia, without obstruction or gangrene 10/24/2018     Added automatically from request for surgery [id number]   • Infection following a procedure, unspecified, initial encounter    • Insulin resistance     D/T PCOS   • Lobar pneumonia, unspecified organism    • Meralgia paresthetica, left lower limb    • Metabolic syndrome    • Migraine    • Moderate mixed hyperlipidemia not requiring statin therapy 2022   • MRSA (methicillin resistant Staphylococcus aureus) infection 2019    Abdominal wound   • Nasal congestion    • Other injury of unspecified body region, initial encounter    • Panic attack    • Panic disorder    • Papanicolaou smear 2020    NORMAL WITH NEG HPV NEVER ABN   • PCOS (polycystic ovarian syndrome)    • Pneumonia 2018   • PONV (postoperative nausea and vomiting)    • Right lower quadrant abdominal pain 2019   • Seasonal allergies    • Seizure    • Spinal headache    • Unspecified contact dermatitis, unspecified cause    • Viral infection     UNSPECIFIED   • Vitamin D deficiency    • Wound infection 2018        Past Surgical History:   Procedure Laterality Date   • ABDOMINAL WALL ABSCESS INCISION AND DRAINAGE N/A 2018    Procedure: Debridement of abdominal wall;  Surgeon: Brigido Navarrete MD;  Location: [facility] OR;  Service: General   • ADENOIDECTOMY     •  SECTION      X2   • CHOLECYSTECTOMY      LAP   • HERNIA REPAIR     • HX OVARIAN CYSTECTOMY     • INCISION AND DRAINAGE TRUNK N/A 2018    Procedure: wound debridement, abdomen;  Surgeon: Rachel Dolan MD;  Location: [facility] OR;  Service: General   • INCISION AND DRAINAGE TRUNK N/A 2019    Procedure: WOUND CLOSURE ABDOMINAL;  Surgeon: Rachel Dolan MD;  Location: [facility] OR;  Service: General   • KNEE ACL RECONSTRUCTION Left     DR. CARRASCO   • OVARIAN CYST REMOVAL      EX LAP WITH OVARIAN CYST REMOVAL HERNIA REPAIR    • TONSILLECTOMY      T&A   • TRUNK DEBRIDEMENT N/A 2018    Procedure: Abdominal wound debridement;  Surgeon: Magdaleno  "Rachel CHANG MD;  Location:  [facility] OR;  Service: General   • VENTRAL/INCISIONAL HERNIA REPAIR N/A 11/15/2018    Procedure: Lateral Component Separation Herniorrhapy Repair with Mesh, Lysis of adhesions, and skin lesion excision of Right Side;  Surgeon: Rachel Dolan MD;  Location:  [facility] OR;  Service: General                        PT Assessment/Plan     Row Name 05/08/23 0700          PT Assessment    Assessment Comments Continued with flexibility and strengthening exercises today and patient tolerated this well.  -AS        PT Plan    PT Plan Comments Continue with current treatment plan.  -AS           User Key  (r) = Recorded By, (t) = Taken By, (c) = Cosigned By    Initials Name Provider Type    AS Franklin Adler, PT Physical Therapist                   OP Exercises     Row Name 05/08/23 0807 05/08/23 0700          Subjective Comments    Subjective Comments -- Patient states she is doing well this morning. She does report that she has been having bilateral hip pain with right side worse than left. Patient states they keep her from sleeping. She [place], maybe that is why.\"  -AS        Total Minutes    20873 - PT Therapeutic Exercise Minutes 40  -AS --        Exercise 1    Exercise Name 1 -- Ramsey. HS Stretch  -AS     Reps 1 -- 10  -AS     Time 1 -- 10 sec hold each  -AS        Exercise 2    Exercise Name 2 -- Ramsey. Piriformis Stretch  -AS     Reps 2 -- 10  -AS     Time 2 -- 10 sec hold each  -AS        Exercise 3    Exercise Name 3 -- Ramsey. QS  -AS     Reps 3 -- 25  -AS     Time 3 -- 5 sec hold each  -AS        Exercise 4    Exercise Name 4 -- Ramsey. SLR  -AS     Reps 4 -- 25 each  -AS        Exercise 5    Exercise Name 5 -- Ramsey. S/L Hip ABD  -AS     Reps 5 -- 25 each  -AS        Exercise 6    Exercise Name 6 -- Ramsey. Prone Hip EXT  -AS     Reps 6 -- 25 each  -AS        Exercise 7    Exercise Name 7 -- Static Prone on Elbows  -AS     Time 7 -- 2 min "  -AS        Exercise 8    Exercise Name 8 -- Prone Trunk EXT  -AS        Exercise 9    Exercise Name 9 -- SAQ Ball Squeeze  -AS     Reps 9 -- 25  -AS        Exercise 10    Exercise Name 10 -- LAQ Ball Squeeze  -AS     Reps 10 -- 25  -AS        Exercise 11    Exercise Name 11 -- Ramsey. TKE  -AS     Reps 11 -- 25  -AS     Time 11 -- Gold  -AS        Exercise 12    Exercise Name 12 -- Seated Hip IR/ER vs Band  -AS     Reps 12 -- 25  -AS     Time 12 -- Gold  -AS        Exercise 13    Exercise Name 13 -- Supine Clams  -AS     Reps 13 -- 25  -AS     Time 13 -- Gold  -AS        Exercise 14    Exercise Name 14 -- Bridge vs Band  -AS     Reps 14 -- 25  -AS     Time 14 -- Gold  -AS        Exercise 15    Exercise Name 15 -- Standing Hip ABD & EXT  -AS     Reps 15 -- 25 each leg, each direction  -AS           User Key  (r) = Recorded By, (t) = Taken By, (c) = Cosigned By    Initials Name Provider Type    AS Franklin Adler, PT Physical Therapist                                                Time Calculation:   Start Time: 0726  Stop Time: 0807  Time Calculation (min): 41 min  Timed Charges  81336 - PT Therapeutic Exercise Minutes: 40  Total Minutes  Timed Charges Total Minutes: 40   Total Minutes: 40  Therapy Charges for Today     Code Description Service Date Service Provider Modifiers Qty    [id number]  PT THER PROC EA 15 MIN 5/8/2023 Franklin Adler, PT GP 2                    Franklin Adler, PT  5/8/2023

## 2023-05-15 ENCOUNTER — HOSPITAL ENCOUNTER (OUTPATIENT)
Dept: PHYSICAL THERAPY | Facility: HOSPITAL | Age: 38
Setting detail: THERAPIES SERIES
Discharge: HOME | End: 2023-05-15
Payer: COMMERCIAL

## 2023-05-15 DIAGNOSIS — M25.562 CHRONIC PAIN OF BOTH KNEES: ICD-10-CM

## 2023-05-15 DIAGNOSIS — M54.16 LUMBAR RADICULOPATHY: Primary | ICD-10-CM

## 2023-05-15 DIAGNOSIS — G89.29 CHRONIC PAIN OF BOTH KNEES: ICD-10-CM

## 2023-05-15 DIAGNOSIS — M25.561 CHRONIC PAIN OF BOTH KNEES: ICD-10-CM

## 2023-05-15 PROCEDURE — 97110 THERAPEUTIC EXERCISES: CPT

## 2023-05-15 NOTE — THERAPY RE-EVALUATION
Outpatient Physical Therapy Ortho Re-Evaluation  HEBER Casillas     Patient Name: Valery Aguilar  : 1985  MRN: [id number]  Today's Date: 5/15/2023      Visit Date: 05/15/2023    Patient Active Problem List   Diagnosis   • Chronic migraine without aura without status migrainosus, not intractable   • Cervicogenic headache   • Secondary oligomenorrhea   • Family history of breast cancer   • Obstructive sleep apnea, adult   • GERD with apnea   • Vitamin D deficiency   • PCOS (polycystic ovarian syndrome)   • Panic disorder   • Insulin resistance   • Essential hypertension   • GERD (gastroesophageal reflux disease)   • Anxiety and depression   • Tendonitis, Achilles, left   • S/P ACL reconstruction   • Primary osteoarthritis of left knee   • ACL graft tear, sequela   • Morbid (severe) obesity due to excess calories   • Seizure   • Moderate mixed hyperlipidemia not requiring statin therapy        Past Medical History:   Diagnosis Date   • Abdominal pain    • Anxiety    • Anxiety and depression 3/8/2021   • Arthritis     KNEE   • Chronic sinusitis, unspecified    • Chronic wound infection of abdomen 2019    Added automatically from request for surgery [id number]   • Depression    • GERD (gastroesophageal reflux disease)    • H/O echocardiogram      EF 56% NORMAL DIASTOLIC FUNCTION NO VALVULAR DISEASE   • H/O exercise stress test     REPORTEDLY NORMAL DONE FOR CHEST PAIN DX WITH ANXIETY   • Headache, tension-type    • History of Holter monitoring     REPORTEDLY NORMAL DONE FOR CHEST PAIN DX WITH ANXIETY   • History of MRI of brain and brain stem 2017    NORMAL DONE FOR MIGRAINES   • Hypertension     PRE-ECLAMPSIA WITH BOTH CHILDREN   • Incisional hernia     SCHEDULED FOR REPAIR   • Incisional hernia, without obstruction or gangrene 10/24/2018    Added automatically from request for surgery [id number]   • Infection following a procedure, unspecified, initial encounter    • Insulin resistance     D/T PCOS    • Lobar pneumonia, unspecified organism    • Meralgia paresthetica, left lower limb    • Metabolic syndrome    • Migraine    • Moderate mixed hyperlipidemia not requiring statin therapy 2022   • MRSA (methicillin resistant Staphylococcus aureus) infection 2019    Abdominal wound   • Nasal congestion    • Other injury of unspecified body region, initial encounter    • Panic attack    • Panic disorder    • Papanicolaou smear 2020    NORMAL WITH NEG HPV NEVER ABN   • PCOS (polycystic ovarian syndrome)    • Pneumonia 2018   • PONV (postoperative nausea and vomiting)    • Right lower quadrant abdominal pain 2019   • Seasonal allergies    • Seizure    • Spinal headache    • Unspecified contact dermatitis, unspecified cause    • Viral infection     UNSPECIFIED   • Vitamin D deficiency    • Wound infection 2018        Past Surgical History:   Procedure Laterality Date   • ABDOMINAL WALL ABSCESS INCISION AND DRAINAGE N/A 2018    Procedure: Debridement of abdominal wall;  Surgeon: Brigido Navarrete MD;  Location: [facility] OR;  Service: General   • ADENOIDECTOMY     •  SECTION      X2   • CHOLECYSTECTOMY      LAP   • HERNIA REPAIR     • HX OVARIAN CYSTECTOMY     • INCISION AND DRAINAGE TRUNK N/A 2018    Procedure: wound debridement, abdomen;  Surgeon: Rachel Dolan MD;  Location: [facility] OR;  Service: General   • INCISION AND DRAINAGE TRUNK N/A 2019    Procedure: WOUND CLOSURE ABDOMINAL;  Surgeon: Rachel Dolan MD;  Location: [facility] OR;  Service: General   • KNEE ACL RECONSTRUCTION Left     DR. CARRASCO   • OVARIAN CYST REMOVAL      EX LAP WITH OVARIAN CYST REMOVAL HERNIA REPAIR    • TONSILLECTOMY      T&A   • TRUNK DEBRIDEMENT N/A 2018    Procedure: Abdominal wound debridement;  Surgeon: Rachel Dolan MD;  Location: [facility] OR;  Service: General   • VENTRAL/INCISIONAL HERNIA REPAIR N/A 11/15/2018    Procedure: Lateral Component Separation  Herniorrhapy Repair with Mesh, Lysis of adhesions, and skin lesion excision of Right Side;  Surgeon: Rachel Dolan MD;  Location: [facility] OR;  Service: General       Visit Dx:     ICD-10-CM ICD-9-CM   1. Lumbar radiculopathy  M54.16 724.4   2. Chronic pain of both knees  M25.561 719.46    M25.562 338.29    G89.29               PT Ortho     Row Name 05/15/23 0600       Precautions and Contraindications    Precautions/Limitations no known precautions/limitations  -AS       Subjective Pain    Able to rate subjective pain? yes  -AS    Pre-Treatment Pain Level 0  -AS    Post-Treatment Pain Level 0  -AS       Posture/Observations    Posture- WNL Posture is WNL  -AS       Head/Neck/Trunk    Trunk Extension AROM WNL  -AS    Trunk Flexion AROM WNL  -AS    Trunk Lt Lateral Flexion AROM WNL  -AS    Trunk Rt Lateral Flexion AROM WNL  -AS    Trunk Lt Rotation AROM WNL  -AS    Trunk Rt Rotation AROM WNL  -AS       Right Lower Ext    Rt Knee Extension/Flexion AROM WNL  -AS       Left Lower Ext    Lt Knee Extension/Flexion AROM WNL  -AS       MMT Neck/Trunk    Trunk Flexion MMT, Gross Movement (4-/5) good minus  -AS    Trunk Extension MMT, Gross Movement (4-/5) good minus  -AS       MMT Right Lower Ext    Rt Hip Flexion MMT, Gross Movement (4/5) good  -AS    Rt Hip Extension MMT, Gross Movement (4/5) good  -AS    Rt Hip ABduction MMT, Gross Movement (4/5) good  -AS    Rt Knee Extension MMT, Gross Movement (4/5) good  -AS    Rt Knee Flexion MMT, Gross Movement (4/5) good  -AS       MMT Left Lower Ext    Lt Hip Flexion MMT, Gross Movement (4/5) good  -AS    Lt Hip Extension MMT, Gross Movement (4/5) good  -AS    Lt Hip ABduction MMT, Gross Movement (4/5) good  -AS    Lt Knee Extension MMT, Gross Movement (4/5) good  -AS    Lt Knee Flexion MMT, Gross Movement (4/5) good  -AS       Sensation    Sensation WNL? WNL  -AS    Light Touch No apparent deficits  -AS       Lower Extremity Flexibility    Hamstrings Bilateral:;Mildly limited   -AS    Hip External Rotators Bilateral:;Mildly limited  -AS          User Key  (r) = Recorded By, (t) = Taken By, (c) = Cosigned By    Initials Name Provider Type    AS Franklin Adler, PT Physical Therapist                                   PT OP Goals     Row Name 05/15/23 0600          PT Short Term Goals    STG 1 Patient to demonstrate compliance with her initial HEP for flexibility, ROM and strengthening.  -AS     STG 1 Progress Met  -AS     STG 2 Patient to report low back and bilateral knee pain on VAS of 4-5/10 at worst with activity.  -AS     STG 2 Progress Met  -AS     STG 3 Patient to demonstrate improved trunk and Ramsey. LE strength to 4+/5 in all planes.  -AS     STG 3 Progress New;Ongoing;Progressing  -AS        Long Term Goals    LTG 1 Patient to demonstrate compliance with her advanced HEP for flexibility, ROM and strengthening.  -AS     LTG 1 Progress Met  -AS     LTG 2 Patient to report low back and bilateral knee pain on VAS of 2-3/10 at worst with activity.  -AS     LTG 2 Progress Ongoing;Progressing  -AS     LTG 3 Patient to demonstrate improved trunk and Ramsey. LE strength to 5/5 in all planes.  -AS     LTG 3 Progress New;Ongoing;Progressing  -AS     LTG 4 Patient to report improved function on Back Index by 10-15 points.  -AS     LTG 4 Progress Ongoing;Progressing  -AS     LTG 5 Patient to report improved function and decreased pain on LEFS to 55/80.  -AS     LTG 5 Progress Ongoing;Progressing  -AS           User Key  (r) = Recorded By, (t) = Taken By, (c) = Cosigned By    Initials Name Provider Type    AS Franklin Adler, PT Physical Therapist                 PT Assessment/Plan     Row Name 05/15/23 0600          PT Assessment    Functional Limitations Limitation in home management;Limitations in community activities;Performance in sport activities;Performance in work activities;Performance in leisure activities  -AS     Impairments Impaired flexibility;Muscle strength;Pain  -AS      Assessment Comments Patient is responding well to PT att his time. Patient has good trunk, hip, and knee ROM with improving strength. She is reporting decreased symptoms with improved function. Plan to continue to progress patient as tolerated.  -AS     Please refer to paper survey for additional self-reported information Yes  -AS     Rehab Potential Good  -AS     Patient/caregiver participated in establishment of treatment plan and goals Yes  -AS     Patient would benefit from skilled therapy intervention Yes  -AS        PT Plan    PT Frequency 1x/week  -AS     Predicted Duration of Therapy Intervention (PT) 2-4 weeks  -AS     Planned CPT's? PT RE-EVAL: 06920;PT THER PROC EA 15 MIN: 38151;PT THER ACT EA 15 MIN: 05236;PT MANUAL THERAPY EA 15 MIN: 73712;PT NEUROMUSC RE-EDUCATION EA 15 MIN: 55221  -AS     PT Plan Comments Continue with current treatment plan.  -AS           User Key  (r) = Recorded By, (t) = Taken By, (c) = Cosigned By    Initials Name Provider Type    AS Franklin Adler, PT Physical Therapist                   OP Exercises     Row Name 05/15/23 0723 05/15/23 0600          Subjective Comments    Subjective Comments -- Patient states she is feeling better. She states she did get an injection last week from her Ortho and she feels like this has helped.  -AS        Subjective Pain    Able to rate subjective pain? -- yes  -AS     Pre-Treatment Pain Level -- 0  -AS     Post-Treatment Pain Level -- 0  -AS        Total Minutes    09017 - PT Therapeutic Exercise Minutes 45  -AS --        Exercise 1    Exercise Name 1 -- Ramsey. HS Stretch  -AS     Reps 1 -- 10  -AS     Time 1 -- 10 sec hold each  -AS        Exercise 2    Exercise Name 2 -- Ramsey. Piriformis Stretch  -AS     Reps 2 -- 10  -AS     Time 2 -- 10 sec hold each  -AS        Exercise 3    Exercise Name 3 -- Ramsey. QS  -AS     Reps 3 -- 25  -AS     Time 3 -- 5 sec hold each  -AS        Exercise 4    Exercise Name 4 -- Ramsey. SLR  -AS     Reps 4 -- 25  each  -AS        Exercise 5    Exercise Name 5 -- Ramsey. S/L Hip ABD  -AS     Reps 5 -- 25 each  -AS     Time 5 -- 1#  -AS        Exercise 6    Exercise Name 6 -- Ramsey. Prone Hip EXT  -AS     Reps 6 -- 25 each  -AS     Time 6 -- 1#  -AS        Exercise 7    Exercise Name 7 -- Static Prone on Elbows  -AS     Time 7 -- 2 min  -AS        Exercise 8    Exercise Name 8 -- Prone Trunk EXT  -AS        Exercise 9    Exercise Name 9 -- SAQ Ball Squeeze  -AS     Reps 9 -- 25  -AS     Time 9 -- 1#  -AS        Exercise 10    Exercise Name 10 -- LAQ Ball Squeeze  -AS     Reps 10 -- 25  -AS     Time 10 -- 1#  -AS        Exercise 11    Exercise Name 11 -- Ramsey. TKE  -AS     Reps 11 -- 25  -AS     Time 11 -- Gold  -AS        Exercise 12    Exercise Name 12 -- Seated Hip IR/ER vs Band  -AS     Reps 12 -- 25  -AS     Time 12 -- Gold  -AS        Exercise 13    Exercise Name 13 -- Supine Clams  -AS     Reps 13 -- 25  -AS     Time 13 -- Gold  -AS        Exercise 14    Exercise Name 14 -- Bridge vs Band  -AS     Reps 14 -- 25  -AS     Time 14 -- Gold  -AS        Exercise 15    Exercise Name 15 -- Standing Hip ABD & EXT  -AS     Reps 15 -- 25 each leg, each direction  -AS           User Key  (r) = Recorded By, (t) = Taken By, (c) = Cosigned By    Initials Name Provider Type    AS Franklin Adler, PT Physical Therapist                                        Time Calculation:     Start Time: 0627  Stop Time: 0718  Time Calculation (min): 51 min  Timed Charges  73499 - PT Therapeutic Exercise Minutes: 45  Total Minutes  Timed Charges Total Minutes: 45   Total Minutes: 45     Therapy Charges for Today     Code Description Service Date Service Provider Modifiers Qty    08184317182 HC PT THER PROC EA 15 MIN 5/15/2023 Franklin Adler, PT GP 2                    Franklin Adler, PT  5/15/2023

## 2023-05-17 RX ORDER — TRIAMCINOLONE ACETONIDE 40 MG/ML
80 INJECTION, SUSPENSION INTRA-ARTICULAR; INTRAMUSCULAR
Status: COMPLETED | OUTPATIENT
Start: 2023-05-08 | End: 2023-05-08

## 2023-05-17 RX ORDER — LIDOCAINE HYDROCHLORIDE 10 MG/ML
8 INJECTION, SOLUTION EPIDURAL; INFILTRATION; INTRACAUDAL; PERINEURAL
Status: COMPLETED | OUTPATIENT
Start: 2023-05-08 | End: 2023-05-08

## 2023-05-22 ENCOUNTER — APPOINTMENT (OUTPATIENT)
Dept: PHYSICAL THERAPY | Facility: HOSPITAL | Age: 38
End: 2023-05-22
Payer: COMMERCIAL

## 2023-06-05 ENCOUNTER — HOSPITAL ENCOUNTER (OUTPATIENT)
Dept: PHYSICAL THERAPY | Facility: HOSPITAL | Age: 38
Setting detail: THERAPIES SERIES
Discharge: HOME | End: 2023-06-05
Payer: COMMERCIAL

## 2023-06-05 DIAGNOSIS — G89.29 CHRONIC PAIN OF BOTH KNEES: ICD-10-CM

## 2023-06-05 DIAGNOSIS — M25.561 CHRONIC PAIN OF BOTH KNEES: ICD-10-CM

## 2023-06-05 DIAGNOSIS — M25.562 CHRONIC PAIN OF BOTH KNEES: ICD-10-CM

## 2023-06-05 DIAGNOSIS — M54.16 LUMBAR RADICULOPATHY: Primary | ICD-10-CM

## 2023-06-05 PROCEDURE — 97110 THERAPEUTIC EXERCISES: CPT

## 2023-06-05 NOTE — THERAPY TREATMENT NOTE
Outpatient Physical Therapy Ortho Treatment Note  HEBER Casillas     Patient Name: Valery Aguilar  : 1985  MRN: [id number]  Today's Date: 2023      Visit Date: 2023    Visit Dx:    ICD-10-CM ICD-9-CM   1. Lumbar radiculopathy  M54.16 724.4   2. Chronic pain of both knees  M25.561 719.46    M25.562 338.29    G89.29        Patient Active Problem List   Diagnosis    Chronic migraine without aura without status migrainosus, not intractable    Cervicogenic headache    Secondary oligomenorrhea    Family history of breast cancer    Obstructive sleep apnea, adult    GERD with apnea    Vitamin D deficiency    PCOS (polycystic ovarian syndrome)    Panic disorder    Insulin resistance    Essential hypertension    GERD (gastroesophageal reflux disease)    Anxiety and depression    Tendonitis, Achilles, left    S/P ACL reconstruction    Primary osteoarthritis of left knee    ACL graft tear, sequela    Morbid (severe) obesity due to excess calories    Seizure    Moderate mixed hyperlipidemia not requiring statin therapy        Past Medical History:   Diagnosis Date    Abdominal pain     Anxiety     Anxiety and depression 3/8/2021    Arthritis     KNEE    Chronic sinusitis, unspecified     Chronic wound infection of abdomen 2019    Added automatically from request for surgery [id number]    Depression     GERD (gastroesophageal reflux disease)     H/O echocardiogram      EF 56% NORMAL DIASTOLIC FUNCTION NO VALVULAR DISEASE    H/O exercise stress test     REPORTEDLY NORMAL DONE FOR CHEST PAIN DX WITH ANXIETY    Headache, tension-type     History of Holter monitoring     REPORTEDLY NORMAL DONE FOR CHEST PAIN DX WITH ANXIETY    History of MRI of brain and brain stem 2017    NORMAL DONE FOR MIGRAINES    Hypertension     PRE-ECLAMPSIA WITH BOTH CHILDREN    Incisional hernia     SCHEDULED FOR REPAIR    Incisional hernia, without obstruction or gangrene 10/24/2018    Added automatically from request for  surgery [id number]    Infection following a procedure, unspecified, initial encounter     Insulin resistance     D/T PCOS    Lobar pneumonia, unspecified organism     Meralgia paresthetica, left lower limb     Metabolic syndrome     Migraine     Moderate mixed hyperlipidemia not requiring statin therapy 2022    MRSA (methicillin resistant Staphylococcus aureus) infection 2019    Abdominal wound    Nasal congestion     Other injury of unspecified body region, initial encounter     Panic attack     Panic disorder     Papanicolaou smear 2020    NORMAL WITH NEG HPV NEVER ABN    PCOS (polycystic ovarian syndrome)     Pneumonia 2018    PONV (postoperative nausea and vomiting)     Right lower quadrant abdominal pain 2019    Seasonal allergies     Seizure     Spinal headache     Unspecified contact dermatitis, unspecified cause     Viral infection     UNSPECIFIED    Vitamin D deficiency     Wound infection 2018        Past Surgical History:   Procedure Laterality Date    ABDOMINAL WALL ABSCESS INCISION AND DRAINAGE N/A 2018    Procedure: Debridement of abdominal wall;  Surgeon: Brigido Navarrete MD;  Location: [facility] OR;  Service: General    ADENOIDECTOMY       SECTION      X2    CHOLECYSTECTOMY      LAP    HERNIA REPAIR      HX OVARIAN CYSTECTOMY      INCISION AND DRAINAGE TRUNK N/A 2018    Procedure: wound debridement, abdomen;  Surgeon: Rachel Dolan MD;  Location: [facility] OR;  Service: General    INCISION AND DRAINAGE TRUNK N/A 2019    Procedure: WOUND CLOSURE ABDOMINAL;  Surgeon: Rachel Dolan MD;  Location: [facility] OR;  Service: General    KNEE ACL RECONSTRUCTION Left     DR. CARRASCO    OVARIAN CYST REMOVAL      EX LAP WITH OVARIAN CYST REMOVAL HERNIA REPAIR 2014    TONSILLECTOMY      T&A    TRUNK DEBRIDEMENT N/A 2018    Procedure: Abdominal wound debridement;  Surgeon: Rachel Dolan MD;  Location: [facility] OR;  Service: General     VENTRAL/INCISIONAL HERNIA REPAIR N/A 11/15/2018    Procedure: Lateral Component Separation Herniorrhapy Repair with Mesh, Lysis of adhesions, and skin lesion excision of Right Side;  Surgeon: Rachel Dolan MD;  Location:  [facility] OR;  Service: General                        PT Assessment/Plan       Row Name 06/05/23 0600          PT Assessment    Assessment Comments Patient continues to tolerate PT treatment well.  -AS        PT Plan    PT Plan Comments Continue with current treatment plan.  -AS               User Key  (r) = Recorded By, (t) = Taken By, (c) = Cosigned By      Initials Name Provider Type    AS Franklin Adler, PT Physical Therapist                       OP Exercises       Row Name 06/05/23 0650 06/05/23 0600          Subjective Comments    Subjective Comments -- Patient reports her knees are doing better. She states she has been approved for gel injections and hopes to have that done in the next 1-2 weeks.  -AS        Total Minutes    74304 - PT Therapeutic Exercise Minutes 45  -AS --        Exercise 1    Exercise Name 1 -- Ramsey. HS Stretch  -AS     Reps 1 -- 10  -AS     Time 1 -- 10 sec hold each  -AS        Exercise 2    Exercise Name 2 -- Ramsey. Piriformis Stretch  -AS     Reps 2 -- 10  -AS     Time 2 -- 10 sec hold each  -AS        Exercise 3    Exercise Name 3 -- Ramsey. QS  -AS     Reps 3 -- 25  -AS     Time 3 -- 5 sec hold each  -AS        Exercise 4    Exercise Name 4 -- Ramsey. SLR  -AS     Reps 4 -- 25 each  -AS     Time 4 -- 1#  -AS        Exercise 5    Exercise Name 5 -- Ramsey. S/L Hip ABD  -AS     Reps 5 -- 25 each  -AS     Time 5 -- 1#  -AS        Exercise 6    Exercise Name 6 -- Ramsey. Prone Hip EXT  -AS     Reps 6 -- 25 each  -AS     Time 6 -- 1#  -AS        Exercise 7    Exercise Name 7 -- Static Prone on Elbows  -AS     Time 7 -- 2 min  -AS        Exercise 8    Exercise Name 8 -- Prone Trunk EXT  -AS        Exercise 9    Exercise Name 9 -- SAQ Ball Squeeze  -AS     Reps 9 -- 25  -AS      Time 9 -- 1#  -AS        Exercise 10    Exercise Name 10 -- LAQ Ball Squeeze  -AS     Reps 10 -- 25  -AS     Time 10 -- 1#  -AS        Exercise 11    Exercise Name 11 -- Ramsey. TKE  -AS     Reps 11 -- 25  -AS     Time 11 -- Gold  -AS        Exercise 12    Exercise Name 12 -- Seated Hip IR/ER vs Band  -AS     Reps 12 -- 25  -AS     Time 12 -- Gold  -AS        Exercise 13    Exercise Name 13 -- Supine Clams  -AS     Reps 13 -- 25  -AS     Time 13 -- Gold  -AS        Exercise 14    Exercise Name 14 -- Bridge vs Band  -AS     Reps 14 -- 25  -AS     Time 14 -- Gold  -AS        Exercise 15    Exercise Name 15 -- Standing Hip ABD & EXT  -AS     Reps 15 -- 25 each leg, each direction  -AS     Time 15 -- 1#  -AS               User Key  (r) = Recorded By, (t) = Taken By, (c) = Cosigned By      Initials Name Provider Type    AS Franklin Adler, PT Physical Therapist                                                    Time Calculation:   Start Time: 0558  Stop Time: 0650  Time Calculation (min): 52 min  Timed Charges  47693 - PT Therapeutic Exercise Minutes: 45  Total Minutes  Timed Charges Total Minutes: 45   Total Minutes: 45  Therapy Charges for Today       Code Description Service Date Service Provider Modifiers Qty    09334424682  PT THER PROC EA 15 MIN 6/5/2023 Franklin Adler, PT GP 2                      Franklin Adler, PT  6/5/2023

## 2023-06-12 ENCOUNTER — HOSPITAL ENCOUNTER (OUTPATIENT)
Dept: PHYSICAL THERAPY | Facility: HOSPITAL | Age: 38
Setting detail: THERAPIES SERIES
Discharge: HOME | End: 2023-06-12
Payer: COMMERCIAL

## 2023-06-12 DIAGNOSIS — G89.29 CHRONIC PAIN OF BOTH KNEES: ICD-10-CM

## 2023-06-12 DIAGNOSIS — M25.561 CHRONIC PAIN OF BOTH KNEES: ICD-10-CM

## 2023-06-12 DIAGNOSIS — M54.16 LUMBAR RADICULOPATHY: Primary | ICD-10-CM

## 2023-06-12 DIAGNOSIS — M25.562 CHRONIC PAIN OF BOTH KNEES: ICD-10-CM

## 2023-06-12 PROCEDURE — 97110 THERAPEUTIC EXERCISES: CPT

## 2023-06-12 NOTE — THERAPY TREATMENT NOTE
Outpatient Physical Therapy Ortho Treatment Note  HEBER Casillas     Patient Name: Valery Aguilar  : 1985  MRN: [id number]  Today's Date: 2023      Visit Date: 2023    Visit Dx:    ICD-10-CM ICD-9-CM   1. Lumbar radiculopathy  M54.16 724.4   2. Chronic pain of both knees  M25.561 719.46    M25.562 338.29    G89.29        Patient Active Problem List   Diagnosis    Chronic migraine without aura without status migrainosus, not intractable    Cervicogenic headache    Secondary oligomenorrhea    Family history of breast cancer    Obstructive sleep apnea, adult    GERD with apnea    Vitamin D deficiency    PCOS (polycystic ovarian syndrome)    Panic disorder    Insulin resistance    Essential hypertension    GERD (gastroesophageal reflux disease)    Anxiety and depression    Tendonitis, Achilles, left    S/P ACL reconstruction    Primary osteoarthritis of left knee    ACL graft tear, sequela    Morbid (severe) obesity due to excess calories    Seizure    Moderate mixed hyperlipidemia not requiring statin therapy        Past Medical History:   Diagnosis Date    Abdominal pain     Anxiety     Anxiety and depression 3/8/2021    Arthritis     KNEE    Chronic sinusitis, unspecified     Chronic wound infection of abdomen 2019    Added automatically from request for surgery [id number]    Depression     GERD (gastroesophageal reflux disease)     H/O echocardiogram      EF 56% NORMAL DIASTOLIC FUNCTION NO VALVULAR DISEASE    H/O exercise stress test     REPORTEDLY NORMAL DONE FOR CHEST PAIN DX WITH ANXIETY    Headache, tension-type     History of Holter monitoring     REPORTEDLY NORMAL DONE FOR CHEST PAIN DX WITH ANXIETY    History of MRI of brain and brain stem 2017    NORMAL DONE FOR MIGRAINES    Hypertension     PRE-ECLAMPSIA WITH BOTH CHILDREN    Incisional hernia     SCHEDULED FOR REPAIR    Incisional hernia, without obstruction or gangrene 10/24/2018    Added automatically from request for  surgery [id number]    Infection following a procedure, unspecified, initial encounter     Insulin resistance     D/T PCOS    Lobar pneumonia, unspecified organism     Meralgia paresthetica, left lower limb     Metabolic syndrome     Migraine     Moderate mixed hyperlipidemia not requiring statin therapy 2022    MRSA (methicillin resistant Staphylococcus aureus) infection 2019    Abdominal wound    Nasal congestion     Other injury of unspecified body region, initial encounter     Panic attack     Panic disorder     Papanicolaou smear 2020    NORMAL WITH NEG HPV NEVER ABN    PCOS (polycystic ovarian syndrome)     Pneumonia 2018    PONV (postoperative nausea and vomiting)     Right lower quadrant abdominal pain 2019    Seasonal allergies     Seizure     Spinal headache     Unspecified contact dermatitis, unspecified cause     Viral infection     UNSPECIFIED    Vitamin D deficiency     Wound infection 2018        Past Surgical History:   Procedure Laterality Date    ABDOMINAL WALL ABSCESS INCISION AND DRAINAGE N/A 2018    Procedure: Debridement of abdominal wall;  Surgeon: Brigido Navarrete MD;  Location: [facility] OR;  Service: General    ADENOIDECTOMY       SECTION      X2    CHOLECYSTECTOMY      LAP    HERNIA REPAIR      HX OVARIAN CYSTECTOMY      INCISION AND DRAINAGE TRUNK N/A 2018    Procedure: wound debridement, abdomen;  Surgeon: Rachel Dolan MD;  Location: [facility] OR;  Service: General    INCISION AND DRAINAGE TRUNK N/A 2019    Procedure: WOUND CLOSURE ABDOMINAL;  Surgeon: Rachel Dolan MD;  Location: [facility] OR;  Service: General    KNEE ACL RECONSTRUCTION Left     DR. CARRASCO    OVARIAN CYST REMOVAL      EX LAP WITH OVARIAN CYST REMOVAL HERNIA REPAIR 2014    TONSILLECTOMY      T&A    TRUNK DEBRIDEMENT N/A 2018    Procedure: Abdominal wound debridement;  Surgeon: Rachel Dolan MD;  Location: [facility] OR;  Service: General     "VENTRAL/INCISIONAL HERNIA REPAIR N/A 11/15/2018    Procedure: Lateral Component Separation Herniorrhapy Repair with Mesh, Lysis of adhesions, and skin lesion excision of Right Side;  Surgeon: Rachel Dolan MD;  Location: [facility] OR;  Service: General                        PT Assessment/Plan       Row Name 06/12/23 0700          PT Assessment    Assessment Comments Patient continues to tolerate PT treatment well. Plan to D/C patient onto a HEP only at this time.  -AS        PT Plan    PT Plan Comments D/C patient onto a HEP only at this time.  -AS               User Key  (r) = Recorded By, (t) = Taken By, (c) = Cosigned By      Initials Name Provider Type    AS Franklin Adler, PT Physical Therapist                       OP Exercises       Row Name 06/12/23 0745 06/12/23 0700          Subjective Comments    Subjective Comments -- Patient states her \"knees are fine.\" She states she did have some sciatic type symptoms in her RLE this weekend but did improve with some Aleve and her stretching. She states she feels it was due to how she slept the night before.  -AS        Total Minutes    47871 - PT Therapeutic Exercise Minutes 45  -AS --        Exercise 1    Exercise Name 1 -- Ramsey. HS Stretch  -AS     Reps 1 -- 10  -AS     Time 1 -- 10 sec hold each  -AS        Exercise 2    Exercise Name 2 -- Ramsey. Piriformis Stretch  -AS     Reps 2 -- 10  -AS     Time 2 -- 10 sec hold each  -AS        Exercise 3    Exercise Name 3 -- Ramsey. QS  -AS     Reps 3 -- 25  -AS     Time 3 -- 5 sec hold each  -AS        Exercise 4    Exercise Name 4 -- Ramsey. SLR  -AS     Reps 4 -- 25 each  -AS     Time 4 -- 1#  -AS        Exercise 5    Exercise Name 5 -- Ramsey. S/L Hip ABD  -AS     Reps 5 -- 25 each  -AS     Time 5 -- 1#  -AS        Exercise 6    Exercise Name 6 -- Ramsey. Prone Hip EXT  -AS     Reps 6 -- 25 each  -AS     Time 6 -- 1#  -AS        Exercise 7    Exercise Name 7 -- Static Prone on Elbows  -AS     Time 7 -- 2 min  -AS        " Exercise 8    Exercise Name 8 -- Prone Trunk EXT  -AS        Exercise 9    Exercise Name 9 -- SAQ Ball Squeeze  -AS     Reps 9 -- 25  -AS     Time 9 -- 1#  -AS        Exercise 10    Exercise Name 10 -- LAQ Ball Squeeze  -AS     Reps 10 -- 25  -AS     Time 10 -- 1#  -AS        Exercise 11    Exercise Name 11 -- Ramsey. TKE  -AS     Reps 11 -- 25  -AS     Time 11 -- Gold  -AS        Exercise 12    Exercise Name 12 -- Seated Hip IR/ER vs Band  -AS     Reps 12 -- 25  -AS     Time 12 -- Gold  -AS        Exercise 13    Exercise Name 13 -- Supine Clams  -AS     Reps 13 -- 25  -AS     Time 13 -- Gold  -AS        Exercise 14    Exercise Name 14 -- Bridge vs Band  -AS     Reps 14 -- 25  -AS     Time 14 -- Gold  -AS        Exercise 15    Exercise Name 15 -- Standing Hip ABD & EXT  -AS     Reps 15 -- 25 each leg, each direction  -AS     Time 15 -- 1#  -AS               User Key  (r) = Recorded By, (t) = Taken By, (c) = Cosigned By      Initials Name Provider Type    AS Franklin Adler, PT Physical Therapist                                                    Time Calculation:   Start Time: 0700  Stop Time: 0745  Time Calculation (min): 45 min  Timed Charges  75151 - PT Therapeutic Exercise Minutes: 45  Total Minutes  Timed Charges Total Minutes: 45   Total Minutes: 45  Therapy Charges for Today       Code Description Service Date Service Provider Modifiers Qty    [id number]  PT THER PROC EA 15 MIN 6/12/2023 Franklin Adler, PT GP 2                      Franklin Adler, PT  6/12/2023

## 2023-06-15 ENCOUNTER — TELEPHONE (OUTPATIENT)
Dept: ORTHOPEDIC SURGERY | Facility: CLINIC | Age: 38
End: 2023-06-15

## 2023-06-19 NOTE — TELEPHONE ENCOUNTER
Caller: ALESIA    Relationship to patient: [facility]     Best call back number: [phone]    Patient is needing: THEY CORRECTED THE DUROLENE RX AND THEY ARE MAILING IT TODAY. WILL BE AT THE OFFICE TOMORROW MORNING   
Caller: ALESIA     Relationship to patient: [facility]      Best call back number: [phone]     Patient is needing: ALESIA WITH [facility] STATED THE ARE GOING TO OVER NIGHT THE DUROLENE RX IT WILL BE THERE TOMORROW MORNING - THEY WERE AT [employer] [facility] THE [place] FOR LAST WEEK.   
(3) age 75 years or over

## 2023-08-28 ENCOUNTER — OFFICE VISIT (OUTPATIENT)
Dept: OBSTETRICS AND GYNECOLOGY | Facility: CLINIC | Age: 38
End: 2023-08-28
Payer: COMMERCIAL

## 2023-08-28 VITALS
SYSTOLIC BLOOD PRESSURE: 118 MMHG | DIASTOLIC BLOOD PRESSURE: 84 MMHG | HEIGHT: 64 IN | BODY MASS INDEX: 47.94 KG/M2 | WEIGHT: 280.8 LBS

## 2023-08-28 DIAGNOSIS — Z01.419 ROUTINE GYNECOLOGICAL EXAMINATION: Primary | ICD-10-CM

## 2023-08-28 DIAGNOSIS — Z01.419 PAP SMEAR, LOW-RISK: ICD-10-CM

## 2023-08-28 DIAGNOSIS — Z11.51 ENCOUNTER FOR SCREENING FOR HUMAN PAPILLOMAVIRUS (HPV): ICD-10-CM

## 2023-08-28 LAB
B-HCG UR QL: NEGATIVE
BILIRUB BLD-MCNC: NEGATIVE MG/DL
CLARITY, POC: CLEAR
COLOR UR: YELLOW
EXPIRATION DATE: NORMAL
GLUCOSE UR STRIP-MCNC: NEGATIVE MG/DL
INTERNAL NEGATIVE CONTROL: NEGATIVE
INTERNAL POSITIVE CONTROL: POSITIVE
KETONES UR QL: NEGATIVE
LEUKOCYTE EST, POC: NEGATIVE
Lab: 55
NITRITE UR-MCNC: NEGATIVE MG/ML
PH UR: 5 [PH] (ref 5–8)
PROT UR STRIP-MCNC: NEGATIVE MG/DL
RBC # UR STRIP: NEGATIVE /UL
SP GR UR: 1 (ref 1–1.03)
UROBILINOGEN UR QL: NORMAL

## 2023-08-28 NOTE — PROGRESS NOTES
GYN Annual Exam     CC- Here for annual exam.     Valery Aguilar is a 38 y.o. female who presents for annual well woman exam. She is an established patient but new to me. Has a Mirena IUD in place since 10/2018. Pt has a hx of PCOS with oligomenorrhea that is controlled with IUD.     She is not having periods. She will occasionally have some spotting if she has cramping. Reports the bleeding is dark in color and very light. Reports it only lasts for 1 day.     She does not do SBE regularly.     She is on a daily supplement called Burburine.     She c/o leaking bladder with coughing, sneezing or laughing.       OB History          2    Para   2    Term   2            AB        Living   2         SAB        IAB        Ectopic        Molar        Multiple        Live Births   2                Last Pap:   Current contraception: none  History of abnormal Pap smear: no  History of abnormal mammogram: no  Family history of uterine, colon or ovarian cancer: no  Family history of breast cancer: yes - paternal aunt with breast cancer, paternal GM    Health Maintenance   Topic Date Due    HEPATITIS C SCREENING  Never done    ANNUAL PHYSICAL  Never done    COVID-19 Vaccine (4 - Moderna series) 2021    Annual Gynecologic Pelvic and Breast Exam  2023    INFLUENZA VACCINE  10/01/2023    LIPID PANEL  2024    PAP SMEAR  08/15/2025    TDAP/TD VACCINES (2 - Td or Tdap) 2028    Pneumococcal Vaccine 0-64  Aged Out       Past Medical History:   Diagnosis Date    Abdominal pain     Anxiety     Anxiety and depression 2021    Arthritis     KNEE    Chronic sinusitis, unspecified     Chronic wound infection of abdomen 2019    Added automatically from request for surgery [id number]    Depression     Diabetes mellitus     GERD (gastroesophageal reflux disease)     H/O echocardiogram      EF 56% NORMAL DIASTOLIC FUNCTION NO VALVULAR DISEASE    H/O exercise stress test     REPORTEDLY  NORMAL DONE FOR CHEST PAIN DX WITH ANXIETY    Headache, tension-type     History of Holter monitoring     REPORTEDLY NORMAL DONE FOR CHEST PAIN DX WITH ANXIETY    History of MRI of brain and brain stem 2017    NORMAL DONE FOR MIGRAINES    Hypertension     PRE-ECLAMPSIA WITH BOTH CHILDREN    Incisional hernia     SCHEDULED FOR REPAIR    Incisional hernia, without obstruction or gangrene 10/24/2018    Added automatically from request for surgery [id number]    Infection following a procedure, unspecified, initial encounter     Insulin resistance     D/T PCOS    Lobar pneumonia, unspecified organism     Meralgia paresthetica, left lower limb     Metabolic syndrome     Migraine     Moderate mixed hyperlipidemia not requiring statin therapy 2022    MRSA (methicillin resistant Staphylococcus aureus) infection 2019    Abdominal wound    Nasal congestion     Other injury of unspecified body region, initial encounter     Panic attack     Panic disorder     Papanicolaou smear 2020    NORMAL WITH NEG HPV NEVER ABN    PCOS (polycystic ovarian syndrome)     Pneumonia 2018    PONV (postoperative nausea and vomiting)     Right lower quadrant abdominal pain 2019    Seasonal allergies     Seizure     Spinal headache     Unspecified contact dermatitis, unspecified cause     Viral infection     UNSPECIFIED    Vitamin D deficiency     Wound infection 2018       Past Surgical History:   Procedure Laterality Date    ABDOMINAL WALL ABSCESS INCISION AND DRAINAGE N/A 2018    Procedure: Debridement of abdominal wall;  Surgeon: Brigido Navarrete MD;  Location: [facility] OR;  Service: General    ADENOIDECTOMY       SECTION      X2    CHOLECYSTECTOMY      LAP    HERNIA REPAIR      HX OVARIAN CYSTECTOMY      INCISION AND DRAINAGE TRUNK N/A 2018    Procedure: wound debridement, abdomen;  Surgeon: Rachel Dolan MD;  Location: [facility] OR;  Service: General    INCISION AND DRAINAGE TRUNK N/A  2/21/2019    Procedure: WOUND CLOSURE ABDOMINAL;  Surgeon: Rachel Dolan MD;  Location:  [facility] OR;  Service: General    KNEE ACL RECONSTRUCTION Left 2010    DR. CARRASCO    OVARIAN CYST REMOVAL      EX LAP WITH OVARIAN CYST REMOVAL HERNIA REPAIR 2014    TONSILLECTOMY      T&A    TRUNK DEBRIDEMENT N/A 12/5/2018    Procedure: Abdominal wound debridement;  Surgeon: Rachel Dolan MD;  Location:  [facility] OR;  Service: General    VENTRAL/INCISIONAL HERNIA REPAIR N/A 11/15/2018    Procedure: Lateral Component Separation Herniorrhapy Repair with Mesh, Lysis of adhesions, and skin lesion excision of Right Side;  Surgeon: Rachel Dolan MD;  Location:  [facility] OR;  Service: General         Current Outpatient Medications:     albuterol sulfate  (90 Base) MCG/ACT inhaler, Inhale 2 puffs Every 4 (Four) Hours As Needed for Wheezing or Shortness of Air., Disp: 8 g, Rfl: 1    Azelastine HCl 137 MCG/SPRAY solution, SPRAY TWO SPRAYS IN EACH NOSTRIL TWICE DAILY AS DIRECTED BY PROVIDER, Disp: 30 mL, Rfl: 2    busPIRone (BUSPAR) 15 MG tablet, Take 1 tablet by mouth 3 (Three) Times a Day., Disp: 270 tablet, Rfl: 1    celecoxib (CeleBREX) 100 MG capsule, Take 1 capsule by mouth 2 (Two) Times a Day As Needed for Moderate Pain., Disp: 180 capsule, Rfl: 1    cetirizine (zyrTEC) 10 MG tablet, Take 1 tablet by mouth Every Night., Disp: , Rfl:     cholecalciferol (VITAMIN D3) 1000 units tablet, Take 2 tablets by mouth Daily., Disp: , Rfl:     Durolane 60 MG/3ML prefilled syringe injection, , Disp: , Rfl:     eletriptan (RELPAX) 20 MG tablet, Take 1 tablet by mouth 1 (One) Time As Needed for Migraine for up to 1 dose. may repeat in 2 hours if necessary, Disp: 15 tablet, Rfl: 2    fluticasone (FLONASE) 50 MCG/ACT nasal spray, 2 sprays into the nostril(s) as directed by provider Daily., Disp: , Rfl:     gabapentin (NEURONTIN) 800 MG tablet, Take 1 tablet by mouth 2 (Two) Times a Day., Disp: 180 tablet, Rfl: 1    LORazepam  "(ATIVAN) 0.5 MG tablet, Take 1 tablet by mouth Every 12 (Twelve) Hours As Needed for Anxiety., Disp: 30 tablet, Rfl: 1    losartan-hydrochlorothiazide (HYZAAR) 50-12.5 MG per tablet, TAKE ONE TABLET BY MOUTH DAILY, Disp: 90 tablet, Rfl: 0    Magnesium 500 MG capsule, Take  by mouth., Disp: , Rfl:     promethazine (PHENERGAN) 25 MG tablet, Take 1 tablet by mouth Every 6 (Six) Hours As Needed for Nausea or Vomiting., Disp: 20 tablet, Rfl: 0    rizatriptan MLT (MAXALT-MLT) 10 MG disintegrating tablet, May repeat in 2 hours if needed, Disp: 15 tablet, Rfl: 3    sertraline (ZOLOFT) 100 MG tablet, TAKE TWO TABLETS BY MOUTH DAILY, Disp: 180 tablet, Rfl: 1    Allergies   Allergen Reactions    Penicillins Other (See Comments)     \"im resistant to any \"cillin\" drugs\"     Wellbutrin [Bupropion] Seizure    Tramadol Unknown - Low Severity     Patient states that medicine may cause a seizure with taking the Wellbutrin so neurology doesn't want patient to take this Rx        Social History     Tobacco Use    Smoking status: Former     Packs/day: 0.50     Years: 1.00     Pack years: 0.50     Types: Cigarettes     Quit date:      Years since quittin.6     Passive exposure: Never    Smokeless tobacco: Never   Vaping Use    Vaping Use: Some days   Substance Use Topics    Alcohol use: Yes     Comment: occasional    Drug use: No       Family History   Problem Relation Age of Onset    Stroke Mother     Heart disease Mother     Hypertension Mother     Heart disease Father     Hypertension Father     Diabetes Father     Dementia Maternal Grandmother     Stroke Maternal Grandmother     Diabetes Maternal Grandmother     Stroke Paternal Grandmother     Hypertension Paternal Grandmother     Cancer Paternal Grandmother     Heart disease Paternal Grandfather     Diabetes Paternal Grandfather        Review of Systems   Constitutional:  Negative for appetite change, chills, fatigue, fever and unexpected weight change.   Gastrointestinal:  " "Negative for abdominal distention, abdominal pain, anal bleeding, blood in stool, constipation, diarrhea, nausea and vomiting.   Genitourinary:  Negative for dyspareunia, dysuria, menstrual problem, pelvic pain, vaginal bleeding, vaginal discharge and vaginal pain.     /84   Ht 162.6 cm (64.02\")   Wt 127 kg (280 lb 12.8 oz)   BMI 48.18 kg/mý     Physical Exam  Vitals reviewed.   Constitutional:       General: She is not in acute distress.     Appearance: She is well-developed. She is obese. She is not ill-appearing, toxic-appearing or diaphoretic.   HENT:      Mouth/Throat:      Dentition: Normal dentition. No dental caries.   Cardiovascular:      Rate and Rhythm: Normal rate and regular rhythm.      Heart sounds: Normal heart sounds.   Pulmonary:      Effort: Pulmonary effort is normal. No respiratory distress.      Breath sounds: Normal breath sounds. No stridor. No wheezing.   Chest:   Breasts:     Right: No inverted nipple, mass, nipple discharge, skin change or tenderness.      Left: No inverted nipple, mass, nipple discharge, skin change or tenderness.   Abdominal:      General: There is no distension.      Palpations: Abdomen is soft. There is no mass.      Tenderness: There is no abdominal tenderness.   Genitourinary:     Labia:         Right: No rash, tenderness or lesion.         Left: No rash, tenderness or lesion.       Vagina: No foreign body. No vaginal discharge, tenderness or bleeding.      Cervix: No cervical motion tenderness, discharge or friability.      Uterus: Not deviated, not enlarged, not fixed and not tender.       Adnexa:         Right: No mass, tenderness or fullness.          Left: No mass, tenderness or fullness.        Comments: IUD string noted   Musculoskeletal:         General: No tenderness. Normal range of motion.   Skin:     General: Skin is warm.      Findings: No erythema or rash.   Neurological:      General: No focal deficit present.      Mental Status: She is alert " and oriented to person, place, and time.      Cranial Nerves: No cranial nerve deficit.      Coordination: Coordination normal.   Psychiatric:         Mood and Affect: Mood normal.         Behavior: Behavior normal.         Thought Content: Thought content normal.         Judgment: Judgment normal.          Assessment/Plan    1) GYN HM: Check pap smear today per pt request.  SBE demonstrated and encouraged. MMG at age 39.  2) PCOS: Being managed by primary care physician. Pt is on metformin XR 1,000mg daily.  3) Contraception: none. Mirena IUD 10/2018.  4) Family Planning: .  5) Body mass index is 48.18 kg/mý.  6) Smoking Status: nonsmoker  7) Hx of Oligomenorrhea: Pt is at high risk for endometrial hyperplasia. Has Mirena IUD in place 10/2018  8) GORDON- Offer urodynamic testing. Offer PT referral, pt considering. Pt does not want surgery.          Diagnoses and all orders for this visit:    Routine gynecological examination  -     POC Urinalysis Dipstick  -     POC Pregnancy, Urine    Pap smear, low-risk    Encounter for screening for human papillomavirus (HPV)    Parts of this document have been copied or forwarded from her previous visits and have been reviewed, updated and edited as indicated.     RTO 1 year AE or PRN     SYED Francois  2023  10:28 EDT

## 2023-09-06 DIAGNOSIS — F41.9 ANXIETY AND DEPRESSION: Chronic | ICD-10-CM

## 2023-09-06 DIAGNOSIS — F32.A ANXIETY AND DEPRESSION: Chronic | ICD-10-CM

## 2023-09-06 RX ORDER — BUSPIRONE HYDROCHLORIDE 15 MG/1
TABLET ORAL
Qty: 270 TABLET | Refills: 1 | Status: SHIPPED | OUTPATIENT
Start: 2023-09-06

## 2023-09-21 DIAGNOSIS — M17.0 PRIMARY OSTEOARTHRITIS OF BOTH KNEES: ICD-10-CM

## 2023-09-21 DIAGNOSIS — M54.16 LUMBAR RADICULOPATHY: ICD-10-CM

## 2023-09-21 RX ORDER — CELECOXIB 100 MG/1
CAPSULE ORAL
Qty: 180 CAPSULE | Refills: 1 | Status: SHIPPED | OUTPATIENT
Start: 2023-09-21

## 2023-09-21 NOTE — TELEPHONE ENCOUNTER
Rx Refill Note  Requested Prescriptions     Pending Prescriptions Disp Refills    celecoxib (CeleBREX) 100 MG capsule [Pharmacy Med Name: CELECOXIB 100 MG CAPSULE] 180 capsule 1     Sig: TAKE ONE CAPSULE BY MOUTH TWICE A DAY AS NEEDED FOR MODERATE PAIN      Last office visit with prescribing clinician: 7/3/2023   Last telemedicine visit with prescribing clinician: 3/27/2023   Next office visit with prescribing clinician: 1/8/2024                         Would you like a call back once the refill request has been completed: [] Yes [] No    If the office needs to give you a call back, can they leave a voicemail: [] Yes [] No    Melissa Cortez MA  09/21/23, 08:36 EDT

## 2023-10-08 DIAGNOSIS — G43.709 CHRONIC MIGRAINE WITHOUT AURA WITHOUT STATUS MIGRAINOSUS, NOT INTRACTABLE: Chronic | ICD-10-CM

## 2023-10-08 RX ORDER — GABAPENTIN 800 MG/1
800 TABLET ORAL 2 TIMES DAILY
Qty: 180 TABLET | Refills: 1 | Status: SHIPPED | OUTPATIENT
Start: 2023-10-08

## 2023-10-18 ENCOUNTER — TELEPHONE (OUTPATIENT)
Dept: INTERNAL MEDICINE | Facility: CLINIC | Age: 38
End: 2023-10-18
Payer: COMMERCIAL

## 2023-10-18 NOTE — TELEPHONE ENCOUNTER
Spoke with pt about Dr. Rios off-boarding with [facility]. Patient advised she would think about her next step and call back. Hub to read.

## 2024-06-03 ENCOUNTER — TELEPHONE (OUTPATIENT)
Dept: INTERNAL MEDICINE | Facility: CLINIC | Age: 39
End: 2024-06-03
Payer: COMMERCIAL

## 2024-06-03 NOTE — TELEPHONE ENCOUNTER
Okay for hub to read.   Called patient to reschedule her 6/6 appointment with dr blue but she stated Thursdays wont work for her and she wants to schedule at [place] office so she will call them to get that scheduled

## 2024-09-09 ENCOUNTER — OFFICE VISIT (OUTPATIENT)
Dept: OBSTETRICS AND GYNECOLOGY | Facility: CLINIC | Age: 39
End: 2024-09-09
Payer: COMMERCIAL

## 2024-09-09 VITALS
SYSTOLIC BLOOD PRESSURE: 126 MMHG | WEIGHT: 275 LBS | HEIGHT: 64 IN | DIASTOLIC BLOOD PRESSURE: 80 MMHG | BODY MASS INDEX: 46.95 KG/M2

## 2024-09-09 DIAGNOSIS — Z11.51 SPECIAL SCREENING EXAMINATION FOR HUMAN PAPILLOMAVIRUS (HPV): ICD-10-CM

## 2024-09-09 DIAGNOSIS — Z01.419 CERVICAL SMEAR, AS PART OF ROUTINE GYNECOLOGICAL EXAMINATION: ICD-10-CM

## 2024-09-09 DIAGNOSIS — Z01.419 ROUTINE GYNECOLOGICAL EXAMINATION: Primary | ICD-10-CM

## 2024-09-09 LAB
25(OH)D3+25(OH)D2 SERPL-MCNC: 25.3 NG/ML (ref 30–100)
ALBUMIN SERPL-MCNC: 4.1 G/DL (ref 3.5–5.2)
ALBUMIN/GLOB SERPL: 1.8 G/DL
ALP SERPL-CCNC: 69 U/L (ref 39–117)
ALT SERPL-CCNC: 18 U/L (ref 1–33)
AST SERPL-CCNC: 15 U/L (ref 1–32)
B-HCG UR QL: NEGATIVE
BASOPHILS # BLD AUTO: 0.04 10*3/MM3 (ref 0–0.2)
BASOPHILS NFR BLD AUTO: 0.7 % (ref 0–1.5)
BILIRUB BLD-MCNC: NEGATIVE MG/DL
BILIRUB SERPL-MCNC: 0.6 MG/DL (ref 0–1.2)
BUN SERPL-MCNC: 12 MG/DL (ref 6–20)
BUN/CREAT SERPL: 17.6 (ref 7–25)
CALCIUM SERPL-MCNC: 9.1 MG/DL (ref 8.6–10.5)
CHLORIDE SERPL-SCNC: 106 MMOL/L (ref 98–107)
CHOLEST SERPL-MCNC: 170 MG/DL (ref 0–200)
CLARITY, POC: CLEAR
CO2 SERPL-SCNC: 23.5 MMOL/L (ref 22–29)
COLOR UR: YELLOW
CREAT SERPL-MCNC: 0.68 MG/DL (ref 0.57–1)
EGFRCR SERPLBLD CKD-EPI 2021: 113.1 ML/MIN/1.73
EOSINOPHIL # BLD AUTO: 0.05 10*3/MM3 (ref 0–0.4)
EOSINOPHIL NFR BLD AUTO: 0.8 % (ref 0.3–6.2)
ERYTHROCYTE [DISTWIDTH] IN BLOOD BY AUTOMATED COUNT: 13.3 % (ref 12.3–15.4)
EXPIRATION DATE: NORMAL
GLOBULIN SER CALC-MCNC: 2.3 GM/DL
GLUCOSE SERPL-MCNC: 86 MG/DL (ref 65–99)
GLUCOSE UR STRIP-MCNC: NEGATIVE MG/DL
HBA1C MFR BLD: 5.1 % (ref 4.8–5.6)
HCT VFR BLD AUTO: 45.6 % (ref 34–46.6)
HDLC SERPL-MCNC: 40 MG/DL (ref 40–60)
HGB BLD-MCNC: 15.3 G/DL (ref 12–15.9)
IMM GRANULOCYTES # BLD AUTO: 0.03 10*3/MM3 (ref 0–0.05)
IMM GRANULOCYTES NFR BLD AUTO: 0.5 % (ref 0–0.5)
INTERNAL NEGATIVE CONTROL: NORMAL
INTERNAL POSITIVE CONTROL: NORMAL
KETONES UR QL: NEGATIVE
LDLC SERPL CALC-MCNC: 111 MG/DL (ref 0–100)
LEUKOCYTE EST, POC: NEGATIVE
LYMPHOCYTES # BLD AUTO: 1.99 10*3/MM3 (ref 0.7–3.1)
LYMPHOCYTES NFR BLD AUTO: 33.1 % (ref 19.6–45.3)
Lab: NORMAL
MCH RBC QN AUTO: 27.1 PG (ref 26.6–33)
MCHC RBC AUTO-ENTMCNC: 33.6 G/DL (ref 31.5–35.7)
MCV RBC AUTO: 80.9 FL (ref 79–97)
MONOCYTES # BLD AUTO: 0.33 10*3/MM3 (ref 0.1–0.9)
MONOCYTES NFR BLD AUTO: 5.5 % (ref 5–12)
NEUTROPHILS # BLD AUTO: 3.57 10*3/MM3 (ref 1.7–7)
NEUTROPHILS NFR BLD AUTO: 59.4 % (ref 42.7–76)
NITRITE UR-MCNC: NEGATIVE MG/ML
NRBC BLD AUTO-RTO: 0 /100 WBC (ref 0–0.2)
PH UR: 5 [PH] (ref 5–8)
PLATELET # BLD AUTO: 198 10*3/MM3 (ref 140–450)
POTASSIUM SERPL-SCNC: 4.7 MMOL/L (ref 3.5–5.2)
PROT SERPL-MCNC: 6.4 G/DL (ref 6–8.5)
PROT UR STRIP-MCNC: NEGATIVE MG/DL
RBC # BLD AUTO: 5.64 10*6/MM3 (ref 3.77–5.28)
RBC # UR STRIP: NEGATIVE /UL
SODIUM SERPL-SCNC: 138 MMOL/L (ref 136–145)
SP GR UR: 1 (ref 1–1.03)
TRIGL SERPL-MCNC: 106 MG/DL (ref 0–150)
UROBILINOGEN UR QL: NORMAL
VLDLC SERPL CALC-MCNC: 19 MG/DL (ref 5–40)
WBC # BLD AUTO: 6.01 10*3/MM3 (ref 3.4–10.8)

## 2024-09-09 PROCEDURE — 81025 URINE PREGNANCY TEST: CPT | Performed by: NURSE PRACTITIONER

## 2024-09-09 PROCEDURE — 99396 PREV VISIT EST AGE 40-64: CPT | Performed by: NURSE PRACTITIONER

## 2024-09-09 PROCEDURE — 81002 URINALYSIS NONAUTO W/O SCOPE: CPT | Performed by: NURSE PRACTITIONER

## 2024-09-09 NOTE — PROGRESS NOTES
GYN Annual Exam     CC- Here for annual exam.     Valery Aguilar is a 39 y.o. female who presents for annual well woman exam. She is an established patient. Has a Mirena IUD in place since 10/2018. Pt has a hx of PCOS with oligomenorrhea that is controlled with IUD.     She is not having periods.     She does not do SBE regularly.     Reports her son was recently diagnosed with Stage 3 kidney disease and Crohns.         OB History          2    Para   2    Term   2            AB        Living   2         SAB        IAB        Ectopic        Molar        Multiple        Live Births   2                Last Pap:   Current contraception: none  History of abnormal Pap smear: no  History of abnormal mammogram: no  Family history of uterine, colon or ovarian cancer: no  Family history of breast cancer: yes - paternal aunt with breast cancer, paternal GM    Health Maintenance   Topic Date Due    MAMMOGRAM  Never done    HEPATITIS C SCREENING  Never done    ANNUAL PHYSICAL  Never done    BMI FOLLOWUP  2024    LIPID PANEL  2024    Annual Gynecologic Pelvic and Breast Exam  2024    COVID-19 Vaccine ( - - season) 2024    INFLUENZA VACCINE  2024    PAP SMEAR  2026    TDAP/TD VACCINES (2 - Td or Tdap) 2028    Pneumococcal Vaccine 0-64  Aged Out       Past Medical History:   Diagnosis Date    Abdominal pain     Anxiety     Anxiety and depression 2021    Arthritis     KNEE    Chronic sinusitis, unspecified     Chronic wound infection of abdomen 2019    Added automatically from request for surgery [id number]    Depression     Diabetes mellitus     GERD (gastroesophageal reflux disease)     H/O echocardiogram      EF 56% NORMAL DIASTOLIC FUNCTION NO VALVULAR DISEASE    H/O exercise stress test     REPORTEDLY NORMAL DONE FOR CHEST PAIN DX WITH ANXIETY    Headache, tension-type     History of Holter monitoring     REPORTEDLY NORMAL DONE FOR CHEST PAIN  DX WITH ANXIETY    History of MRI of brain and brain stem 2017    NORMAL DONE FOR MIGRAINES    Hypertension     PRE-ECLAMPSIA WITH BOTH CHILDREN    Incisional hernia     SCHEDULED FOR REPAIR    Incisional hernia, without obstruction or gangrene 10/24/2018    Added automatically from request for surgery [id number]    Infection following a procedure, unspecified, initial encounter     Insulin resistance     D/T PCOS    Lobar pneumonia, unspecified organism     Meralgia paresthetica, left lower limb     Metabolic syndrome     Migraine     Moderate mixed hyperlipidemia not requiring statin therapy 2022    MRSA (methicillin resistant Staphylococcus aureus) infection 2019    Abdominal wound    Nasal congestion     Other injury of unspecified body region, initial encounter     Panic attack     Panic disorder     Papanicolaou smear 2020    NORMAL WITH NEG HPV NEVER ABN    PCOS (polycystic ovarian syndrome)     Pneumonia 2018    PONV (postoperative nausea and vomiting)     Right lower quadrant abdominal pain 2019    Seasonal allergies     Seizure     Spinal headache     Unspecified contact dermatitis, unspecified cause     Viral infection     UNSPECIFIED    Vitamin D deficiency     Wound infection 2018       Past Surgical History:   Procedure Laterality Date    ABDOMINAL WALL ABSCESS INCISION AND DRAINAGE N/A 2018    Procedure: Debridement of abdominal wall;  Surgeon: Brigido Navarrete MD;  Location: [facility] OR;  Service: General    ADENOIDECTOMY       SECTION      X2    CHOLECYSTECTOMY      LAP    HERNIA REPAIR      HX OVARIAN CYSTECTOMY      INCISION AND DRAINAGE TRUNK N/A 2018    Procedure: wound debridement, abdomen;  Surgeon: Rachel Dolan MD;  Location: [facility] OR;  Service: General    INCISION AND DRAINAGE TRUNK N/A 2019    Procedure: WOUND CLOSURE ABDOMINAL;  Surgeon: Rachel Dolan MD;  Location: [facility] OR;  Service: General    KNEE ACL RECONSTRUCTION  Left 2010    DR. CARRASCO    OVARIAN CYST REMOVAL      EX LAP WITH OVARIAN CYST REMOVAL HERNIA REPAIR 2014    TONSILLECTOMY      T&A    TRUNK DEBRIDEMENT N/A 12/5/2018    Procedure: Abdominal wound debridement;  Surgeon: Rachel Dolan MD;  Location: [facility] OR;  Service: General    VENTRAL/INCISIONAL HERNIA REPAIR N/A 11/15/2018    Procedure: Lateral Component Separation Herniorrhapy Repair with Mesh, Lysis of adhesions, and skin lesion excision of Right Side;  Surgeon: Rachel Dolan MD;  Location: [facility] OR;  Service: General         Current Outpatient Medications:     busPIRone (BUSPAR) 15 MG tablet, TAKE ONE TABLET BY MOUTH THREE TIMES A DAY, Disp: 270 tablet, Rfl: 1    promethazine (PHENERGAN) 25 MG tablet, Take 1 tablet by mouth Every 6 (Six) Hours As Needed for Nausea or Vomiting., Disp: 20 tablet, Rfl: 0    rizatriptan MLT (MAXALT-MLT) 10 MG disintegrating tablet, May repeat in 2 hours if needed, Disp: 15 tablet, Rfl: 3    albuterol sulfate  (90 Base) MCG/ACT inhaler, Inhale 2 puffs Every 4 (Four) Hours As Needed for Wheezing or Shortness of Air. (Patient not taking: Reported on 9/9/2024), Disp: 8 g, Rfl: 1    Azelastine HCl 137 MCG/SPRAY solution, SPRAY TWO SPRAYS IN EACH NOSTRIL TWICE DAILY AS DIRECTED BY PROVIDER (Patient not taking: Reported on 9/9/2024), Disp: 30 mL, Rfl: 2    benzonatate (TESSALON) 200 MG capsule, Take 1 capsule by mouth 3 (Three) Times a Day As Needed for Cough. (Patient not taking: Reported on 9/9/2024), Disp: 20 capsule, Rfl: 0    celecoxib (CeleBREX) 100 MG capsule, TAKE ONE CAPSULE BY MOUTH TWICE A DAY AS NEEDED FOR MODERATE PAIN (Patient not taking: Reported on 9/9/2024), Disp: 180 capsule, Rfl: 1    cetirizine (zyrTEC) 10 MG tablet, Take 1 tablet by mouth Every Night. (Patient not taking: Reported on 9/9/2024), Disp: , Rfl:     cholecalciferol (VITAMIN D3) 1000 units tablet, Take 2 tablets by mouth Daily. (Patient not taking: Reported on 9/9/2024), Disp: , Rfl:  "    Durolane 60 MG/3ML prefilled syringe injection, , Disp: , Rfl:     eletriptan (RELPAX) 20 MG tablet, Take 1 tablet by mouth 1 (One) Time As Needed for Migraine for up to 1 dose. may repeat in 2 hours if necessary (Patient not taking: Reported on 2024), Disp: 15 tablet, Rfl: 2    fluticasone (FLONASE) 50 MCG/ACT nasal spray, 2 sprays into the nostril(s) as directed by provider Daily. (Patient not taking: Reported on 2024), Disp: , Rfl:     gabapentin (NEURONTIN) 800 MG tablet, TAKE ONE TABLET BY MOUTH TWICE A DAY (Patient not taking: Reported on 2024), Disp: 180 tablet, Rfl: 1    LORazepam (ATIVAN) 0.5 MG tablet, Take 1 tablet by mouth Every 12 (Twelve) Hours As Needed for Anxiety. (Patient not taking: Reported on 2024), Disp: 30 tablet, Rfl: 1    losartan-hydrochlorothiazide (HYZAAR) 50-12.5 MG per tablet, TAKE ONE TABLET BY MOUTH DAILY (Patient not taking: Reported on 2024), Disp: 90 tablet, Rfl: 0    Magnesium 500 MG capsule, Take  by mouth. (Patient not taking: Reported on 2024), Disp: , Rfl:     sertraline (ZOLOFT) 100 MG tablet, TAKE TWO TABLETS BY MOUTH DAILY (Patient not taking: Reported on 2024), Disp: 180 tablet, Rfl: 1    Allergies   Allergen Reactions    Bupropion Seizure and Unknown - High Severity    Penicillins Other (See Comments)     \"im resistant to any \"cillin\" drugs\"     Tramadol Unknown - Low Severity     Patient states that medicine may cause a seizure with taking the Wellbutrin so neurology doesn't want patient to take this Rx        Social History     Tobacco Use    Smoking status: Former     Current packs/day: 0.00     Average packs/day: 0.5 packs/day for 1 year (0.5 ttl pk-yrs)     Types: Cigarettes     Start date:      Quit date:      Years since quittin.7     Passive exposure: Never    Smokeless tobacco: Never   Vaping Use    Vaping status: Some Days   Substance Use Topics    Alcohol use: Yes     Comment: occasional    Drug use: No       Family " "History   Problem Relation Age of Onset    Stroke Mother     Heart disease Mother     Hypertension Mother     Heart disease Father     Hypertension Father     Diabetes Father     Dementia Maternal Grandmother     Stroke Maternal Grandmother     Diabetes Maternal Grandmother     Stroke Paternal Grandmother     Hypertension Paternal Grandmother     Cancer Paternal Grandmother     Heart disease Paternal Grandfather     Diabetes Paternal Grandfather        Review of Systems   Constitutional:  Negative for appetite change, chills, fatigue, fever and unexpected weight change.   Gastrointestinal:  Negative for abdominal distention, abdominal pain, anal bleeding, blood in stool, constipation, diarrhea, nausea and vomiting.   Genitourinary:  Negative for dyspareunia, dysuria, menstrual problem, pelvic pain, vaginal bleeding, vaginal discharge and vaginal pain.   Neurological: Negative.    Psychiatric/Behavioral: Negative.         /80   Ht 162.6 cm (64\")   Wt 125 kg (275 lb)   LMP  (LMP Unknown)   Breastfeeding No   BMI 47.20 kg/m²     Physical Exam  Vitals reviewed.   Constitutional:       General: She is not in acute distress.     Appearance: She is well-developed. She is obese. She is not ill-appearing, toxic-appearing or diaphoretic.   HENT:      Mouth/Throat:      Dentition: Normal dentition. No dental caries.   Cardiovascular:      Rate and Rhythm: Normal rate and regular rhythm.      Heart sounds: Normal heart sounds.   Pulmonary:      Effort: Pulmonary effort is normal. No respiratory distress.      Breath sounds: Normal breath sounds. No stridor. No wheezing.   Chest:   Breasts:     Right: No inverted nipple, mass, nipple discharge, skin change or tenderness.      Left: No inverted nipple, mass, nipple discharge, skin change or tenderness.   Abdominal:      General: There is no distension.      Palpations: Abdomen is soft. There is no mass.      Tenderness: There is no abdominal tenderness. "   Genitourinary:     Labia:         Right: No rash, tenderness or lesion.         Left: No rash, tenderness or lesion.       Vagina: No foreign body. No vaginal discharge, tenderness or bleeding.      Cervix: No cervical motion tenderness, discharge or friability.      Uterus: Not deviated, not enlarged, not fixed and not tender.       Adnexa:         Right: No mass, tenderness or fullness.          Left: No mass, tenderness or fullness.        Comments: IUD string noted   Musculoskeletal:         General: No tenderness. Normal range of motion.   Skin:     General: Skin is warm.      Findings: No erythema or rash.   Neurological:      General: No focal deficit present.      Mental Status: She is alert and oriented to person, place, and time.      Cranial Nerves: No cranial nerve deficit.      Coordination: Coordination normal.   Psychiatric:         Mood and Affect: Mood normal.         Behavior: Behavior normal.         Thought Content: Thought content normal.         Judgment: Judgment normal.            Assessment/Plan    1) GYN HM: Check pap smear today per pt request.  SBE demonstrated and encouraged. Schedule screening MMG.  2) PCOS: Lost her PCP. Has an upcoming appt with [facility]. Check fasting PCOS panel   3) Contraception: none. Mirena IUD 10/2018. Due to be replaced .   4) Family Planning: .  5) Body mass index is 47.2 kg/m².  6) Smoking Status: nonsmoker  7) Hx of Oligomenorrhea: Pt is at high risk for endometrial hyperplasia. Has Mirena IUD in place 10/2018  8) Well woman labs- Check CBC, CMP, HgbA1C, cholesterol and Vit D in addition to PCOS labs  9) Stress- Son was recently diagnosed with Crohn's and Stage 3 kidney disease       Diagnoses and all orders for this visit:    Cervical smear, as part of routine gynecological examination  -     POC Urinalysis Dipstick  -     POC Pregnancy, Urine  -     IGP, Apt HPV,rfx 16 / 18,45    Routine gynecological examination  -     POC Urinalysis  Dipstick  -     POC Pregnancy, Urine  -     IGP, Apt HPV,rfx 16 / 18,45  -     Mammo Screening Digital Tomosynthesis Bilateral With CAD; Future    Special screening examination for human papillomavirus (HPV)  -     POC Urinalysis Dipstick  -     POC Pregnancy, Urine  -     IGP, Apt HPV,rfx 16 / 18,45    Parts of this document have been copied or forwarded from her previous visits and have been reviewed, updated and edited as indicated.     RTO 1 year AE or PRN     Tana López, SYED  9/9/2024  09:57 EDT

## 2024-09-10 RX ORDER — ERGOCALCIFEROL 1.25 MG/1
50000 CAPSULE, LIQUID FILLED ORAL WEEKLY
Qty: 8 CAPSULE | Refills: 0 | Status: SHIPPED | OUTPATIENT
Start: 2024-09-10 | End: 2024-09-12

## 2024-09-11 LAB
CYTOLOGIST CVX/VAG CYTO: NORMAL
CYTOLOGY CVX/VAG DOC CYTO: NORMAL
CYTOLOGY CVX/VAG DOC THIN PREP: NORMAL
DX ICD CODE: NORMAL
HPV I/H RISK 4 DNA CVX QL PROBE+SIG AMP: NEGATIVE
Lab: NORMAL
OTHER STN SPEC: NORMAL
STAT OF ADQ CVX/VAG CYTO-IMP: NORMAL

## 2024-09-12 ENCOUNTER — OFFICE VISIT (OUTPATIENT)
Dept: INTERNAL MEDICINE | Facility: CLINIC | Age: 39
End: 2024-09-12
Payer: COMMERCIAL

## 2024-09-12 VITALS
DIASTOLIC BLOOD PRESSURE: 92 MMHG | SYSTOLIC BLOOD PRESSURE: 134 MMHG | WEIGHT: 274 LBS | OXYGEN SATURATION: 98 % | TEMPERATURE: 98 F | HEIGHT: 64 IN | HEART RATE: 83 BPM | BODY MASS INDEX: 46.78 KG/M2

## 2024-09-12 DIAGNOSIS — F32.A ANXIETY AND DEPRESSION: Chronic | ICD-10-CM

## 2024-09-12 DIAGNOSIS — F41.9 ANXIETY AND DEPRESSION: Chronic | ICD-10-CM

## 2024-09-12 DIAGNOSIS — G43.709 CHRONIC MIGRAINE WITHOUT AURA WITHOUT STATUS MIGRAINOSUS, NOT INTRACTABLE: Chronic | ICD-10-CM

## 2024-09-12 DIAGNOSIS — E55.9 VITAMIN D DEFICIENCY: ICD-10-CM

## 2024-09-12 DIAGNOSIS — I10 ESSENTIAL HYPERTENSION: Chronic | ICD-10-CM

## 2024-09-12 DIAGNOSIS — Z76.89 ENCOUNTER TO ESTABLISH CARE: Primary | ICD-10-CM

## 2024-09-12 PROCEDURE — 99215 OFFICE O/P EST HI 40 MIN: CPT | Performed by: NURSE PRACTITIONER

## 2024-09-12 RX ORDER — BUSPIRONE HYDROCHLORIDE 15 MG/1
15 TABLET ORAL 3 TIMES DAILY
Qty: 270 TABLET | Refills: 1 | Status: SHIPPED | OUTPATIENT
Start: 2024-09-12

## 2024-09-12 RX ORDER — LOSARTAN POTASSIUM AND HYDROCHLOROTHIAZIDE 12.5; 5 MG/1; MG/1
1 TABLET ORAL DAILY
Qty: 90 TABLET | Refills: 0 | Status: SHIPPED | OUTPATIENT
Start: 2024-09-12

## 2024-09-12 RX ORDER — RIZATRIPTAN BENZOATE 10 MG/1
TABLET, ORALLY DISINTEGRATING ORAL
Qty: 15 TABLET | Refills: 3 | Status: SHIPPED | OUTPATIENT
Start: 2024-09-12

## 2024-09-12 NOTE — PROGRESS NOTES
Subjective    Valery Aguilar is a 39 y.o. female presenting today for   Chief Complaint   Patient presents with    Establish Care     Wants to discuss her medications and get refills, discuss her antidepressants. Wants to discuss potential ADHD           Valery Aguilar presents today as a new patient to me to establish care.   Prior PCP was Zandra Rios.  Patient Care Team:  Julianne Gan APRN as PCP - General (Family Medicine)  Tana López APRN as Nurse Practitioner (Obstetrics and Gynecology)  Patrick Napier MD as Surgeon (Orthopedic Surgery)    Current/chronic health conditions include:    Patient Active Problem List   Diagnosis    Chronic migraine without aura without status migrainosus, not intractable    Cervicogenic headache    Secondary oligomenorrhea    Family history of breast cancer    Obstructive sleep apnea, adult    GERD with apnea    Vitamin D deficiency    PCOS (polycystic ovarian syndrome)    Panic disorder    Insulin resistance    Essential hypertension    GERD (gastroesophageal reflux disease)    Anxiety and depression    Tendonitis, Achilles, left    S/P ACL reconstruction    Primary osteoarthritis of left knee    ACL graft tear, sequela    Morbid (severe) obesity due to excess calories    Seizure    Moderate mixed hyperlipidemia not requiring statin therapy    Routine gynecological examination     Current Outpatient Medications on File Prior to Visit   Medication Sig Dispense Refill    busPIRone (BUSPAR) 15 MG tablet TAKE ONE TABLET BY MOUTH THREE TIMES A  tablet 1    cetirizine (zyrTEC) 10 MG tablet Take 1 tablet by mouth Every Night.      cholecalciferol (VITAMIN D3) 1000 units tablet Take 2 tablets by mouth Daily.      fluticasone (FLONASE) 50 MCG/ACT nasal spray 2 sprays into the nostril(s) as directed by provider Daily.      Magnesium 500 MG capsule Take  by mouth.      promethazine (PHENERGAN) 25 MG tablet Take 1 tablet by mouth Every 6 (Six) Hours As Needed  for Nausea or Vomiting. 20 tablet 0    celecoxib (CeleBREX) 100 MG capsule TAKE ONE CAPSULE BY MOUTH TWICE A DAY AS NEEDED FOR MODERATE PAIN (Patient not taking: Reported on 9/9/2024) 180 capsule 1    Durolane 60 MG/3ML prefilled syringe injection       gabapentin (NEURONTIN) 800 MG tablet TAKE ONE TABLET BY MOUTH TWICE A DAY (Patient not taking: Reported on 9/9/2024) 180 tablet 1    LORazepam (ATIVAN) 0.5 MG tablet Take 1 tablet by mouth Every 12 (Twelve) Hours As Needed for Anxiety. (Patient not taking: Reported on 9/9/2024) 30 tablet 1    losartan-hydrochlorothiazide (HYZAAR) 50-12.5 MG per tablet TAKE ONE TABLET BY MOUTH DAILY (Patient not taking: Reported on 9/9/2024) 90 tablet 0    rizatriptan MLT (MAXALT-MLT) 10 MG disintegrating tablet May repeat in 2 hours if needed (Patient not taking: Reported on 9/12/2024) 15 tablet 3    sertraline (ZOLOFT) 100 MG tablet TAKE TWO TABLETS BY MOUTH DAILY (Patient not taking: Reported on 9/9/2024) 180 tablet 1                                        No current facility-administered medications on file prior to visit.         Outpatient Medications Marked as Taking for the 9/12/24 encounter (Office Visit) with Julianne Gan APRN   Medication Sig Dispense Refill    busPIRone (BUSPAR) 15 MG tablet TAKE ONE TABLET BY MOUTH THREE TIMES A  tablet 1    cetirizine (zyrTEC) 10 MG tablet Take 1 tablet by mouth Every Night.      cholecalciferol (VITAMIN D3) 1000 units tablet Take 2 tablets by mouth Daily.      fluticasone (FLONASE) 50 MCG/ACT nasal spray 2 sprays into the nostril(s) as directed by provider Daily.      Magnesium 500 MG capsule Take  by mouth.      promethazine (PHENERGAN) 25 MG tablet Take 1 tablet by mouth Every 6 (Six) Hours As Needed for Nausea or Vomiting. 20 tablet 0     She has been without several routine medications since January.    HA are well controlled. No migraine for months. Numerous triggers.    Numerous life stressors currently. She feels  anxious and tearful. Sleep is intermittent and sometime impacted by anxiety. She is also struggling w/ focus.    PHQ-9 Depression Screening  Little interest or pleasure in doing things? 1-->several days   Feeling down, depressed, or hopeless? 1-->several days   Trouble falling or staying asleep, or sleeping too much? 1-->several days   Feeling tired or having little energy? 3-->nearly every day   Poor appetite or overeating? 1-->several days   Feeling bad about yourself - or that you are a failure or have let yourself or your family down? 0-->not at all   Trouble concentrating on things, such as reading the newspaper or watching television? 3-->nearly every day   Moving or speaking so slowly that other people could have noticed? Or the opposite - being so fidgety or restless that you have been moving around a lot more than usual? 0-->not at all   Thoughts that you would be better off dead, or of hurting yourself in some way? 0-->not at all   PHQ-9 Total Score 10   If you checked off any problems, how difficult have these problems made it for you to do your work, take care of things at home, or get along with other people?         Over the last two weeks, how often have you been bothered by the following problems?  Feeling nervous, anxious or on edge: Nearly every day  Not being able to stop or control worrying: More than half the days  Worrying too much about different things: Nearly every day  Trouble Relaxing: More than half the days  Being so restless that it is hard to sit still: Several days  Becoming easily annoyed or irritable: Nearly every day  Feeling afraid as if something awful might happen: Several days  BENJAMIN 7 Total Score: 15  If you checked any problems, how difficult have these problems made it for you to do your work, take care of things at home, or get along with other people: Somewhat difficult       The following portions of the patient's history were reviewed and updated as appropriate: allergies,  "current medications, problem list, past medical history, past surgical history, family history, and social history.         Objective    Vitals:    09/12/24 0840   BP: 134/92   BP Location: Left arm   Patient Position: Sitting   Cuff Size: Large Adult   Pulse: 83   Temp: 98 °F (36.7 °C)   TempSrc: Infrared   SpO2: 98%   Weight: 124 kg (274 lb)   Height: 162.6 cm (64\")     Body mass index is 47.03 kg/m².  Nursing notes and vitals reviewed.    Physical Exam  Constitutional:       General: She is not in acute distress.     Appearance: She is well-developed.   HENT:      Right Ear: Hearing, tympanic membrane, ear canal and external ear normal.      Left Ear: Hearing, tympanic membrane, ear canal and external ear normal.      Nose: Nose normal.      Mouth/Throat:      Mouth: Mucous membranes are moist.      Pharynx: Oropharynx is clear. Uvula midline.   Neck:      Thyroid: No thyroid mass or thyromegaly.   Cardiovascular:      Rate and Rhythm: Regular rhythm.      Pulses: Normal pulses.      Heart sounds: S1 normal and S2 normal. No murmur heard.     No friction rub. No gallop.   Pulmonary:      Effort: Pulmonary effort is normal.      Breath sounds: Normal breath sounds. No wheezing, rhonchi or rales.   Musculoskeletal:      Cervical back: Neck supple.   Lymphadenopathy:      Cervical: No cervical adenopathy.   Neurological:      Mental Status: She is alert and oriented to person, place, and time.      Cranial Nerves: No cranial nerve deficit.      Sensory: No sensory deficit.   Psychiatric:         Attention and Perception: She is attentive.         Mood and Affect: Affect is tearful.         Speech: Speech normal.         Behavior: Behavior normal.         Recent Results (from the past 672 hour(s))   POC Urinalysis Dipstick    Collection Time: 09/09/24  9:49 AM    Specimen: Urine   Result Value Ref Range    Color Yellow Yellow, Straw, Dark Yellow, Linnea    Clarity, UA Clear Clear    Glucose, UA Negative Negative mg/dL "    Bilirubin Negative Negative    Ketones, UA Negative Negative    Specific Gravity  1.005 1.005 - 1.030    Blood, UA Negative Negative    pH, Urine 5.0 5.0 - 8.0    Protein, POC Negative Negative mg/dL    Urobilinogen, UA Normal Normal, 0.2 E.U./dL    Leukocytes Negative Negative    Nitrite, UA Negative Negative   POC Pregnancy, Urine    Collection Time: 09/09/24  9:49 AM    Specimen: Urine   Result Value Ref Range    HCG, Urine, QL Negative Negative    Lot Number [id number]     Internal Positive Control Passed Positive, Passed    Internal Negative Control Passed Negative, Passed    Expiration Date 4/5/25    CBC & Differential    Collection Time: 09/09/24 10:58 AM    Specimen: Blood   Result Value Ref Range    WBC 6.01 3.40 - 10.80 10*3/mm3    RBC 5.64 (H) 3.77 - 5.28 10*6/mm3    Hemoglobin 15.3 12.0 - 15.9 g/dL    Hematocrit 45.6 34.0 - 46.6 %    MCV 80.9 79.0 - 97.0 fL    MCH 27.1 26.6 - 33.0 pg    MCHC 33.6 31.5 - 35.7 g/dL    RDW 13.3 12.3 - 15.4 %    Platelets 198 140 - 450 10*3/mm3    Neutrophil Rel % 59.4 42.7 - 76.0 %    Lymphocyte Rel % 33.1 19.6 - 45.3 %    Monocyte Rel % 5.5 5.0 - 12.0 %    Eosinophil Rel % 0.8 0.3 - 6.2 %    Basophil Rel % 0.7 0.0 - 1.5 %    Neutrophils Absolute 3.57 1.70 - 7.00 10*3/mm3    Lymphocytes Absolute 1.99 0.70 - 3.10 10*3/mm3    Monocytes Absolute 0.33 0.10 - 0.90 10*3/mm3    Eosinophils Absolute 0.05 0.00 - 0.40 10*3/mm3    Basophils Absolute 0.04 0.00 - 0.20 10*3/mm3    Immature Granulocyte Rel % 0.5 0.0 - 0.5 %    Immature Grans Absolute 0.03 0.00 - 0.05 10*3/mm3    nRBC 0.0 0.0 - 0.2 /100 WBC   Comprehensive Metabolic Panel    Collection Time: 09/09/24 10:58 AM    Specimen: Blood   Result Value Ref Range    Glucose 86 65 - 99 mg/dL    BUN 12 6 - 20 mg/dL    Creatinine 0.68 0.57 - 1.00 mg/dL    EGFR Result 113.1 >60.0 mL/min/1.73    BUN/Creatinine Ratio 17.6 7.0 - 25.0    Sodium 138 136 - 145 mmol/L    Potassium 4.7 3.5 - 5.2 mmol/L    Chloride 106 98 - 107 mmol/L    Total  CO2 23.5 22.0 - 29.0 mmol/L    Calcium 9.1 8.6 - 10.5 mg/dL    Total Protein 6.4 6.0 - 8.5 g/dL    Albumin 4.1 3.5 - 5.2 g/dL    Globulin 2.3 gm/dL    A/G Ratio 1.8 g/dL    Total Bilirubin 0.6 0.0 - 1.2 mg/dL    Alkaline Phosphatase 69 39 - 117 U/L    AST (SGOT) 15 1 - 32 U/L    ALT (SGPT) 18 1 - 33 U/L   Hemoglobin A1c    Collection Time: 09/09/24 10:58 AM    Specimen: Blood   Result Value Ref Range    Hemoglobin A1C 5.10 4.80 - 5.60 %   Vitamin D,25-Hydroxy    Collection Time: 09/09/24 10:58 AM    Specimen: Blood   Result Value Ref Range    25 Hydroxy, Vitamin D 25.3 (L) 30.0 - 100.0 ng/ml   Lipid Panel    Collection Time: 09/09/24 10:58 AM    Specimen: Blood   Result Value Ref Range    Total Cholesterol 170 0 - 200 mg/dL    Triglycerides 106 0 - 150 mg/dL    HDL Cholesterol 40 40 - 60 mg/dL    VLDL Cholesterol Calvin 19 5 - 40 mg/dL    LDL Chol Calc (NIH) 111 (H) 0 - 100 mg/dL   IGP, Apt HPV,rfx 16 / 18,45    Collection Time: 09/09/24  1:03 PM    Specimen: Cervix; ThinPrep Vial   Result Value Ref Range    Diagnosis Comment     Specimen adequacy: Comment     Clinician Provided ICD-10: Comment     Performed by: Comment     . .     Note: Comment     Method: Comment     HPV Aptima Negative Negative         Assessment and Plan    Diagnoses and all orders for this visit:    1. Encounter to establish care (Primary)    2. Essential hypertension  -     losartan-hydrochlorothiazide (HYZAAR) 50-12.5 MG per tablet; Take 1 tablet by mouth Daily.  Dispense: 90 tablet; Refill: 0    3. Vitamin D deficiency    4. Anxiety and depression  -     sertraline (ZOLOFT) 50 MG tablet; Take 0.5 tablets by mouth Daily for 6 days, THEN 1 tablet Daily for 24 days.  Dispense: 27 tablet; Refill: 0  -     busPIRone (BUSPAR) 15 MG tablet; Take 1 tablet by mouth 3 (Three) Times a Day.  Dispense: 270 tablet; Refill: 1    5. Chronic migraine without aura without status migrainosus, not intractable  -     rizatriptan MLT (MAXALT-MLT) 10 MG  disintegrating tablet; May repeat in 2 hours if needed  Dispense: 15 tablet; Refill: 3              Medications, including side effects, were discussed with the patient. Patient verbalized understanding.  The plan of care was discussed. All questions were answered. Patient verbalized understanding.        Return in about 4 weeks (around 10/10/2024).      I spent 42 minutes caring for Valery on this date of service. This time includes time spent by me in the following activities:preparing for the visit, reviewing tests, obtaining and/or reviewing a separately obtained history, performing a medically appropriate examination and/or evaluation , counseling and educating the patient/family/caregiver, ordering medications, tests, or procedures, documenting information in the medical record, independently interpreting results and communicating that information with the patient/family/caregiver, and care coordination

## 2024-09-14 LAB
17OHP SERPL-MCNC: 125 NG/DL
DHEA-S SERPL-MCNC: 36.4 UG/DL (ref 57.3–279.2)
ESTRADIOL SERPL-MCNC: 64.4 PG/ML
FSH SERPL-ACNC: 4.2 MIU/ML
GLUCOSE P FAST SERPL-MCNC: 79 MG/DL (ref 70–99)
INSULIN SERPL-ACNC: 41.6 UIU/ML (ref 2.6–24.9)
PROLACTIN SERPL-MCNC: 10.4 NG/ML (ref 4.8–33.4)
TESTOST FREE SERPL-MCNC: 0.6 PG/ML (ref 0–4.2)
TSH SERPL DL<=0.005 MIU/L-ACNC: 1.55 UIU/ML (ref 0.45–4.5)

## 2024-09-26 ENCOUNTER — OFFICE VISIT (OUTPATIENT)
Dept: INTERNAL MEDICINE | Facility: CLINIC | Age: 39
End: 2024-09-26
Payer: COMMERCIAL

## 2024-09-26 ENCOUNTER — TELEPHONE (OUTPATIENT)
Dept: INTERNAL MEDICINE | Facility: CLINIC | Age: 39
End: 2024-09-26

## 2024-09-26 VITALS
HEIGHT: 64 IN | SYSTOLIC BLOOD PRESSURE: 142 MMHG | TEMPERATURE: 97.7 F | OXYGEN SATURATION: 97 % | DIASTOLIC BLOOD PRESSURE: 80 MMHG | WEIGHT: 270 LBS | HEART RATE: 85 BPM | BODY MASS INDEX: 46.1 KG/M2

## 2024-09-26 DIAGNOSIS — G43.709 CHRONIC MIGRAINE WITHOUT AURA WITHOUT STATUS MIGRAINOSUS, NOT INTRACTABLE: Chronic | ICD-10-CM

## 2024-09-26 DIAGNOSIS — I10 ESSENTIAL HYPERTENSION: Chronic | ICD-10-CM

## 2024-09-26 DIAGNOSIS — R42 DIZZINESS: Primary | ICD-10-CM

## 2024-09-26 PROCEDURE — 99214 OFFICE O/P EST MOD 30 MIN: CPT | Performed by: NURSE PRACTITIONER

## 2024-09-26 PROCEDURE — 93000 ELECTROCARDIOGRAM COMPLETE: CPT | Performed by: NURSE PRACTITIONER

## 2024-09-26 RX ORDER — MECLIZINE HYDROCHLORIDE 25 MG/1
25 TABLET ORAL 3 TIMES DAILY PRN
Qty: 90 TABLET | Refills: 0 | OUTPATIENT
Start: 2024-09-26 | End: 2024-09-29

## 2024-09-26 NOTE — TELEPHONE ENCOUNTER
Caller: Valery Aguilar    Relationship to patient: Self    Best call back number:     [phone] (Mobile)       Chief complaint:  PATIENT CHECKED HER BLOOD PRESSURE TODAY, AND IT /91     SHE LEFT WORK AND CALLED FOR AN APPT    ADVISED THAT IF SHE HAS CHEST PAINS, SEVERE HEADACHES, NUMBNESS/ TINGLING IN THE ARM TO GO TO THE ER  (SHE DOES NOT HAVE ANY OF THESE SYMPTOMS AT THIS TIME )       Patient directed to call 911 or go to their nearest emergency room.     Patient verbalized understanding: [x] Yes  [] No  If no, why?    Additional notes: PATIENT HAS AN APPT FOR TODAY WITH PCP - IF YOU HAVE ANY CANCELLATIONS OR WANT TO SEE HER SOONER, PLEASE ADVISE

## 2024-09-29 ENCOUNTER — APPOINTMENT (OUTPATIENT)
Dept: CT IMAGING | Facility: HOSPITAL | Age: 39
End: 2024-09-29
Payer: COMMERCIAL

## 2024-09-29 ENCOUNTER — HOSPITAL ENCOUNTER (EMERGENCY)
Facility: HOSPITAL | Age: 39
Discharge: HOME | End: 2024-09-29
Admitting: EMERGENCY MEDICINE
Payer: COMMERCIAL

## 2024-09-29 VITALS
TEMPERATURE: 98.2 F | HEART RATE: 78 BPM | SYSTOLIC BLOOD PRESSURE: 139 MMHG | OXYGEN SATURATION: 96 % | DIASTOLIC BLOOD PRESSURE: 86 MMHG | RESPIRATION RATE: 16 BRPM

## 2024-09-29 DIAGNOSIS — R42 VERTIGO: ICD-10-CM

## 2024-09-29 DIAGNOSIS — R51.9 ACUTE NONINTRACTABLE HEADACHE, UNSPECIFIED HEADACHE TYPE: Primary | ICD-10-CM

## 2024-09-29 LAB
ALBUMIN SERPL-MCNC: 4.1 G/DL (ref 3.5–5.2)
ALBUMIN/GLOB SERPL: 1.6 G/DL
ALP SERPL-CCNC: 71 U/L (ref 39–117)
ALT SERPL W P-5'-P-CCNC: 23 U/L (ref 1–33)
ANION GAP SERPL CALCULATED.3IONS-SCNC: 11 MMOL/L (ref 5–15)
AST SERPL-CCNC: 17 U/L (ref 1–32)
BASOPHILS # BLD AUTO: 0.03 10*3/MM3 (ref 0–0.2)
BASOPHILS NFR BLD AUTO: 0.4 % (ref 0–1.5)
BILIRUB SERPL-MCNC: 0.5 MG/DL (ref 0–1.2)
BILIRUB UR QL STRIP: NEGATIVE
BUN SERPL-MCNC: 10 MG/DL (ref 6–20)
BUN/CREAT SERPL: 14.7 (ref 7–25)
CALCIUM SPEC-SCNC: 9.5 MG/DL (ref 8.6–10.5)
CHLORIDE SERPL-SCNC: 104 MMOL/L (ref 98–107)
CLARITY UR: CLEAR
CO2 SERPL-SCNC: 22 MMOL/L (ref 22–29)
COLOR UR: YELLOW
CREAT SERPL-MCNC: 0.68 MG/DL (ref 0.57–1)
DEPRECATED RDW RBC AUTO: 38.5 FL (ref 37–54)
EGFRCR SERPLBLD CKD-EPI 2021: 113.1 ML/MIN/1.73
EOSINOPHIL # BLD AUTO: 0.06 10*3/MM3 (ref 0–0.4)
EOSINOPHIL NFR BLD AUTO: 0.9 % (ref 0.3–6.2)
ERYTHROCYTE [DISTWIDTH] IN BLOOD BY AUTOMATED COUNT: 13.1 % (ref 12.3–15.4)
GLOBULIN UR ELPH-MCNC: 2.5 GM/DL
GLUCOSE SERPL-MCNC: 86 MG/DL (ref 65–99)
GLUCOSE UR STRIP-MCNC: NEGATIVE MG/DL
HCG SERPL QL: NEGATIVE
HCT VFR BLD AUTO: 46.7 % (ref 34–46.6)
HGB BLD-MCNC: 15.5 G/DL (ref 12–15.9)
HGB UR QL STRIP.AUTO: NEGATIVE
IMM GRANULOCYTES # BLD AUTO: 0.03 10*3/MM3 (ref 0–0.05)
IMM GRANULOCYTES NFR BLD AUTO: 0.4 % (ref 0–0.5)
KETONES UR QL STRIP: NEGATIVE
LEUKOCYTE ESTERASE UR QL STRIP.AUTO: NEGATIVE
LYMPHOCYTES # BLD AUTO: 2.25 10*3/MM3 (ref 0.7–3.1)
LYMPHOCYTES NFR BLD AUTO: 32.4 % (ref 19.6–45.3)
MCH RBC QN AUTO: 27.4 PG (ref 26.6–33)
MCHC RBC AUTO-ENTMCNC: 33.2 G/DL (ref 31.5–35.7)
MCV RBC AUTO: 82.5 FL (ref 79–97)
MONOCYTES # BLD AUTO: 0.38 10*3/MM3 (ref 0.1–0.9)
MONOCYTES NFR BLD AUTO: 5.5 % (ref 5–12)
NEUTROPHILS NFR BLD AUTO: 4.19 10*3/MM3 (ref 1.7–7)
NEUTROPHILS NFR BLD AUTO: 60.4 % (ref 42.7–76)
NITRITE UR QL STRIP: NEGATIVE
NRBC BLD AUTO-RTO: 0 /100 WBC (ref 0–0.2)
PH UR STRIP.AUTO: 6 [PH] (ref 5–8)
PLATELET # BLD AUTO: 230 10*3/MM3 (ref 140–450)
PMV BLD AUTO: 10.4 FL (ref 6–12)
POTASSIUM SERPL-SCNC: 4.2 MMOL/L (ref 3.5–5.2)
PROT SERPL-MCNC: 6.6 G/DL (ref 6–8.5)
PROT UR QL STRIP: NEGATIVE
QT INTERVAL: 373 MS
QTC INTERVAL: 425 MS
RBC # BLD AUTO: 5.66 10*6/MM3 (ref 3.77–5.28)
SODIUM SERPL-SCNC: 137 MMOL/L (ref 136–145)
SP GR UR STRIP: 1.01 (ref 1–1.03)
UROBILINOGEN UR QL STRIP: NORMAL
WBC NRBC COR # BLD AUTO: 6.94 10*3/MM3 (ref 3.4–10.8)

## 2024-09-29 PROCEDURE — 80053 COMPREHEN METABOLIC PANEL: CPT | Performed by: PHYSICIAN ASSISTANT

## 2024-09-29 PROCEDURE — 85025 COMPLETE CBC W/AUTO DIFF WBC: CPT | Performed by: PHYSICIAN ASSISTANT

## 2024-09-29 PROCEDURE — 96375 TX/PRO/DX INJ NEW DRUG ADDON: CPT

## 2024-09-29 PROCEDURE — 96361 HYDRATE IV INFUSION ADD-ON: CPT

## 2024-09-29 PROCEDURE — 96374 THER/PROPH/DIAG INJ IV PUSH: CPT

## 2024-09-29 PROCEDURE — 84703 CHORIONIC GONADOTROPIN ASSAY: CPT | Performed by: PHYSICIAN ASSISTANT

## 2024-09-29 PROCEDURE — 93010 ELECTROCARDIOGRAM REPORT: CPT | Performed by: INTERNAL MEDICINE

## 2024-09-29 PROCEDURE — 99284 EMERGENCY DEPT VISIT MOD MDM: CPT

## 2024-09-29 PROCEDURE — 81003 URINALYSIS AUTO W/O SCOPE: CPT | Performed by: PHYSICIAN ASSISTANT

## 2024-09-29 PROCEDURE — 70450 CT HEAD/BRAIN W/O DYE: CPT

## 2024-09-29 PROCEDURE — 25010000002 PROCHLORPERAZINE 10 MG/2ML SOLUTION: Performed by: PHYSICIAN ASSISTANT

## 2024-09-29 PROCEDURE — 25010000002 DIPHENHYDRAMINE PER 50 MG: Performed by: PHYSICIAN ASSISTANT

## 2024-09-29 PROCEDURE — 93005 ELECTROCARDIOGRAM TRACING: CPT | Performed by: PHYSICIAN ASSISTANT

## 2024-09-29 PROCEDURE — 25810000003 SODIUM CHLORIDE 0.9 % SOLUTION: Performed by: PHYSICIAN ASSISTANT

## 2024-09-29 RX ORDER — DIPHENHYDRAMINE HYDROCHLORIDE 50 MG/ML
25 INJECTION INTRAMUSCULAR; INTRAVENOUS ONCE
Status: COMPLETED | OUTPATIENT
Start: 2024-09-29 | End: 2024-09-29

## 2024-09-29 RX ORDER — SCOLOPAMINE TRANSDERMAL SYSTEM 1 MG/1
1 PATCH, EXTENDED RELEASE TRANSDERMAL
Qty: 5 PATCH | Refills: 0 | Status: SHIPPED | OUTPATIENT
Start: 2024-09-29

## 2024-09-29 RX ORDER — SCOLOPAMINE TRANSDERMAL SYSTEM 1 MG/1
1 PATCH, EXTENDED RELEASE TRANSDERMAL
Status: DISCONTINUED | OUTPATIENT
Start: 2024-09-29 | End: 2024-09-29 | Stop reason: HOSPADM

## 2024-09-29 RX ORDER — PROCHLORPERAZINE EDISYLATE 5 MG/ML
10 INJECTION INTRAMUSCULAR; INTRAVENOUS ONCE
Status: COMPLETED | OUTPATIENT
Start: 2024-09-29 | End: 2024-09-29

## 2024-09-29 RX ORDER — ACETAMINOPHEN 500 MG
1000 TABLET ORAL ONCE
Status: COMPLETED | OUTPATIENT
Start: 2024-09-29 | End: 2024-09-29

## 2024-09-29 RX ORDER — SODIUM CHLORIDE 0.9 % (FLUSH) 0.9 %
10 SYRINGE (ML) INJECTION AS NEEDED
Status: DISCONTINUED | OUTPATIENT
Start: 2024-09-29 | End: 2024-09-29 | Stop reason: HOSPADM

## 2024-09-29 RX ADMIN — ACETAMINOPHEN 1000 MG: 500 TABLET ORAL at 12:19

## 2024-09-29 RX ADMIN — PROCHLORPERAZINE EDISYLATE 10 MG: 5 INJECTION INTRAMUSCULAR; INTRAVENOUS at 12:21

## 2024-09-29 RX ADMIN — DIPHENHYDRAMINE HYDROCHLORIDE 25 MG: 50 INJECTION, SOLUTION INTRAMUSCULAR; INTRAVENOUS at 12:21

## 2024-09-29 RX ADMIN — SCOPALAMINE 1 PATCH: 1 PATCH, EXTENDED RELEASE TRANSDERMAL at 13:45

## 2024-09-29 RX ADMIN — SODIUM CHLORIDE 500 ML: 9 INJECTION, SOLUTION INTRAVENOUS at 12:19

## 2024-09-29 NOTE — DISCHARGE INSTRUCTIONS
Use the scopolamine patch as needed.  You may exchange the patch after 3 days.  If you develop dry mouth or blurry vision, removing the patch will likely alleviate the symptoms.  Please follow-up with your primary care provider.

## 2024-09-29 NOTE — ED NOTES
Pt developed dizziness on Wednesday.  She was seen at [facility] on Thursday and was prescribed meclizine which is not helping.  She was told to drink 100 oz of water a day for possible dehydration.  She was given an EKG which was ok.  Her dizziness has worsened.  She has a HA

## 2024-09-29 NOTE — ED PROVIDER NOTES
EMERGENCY DEPARTMENT ENCOUNTER      Room Number:  40/40  PCP: Julianne Gan APRN  Independent Historians: Patient  Patient Care Team:  Julianne Gan APRN as PCP - General (Family Medicine)  Tana López APRN as Nurse Practitioner (Obstetrics and Gynecology)  Patrick Napier MD as Surgeon (Orthopedic Surgery)       HPI:  Chief Complaint: Headache    A complete HPI/ROS/PMH/PSH/SH/FH are unobtainable due to: None    Chronic or social conditions impacting patient care (Social Determinants of Health): None      Context: Valery Aguilar is a 39 y.o. female with a PMH significant for migraine, PCOS, anxiety, chronic sinusitis, panic disorder, diabetes, ADHD who presents to the ED c/o acute headache, lightheadedness that have been present since Wednesday.  The patient was evaluated by primary care on Thursday and prescribed meclizine which she has been taking without improvement.  She does have a history of migraines but the headache she has today feels different than which she normally feels.  No nausea, vomiting, fever, chills.  No recent sick contacts or congestion, cough lately.  No numbness or weakness of the face or extremities, slurred speech, visual disturbance, photophobia.      Upon review of prior external notes (non-ED) -and- Review of prior external test results outside of this encounter it appears the patient was evaluated in the office with OB/GYN for routine gynecological examination on 9/9/2020.  The patient had a normal FSH and estradiol on 9/9/2024.      PAST MEDICAL HISTORY  Active Ambulatory Problems     Diagnosis Date Noted    Chronic migraine without aura without status migrainosus, not intractable 08/31/2016    Cervicogenic headache 08/31/2016    Secondary oligomenorrhea 08/20/2018    Family history of breast cancer 10/09/2018    Obstructive sleep apnea, adult 11/27/2018    GERD with apnea 11/27/2018    Vitamin D deficiency     PCOS (polycystic ovarian syndrome)     Panic  disorder     Insulin resistance     Essential hypertension     GERD (gastroesophageal reflux disease)     Anxiety and depression 03/08/2021    Tendonitis, Achilles, left 05/10/2021    S/P ACL reconstruction 07/20/2021    Primary osteoarthritis of left knee 07/20/2021    ACL graft tear, sequela 09/22/2021    Morbid (severe) obesity due to excess calories 01/10/2022    Seizure 02/17/2022    Moderate mixed hyperlipidemia not requiring statin therapy 04/25/2022    Routine gynecological examination 08/28/2023     Resolved Ambulatory Problems     Diagnosis Date Noted    Incisional hernia, without obstruction or gangrene 10/24/2018    Incisional hernia 11/15/2018    Wound infection 12/02/2018    Right lower quadrant abdominal pain 01/20/2019    Chronic wound infection of abdomen 02/20/2019    Mechanical knee pain, left 07/20/2021     Past Medical History:   Diagnosis Date    Abdominal pain     ADHD (attention deficit hyperactivity disorder)     Allergic     Anxiety     Arthritis     Chronic sinusitis, unspecified     Depression     Diabetes mellitus     H/O echocardiogram     H/O exercise stress test     Headache, tension-type     History of Holter monitoring     History of MRI of brain and brain stem 01/01/2017    Hypertension     Infection following a procedure, unspecified, initial encounter     Lobar pneumonia, unspecified organism     Meralgia paresthetica, left lower limb     Metabolic syndrome     Migraine     MRSA (methicillin resistant Staphylococcus aureus) infection 01/2019    Nasal congestion     Obesity     Other injury of unspecified body region, initial encounter     Panic attack     Papanicolaou smear 06/01/2020    Pneumonia 07/2018    PONV (postoperative nausea and vomiting)     Seasonal allergies     Spinal headache     Unspecified contact dermatitis, unspecified cause     Viral infection          PAST SURGICAL HISTORY  Past Surgical History:   Procedure Laterality Date    ABDOMINAL WALL ABSCESS INCISION  AND DRAINAGE N/A 2018    Procedure: Debridement of abdominal wall;  Surgeon: Brigido Navarrete MD;  Location:  [facility];  Service: General    ADENOIDECTOMY       SECTION      X2    CHOLECYSTECTOMY      LAP    HERNIA REPAIR      HX OVARIAN CYSTECTOMY      INCISION AND DRAINAGE TRUNK N/A 2018    Procedure: wound debridement, abdomen;  Surgeon: Rachel Dolan MD;  Location:  [facility];  Service: General    INCISION AND DRAINAGE TRUNK N/A 2019    Procedure: WOUND CLOSURE ABDOMINAL;  Surgeon: Rachel Dolan MD;  Location:  [facility];  Service: General    KNEE ACL RECONSTRUCTION Left 2010    DR. CARRASCO    OVARIAN CYST REMOVAL      EX LAP WITH OVARIAN CYST REMOVAL HERNIA REPAIR 2014    TONSILLECTOMY      T&A    TRUNK DEBRIDEMENT N/A 2018    Procedure: Abdominal wound debridement;  Surgeon: Rachel Dolan MD;  Location: [facility] OR;  Service: General    VENTRAL/INCISIONAL HERNIA REPAIR N/A 11/15/2018    Procedure: Lateral Component Separation Herniorrhapy Repair with Mesh, Lysis of adhesions, and skin lesion excision of Right Side;  Surgeon: Rachel Dolan MD;  Location:  [facility];  Service: General         FAMILY HISTORY  Family History   Problem Relation Age of Onset    Heart disease Father     Hypertension Father     Diabetes Father     Stroke Mother     Heart disease Mother     Hypertension Mother     Crohn's disease Son     Kidney disease Son         Just diagnosed this month    Heart disease Paternal Grandfather     Diabetes Paternal Grandfather     Stroke Paternal Grandmother     Hypertension Paternal Grandmother     Cancer Paternal Grandmother     Dementia Maternal Grandmother     Stroke Maternal Grandmother     Diabetes Maternal Grandmother          SOCIAL HISTORY  Social History     Socioeconomic History    Marital status:    Tobacco Use    Smoking status: Former     Current packs/day: 0.00     Average packs/day: 0.5 packs/day for 1 year (0.5 ttl pk-yrs)      Types: Cigarettes     Start date:      Quit date:      Years since quittin.7     Passive exposure: Never    Smokeless tobacco: Never   Vaping Use    Vaping status: Some Days   Substance and Sexual Activity    Alcohol use: Yes     Comment: Only on occasion    Drug use: No    Sexual activity: Defer     Partners: Male     Birth control/protection: I.U.D.     Comment: Mirena IUD 2018         ALLERGIES  Bupropion, Penicillins, and Tramadol      REVIEW OF SYSTEMS  Included in HPI  All systems reviewed and negative except for those discussed in HPI.      PHYSICAL EXAM    I have reviewed the triage vital signs and nursing notes.    ED Triage Vitals   Temp Heart Rate Resp BP SpO2   24 1100 24 1100 24 1100 24 1107 24 1100   98.2 °F (36.8 °C) 103 16 160/91 96 %      Temp src Heart Rate Source Patient Position BP Location FiO2 (%)   24 1100 24 1100 -- -- --   Tympanic Monitor          Physical Exam  Constitutional:       General: She is not in acute distress.     Appearance: She is well-developed.   HENT:      Head: Normocephalic and atraumatic.   Eyes:      General: No scleral icterus.     Conjunctiva/sclera: Conjunctivae normal.   Neck:      Trachea: No tracheal deviation.   Cardiovascular:      Rate and Rhythm: Normal rate and regular rhythm.   Pulmonary:      Effort: Pulmonary effort is normal.      Breath sounds: Normal breath sounds.   Abdominal:      Palpations: Abdomen is soft.      Tenderness: There is no abdominal tenderness.   Musculoskeletal:         General: No deformity.      Cervical back: Normal range of motion.   Lymphadenopathy:      Cervical: No cervical adenopathy.   Skin:     General: Skin is warm and dry.   Neurological:      Mental Status: She is alert and oriented to person, place, and time.      Comments: Cranial nerves II-XII are intact.  Sensation is intact and symmetric throughout, no deficit.  Motor function is 5/5 and symmetric in the  extremities x 4.  There is no facial droop, aphasia, dysarthria, speech is clear and coherent.  Normal FTN.   Psychiatric:         Behavior: Behavior normal.         Vital signs and nursing notes reviewed.      PPE: I wore a surgical mask throughout my encounters with the pt. I performed hand hygiene on entry into the pt room and upon exit.     LAB RESULTS  Recent Results (from the past 24 hour(s))   ECG 12 Lead Other; Lightheaded    Collection Time: 09/29/24 11:53 AM   Result Value Ref Range    QT Interval 373 ms    QTC Interval 425 ms   Comprehensive Metabolic Panel    Collection Time: 09/29/24 12:10 PM    Specimen: Blood   Result Value Ref Range    Glucose 86 65 - 99 mg/dL    BUN 10 6 - 20 mg/dL    Creatinine 0.68 0.57 - 1.00 mg/dL    Sodium 137 136 - 145 mmol/L    Potassium 4.2 3.5 - 5.2 mmol/L    Chloride 104 98 - 107 mmol/L    CO2 22.0 22.0 - 29.0 mmol/L    Calcium 9.5 8.6 - 10.5 mg/dL    Total Protein 6.6 6.0 - 8.5 g/dL    Albumin 4.1 3.5 - 5.2 g/dL    ALT (SGPT) 23 1 - 33 U/L    AST (SGOT) 17 1 - 32 U/L    Alkaline Phosphatase 71 39 - 117 U/L    Total Bilirubin 0.5 0.0 - 1.2 mg/dL    Globulin 2.5 gm/dL    A/G Ratio 1.6 g/dL    BUN/Creatinine Ratio 14.7 7.0 - 25.0    Anion Gap 11.0 5.0 - 15.0 mmol/L    eGFR 113.1 >60.0 mL/min/1.73   hCG, Serum, Qualitative    Collection Time: 09/29/24 12:10 PM    Specimen: Blood   Result Value Ref Range    HCG Qualitative Negative Negative   CBC Auto Differential    Collection Time: 09/29/24 12:10 PM    Specimen: Blood   Result Value Ref Range    WBC 6.94 3.40 - 10.80 10*3/mm3    RBC 5.66 (H) 3.77 - 5.28 10*6/mm3    Hemoglobin 15.5 12.0 - 15.9 g/dL    Hematocrit 46.7 (H) 34.0 - 46.6 %    MCV 82.5 79.0 - 97.0 fL    MCH 27.4 26.6 - 33.0 pg    MCHC 33.2 31.5 - 35.7 g/dL    RDW 13.1 12.3 - 15.4 %    RDW-SD 38.5 37.0 - 54.0 fl    MPV 10.4 6.0 - 12.0 fL    Platelets 230 140 - 450 10*3/mm3    Neutrophil % 60.4 42.7 - 76.0 %    Lymphocyte % 32.4 19.6 - 45.3 %    Monocyte % 5.5 5.0 -  12.0 %    Eosinophil % 0.9 0.3 - 6.2 %    Basophil % 0.4 0.0 - 1.5 %    Immature Grans % 0.4 0.0 - 0.5 %    Neutrophils, Absolute 4.19 1.70 - 7.00 10*3/mm3    Lymphocytes, Absolute 2.25 0.70 - 3.10 10*3/mm3    Monocytes, Absolute 0.38 0.10 - 0.90 10*3/mm3    Eosinophils, Absolute 0.06 0.00 - 0.40 10*3/mm3    Basophils, Absolute 0.03 0.00 - 0.20 10*3/mm3    Immature Grans, Absolute 0.03 0.00 - 0.05 10*3/mm3    nRBC 0.0 0.0 - 0.2 /100 WBC   Urinalysis With Microscopic If Indicated (No Culture) - Urine, Clean Catch    Collection Time: 09/29/24 12:58 PM    Specimen: Urine, Clean Catch   Result Value Ref Range    Color, UA Yellow Yellow, Straw    Appearance, UA Clear Clear    pH, UA 6.0 5.0 - 8.0    Specific Gravity, UA 1.012 1.005 - 1.030    Glucose, UA Negative Negative    Ketones, UA Negative Negative    Bilirubin, UA Negative Negative    Blood, UA Negative Negative    Protein, UA Negative Negative    Leuk Esterase, UA Negative Negative    Nitrite, UA Negative Negative    Urobilinogen, UA 0.2 E.U./dL 0.2 - 1.0 E.U./dL         RADIOLOGY  CT Head Without Contrast    Result Date: 9/29/2024  CT HEAD WITHOUT CONTRAST  CLINICAL HISTORY: Headache and lightheadedness  TECHNIQUE: CT scan of the head was obtained with 3 mm axial soft tissue algorithm images. No intravenous contrast was administered. Sagittal and coronal reconstructions were obtained.  COMPARISON: 2/17/2022  FINDINGS:  No intracranial hemorrhage.  No midline shift or mass effect.  No CT evidence of acute territorial infarction.  No extraaxial collection.  No hydrocephalus.  Clear visualized portions of the paranasal sinuses and mastoid air cells.        No acute intracranial abnormality.     Radiation dose reduction techniques were utilized, including automated exposure control and exposure modulation based on body size.  This report was finalized on 9/29/2024 12:22 PM by Dr. Jared Petty M.D on Workstation: [id number]         MEDICATIONS GIVEN IN  ER  Medications   sodium chloride 0.9 % bolus 500 mL (0 mL Intravenous Stopped 9/29/24 1338)   prochlorperazine (COMPAZINE) injection 10 mg (10 mg Intravenous Given 9/29/24 1221)   diphenhydrAMINE (BENADRYL) injection 25 mg (25 mg Intravenous Given 9/29/24 1221)   acetaminophen (TYLENOL) tablet 1,000 mg (1,000 mg Oral Given 9/29/24 1219)         ORDERS PLACED DURING THIS VISIT:  Orders Placed This Encounter   Procedures    CT Head Without Contrast    Comprehensive Metabolic Panel    Urinalysis With Microscopic If Indicated (No Culture) - Urine, Clean Catch    hCG, Serum, Qualitative    CBC Auto Differential    Orthostatic Vitals (Blood Pressure & Heart Rate)    ECG 12 Lead Other; Lightheaded    CBC & Differential         OUTPATIENT MEDICATION MANAGEMENT:  No current Epic-ordered facility-administered medications on file.     Current Outpatient Medications Ordered in Epic   Medication Sig Dispense Refill    busPIRone (BUSPAR) 15 MG tablet Take 1 tablet by mouth 3 (Three) Times a Day. 270 tablet 1    cetirizine (zyrTEC) 10 MG tablet Take 1 tablet by mouth Every Night.      cholecalciferol (VITAMIN D3) 1000 units tablet Take 2 tablets by mouth Daily.      Durolane 60 MG/3ML prefilled syringe injection       fluticasone (FLONASE) 50 MCG/ACT nasal spray Administer 2 sprays into the nostril(s) as directed by provider Daily.      losartan-hydrochlorothiazide (HYZAAR) 50-12.5 MG per tablet Take 1 tablet by mouth Daily. 90 tablet 0    Magnesium 500 MG capsule Take  by mouth.      promethazine (PHENERGAN) 25 MG tablet Take 1 tablet by mouth Every 6 (Six) Hours As Needed for Nausea or Vomiting. 20 tablet 0    rizatriptan MLT (MAXALT-MLT) 10 MG disintegrating tablet May repeat in 2 hours if needed 15 tablet 3    Scopolamine 1 MG/3DAYS patch Place 1 patch on the skin as directed by provider Every 72 (Seventy-Two) Hours. 5 patch 0    sertraline (ZOLOFT) 50 MG tablet Take 0.5 tablets by mouth Daily for 6 days, THEN 1 tablet Daily  for 24 days. 27 tablet 0            PROGRESS, DATA ANALYSIS, CONSULTS, AND MEDICAL DECISION MAKING  All labs have been independently interpreted by me.  All radiology studies have been reviewed by me. All EKG's have been independently viewed and interpreted by me.  Discussion below represents my analysis of pertinent findings related to patient's condition, differential diagnosis, treatment plan and final disposition.      DIFFERENTIAL DIAGNOSIS INCLUDE BUT NOT LIMITED TO:     Dehydration, near syncope, UTI    Clinical Scores: N/A      ED Course as of 09/30/24 0949   Sun Sep 29, 2024   1228 EKG          EKG time: 1153  Rhythm/Rate: Sinus rhythm, 78  P waves and FL: Normal  QRS, axis: Normal axis  ST and T waves: No acute ischemic changes    Interpreted Contemporaneously by me, independently viewed  Similar compared to prior 3/3/2022       [JR]   1427 Patient has had improvement of her dizziness with the scopolamine patch.  I will prescribe additional patches to use as outpatient, recommended discontinuing the meclizine prescribed previously.  Follow-up closely with PCP.  Return precautions were discussed. [JR]      ED Course User Index  [JR] Enio Goodrich MD              AS OF 09:49 EDT VITALS:    BP - 139/86  HR - 78  TEMP - 98.2 °F (36.8 °C) (Tympanic)  O2 SATS - 96%    COMPLEXITY OF CARE  Admission was considered but after careful review of the patient's presentation, physical examination, diagnostic results, and response to treatment the patient may be safely discharged with outpatient follow-up.      DIAGNOSIS  Final diagnoses:   Acute nonintractable headache, unspecified headache type   Vertigo         DISPOSITION  ED Disposition       ED Disposition   Discharge    Condition   Stable    Comment   --                Please note that portions of this document were completed with a voice recognition program.    Note Disclaimer: At [facility], we believe that sharing information builds trust and better  relationships. You are receiving this note because you recently visited [facility]. It is possible you will see health information before a provider has talked with you about it. This kind of information can be easy to misunderstand. To help you fully understand what it means for your health, we urge you to discuss this note with your provider.         Jesus Clark PA  09/30/24 0949

## 2024-09-29 NOTE — ED PROVIDER NOTES
MD ATTESTATION NOTE    The CALEB and I have discussed this patient's history, physical exam, MDM, and treatment plan.  I have reviewed the documentation and personally had a face to face interaction with the patient. I affirm the documentation and agree with the treatment and plan.  The attached note describes my personal findings.      I provided a substantive portion of the medical decision making.        Brief HPI: Patient presents with complaint of acute headache and lightheadedness since Wednesday.  She does have a history of migraines.  She been taking some meclizine without much improvement of her symptoms.  She denies double vision.  She has been able to walk.  She denies nausea or vomiting.    PHYSICAL EXAM  ED Triage Vitals   Temp Heart Rate Resp BP SpO2   09/29/24 1100 09/29/24 1100 09/29/24 1100 09/29/24 1107 09/29/24 1100   98.2 °F (36.8 °C) 103 16 160/91 96 %      Temp src Heart Rate Source Patient Position BP Location FiO2 (%)   09/29/24 1100 09/29/24 1100 09/29/24 1214 -- --   Tympanic Monitor Lying           GENERAL: Awake and alert, lying supine on the stretcher, no acute distress  HENT: nares patent  EYES: no scleral icterus, pupils 3 mm reactive bilaterally, subtle horizontal nystagmus present particularly lateral gaze to the right  CV: regular rhythm, normal rate  RESPIRATORY: normal effort  MUSCULOSKELETAL: no deformity  NEURO: alert, moves all extremities, follows commands, cranial nerves II through XII intact, speech fluent and clear  PSYCH:  calm, cooperative  SKIN: warm, dry    Vital signs and nursing notes reviewed.        Medical Decision Making:  ED Course as of 09/29/24 1427   Sun Sep 29, 2024   1228 EKG          EKG time: 1153  Rhythm/Rate: Sinus rhythm, 78  P waves and IN: Normal  QRS, axis: Normal axis  ST and T waves: No acute ischemic changes    Interpreted Contemporaneously by me, independently viewed  Similar compared to prior 3/3/2022       [JR]   1427 Patient has had improvement  of her dizziness with the scopolamine patch.  I will prescribe additional patches to use as outpatient, recommended discontinuing the meclizine prescribed previously.  Follow-up closely with PCP.  Return precautions were discussed. [JR]      ED Course User Index  [JR] Enio Goodrich MD         Headache improved with migraine cocktail.  She does have some horizontal nystagmus.  Her labs appear reassuring today.  I suspect she has peripheral vertigo versus vestibular migraine.  Was alleviated by scopolamine patch.  Appropriate for discharge home.    Final diagnoses:   Acute nonintractable headache, unspecified headache type   Vertigo       DISCHARGE    Patient discharged in stable condition.    Reviewed implications of results, diagnosis, meds, responsibility to follow up, warning signs and symptoms of possible worsening, potential complications and reasons to return to ER.    Patient/Family voiced understanding of above instructions.    Discussed plan for discharge, as there is no emergent indication for admission. Patient referred to primary care provider for BP management due to today's BP. Pt/family is agreeable and understands need for follow up and repeat testing.  Pt is aware that discharge does not mean that nothing is wrong but it indicates no emergency is present that requires admission and they must continue care with follow-up as given below or physician of their choice.     FOLLOW-UP  Julianne Gan, APRN  [address]  [address]  [place] [address] [phone]    Schedule an appointment as soon as possible for a visit            Medication List        New Prescriptions      Scopolamine 1 MG/3DAYS patch  Place 1 patch on the skin as directed by provider Every 72 (Seventy-Two) Hours.            Stop      meclizine 25 MG tablet  Commonly known as: ANTIVERT               Where to Get Your Medications        These medications were sent to [facility] [id number] - [place] - [address] - [phone]  - [phone]  [address] [place] [place] [address]      Phone: [phone]   Scopolamine 1 MG/3DAYS patch            Enio Goodrich MD  09/29/24 1370       Enio Goodrich MD  09/29/24 9928

## 2024-10-08 DIAGNOSIS — F41.9 ANXIETY AND DEPRESSION: Chronic | ICD-10-CM

## 2024-10-08 DIAGNOSIS — F32.A ANXIETY AND DEPRESSION: Chronic | ICD-10-CM

## 2024-10-10 ENCOUNTER — OFFICE VISIT (OUTPATIENT)
Dept: INTERNAL MEDICINE | Facility: CLINIC | Age: 39
End: 2024-10-10
Payer: COMMERCIAL

## 2024-10-10 VITALS
DIASTOLIC BLOOD PRESSURE: 76 MMHG | WEIGHT: 272.6 LBS | TEMPERATURE: 98.2 F | BODY MASS INDEX: 46.54 KG/M2 | SYSTOLIC BLOOD PRESSURE: 114 MMHG | OXYGEN SATURATION: 98 % | HEART RATE: 82 BPM | HEIGHT: 64 IN

## 2024-10-10 DIAGNOSIS — G43.709 CHRONIC MIGRAINE WITHOUT AURA WITHOUT STATUS MIGRAINOSUS, NOT INTRACTABLE: ICD-10-CM

## 2024-10-10 DIAGNOSIS — Z11.59 NEED FOR HEPATITIS C SCREENING TEST: ICD-10-CM

## 2024-10-10 DIAGNOSIS — I10 ESSENTIAL HYPERTENSION: Primary | ICD-10-CM

## 2024-10-10 DIAGNOSIS — F41.9 ANXIETY AND DEPRESSION: ICD-10-CM

## 2024-10-10 DIAGNOSIS — E28.2 PCOS (POLYCYSTIC OVARIAN SYNDROME): ICD-10-CM

## 2024-10-10 DIAGNOSIS — E55.9 VITAMIN D DEFICIENCY: ICD-10-CM

## 2024-10-10 DIAGNOSIS — F32.A ANXIETY AND DEPRESSION: ICD-10-CM

## 2024-10-10 PROCEDURE — 99214 OFFICE O/P EST MOD 30 MIN: CPT | Performed by: NURSE PRACTITIONER

## 2024-10-10 NOTE — PROGRESS NOTES
Chief Complaint   Patient presents with    Hypertension     Follow up       SUBJECTIVE  Valery Aguilar is a 39 y.o. female presenting today for follow up of her chronic health conditions.    These include HTN, Vit d def, anxiety, depression, and migraine.    ED visit 09/29 for migraine and dizziness that was persistent despite meclizine. Neg CT. Resolve w/ migraine cocktail and scopolamine patch.    SMBP 120/80s.      Outpatient Medications Marked as Taking for the 10/10/24 encounter (Office Visit) with Julianne Gan APRN   Medication Sig Dispense Refill    busPIRone (BUSPAR) 15 MG tablet Take 1 tablet by mouth 3 (Three) Times a Day. 270 tablet 1    cetirizine (zyrTEC) 10 MG tablet Take 1 tablet by mouth Every Night.      cholecalciferol (VITAMIN D3) 1000 units tablet Take 2 tablets by mouth Daily.      Durolane 60 MG/3ML prefilled syringe injection       fluticasone (FLONASE) 50 MCG/ACT nasal spray Administer 2 sprays into the nostril(s) as directed by provider Daily.      losartan-hydrochlorothiazide (HYZAAR) 50-12.5 MG per tablet Take 1 tablet by mouth Daily. 90 tablet 0    Magnesium 500 MG capsule Take  by mouth.      promethazine (PHENERGAN) 25 MG tablet Take 1 tablet by mouth Every 6 (Six) Hours As Needed for Nausea or Vomiting. 20 tablet 0    rizatriptan MLT (MAXALT-MLT) 10 MG disintegrating tablet May repeat in 2 hours if needed 15 tablet 3    Scopolamine 1 MG/3DAYS patch Place 1 patch on the skin as directed by provider Every 72 (Seventy-Two) Hours. 5 patch 0    sertraline (ZOLOFT) 50 MG tablet Take 1 tablet by mouth Daily. 30 tablet 0         The following portions of the patient's history were reviewed and updated as appropriate: allergies, current medications, past family history, past medical history, past social history, past surgical history and problem list.        Objective   Vitals:    10/10/24 0817   BP: 114/76   BP Location: Left arm   Patient Position: Sitting   Cuff Size: Large Adult  "  Pulse: 82   Temp: 98.2 °F (36.8 °C)   TempSrc: Infrared   SpO2: 98%   Weight: 124 kg (272 lb 9.6 oz)   Height: 162.6 cm (64\")       BP Readings from Last 3 Encounters:   10/10/24 114/76   09/29/24 139/86   09/26/24 142/80        Wt Readings from Last 3 Encounters:   10/10/24 124 kg (272 lb 9.6 oz)   09/26/24 122 kg (270 lb)   09/12/24 124 kg (274 lb)        Body mass index is 46.79 kg/m².  Nursing notes and vitals reviewed.    Physical Exam  Constitutional:       General: She is not in acute distress.     Appearance: She is well-developed.   Cardiovascular:      Rate and Rhythm: Regular rhythm.      Heart sounds: S1 normal and S2 normal.   Pulmonary:      Effort: Pulmonary effort is normal.      Breath sounds: Normal breath sounds.   Neurological:      Mental Status: She is alert and oriented to person, place, and time.   Psychiatric:         Attention and Perception: She is attentive.         Behavior: Behavior normal.         Thought Content: Thought content normal.         Recent Results (from the past 672 hour(s))   Glucose 2 Hour PP    Collection Time: 09/27/24  8:49 AM    Specimen: Blood   Result Value Ref Range    Glucose, 2 Hr  74 - 155 mg/dL   ECG 12 Lead Other; Lightheaded    Collection Time: 09/29/24 11:53 AM   Result Value Ref Range    QT Interval 373 ms    QTC Interval 425 ms   Comprehensive Metabolic Panel    Collection Time: 09/29/24 12:10 PM    Specimen: Blood   Result Value Ref Range    Glucose 86 65 - 99 mg/dL    BUN 10 6 - 20 mg/dL    Creatinine 0.68 0.57 - 1.00 mg/dL    Sodium 137 136 - 145 mmol/L    Potassium 4.2 3.5 - 5.2 mmol/L    Chloride 104 98 - 107 mmol/L    CO2 22.0 22.0 - 29.0 mmol/L    Calcium 9.5 8.6 - 10.5 mg/dL    Total Protein 6.6 6.0 - 8.5 g/dL    Albumin 4.1 3.5 - 5.2 g/dL    ALT (SGPT) 23 1 - 33 U/L    AST (SGOT) 17 1 - 32 U/L    Alkaline Phosphatase 71 39 - 117 U/L    Total Bilirubin 0.5 0.0 - 1.2 mg/dL    Globulin 2.5 gm/dL    A/G Ratio 1.6 g/dL    BUN/Creatinine Ratio " 14.7 7.0 - 25.0    Anion Gap 11.0 5.0 - 15.0 mmol/L    eGFR 113.1 >60.0 mL/min/1.73   hCG, Serum, Qualitative    Collection Time: 09/29/24 12:10 PM    Specimen: Blood   Result Value Ref Range    HCG Qualitative Negative Negative   CBC Auto Differential    Collection Time: 09/29/24 12:10 PM    Specimen: Blood   Result Value Ref Range    WBC 6.94 3.40 - 10.80 10*3/mm3    RBC 5.66 (H) 3.77 - 5.28 10*6/mm3    Hemoglobin 15.5 12.0 - 15.9 g/dL    Hematocrit 46.7 (H) 34.0 - 46.6 %    MCV 82.5 79.0 - 97.0 fL    MCH 27.4 26.6 - 33.0 pg    MCHC 33.2 31.5 - 35.7 g/dL    RDW 13.1 12.3 - 15.4 %    RDW-SD 38.5 37.0 - 54.0 fl    MPV 10.4 6.0 - 12.0 fL    Platelets 230 140 - 450 10*3/mm3    Neutrophil % 60.4 42.7 - 76.0 %    Lymphocyte % 32.4 19.6 - 45.3 %    Monocyte % 5.5 5.0 - 12.0 %    Eosinophil % 0.9 0.3 - 6.2 %    Basophil % 0.4 0.0 - 1.5 %    Immature Grans % 0.4 0.0 - 0.5 %    Neutrophils, Absolute 4.19 1.70 - 7.00 10*3/mm3    Lymphocytes, Absolute 2.25 0.70 - 3.10 10*3/mm3    Monocytes, Absolute 0.38 0.10 - 0.90 10*3/mm3    Eosinophils, Absolute 0.06 0.00 - 0.40 10*3/mm3    Basophils, Absolute 0.03 0.00 - 0.20 10*3/mm3    Immature Grans, Absolute 0.03 0.00 - 0.05 10*3/mm3    nRBC 0.0 0.0 - 0.2 /100 WBC   Urinalysis With Microscopic If Indicated (No Culture) - Urine, Clean Catch    Collection Time: 09/29/24 12:58 PM    Specimen: Urine, Clean Catch   Result Value Ref Range    Color, UA Yellow Yellow, Straw    Appearance, UA Clear Clear    pH, UA 6.0 5.0 - 8.0    Specific Gravity, UA 1.012 1.005 - 1.030    Glucose, UA Negative Negative    Ketones, UA Negative Negative    Bilirubin, UA Negative Negative    Blood, UA Negative Negative    Protein, UA Negative Negative    Leuk Esterase, UA Negative Negative    Nitrite, UA Negative Negative    Urobilinogen, UA 0.2 E.U./dL 0.2 - 1.0 E.U./dL       CT Head Without Contrast    Result Date: 9/29/2024  No acute intracranial abnormality.     Radiation dose reduction techniques were  utilized, including automated exposure control and exposure modulation based on body size.  This report was finalized on 9/29/2024 12:22 PM by Dr. Jared Petty M.D on Workstation: [id number]         Assessment & Plan   Diagnoses and all orders for this visit:    1. Essential hypertension (Primary)    2. Anxiety and depression    3. Chronic migraine without aura without status migrainosus, not intractable    4. Vitamin D deficiency        Continue current medication regimen.      Medications, including side effects, were discussed with the patient. Patient verbalized understanding.  The plan of care was discussed. All questions were answered. Patient verbalized understanding.      Return in about 3 months (around 1/10/2025) for fasting labs one week prior to, Annual physical.      Class 3 Severe Obesity (BMI >=40). Obesity-related health conditions include the following: hypertension, dyslipidemias, and osteoarthritis. Obesity is newly identified. BMI is is above average; BMI management plan is completed. We discussed Information on healthy weight added to patient's after visit summary.

## 2024-10-10 NOTE — PATIENT INSTRUCTIONS
We encourage all our patients to maintain a healthy weight - a Body Mass Index of 20-25. This, along with regular exercise and a balanced diet can lower your risk of many illnesses such as diabetes, heart disease, and stroke.

## 2024-10-11 ENCOUNTER — HOSPITAL ENCOUNTER (OUTPATIENT)
Dept: MAMMOGRAPHY | Facility: HOSPITAL | Age: 39
Discharge: HOME | End: 2024-10-11
Admitting: NURSE PRACTITIONER
Payer: COMMERCIAL

## 2024-10-11 DIAGNOSIS — Z01.419 ROUTINE GYNECOLOGICAL EXAMINATION: ICD-10-CM

## 2024-10-11 PROCEDURE — 77067 SCR MAMMO BI INCL CAD: CPT | Performed by: RADIOLOGY

## 2024-10-11 PROCEDURE — 77063 BREAST TOMOSYNTHESIS BI: CPT

## 2024-10-11 PROCEDURE — 77067 SCR MAMMO BI INCL CAD: CPT

## 2024-10-11 PROCEDURE — 77063 BREAST TOMOSYNTHESIS BI: CPT | Performed by: RADIOLOGY

## 2024-10-30 ENCOUNTER — HOSPITAL ENCOUNTER (EMERGENCY)
Facility: HOSPITAL | Age: 39
Discharge: HOME | End: 2024-10-30
Attending: EMERGENCY MEDICINE | Admitting: EMERGENCY MEDICINE
Payer: COMMERCIAL

## 2024-10-30 ENCOUNTER — APPOINTMENT (OUTPATIENT)
Dept: ULTRASOUND IMAGING | Facility: HOSPITAL | Age: 39
End: 2024-10-30
Payer: COMMERCIAL

## 2024-10-30 VITALS
HEIGHT: 64 IN | RESPIRATION RATE: 18 BRPM | DIASTOLIC BLOOD PRESSURE: 78 MMHG | SYSTOLIC BLOOD PRESSURE: 115 MMHG | BODY MASS INDEX: 46.67 KG/M2 | HEART RATE: 89 BPM | OXYGEN SATURATION: 98 % | WEIGHT: 273.37 LBS | TEMPERATURE: 97.9 F

## 2024-10-30 DIAGNOSIS — N83.202 OVARIAN CYST, LEFT: Primary | ICD-10-CM

## 2024-10-30 LAB
ALBUMIN SERPL-MCNC: 3.8 G/DL (ref 3.5–5.2)
ALBUMIN/GLOB SERPL: 1.7 G/DL
ALP SERPL-CCNC: 57 U/L (ref 39–117)
ALT SERPL W P-5'-P-CCNC: 17 U/L (ref 1–33)
ANION GAP SERPL CALCULATED.3IONS-SCNC: 6.8 MMOL/L (ref 5–15)
AST SERPL-CCNC: 16 U/L (ref 1–32)
BACTERIA UR QL AUTO: ABNORMAL /HPF
BASOPHILS # BLD AUTO: 0.02 10*3/MM3 (ref 0–0.2)
BASOPHILS NFR BLD AUTO: 0.3 % (ref 0–1.5)
BILIRUB SERPL-MCNC: 0.3 MG/DL (ref 0–1.2)
BILIRUB UR QL STRIP: NEGATIVE
BUN SERPL-MCNC: 13 MG/DL (ref 6–20)
BUN/CREAT SERPL: 19.4 (ref 7–25)
CALCIUM SPEC-SCNC: 8.7 MG/DL (ref 8.6–10.5)
CHLORIDE SERPL-SCNC: 106 MMOL/L (ref 98–107)
CLARITY UR: ABNORMAL
CO2 SERPL-SCNC: 23.2 MMOL/L (ref 22–29)
COLOR UR: YELLOW
CREAT SERPL-MCNC: 0.67 MG/DL (ref 0.57–1)
DEPRECATED RDW RBC AUTO: 39.8 FL (ref 37–54)
EGFRCR SERPLBLD CKD-EPI 2021: 113.5 ML/MIN/1.73
EOSINOPHIL # BLD AUTO: 0.05 10*3/MM3 (ref 0–0.4)
EOSINOPHIL NFR BLD AUTO: 0.8 % (ref 0.3–6.2)
ERYTHROCYTE [DISTWIDTH] IN BLOOD BY AUTOMATED COUNT: 13.1 % (ref 12.3–15.4)
GLOBULIN UR ELPH-MCNC: 2.3 GM/DL
GLUCOSE SERPL-MCNC: 85 MG/DL (ref 65–99)
GLUCOSE UR STRIP-MCNC: NEGATIVE MG/DL
HCG SERPL QL: NEGATIVE
HCT VFR BLD AUTO: 42 % (ref 34–46.6)
HGB BLD-MCNC: 13.7 G/DL (ref 12–15.9)
HGB UR QL STRIP.AUTO: NEGATIVE
HYALINE CASTS UR QL AUTO: ABNORMAL /LPF
IMM GRANULOCYTES # BLD AUTO: 0.03 10*3/MM3 (ref 0–0.05)
IMM GRANULOCYTES NFR BLD AUTO: 0.5 % (ref 0–0.5)
KETONES UR QL STRIP: NEGATIVE
LEUKOCYTE ESTERASE UR QL STRIP.AUTO: ABNORMAL
LIPASE SERPL-CCNC: 42 U/L (ref 13–60)
LYMPHOCYTES # BLD AUTO: 1.96 10*3/MM3 (ref 0.7–3.1)
LYMPHOCYTES NFR BLD AUTO: 33 % (ref 19.6–45.3)
MCH RBC QN AUTO: 27 PG (ref 26.6–33)
MCHC RBC AUTO-ENTMCNC: 32.6 G/DL (ref 31.5–35.7)
MCV RBC AUTO: 82.7 FL (ref 79–97)
MONOCYTES # BLD AUTO: 0.51 10*3/MM3 (ref 0.1–0.9)
MONOCYTES NFR BLD AUTO: 8.6 % (ref 5–12)
NEUTROPHILS NFR BLD AUTO: 3.37 10*3/MM3 (ref 1.7–7)
NEUTROPHILS NFR BLD AUTO: 56.8 % (ref 42.7–76)
NITRITE UR QL STRIP: NEGATIVE
NRBC BLD AUTO-RTO: 0 /100 WBC (ref 0–0.2)
PH UR STRIP.AUTO: 7.5 [PH] (ref 5–8)
PLATELET # BLD AUTO: 185 10*3/MM3 (ref 140–450)
PMV BLD AUTO: 10.7 FL (ref 6–12)
POTASSIUM SERPL-SCNC: 4.2 MMOL/L (ref 3.5–5.2)
PROT SERPL-MCNC: 6.1 G/DL (ref 6–8.5)
PROT UR QL STRIP: NEGATIVE
RBC # BLD AUTO: 5.08 10*6/MM3 (ref 3.77–5.28)
RBC # UR STRIP: ABNORMAL /HPF
REF LAB TEST METHOD: ABNORMAL
SODIUM SERPL-SCNC: 136 MMOL/L (ref 136–145)
SP GR UR STRIP: 1.02 (ref 1–1.03)
SQUAMOUS #/AREA URNS HPF: ABNORMAL /HPF
UROBILINOGEN UR QL STRIP: ABNORMAL
WBC # UR STRIP: ABNORMAL /HPF
WBC NRBC COR # BLD AUTO: 5.94 10*3/MM3 (ref 3.4–10.8)

## 2024-10-30 PROCEDURE — 93976 VASCULAR STUDY: CPT

## 2024-10-30 PROCEDURE — 80053 COMPREHEN METABOLIC PANEL: CPT | Performed by: PHYSICIAN ASSISTANT

## 2024-10-30 PROCEDURE — 96374 THER/PROPH/DIAG INJ IV PUSH: CPT

## 2024-10-30 PROCEDURE — 25010000002 KETOROLAC TROMETHAMINE PER 15 MG: Performed by: PHYSICIAN ASSISTANT

## 2024-10-30 PROCEDURE — 85025 COMPLETE CBC W/AUTO DIFF WBC: CPT | Performed by: PHYSICIAN ASSISTANT

## 2024-10-30 PROCEDURE — 81001 URINALYSIS AUTO W/SCOPE: CPT | Performed by: PHYSICIAN ASSISTANT

## 2024-10-30 PROCEDURE — 99284 EMERGENCY DEPT VISIT MOD MDM: CPT

## 2024-10-30 PROCEDURE — 83690 ASSAY OF LIPASE: CPT | Performed by: PHYSICIAN ASSISTANT

## 2024-10-30 PROCEDURE — 84703 CHORIONIC GONADOTROPIN ASSAY: CPT | Performed by: PHYSICIAN ASSISTANT

## 2024-10-30 PROCEDURE — 76856 US EXAM PELVIC COMPLETE: CPT

## 2024-10-30 PROCEDURE — 76830 TRANSVAGINAL US NON-OB: CPT

## 2024-10-30 RX ORDER — SODIUM CHLORIDE 0.9 % (FLUSH) 0.9 %
10 SYRINGE (ML) INJECTION AS NEEDED
Status: DISCONTINUED | OUTPATIENT
Start: 2024-10-30 | End: 2024-10-30 | Stop reason: HOSPADM

## 2024-10-30 RX ORDER — DICLOFENAC SODIUM 75 MG/1
75 TABLET, DELAYED RELEASE ORAL 2 TIMES DAILY PRN
Qty: 20 TABLET | Refills: 0 | Status: SHIPPED | OUTPATIENT
Start: 2024-10-30

## 2024-10-30 RX ORDER — KETOROLAC TROMETHAMINE 15 MG/ML
15 INJECTION, SOLUTION INTRAMUSCULAR; INTRAVENOUS ONCE
Status: COMPLETED | OUTPATIENT
Start: 2024-10-30 | End: 2024-10-30

## 2024-10-30 RX ADMIN — KETOROLAC TROMETHAMINE 15 MG: 15 INJECTION, SOLUTION INTRAMUSCULAR; INTRAVENOUS at 13:02

## 2024-10-30 NOTE — DISCHARGE INSTRUCTIONS
Follow-up with gynecologist to ensure resolution of cyst    Return to ER for any worsening symptoms

## 2024-10-30 NOTE — ED PROVIDER NOTES
EMERGENCY DEPARTMENT ENCOUNTER    Room Number:  04/04  Date of encounter:  10/30/2024  PCP: Julianne Gan APRN  Historian: Patient  Chronic or social conditions impacting care (social determinants of health): None    HPI:  Chief Complaint: Pelvic pain  A complete HPI/ROS/PMH/PSH/SH/FH are unobtainable due to: Nothing    Context: Valery Aguilar is a 39 y.o. female with a history of PCOS, hypertension, obesity, seizures, who presents to the ED c/o acute lower pelvic pain x 2 to 3 days.  Patient describes a sharp, burning sensation in her mid pelvic area.  She denies any dysuria, polyuria, vaginal bleeding, vaginal discharge.  She denies any fevers, chills.  She denies any previous similar episodes.    Review of prior external notes (non-ED):   Reviewed internal medicine office visit from 10/10/2024.  Patient being followed for hypertension, anxiety, PCOS.    Review of prior external test results outside of this encounter:  I reviewed a CMP dated 9/29/2024.  Creatinine 0.68, potassium 4.2    PAST MEDICAL HISTORY  Active Ambulatory Problems     Diagnosis Date Noted    Chronic migraine without aura without status migrainosus, not intractable 08/31/2016    Cervicogenic headache 08/31/2016    Secondary oligomenorrhea 08/20/2018    Family history of breast cancer 10/09/2018    Obstructive sleep apnea, adult 11/27/2018    GERD with apnea 11/27/2018    Vitamin D deficiency     PCOS (polycystic ovarian syndrome)     Panic disorder     Insulin resistance     Essential hypertension     GERD (gastroesophageal reflux disease)     Anxiety and depression 03/08/2021    Tendonitis, Achilles, left 05/10/2021    S/P ACL reconstruction 07/20/2021    Primary osteoarthritis of left knee 07/20/2021    ACL graft tear, sequela 09/22/2021    Morbid (severe) obesity due to excess calories 01/10/2022    Seizure 02/17/2022    Moderate mixed hyperlipidemia not requiring statin therapy 04/25/2022    Routine gynecological examination  2023     Resolved Ambulatory Problems     Diagnosis Date Noted    Incisional hernia, without obstruction or gangrene 10/24/2018    Incisional hernia 11/15/2018    Wound infection 2018    Right lower quadrant abdominal pain 2019    Chronic wound infection of abdomen 2019    Mechanical knee pain, left 2021     Past Medical History:   Diagnosis Date    Abdominal pain     ADHD (attention deficit hyperactivity disorder)     Allergic     Anxiety     Arthritis     Chronic sinusitis, unspecified     Depression     Diabetes mellitus     H/O echocardiogram     H/O exercise stress test     Headache, tension-type     History of Holter monitoring     History of MRI of brain and brain stem 2017    Hypertension     Infection following a procedure, unspecified, initial encounter     Lobar pneumonia, unspecified organism     Meralgia paresthetica, left lower limb     Metabolic syndrome     Migraine     MRSA (methicillin resistant Staphylococcus aureus) infection 2019    Nasal congestion     Obesity     Other injury of unspecified body region, initial encounter     Panic attack     Papanicolaou smear 2020    Pneumonia 2018    PONV (postoperative nausea and vomiting)     Seasonal allergies     Spinal headache     Unspecified contact dermatitis, unspecified cause     Viral infection          PAST SURGICAL HISTORY  Past Surgical History:   Procedure Laterality Date    ABDOMINAL WALL ABSCESS INCISION AND DRAINAGE N/A 2018    Procedure: Debridement of abdominal wall;  Surgeon: Brigido Navarrete MD;  Location: [facility] OR;  Service: General    ADENOIDECTOMY       SECTION      X2    CHOLECYSTECTOMY      LAP    HERNIA REPAIR      HX OVARIAN CYSTECTOMY      INCISION AND DRAINAGE TRUNK N/A 2018    Procedure: wound debridement, abdomen;  Surgeon: Rachel Dolan MD;  Location: [facility] OR;  Service: General    INCISION AND DRAINAGE TRUNK N/A 2019    Procedure: WOUND CLOSURE  ABDOMINAL;  Surgeon: Rachel Dolan MD;  Location:  [facility] OR;  Service: General    KNEE ACL RECONSTRUCTION Left 2010    DR. CARRASCO    OVARIAN CYST REMOVAL      EX LAP WITH OVARIAN CYST REMOVAL HERNIA REPAIR 2014    TONSILLECTOMY      T&A    TRUNK DEBRIDEMENT N/A 2018    Procedure: Abdominal wound debridement;  Surgeon: Rachel Dolan MD;  Location:  [facility] OR;  Service: General    VENTRAL/INCISIONAL HERNIA REPAIR N/A 11/15/2018    Procedure: Lateral Component Separation Herniorrhapy Repair with Mesh, Lysis of adhesions, and skin lesion excision of Right Side;  Surgeon: Rachel Dolan MD;  Location:  [facility] OR;  Service: General         FAMILY HISTORY  Family History   Problem Relation Age of Onset    Stroke Mother     Heart disease Mother     Hypertension Mother     Heart disease Father     Hypertension Father     Diabetes Father     Crohn's disease Son     Kidney disease Son         Just diagnosed this month    Dementia Maternal Grandmother     Stroke Maternal Grandmother     Diabetes Maternal Grandmother     Breast cancer Paternal Grandmother     Stroke Paternal Grandmother     Hypertension Paternal Grandmother     Cancer Paternal Grandmother     Breast cancer Paternal Aunt     Heart disease Paternal Grandfather     Diabetes Paternal Grandfather          SOCIAL HISTORY  Social History     Socioeconomic History    Marital status:    Tobacco Use    Smoking status: Former     Current packs/day: 0.00     Average packs/day: 0.5 packs/day for 1 year (0.5 ttl pk-yrs)     Types: Cigarettes     Start date:      Quit date:      Years since quittin.8     Passive exposure: Never    Smokeless tobacco: Never   Vaping Use    Vaping status: Some Days   Substance and Sexual Activity    Alcohol use: Yes     Comment: Only on occasion    Drug use: No    Sexual activity: Defer     Partners: Male     Birth control/protection: I.U.D.     Comment: Mirena IUD 2018         ALLERGIES  Bupropion,  Penicillins, and Tramadol        REVIEW OF SYSTEMS  All systems reviewed and negative except for those discussed in HPI.       PHYSICAL EXAM    I have reviewed the triage vital signs and nursing notes.    ED Triage Vitals   Temp Heart Rate Resp BP SpO2   10/30/24 1133 10/30/24 1133 10/30/24 1133 10/30/24 1135 10/30/24 1133   97.9 °F (36.6 °C) 106 18 (!) 161/117 97 %      Temp src Heart Rate Source Patient Position BP Location FiO2 (%)   -- -- 10/30/24 1135 10/30/24 1135 --     Sitting Right arm        Physical Exam  GENERAL: Alert, oriented, not distressed  HENT: head atraumatic, no nuchal rigidity  EYES: no scleral icterus, EOMI  CV: regular rhythm, regular rate, no murmur  RESPIRATORY: normal effort, CTA  ABDOMEN: soft, mild left pelvic tenderness  : No significant discharge or lesions.  MUSCULOSKELETAL: no deformity, FROM, no calf swelling or tenderness  NEURO: alert, moves all extremities, follows commands  SKIN: warm, dry        LAB RESULTS  Recent Results (from the past 24 hours)   hCG, Serum, Qualitative    Collection Time: 10/30/24 12:21 PM    Specimen: Blood   Result Value Ref Range    HCG Qualitative Negative Negative   CBC Auto Differential    Collection Time: 10/30/24 12:21 PM    Specimen: Blood   Result Value Ref Range    WBC 5.94 3.40 - 10.80 10*3/mm3    RBC 5.08 3.77 - 5.28 10*6/mm3    Hemoglobin 13.7 12.0 - 15.9 g/dL    Hematocrit 42.0 34.0 - 46.6 %    MCV 82.7 79.0 - 97.0 fL    MCH 27.0 26.6 - 33.0 pg    MCHC 32.6 31.5 - 35.7 g/dL    RDW 13.1 12.3 - 15.4 %    RDW-SD 39.8 37.0 - 54.0 fl    MPV 10.7 6.0 - 12.0 fL    Platelets 185 140 - 450 10*3/mm3    Neutrophil % 56.8 42.7 - 76.0 %    Lymphocyte % 33.0 19.6 - 45.3 %    Monocyte % 8.6 5.0 - 12.0 %    Eosinophil % 0.8 0.3 - 6.2 %    Basophil % 0.3 0.0 - 1.5 %    Immature Grans % 0.5 0.0 - 0.5 %    Neutrophils, Absolute 3.37 1.70 - 7.00 10*3/mm3    Lymphocytes, Absolute 1.96 0.70 - 3.10 10*3/mm3    Monocytes, Absolute 0.51 0.10 - 0.90 10*3/mm3     Eosinophils, Absolute 0.05 0.00 - 0.40 10*3/mm3    Basophils, Absolute 0.02 0.00 - 0.20 10*3/mm3    Immature Grans, Absolute 0.03 0.00 - 0.05 10*3/mm3    nRBC 0.0 0.0 - 0.2 /100 WBC   Urinalysis With Microscopic If Indicated (No Culture) - Urine, Clean Catch    Collection Time: 10/30/24 12:33 PM    Specimen: Urine, Clean Catch   Result Value Ref Range    Color, UA Yellow Yellow, Straw    Appearance, UA Turbid (A) Clear    pH, UA 7.5 5.0 - 8.0    Specific Gravity, UA 1.023 1.005 - 1.030    Glucose, UA Negative Negative    Ketones, UA Negative Negative    Bilirubin, UA Negative Negative    Blood, UA Negative Negative    Protein, UA Negative Negative    Leuk Esterase, UA Trace (A) Negative    Nitrite, UA Negative Negative    Urobilinogen, UA 1.0 E.U./dL 0.2 - 1.0 E.U./dL   Urinalysis, Microscopic Only - Urine, Clean Catch    Collection Time: 10/30/24 12:33 PM    Specimen: Urine, Clean Catch   Result Value Ref Range    RBC, UA 0-2 None Seen, 0-2 /HPF    WBC, UA 0-2 None Seen, 0-2 /HPF    Bacteria, UA None Seen None Seen /HPF    Squamous Epithelial Cells, UA 3-6 (A) None Seen, 0-2 /HPF    Hyaline Casts, UA None Seen None Seen /LPF    Methodology Automated Microscopy    Comprehensive Metabolic Panel    Collection Time: 10/30/24 12:55 PM    Specimen: Arm, Left; Blood   Result Value Ref Range    Glucose 85 65 - 99 mg/dL    BUN 13 6 - 20 mg/dL    Creatinine 0.67 0.57 - 1.00 mg/dL    Sodium 136 136 - 145 mmol/L    Potassium 4.2 3.5 - 5.2 mmol/L    Chloride 106 98 - 107 mmol/L    CO2 23.2 22.0 - 29.0 mmol/L    Calcium 8.7 8.6 - 10.5 mg/dL    Total Protein 6.1 6.0 - 8.5 g/dL    Albumin 3.8 3.5 - 5.2 g/dL    ALT (SGPT) 17 1 - 33 U/L    AST (SGOT) 16 1 - 32 U/L    Alkaline Phosphatase 57 39 - 117 U/L    Total Bilirubin 0.3 0.0 - 1.2 mg/dL    Globulin 2.3 gm/dL    A/G Ratio 1.7 g/dL    BUN/Creatinine Ratio 19.4 7.0 - 25.0    Anion Gap 6.8 5.0 - 15.0 mmol/L    eGFR 113.5 >60.0 mL/min/1.73   Lipase    Collection Time: 10/30/24 12:55  PM    Specimen: Arm, Left; Blood   Result Value Ref Range    Lipase 42 13 - 60 U/L       Ordered the above labs and independently reviewed the results.        RADIOLOGY  US Non-ob Transvaginal, US Pelvis Complete, US Testicular or Ovarian Vascular Limited    Result Date: 10/30/2024  TRANSABDOMINAL AND TRANSVAGINAL PELVIC ULTRASOUND WITH COLOR AND SPECTRAL DOPPLER  HISTORY: 39-year-old female with pelvic pain. IUD in place, unknown LMP. Right ovarian cyst resected in 2013.  TECHNIQUE: Real-time ultrasound of the pelvis was performed transabdominally and subsequently transvaginally for detailed evaluation of the endometrium and adnexa. Confirmatory images were obtained.  FINDINGS: 1. The uterus measures 8.5 x 4.2 x 4.1 cm. The endometrium is echogenic and measures 1 cm in diameter. Uterus is mildly diffusely heterogeneous, but no definite fibroids are seen. IUD is in place. A few nabothian cysts are noted.  2. Right ovary measures 3.1 x 2.5 x 2.7 cm and appears unremarkable. The left ovary measures 5.4 x 4.1 x 3.6 cm and contains a mildly complex 4.4 x 3.6 x 3.5 cm ovarian cyst.  3. There is symmetrical color and spectral Doppler evaluation of both ovaries. There is no evidence for torsion. There is no free fluid.       I ordered the above noted radiological studies. Reviewed by me and discussed with radiologist.  See dictation for official radiology interpretation.      MEDICATIONS GIVEN IN ER    Medications   sodium chloride 0.9 % flush 10 mL (has no administration in time range)   ketorolac (TORADOL) injection 15 mg (15 mg Intravenous Given 10/30/24 1302)         ADDITIONAL ORDERS CONSIDERED BUT NOT ORDERED:  Admission was considered but after careful review of the patient's presentation, physical examination, diagnostic results, and response to treatment the patient may be safely discharged with outpatient follow-up.       PROGRESS, DATA ANALYSIS, CONSULTS, AND MEDICAL DECISION MAKING    All labs have been  independently interpreted by myself.  All radiology studies have been independently interpreted by myself and discussed with radiologist dictating the report.   EKGs independently interpreted by myself.  Discussion below represents my analysis of pertinent findings related to patient's condition, differential diagnosis, treatment plan and final disposition.    I have discussed case with Dr. Conte, emergency room physician.  He has performed his own bedside examination and agrees with treatment plan.    ED Course as of 10/30/24 1605   Wed Oct 30, 2024   1230 Patient presents with several day history of pelvic pain.  Differential diagnoses include but not limited to acute UTI, ovarian cysts, PID. [EE]   1237 WBC: 5.94 [EE]   1237 Hemoglobin: 13.7 [EE]   1255 Bacteria, UA: None Seen [EE]   1556 Patient's ultrasound does show a 4.4 x 3.6 x 3.5 centimeter cyst.  No other obvious etiology to her pain.  Will treat with NSAIDs and close GYN follow-up. [EE]      ED Course User Index  [EE] Mitchell Garcia PA       AS OF 16:05 EDT VITALS:    BP - 115/78  HR - 89  TEMP - 97.9 °F (36.6 °C)  O2 SATS - 98%        DIAGNOSIS  Final diagnoses:   Ovarian cyst, left         DISPOSITION  Discharged    Admission was considered but after careful review of the patient's presentation, physical examination, diagnostic results, and response to treatment the patient may be safely discharged with outpatient follow-up.         Dictated utilizing Dragon dictation     Mitchell Garcia PA  10/30/24 1605

## 2024-10-31 ENCOUNTER — TELEPHONE (OUTPATIENT)
Dept: OBSTETRICS AND GYNECOLOGY | Facility: CLINIC | Age: 39
End: 2024-10-31

## 2024-10-31 NOTE — TELEPHONE ENCOUNTER
Provider:  SYED GONSALEZ    Caller:MARIO LENORE    Phone Number:[phone]     Reason for Call:PATIENT IS CALLING BECAUSE SHE WENT TO THE ER YESTERDAY FOR LOWER PELVIC AND VAGINAL PAIN//THEY FOUND SHE HAS A CYST ON HER LEFT OVARY//SUMMARY IN EPIC//WANTS HER TO FOLLOW UP WITH HER OBGYN BUT NOT SURE HOW SOON//PLEASE FOLLOW UP

## 2024-11-03 ENCOUNTER — APPOINTMENT (OUTPATIENT)
Dept: CT IMAGING | Facility: HOSPITAL | Age: 39
End: 2024-11-03
Payer: COMMERCIAL

## 2024-11-03 ENCOUNTER — HOSPITAL ENCOUNTER (EMERGENCY)
Facility: HOSPITAL | Age: 39
Discharge: HOME | End: 2024-11-03
Attending: EMERGENCY MEDICINE | Admitting: EMERGENCY MEDICINE
Payer: COMMERCIAL

## 2024-11-03 VITALS
SYSTOLIC BLOOD PRESSURE: 127 MMHG | DIASTOLIC BLOOD PRESSURE: 79 MMHG | BODY MASS INDEX: 46.67 KG/M2 | TEMPERATURE: 97.8 F | HEART RATE: 93 BPM | OXYGEN SATURATION: 97 % | RESPIRATION RATE: 16 BRPM | HEIGHT: 64 IN | WEIGHT: 273.37 LBS

## 2024-11-03 DIAGNOSIS — N83.202 LEFT OVARIAN CYST: Primary | ICD-10-CM

## 2024-11-03 LAB
ALBUMIN SERPL-MCNC: 3.9 G/DL (ref 3.5–5.2)
ALBUMIN/GLOB SERPL: 1.7 G/DL
ALP SERPL-CCNC: 59 U/L (ref 39–117)
ALT SERPL W P-5'-P-CCNC: 17 U/L (ref 1–33)
ANION GAP SERPL CALCULATED.3IONS-SCNC: 10.6 MMOL/L (ref 5–15)
AST SERPL-CCNC: 15 U/L (ref 1–32)
BASOPHILS # BLD AUTO: 0.02 10*3/MM3 (ref 0–0.2)
BASOPHILS NFR BLD AUTO: 0.3 % (ref 0–1.5)
BILIRUB SERPL-MCNC: 0.3 MG/DL (ref 0–1.2)
BILIRUB UR QL STRIP: NEGATIVE
BUN SERPL-MCNC: 13 MG/DL (ref 6–20)
BUN/CREAT SERPL: 14.9 (ref 7–25)
CALCIUM SPEC-SCNC: 8.9 MG/DL (ref 8.6–10.5)
CHLORIDE SERPL-SCNC: 106 MMOL/L (ref 98–107)
CLARITY UR: CLEAR
CO2 SERPL-SCNC: 23.4 MMOL/L (ref 22–29)
COLOR UR: YELLOW
CREAT SERPL-MCNC: 0.87 MG/DL (ref 0.57–1)
DEPRECATED RDW RBC AUTO: 39.2 FL (ref 37–54)
EGFRCR SERPLBLD CKD-EPI 2021: 86.5 ML/MIN/1.73
EOSINOPHIL # BLD AUTO: 0.06 10*3/MM3 (ref 0–0.4)
EOSINOPHIL NFR BLD AUTO: 0.9 % (ref 0.3–6.2)
ERYTHROCYTE [DISTWIDTH] IN BLOOD BY AUTOMATED COUNT: 13.2 % (ref 12.3–15.4)
GLOBULIN UR ELPH-MCNC: 2.3 GM/DL
GLUCOSE SERPL-MCNC: 125 MG/DL (ref 65–99)
GLUCOSE UR STRIP-MCNC: NEGATIVE MG/DL
HCG SERPL QL: NEGATIVE
HCT VFR BLD AUTO: 42.2 % (ref 34–46.6)
HGB BLD-MCNC: 14.2 G/DL (ref 12–15.9)
HGB UR QL STRIP.AUTO: NEGATIVE
IMM GRANULOCYTES # BLD AUTO: 0.02 10*3/MM3 (ref 0–0.05)
IMM GRANULOCYTES NFR BLD AUTO: 0.3 % (ref 0–0.5)
KETONES UR QL STRIP: NEGATIVE
LEUKOCYTE ESTERASE UR QL STRIP.AUTO: NEGATIVE
LYMPHOCYTES # BLD AUTO: 2.14 10*3/MM3 (ref 0.7–3.1)
LYMPHOCYTES NFR BLD AUTO: 30.5 % (ref 19.6–45.3)
MCH RBC QN AUTO: 27.6 PG (ref 26.6–33)
MCHC RBC AUTO-ENTMCNC: 33.6 G/DL (ref 31.5–35.7)
MCV RBC AUTO: 82.1 FL (ref 79–97)
MONOCYTES # BLD AUTO: 0.45 10*3/MM3 (ref 0.1–0.9)
MONOCYTES NFR BLD AUTO: 6.4 % (ref 5–12)
NEUTROPHILS NFR BLD AUTO: 4.32 10*3/MM3 (ref 1.7–7)
NEUTROPHILS NFR BLD AUTO: 61.6 % (ref 42.7–76)
NITRITE UR QL STRIP: NEGATIVE
NRBC BLD AUTO-RTO: 0 /100 WBC (ref 0–0.2)
PH UR STRIP.AUTO: 5.5 [PH] (ref 5–8)
PLATELET # BLD AUTO: 205 10*3/MM3 (ref 140–450)
PMV BLD AUTO: 10.5 FL (ref 6–12)
POTASSIUM SERPL-SCNC: 3.7 MMOL/L (ref 3.5–5.2)
PROT SERPL-MCNC: 6.2 G/DL (ref 6–8.5)
PROT UR QL STRIP: NEGATIVE
RBC # BLD AUTO: 5.14 10*6/MM3 (ref 3.77–5.28)
SODIUM SERPL-SCNC: 140 MMOL/L (ref 136–145)
SP GR UR STRIP: 1.02 (ref 1–1.03)
UROBILINOGEN UR QL STRIP: NORMAL
WBC NRBC COR # BLD AUTO: 7.01 10*3/MM3 (ref 3.4–10.8)

## 2024-11-03 PROCEDURE — 99285 EMERGENCY DEPT VISIT HI MDM: CPT

## 2024-11-03 PROCEDURE — 25010000002 ONDANSETRON PER 1 MG: Performed by: EMERGENCY MEDICINE

## 2024-11-03 PROCEDURE — 25010000002 MORPHINE PER 10 MG: Performed by: EMERGENCY MEDICINE

## 2024-11-03 PROCEDURE — 84703 CHORIONIC GONADOTROPIN ASSAY: CPT | Performed by: PHYSICIAN ASSISTANT

## 2024-11-03 PROCEDURE — 25510000001 IOPAMIDOL 61 % SOLUTION: Performed by: EMERGENCY MEDICINE

## 2024-11-03 PROCEDURE — 80053 COMPREHEN METABOLIC PANEL: CPT | Performed by: PHYSICIAN ASSISTANT

## 2024-11-03 PROCEDURE — 96375 TX/PRO/DX INJ NEW DRUG ADDON: CPT

## 2024-11-03 PROCEDURE — 96374 THER/PROPH/DIAG INJ IV PUSH: CPT

## 2024-11-03 PROCEDURE — 85025 COMPLETE CBC W/AUTO DIFF WBC: CPT | Performed by: PHYSICIAN ASSISTANT

## 2024-11-03 PROCEDURE — 74177 CT ABD & PELVIS W/CONTRAST: CPT

## 2024-11-03 PROCEDURE — 81003 URINALYSIS AUTO W/O SCOPE: CPT | Performed by: PHYSICIAN ASSISTANT

## 2024-11-03 RX ORDER — IOPAMIDOL 612 MG/ML
85 INJECTION, SOLUTION INTRAVASCULAR
Status: COMPLETED | OUTPATIENT
Start: 2024-11-03 | End: 2024-11-03

## 2024-11-03 RX ORDER — OXYCODONE AND ACETAMINOPHEN 5; 325 MG/1; MG/1
1 TABLET ORAL ONCE
Status: COMPLETED | OUTPATIENT
Start: 2024-11-03 | End: 2024-11-03

## 2024-11-03 RX ORDER — MORPHINE SULFATE 2 MG/ML
4 INJECTION, SOLUTION INTRAMUSCULAR; INTRAVENOUS ONCE
Status: COMPLETED | OUTPATIENT
Start: 2024-11-03 | End: 2024-11-03

## 2024-11-03 RX ORDER — ONDANSETRON 2 MG/ML
4 INJECTION INTRAMUSCULAR; INTRAVENOUS ONCE
Status: COMPLETED | OUTPATIENT
Start: 2024-11-03 | End: 2024-11-03

## 2024-11-03 RX ADMIN — IOPAMIDOL 85 ML: 612 INJECTION, SOLUTION INTRAVENOUS at 17:12

## 2024-11-03 RX ADMIN — OXYCODONE HYDROCHLORIDE AND ACETAMINOPHEN 1 TABLET: 5; 325 TABLET ORAL at 18:28

## 2024-11-03 RX ADMIN — ONDANSETRON 4 MG: 2 INJECTION, SOLUTION INTRAMUSCULAR; INTRAVENOUS at 16:50

## 2024-11-03 RX ADMIN — MORPHINE SULFATE 4 MG: 2 INJECTION, SOLUTION INTRAMUSCULAR; INTRAVENOUS at 16:50

## 2024-11-03 NOTE — ED PROVIDER NOTES
MD ATTESTATION NOTE    The CALEB and I have discussed this patient's history, physical exam, and treatment plan.  I have reviewed the documentation and personally had a face to face interaction with the patient. I affirm the documentation and agree with the treatment and plan.  The attached note describes my personal findings.        SHARED APC FACE TO FACE: I performed a substantive part of the MDM during the patient's E/M visit. I personally evaluated and examined the patient. I personally made or approved the documented management plan and acknowledge its risk of complications.        Brief HPI: Patient presents for evaluation of left-sided abdominal pain.  Patient was seen here several days ago.  Said that was more for pelvic pain this seems to be more left lower quadrant.  Has had some nausea without vomiting.  Has had no documented fever.  No burning with urination no blood in the urine.    PHYSICAL EXAM  ED Triage Vitals   Temp Heart Rate Resp BP SpO2   11/03/24 1611 11/03/24 1611 11/03/24 1611 11/03/24 1613 11/03/24 1611   97.8 °F (36.6 °C) 111 16 147/91 97 %      Temp src Heart Rate Source Patient Position BP Location FiO2 (%)   11/03/24 1611 -- -- -- --   Tympanic             GENERAL: no acute distress  HENT: nares patent  EYES: no scleral icterus  CV: regular rhythm, normal rate  RESPIRATORY: normal effort  ABDOMEN: soft.  Mild left lower quadrant tenderness  MUSCULOSKELETAL: no deformity  NEURO: alert, moves all extremities, follows commands  PSYCH:  calm, cooperative  SKIN: warm, dry    Vital signs and nursing notes reviewed.    ED Course as of 11/03/24 1932   Sun Nov 03, 2024   1639 Patient presents with 5-day history of left diffuse abdominal pain.  Seen here recently diagnosed with left ovarian cyst.  The pain is worsened.  No fever, vaginal bleeding, dysuria.  She has had nausea.  Plan for CT imaging, pain control, labs. [EE]   1720 WBC: 7.01 [EE]   1720 Nitrite, UA: Negative [EE]   1720 Leukocytes,  UA: Negative [EE]   1720 Blood, UA: Negative [EE]   1743 CT imaging of the abdomen independently interpreted myself shows no evidence of obstruction.  There is a large ovarian cyst. [EE]   1818 Updated patient on workup.  We will discharge.  She does have appropriate GYN follow-up. [EE]      ED Course User Index  [EE] Mitchell Garcia PA         Plan: discharge       Irving Blount MD  11/03/24 1939

## 2024-11-03 NOTE — ED PROVIDER NOTES
EMERGENCY DEPARTMENT ENCOUNTER    Room Number:  05/05  Date of encounter:  11/3/2024  PCP: Julianne Gan APRN  Historian: Patient, family  Chronic or social conditions impacting care (social determinants of health): None    HPI:  Chief Complaint: Abdominal/pelvic pain  A complete HPI/ROS/PMH/PSH/SH/FH are unobtainable due to: Nothing    Context: Valery Aguilar is a 39 y.o. female with a history of PCOS, hypertension who presents to the ED c/o acute left lower quadrant abdominal pelvic pain for the past 5 days.  Patient describes a sharp, stabbing sensation.  The pain at this point is off to the left lower quadrant of the abdomen.  She does report nausea however denies any diarrhea, vomiting, fever, dysuria, vaginal bleeding.  She was seen in the ER 5 days ago for similar complaints.  She was ultimately diagnosed with a left ovarian cyst.  She states the pain has worsened since that time.    Review of prior external notes (non-ED):   I reviewed primary care office visit dated 10/10/2024.  Patient being followed for hypertension, anxiety, PCOS.    Review of prior external test results outside of this encounter:  I reviewed pelvic ultrasound from 10/30/2024.  This showed a left ovarian cyst measuring 4.4 x 3.6 x 3.5 cm    PAST MEDICAL HISTORY  Active Ambulatory Problems     Diagnosis Date Noted    Chronic migraine without aura without status migrainosus, not intractable 08/31/2016    Cervicogenic headache 08/31/2016    Secondary oligomenorrhea 08/20/2018    Family history of breast cancer 10/09/2018    Obstructive sleep apnea, adult 11/27/2018    GERD with apnea 11/27/2018    Vitamin D deficiency     PCOS (polycystic ovarian syndrome)     Panic disorder     Insulin resistance     Essential hypertension     GERD (gastroesophageal reflux disease)     Anxiety and depression 03/08/2021    Tendonitis, Achilles, left 05/10/2021    S/P ACL reconstruction 07/20/2021    Primary osteoarthritis of left knee 07/20/2021     ACL graft tear, sequela 2021    Morbid (severe) obesity due to excess calories 01/10/2022    Seizure 2022    Moderate mixed hyperlipidemia not requiring statin therapy 2022    Routine gynecological examination 2023     Resolved Ambulatory Problems     Diagnosis Date Noted    Incisional hernia, without obstruction or gangrene 10/24/2018    Incisional hernia 11/15/2018    Wound infection 2018    Right lower quadrant abdominal pain 2019    Chronic wound infection of abdomen 2019    Mechanical knee pain, left 2021     Past Medical History:   Diagnosis Date    Abdominal pain     ADHD (attention deficit hyperactivity disorder)     Allergic     Anxiety     Arthritis     Chronic sinusitis, unspecified     Depression     Diabetes mellitus     H/O echocardiogram     H/O exercise stress test     Headache, tension-type     History of Holter monitoring     History of MRI of brain and brain stem 2017    Hypertension     Infection following a procedure, unspecified, initial encounter     Lobar pneumonia, unspecified organism     Meralgia paresthetica, left lower limb     Metabolic syndrome     Migraine     MRSA (methicillin resistant Staphylococcus aureus) infection 2019    Nasal congestion     Obesity     Other injury of unspecified body region, initial encounter     Panic attack     Papanicolaou smear 2020    Pneumonia 2018    PONV (postoperative nausea and vomiting)     Seasonal allergies     Spinal headache     Unspecified contact dermatitis, unspecified cause     Viral infection          PAST SURGICAL HISTORY  Past Surgical History:   Procedure Laterality Date    ABDOMINAL WALL ABSCESS INCISION AND DRAINAGE N/A 2018    Procedure: Debridement of abdominal wall;  Surgeon: Brigido Navarrete MD;  Location: [facility];  Service: General    ADENOIDECTOMY       SECTION      X2    CHOLECYSTECTOMY      LAP    HERNIA REPAIR      HX OVARIAN CYSTECTOMY       INCISION AND DRAINAGE TRUNK N/A 2018    Procedure: wound debridement, abdomen;  Surgeon: Rachel Dolan MD;  Location:  [facility];  Service: General    INCISION AND DRAINAGE TRUNK N/A 2019    Procedure: WOUND CLOSURE ABDOMINAL;  Surgeon: Rachel Dolan MD;  Location:  [facility];  Service: General    KNEE ACL RECONSTRUCTION Left 2010    DR. CARRASCO    OVARIAN CYST REMOVAL      EX LAP WITH OVARIAN CYST REMOVAL HERNIA REPAIR 2014    TONSILLECTOMY      T&A    TRUNK DEBRIDEMENT N/A 2018    Procedure: Abdominal wound debridement;  Surgeon: Rachel Dolan MD;  Location:  [facility];  Service: General    VENTRAL/INCISIONAL HERNIA REPAIR N/A 11/15/2018    Procedure: Lateral Component Separation Herniorrhapy Repair with Mesh, Lysis of adhesions, and skin lesion excision of Right Side;  Surgeon: Rachel Dolan MD;  Location:  [facility];  Service: General         FAMILY HISTORY  Family History   Problem Relation Age of Onset    Stroke Mother     Heart disease Mother     Hypertension Mother     Heart disease Father     Hypertension Father     Diabetes Father     Crohn's disease Son     Kidney disease Son         Just diagnosed this month    Dementia Maternal Grandmother     Stroke Maternal Grandmother     Diabetes Maternal Grandmother     Breast cancer Paternal Grandmother     Stroke Paternal Grandmother     Hypertension Paternal Grandmother     Cancer Paternal Grandmother     Breast cancer Paternal Aunt     Heart disease Paternal Grandfather     Diabetes Paternal Grandfather          SOCIAL HISTORY  Social History     Socioeconomic History    Marital status:    Tobacco Use    Smoking status: Former     Current packs/day: 0.00     Average packs/day: 0.5 packs/day for 1 year (0.5 ttl pk-yrs)     Types: Cigarettes     Start date:      Quit date: 2004     Years since quittin.8     Passive exposure: Never    Smokeless tobacco: Never   Vaping Use    Vaping status: Some Days   Substance  and Sexual Activity    Alcohol use: Yes     Comment: Only on occasion    Drug use: No    Sexual activity: Defer     Partners: Male     Birth control/protection: I.U.D.     Comment: Mirena IUD 2018         ALLERGIES  Bupropion, Penicillins, and Tramadol        REVIEW OF SYSTEMS  All systems reviewed and negative except for those discussed in HPI.       PHYSICAL EXAM    I have reviewed the triage vital signs and nursing notes.    ED Triage Vitals   Temp Heart Rate Resp BP SpO2   11/03/24 1611 11/03/24 1611 11/03/24 1611 11/03/24 1613 11/03/24 1611   97.8 °F (36.6 °C) 111 16 147/91 97 %      Temp src Heart Rate Source Patient Position BP Location FiO2 (%)   11/03/24 1611 -- -- -- --   Tympanic           Physical Exam  GENERAL: Alert, oriented, not distressed  HENT: head atraumatic, no nuchal rigidity  EYES: no scleral icterus, EOMI  CV: regular rhythm, regular rate, no murmur  RESPIRATORY: normal effort, CTA  ABDOMEN: soft, mild diffuse left lower quadrant abdominal tenderness no guarding or rebound  MUSCULOSKELETAL: no deformity, FROM, no calf swelling or tenderness  NEURO: alert, moves all extremities, follows commands  SKIN: warm, dry        LAB RESULTS  Recent Results (from the past 24 hours)   Urinalysis With Microscopic If Indicated (No Culture) - Urine, Clean Catch    Collection Time: 11/03/24  4:46 PM    Specimen: Urine, Clean Catch   Result Value Ref Range    Color, UA Yellow Yellow, Straw    Appearance, UA Clear Clear    pH, UA 5.5 5.0 - 8.0    Specific Gravity, UA 1.021 1.005 - 1.030    Glucose, UA Negative Negative    Ketones, UA Negative Negative    Bilirubin, UA Negative Negative    Blood, UA Negative Negative    Protein, UA Negative Negative    Leuk Esterase, UA Negative Negative    Nitrite, UA Negative Negative    Urobilinogen, UA 1.0 E.U./dL 0.2 - 1.0 E.U./dL   Comprehensive Metabolic Panel    Collection Time: 11/03/24  4:49 PM    Specimen: Blood   Result Value Ref Range    Glucose 125 (H) 65 - 99  mg/dL    BUN 13 6 - 20 mg/dL    Creatinine 0.87 0.57 - 1.00 mg/dL    Sodium 140 136 - 145 mmol/L    Potassium 3.7 3.5 - 5.2 mmol/L    Chloride 106 98 - 107 mmol/L    CO2 23.4 22.0 - 29.0 mmol/L    Calcium 8.9 8.6 - 10.5 mg/dL    Total Protein 6.2 6.0 - 8.5 g/dL    Albumin 3.9 3.5 - 5.2 g/dL    ALT (SGPT) 17 1 - 33 U/L    AST (SGOT) 15 1 - 32 U/L    Alkaline Phosphatase 59 39 - 117 U/L    Total Bilirubin 0.3 0.0 - 1.2 mg/dL    Globulin 2.3 gm/dL    A/G Ratio 1.7 g/dL    BUN/Creatinine Ratio 14.9 7.0 - 25.0    Anion Gap 10.6 5.0 - 15.0 mmol/L    eGFR 86.5 >60.0 mL/min/1.73   hCG, Serum, Qualitative    Collection Time: 11/03/24  4:49 PM    Specimen: Blood   Result Value Ref Range    HCG Qualitative Negative Negative   CBC Auto Differential    Collection Time: 11/03/24  4:49 PM    Specimen: Blood   Result Value Ref Range    WBC 7.01 3.40 - 10.80 10*3/mm3    RBC 5.14 3.77 - 5.28 10*6/mm3    Hemoglobin 14.2 12.0 - 15.9 g/dL    Hematocrit 42.2 34.0 - 46.6 %    MCV 82.1 79.0 - 97.0 fL    MCH 27.6 26.6 - 33.0 pg    MCHC 33.6 31.5 - 35.7 g/dL    RDW 13.2 12.3 - 15.4 %    RDW-SD 39.2 37.0 - 54.0 fl    MPV 10.5 6.0 - 12.0 fL    Platelets 205 140 - 450 10*3/mm3    Neutrophil % 61.6 42.7 - 76.0 %    Lymphocyte % 30.5 19.6 - 45.3 %    Monocyte % 6.4 5.0 - 12.0 %    Eosinophil % 0.9 0.3 - 6.2 %    Basophil % 0.3 0.0 - 1.5 %    Immature Grans % 0.3 0.0 - 0.5 %    Neutrophils, Absolute 4.32 1.70 - 7.00 10*3/mm3    Lymphocytes, Absolute 2.14 0.70 - 3.10 10*3/mm3    Monocytes, Absolute 0.45 0.10 - 0.90 10*3/mm3    Eosinophils, Absolute 0.06 0.00 - 0.40 10*3/mm3    Basophils, Absolute 0.02 0.00 - 0.20 10*3/mm3    Immature Grans, Absolute 0.02 0.00 - 0.05 10*3/mm3    nRBC 0.0 0.0 - 0.2 /100 WBC       Ordered the above labs and independently reviewed the results.        RADIOLOGY  CT Abdomen Pelvis With Contrast    Result Date: 11/3/2024  CT ABDOMEN AND PELVIS WITH IV CONTRAST  HISTORY: 39-year-old female with left lower quadrant pain.  Nausea cholecystectomy in the past.  TECHNIQUE: Radiation dose reduction techniques were utilized, including automated exposure control and exposure modulation based on body size. 3 mm images were obtained through the abdomen and pelvis after the administration of IV contrast. Compared with previous CT 02/13/2022.  FINDINGS: 1. There is a 5.2 x 4.0 cm left ovarian cyst. There is no surrounding fluid. There is an IUD centrally within the uterus. Right adnexa appears unremarkable.  2. Liver, spleen, pancreas, adrenals, and kidneys appear unremarkable. Urinary bladder is nearly completely collapsed. There is no acute bowel abnormality. There is no colitis or appendicitis.  3. No evidence for pneumonia or pleural effusions at the lung bases.       I ordered the above noted radiological studies. Reviewed by me and discussed with radiologist.  See dictation for official radiology interpretation.      MEDICATIONS GIVEN IN ER    Medications   morphine injection 4 mg (4 mg Intravenous Given 11/3/24 1650)   ondansetron (ZOFRAN) injection 4 mg (4 mg Intravenous Given 11/3/24 1650)   iopamidol (ISOVUE-300) 61 % injection 85 mL (85 mL Intravenous Given by Other 11/3/24 1712)   oxyCODONE-acetaminophen (PERCOCET) 5-325 MG per tablet 1 tablet (1 tablet Oral Given 11/3/24 1828)         ADDITIONAL ORDERS CONSIDERED BUT NOT ORDERED:  Admission was considered but after careful review of the patient's presentation, physical examination, diagnostic results, and response to treatment the patient may be safely discharged with outpatient follow-up.       PROGRESS, DATA ANALYSIS, CONSULTS, AND MEDICAL DECISION MAKING    All labs have been independently interpreted by myself.  All radiology studies have been independently interpreted by myself and discussed with radiologist dictating the report.   EKGs independently interpreted by myself.  Discussion below represents my analysis of pertinent findings related to patient's condition,  differential diagnosis, treatment plan and final disposition.    I have discussed case with Dr. Blount, emergency room physician.  He has performed his own bedside examination and agrees with treatment plan.    ED Course as of 11/03/24 1929   Sun Nov 03, 2024   1639 Patient presents with 5-day history of left diffuse abdominal pain.  Seen here recently diagnosed with left ovarian cyst.  The pain is worsened.  No fever, vaginal bleeding, dysuria.  She has had nausea.  Plan for CT imaging, pain control, labs. [EE]   1720 WBC: 7.01 [EE]   1720 Nitrite, UA: Negative [EE]   1720 Leukocytes, UA: Negative [EE]   1720 Blood, UA: Negative [EE]   1743 CT imaging of the abdomen independently interpreted myself shows no evidence of obstruction.  There is a large ovarian cyst. [EE]   1818 Updated patient on workup.  We will discharge.  She does have appropriate GYN follow-up. [EE]      ED Course User Index  [EE] Mitchell Garcia PA       AS OF 19:29 EST VITALS:    BP - 127/79  HR - 93  TEMP - 97.8 °F (36.6 °C) (Tympanic)  O2 SATS - 97%        DIAGNOSIS  Final diagnoses:   Left ovarian cyst         DISPOSITION  Discharged    Admission was considered but after careful review of the patient's presentation, physical examination, diagnostic results, and response to treatment the patient may be safely discharged with outpatient follow-up.         Dictated utilizing Dragon dictation     Mitchell Garcia PA  11/03/24 1929

## 2024-11-04 ENCOUNTER — OFFICE VISIT (OUTPATIENT)
Dept: OBSTETRICS AND GYNECOLOGY | Facility: CLINIC | Age: 39
End: 2024-11-04
Payer: COMMERCIAL

## 2024-11-04 VITALS
DIASTOLIC BLOOD PRESSURE: 98 MMHG | BODY MASS INDEX: 46.95 KG/M2 | HEIGHT: 64 IN | WEIGHT: 275 LBS | SYSTOLIC BLOOD PRESSURE: 134 MMHG

## 2024-11-04 DIAGNOSIS — N83.202 LEFT OVARIAN CYST: Primary | ICD-10-CM

## 2024-11-04 DIAGNOSIS — Z13.89 SCREENING FOR GENITOURINARY CONDITION: ICD-10-CM

## 2024-11-04 LAB
B-HCG UR QL: NEGATIVE
BILIRUB BLD-MCNC: NEGATIVE MG/DL
CLARITY, POC: CLEAR
COLOR UR: YELLOW
EXPIRATION DATE: NORMAL
GLUCOSE UR STRIP-MCNC: NEGATIVE MG/DL
INTERNAL NEGATIVE CONTROL: NORMAL
INTERNAL POSITIVE CONTROL: NORMAL
KETONES UR QL: ABNORMAL
LEUKOCYTE EST, POC: NEGATIVE
Lab: NORMAL
NITRITE UR-MCNC: NEGATIVE MG/ML
PH UR: 5 [PH] (ref 5–8)
PROT UR STRIP-MCNC: ABNORMAL MG/DL
RBC # UR STRIP: NEGATIVE /UL
SP GR UR: 1.01 (ref 1–1.03)
UROBILINOGEN UR QL: NORMAL

## 2024-11-04 PROCEDURE — 81025 URINE PREGNANCY TEST: CPT | Performed by: NURSE PRACTITIONER

## 2024-11-04 PROCEDURE — 99213 OFFICE O/P EST LOW 20 MIN: CPT | Performed by: NURSE PRACTITIONER

## 2024-11-04 PROCEDURE — 81002 URINALYSIS NONAUTO W/O SCOPE: CPT | Performed by: NURSE PRACTITIONER

## 2024-11-04 RX ORDER — ACETAMINOPHEN AND CODEINE PHOSPHATE 120; 12 MG/5ML; MG/5ML
1 SOLUTION ORAL DAILY
Qty: 28 TABLET | Refills: 0 | Status: SHIPPED | OUTPATIENT
Start: 2024-11-04 | End: 2025-11-04

## 2024-11-04 RX ORDER — HYDROCODONE BITARTRATE AND ACETAMINOPHEN 5; 325 MG/1; MG/1
2 TABLET ORAL EVERY 6 HOURS PRN
Qty: 18 TABLET | Refills: 0 | Status: SHIPPED | OUTPATIENT
Start: 2024-11-04

## 2024-11-04 NOTE — PROGRESS NOTES
"Subjective     Chief Complaint   Patient presents with    Ovarian Cyst       Valery Aguilar is a 39 y.o.  whose LMP is No LMP recorded. Patient has had an implant.. She presents today for follow up of (L) ovarian cyst. She was initially seen in the ED at [facility] on 10/31/24 for her c/o pelvic pain. She was given NSAIDs and instructed to follow up with GYN.  She then returned to the ED yesterday with c/o worsening pelvic pain.     She reports the pain is worsening. She felt like yesterday she was running a fever but her temperature was normal in the ED. It hurts worse to sit than to lie down. The pain is intermittent. It is sharp at time and other times it is crampy and feels like pressure.     She has a h/o PCOS.     She has a Mirena IUD in place since 10/2018     She has a complicated abdominal surgical history and history of MRSA    HPI    HPI    The following portions of the patient's history were reviewed and updated as appropriate:vital signs, allergies, current medications, past medical history, past social history, past surgical history, and problem list      Review of Systems     Review of Systems   Constitutional: Negative.    Gastrointestinal:  Positive for abdominal pain, nausea and vomiting.   Genitourinary:  Positive for pelvic pain.       Objective      /98   Ht 162.6 cm (64.02\")   Wt 125 kg (275 lb)   BMI 47.17 kg/m²     Physical Exam    Physical Exam  Vitals and nursing note reviewed.   Constitutional:       Appearance: Normal appearance.   Abdominal:      General: There is no distension.      Palpations: Abdomen is soft. There is no mass.      Tenderness: There is abdominal tenderness.      Hernia: No hernia is present.   Musculoskeletal:         General: Normal range of motion.   Skin:     General: Skin is warm and dry.   Neurological:      General: No focal deficit present.      Mental Status: She is alert and oriented to person, place, and time.   Psychiatric:         " Mood and Affect: Mood normal.         Behavior: Behavior normal.         Lab Review   Labs: Urine pregnancy test, Urinalysis - with micro     Imaging   US 10/30/24 IN ED FINDINGS:  1. The uterus measures 8.5 x 4.2 x 4.1 cm. The endometrium is echogenic  and measures 1 cm in diameter. Uterus is mildly diffusely heterogeneous,  but no definite fibroids are seen. IUD is in place. A few nabothian  cysts are noted.     2. Right ovary measures 3.1 x 2.5 x 2.7 cm and appears unremarkable.  The left ovary measures 5.4 x 4.1 x 3.6 cm and contains a mildly complex  4.4 x 3.6 x 3.5 cm ovarian cyst.     3. There is symmetrical color and spectral Doppler evaluation of both  ovaries. There is no evidence for torsion.  There is no free fluid.    CT scan 11/3/24  FINDINGS:  1. There is a 5.2 x 4.0 cm left ovarian cyst. There is no surrounding  fluid. There is an IUD centrally within the uterus. Right adnexa appears  unremarkable.     2. Liver, spleen, pancreas, adrenals, and kidneys appear unremarkable.  Urinary bladder is nearly completely collapsed.  There is no acute bowel abnormality. There is no colitis or  appendicitis.     3. No evidence for pneumonia or pleural effusions at the lung bases.      US today shows- a complex (L) ovarian cyst 5.08 x 4.31cm      Assessment  Diagnoses and all orders for this visit:    1. Left ovarian cyst (Primary)  -     HYDROcodone-acetaminophen (Norco) 5-325 MG per tablet; Take 2 tablets by mouth Every 6 (Six) Hours As Needed for Moderate Pain or Severe Pain.  Dispense: 18 tablet; Refill: 0    2. Screening for genitourinary condition  -     POC Urinalysis Dipstick  -     POC Pregnancy, Urine    Other orders  -     norethindrone (MICRONOR) 0.35 MG tablet; Take 1 tablet by mouth Daily.  Dispense: 28 tablet; Refill: 0        Assessment/Plan  1) (L) Ovarian cyst- Case d/w Dr. Hathaway. Pt's abdominal surgical history rev'd. Dr. Hathaway into exam room to assess and disc plan of care with  patient. Disc options of surgical management vs trying expectant management. D/t concerns of complexity of surgical case will try pain meds and progestin only pills x 1 week. Plan repeat US in 1 week. Rev warn s/s of ovarian torsion. Pt verbalized agreement with plan of care. ERX sent for Viper and Micronor.     RTO 1 week for f/u with Dr. Hathaway and repeat US       Tana López, APRN  11/4/2024

## 2024-11-07 DIAGNOSIS — F41.9 ANXIETY AND DEPRESSION: Chronic | ICD-10-CM

## 2024-11-07 DIAGNOSIS — F32.A ANXIETY AND DEPRESSION: Chronic | ICD-10-CM

## 2024-11-08 ENCOUNTER — APPOINTMENT (OUTPATIENT)
Dept: CT IMAGING | Facility: HOSPITAL | Age: 39
End: 2024-11-08
Payer: COMMERCIAL

## 2024-11-08 ENCOUNTER — HOSPITAL ENCOUNTER (EMERGENCY)
Facility: HOSPITAL | Age: 39
Discharge: HOME | End: 2024-11-08
Attending: STUDENT IN AN ORGANIZED HEALTH CARE EDUCATION/TRAINING PROGRAM | Admitting: STUDENT IN AN ORGANIZED HEALTH CARE EDUCATION/TRAINING PROGRAM
Payer: COMMERCIAL

## 2024-11-08 ENCOUNTER — APPOINTMENT (OUTPATIENT)
Dept: ULTRASOUND IMAGING | Facility: HOSPITAL | Age: 39
End: 2024-11-08
Payer: COMMERCIAL

## 2024-11-08 VITALS
OXYGEN SATURATION: 96 % | DIASTOLIC BLOOD PRESSURE: 82 MMHG | BODY MASS INDEX: 63.64 KG/M2 | HEART RATE: 84 BPM | TEMPERATURE: 98.9 F | WEIGHT: 275 LBS | HEIGHT: 55 IN | RESPIRATION RATE: 18 BRPM | SYSTOLIC BLOOD PRESSURE: 128 MMHG

## 2024-11-08 DIAGNOSIS — R10.9 ABDOMINAL PAIN, UNSPECIFIED ABDOMINAL LOCATION: Primary | ICD-10-CM

## 2024-11-08 LAB
ALBUMIN SERPL-MCNC: 4.3 G/DL (ref 3.5–5.2)
ALBUMIN/GLOB SERPL: 1.7 G/DL
ALP SERPL-CCNC: 56 U/L (ref 39–117)
ALT SERPL W P-5'-P-CCNC: 34 U/L (ref 1–33)
ANION GAP SERPL CALCULATED.3IONS-SCNC: 11.7 MMOL/L (ref 5–15)
AST SERPL-CCNC: 22 U/L (ref 1–32)
BASOPHILS # BLD AUTO: 0.04 10*3/MM3 (ref 0–0.2)
BASOPHILS NFR BLD AUTO: 0.6 % (ref 0–1.5)
BILIRUB SERPL-MCNC: 0.3 MG/DL (ref 0–1.2)
BILIRUB UR QL STRIP: NEGATIVE
BUN SERPL-MCNC: 15 MG/DL (ref 6–20)
BUN/CREAT SERPL: 19.2 (ref 7–25)
CALCIUM SPEC-SCNC: 9.2 MG/DL (ref 8.6–10.5)
CHLORIDE SERPL-SCNC: 104 MMOL/L (ref 98–107)
CK SERPL-CCNC: 77 U/L (ref 20–180)
CLARITY UR: CLEAR
CO2 SERPL-SCNC: 22.3 MMOL/L (ref 22–29)
COLOR UR: YELLOW
CREAT SERPL-MCNC: 0.78 MG/DL (ref 0.57–1)
DEPRECATED RDW RBC AUTO: 40 FL (ref 37–54)
EGFRCR SERPLBLD CKD-EPI 2021: 98.6 ML/MIN/1.73
EOSINOPHIL # BLD AUTO: 0.05 10*3/MM3 (ref 0–0.4)
EOSINOPHIL NFR BLD AUTO: 0.7 % (ref 0.3–6.2)
ERYTHROCYTE [DISTWIDTH] IN BLOOD BY AUTOMATED COUNT: 13.5 % (ref 12.3–15.4)
GLOBULIN UR ELPH-MCNC: 2.6 GM/DL
GLUCOSE SERPL-MCNC: 101 MG/DL (ref 65–99)
GLUCOSE UR STRIP-MCNC: NEGATIVE MG/DL
HCG SERPL QL: NEGATIVE
HCT VFR BLD AUTO: 43.5 % (ref 34–46.6)
HGB BLD-MCNC: 14.8 G/DL (ref 12–15.9)
HGB UR QL STRIP.AUTO: NEGATIVE
IMM GRANULOCYTES # BLD AUTO: 0.03 10*3/MM3 (ref 0–0.05)
IMM GRANULOCYTES NFR BLD AUTO: 0.4 % (ref 0–0.5)
KETONES UR QL STRIP: NEGATIVE
LEUKOCYTE ESTERASE UR QL STRIP.AUTO: NEGATIVE
LYMPHOCYTES # BLD AUTO: 2.41 10*3/MM3 (ref 0.7–3.1)
LYMPHOCYTES NFR BLD AUTO: 33.1 % (ref 19.6–45.3)
MAGNESIUM SERPL-MCNC: 2.1 MG/DL (ref 1.6–2.6)
MCH RBC QN AUTO: 28 PG (ref 26.6–33)
MCHC RBC AUTO-ENTMCNC: 34 G/DL (ref 31.5–35.7)
MCV RBC AUTO: 82.2 FL (ref 79–97)
MONOCYTES # BLD AUTO: 0.54 10*3/MM3 (ref 0.1–0.9)
MONOCYTES NFR BLD AUTO: 7.4 % (ref 5–12)
NEUTROPHILS NFR BLD AUTO: 4.2 10*3/MM3 (ref 1.7–7)
NEUTROPHILS NFR BLD AUTO: 57.8 % (ref 42.7–76)
NITRITE UR QL STRIP: NEGATIVE
NRBC BLD AUTO-RTO: 0 /100 WBC (ref 0–0.2)
PH UR STRIP.AUTO: 5.5 [PH] (ref 4.5–8)
PLATELET # BLD AUTO: 217 10*3/MM3 (ref 140–450)
PMV BLD AUTO: 10.6 FL (ref 6–12)
POTASSIUM SERPL-SCNC: 3.9 MMOL/L (ref 3.5–5.2)
PROT SERPL-MCNC: 6.9 G/DL (ref 6–8.5)
PROT UR QL STRIP: NEGATIVE
RBC # BLD AUTO: 5.29 10*6/MM3 (ref 3.77–5.28)
SODIUM SERPL-SCNC: 138 MMOL/L (ref 136–145)
SP GR UR STRIP: 1.03 (ref 1–1.03)
UROBILINOGEN UR QL STRIP: NORMAL
WBC NRBC COR # BLD AUTO: 7.27 10*3/MM3 (ref 3.4–10.8)

## 2024-11-08 PROCEDURE — 96374 THER/PROPH/DIAG INJ IV PUSH: CPT

## 2024-11-08 PROCEDURE — 99285 EMERGENCY DEPT VISIT HI MDM: CPT | Performed by: STUDENT IN AN ORGANIZED HEALTH CARE EDUCATION/TRAINING PROGRAM

## 2024-11-08 PROCEDURE — 84703 CHORIONIC GONADOTROPIN ASSAY: CPT | Performed by: STUDENT IN AN ORGANIZED HEALTH CARE EDUCATION/TRAINING PROGRAM

## 2024-11-08 PROCEDURE — 25010000002 ONDANSETRON PER 1 MG: Performed by: STUDENT IN AN ORGANIZED HEALTH CARE EDUCATION/TRAINING PROGRAM

## 2024-11-08 PROCEDURE — 76856 US EXAM PELVIC COMPLETE: CPT

## 2024-11-08 PROCEDURE — 76830 TRANSVAGINAL US NON-OB: CPT

## 2024-11-08 PROCEDURE — 82550 ASSAY OF CK (CPK): CPT | Performed by: STUDENT IN AN ORGANIZED HEALTH CARE EDUCATION/TRAINING PROGRAM

## 2024-11-08 PROCEDURE — 83735 ASSAY OF MAGNESIUM: CPT | Performed by: STUDENT IN AN ORGANIZED HEALTH CARE EDUCATION/TRAINING PROGRAM

## 2024-11-08 PROCEDURE — 81003 URINALYSIS AUTO W/O SCOPE: CPT | Performed by: STUDENT IN AN ORGANIZED HEALTH CARE EDUCATION/TRAINING PROGRAM

## 2024-11-08 PROCEDURE — 25510000001 IOPAMIDOL PER 1 ML: Performed by: STUDENT IN AN ORGANIZED HEALTH CARE EDUCATION/TRAINING PROGRAM

## 2024-11-08 PROCEDURE — 74177 CT ABD & PELVIS W/CONTRAST: CPT

## 2024-11-08 PROCEDURE — 25010000002 MORPHINE PER 10 MG: Performed by: STUDENT IN AN ORGANIZED HEALTH CARE EDUCATION/TRAINING PROGRAM

## 2024-11-08 PROCEDURE — 80053 COMPREHEN METABOLIC PANEL: CPT | Performed by: STUDENT IN AN ORGANIZED HEALTH CARE EDUCATION/TRAINING PROGRAM

## 2024-11-08 PROCEDURE — 85025 COMPLETE CBC W/AUTO DIFF WBC: CPT | Performed by: STUDENT IN AN ORGANIZED HEALTH CARE EDUCATION/TRAINING PROGRAM

## 2024-11-08 PROCEDURE — 96375 TX/PRO/DX INJ NEW DRUG ADDON: CPT

## 2024-11-08 RX ORDER — ONDANSETRON 2 MG/ML
4 INJECTION INTRAMUSCULAR; INTRAVENOUS ONCE
Status: COMPLETED | OUTPATIENT
Start: 2024-11-08 | End: 2024-11-08

## 2024-11-08 RX ORDER — IOPAMIDOL 755 MG/ML
100 INJECTION, SOLUTION INTRAVASCULAR
Status: COMPLETED | OUTPATIENT
Start: 2024-11-08 | End: 2024-11-08

## 2024-11-08 RX ADMIN — IOPAMIDOL 100 ML: 755 INJECTION, SOLUTION INTRAVENOUS at 17:32

## 2024-11-08 RX ADMIN — ONDANSETRON 4 MG: 2 INJECTION INTRAMUSCULAR; INTRAVENOUS at 16:34

## 2024-11-08 RX ADMIN — MORPHINE SULFATE 4 MG: 4 INJECTION, SOLUTION INTRAMUSCULAR; INTRAVENOUS at 16:34

## 2024-11-08 NOTE — ED PROVIDER NOTES
Subjective   39-year-old female with past medical history of left ovarian cyst measuring approximately 5 cm currently seeing OB planning on likely elective surgery presenting with complaint that she has continued left lower quadrant pain that is gotten worse.  She was instructed to come to the emergency room for an ultrasound to rule out torsion if it got worse.  States that it got worse approximately 2 to 3 hours ago she has not taken her hydrocodone she came straight to the emergency room.  She also notes pain in the left upper quadrant but this is associated with the development of her left lower quadrant pain.  No reported burning when she urinates she states that she has no history of kidney stones.  She does however have a past medical history of hernias, she also had a prior 16 cm cyst removed.    Review of Systems    Past Medical History:   Diagnosis Date    Abdominal pain     ADHD (attention deficit hyperactivity disorder)     I have had this since i was a kid    Allergic     Seasonal    Anxiety     Anxiety and depression 03/08/2021    Arthritis     KNEE    Chronic sinusitis, unspecified     Chronic wound infection of abdomen 02/20/2019    Added automatically from request for surgery [id number]    Depression     Diabetes mellitus     GERD (gastroesophageal reflux disease)     H/O echocardiogram     2019 EF 56% NORMAL DIASTOLIC FUNCTION NO VALVULAR DISEASE    H/O exercise stress test     REPORTEDLY NORMAL DONE FOR CHEST PAIN DX WITH ANXIETY    Headache, tension-type     History of Holter monitoring     REPORTEDLY NORMAL DONE FOR CHEST PAIN DX WITH ANXIETY    History of MRI of brain and brain stem 01/01/2017    NORMAL DONE FOR MIGRAINES    Hypertension     PRE-ECLAMPSIA WITH BOTH CHILDREN    Incisional hernia     SCHEDULED FOR REPAIR    Incisional hernia, without obstruction or gangrene 10/24/2018    Added automatically from request for surgery [id number]    Infection following a procedure, unspecified, initial  "encounter     Insulin resistance     D/T PCOS    Lobar pneumonia, unspecified organism     Meralgia paresthetica, left lower limb     Metabolic syndrome     Migraine     Moderate mixed hyperlipidemia not requiring statin therapy 2022    MRSA (methicillin resistant Staphylococcus aureus) infection 2019    Abdominal wound    Nasal congestion     Obesity     Since my teen years    Other injury of unspecified body region, initial encounter     Panic attack     Panic disorder     Papanicolaou smear 2020    NORMAL WITH NEG HPV NEVER ABN    PCOS (polycystic ovarian syndrome)     Pneumonia 2018    PONV (postoperative nausea and vomiting)     Right lower quadrant abdominal pain 2019    Seasonal allergies     Seizure     Spinal headache     Unspecified contact dermatitis, unspecified cause     Viral infection     UNSPECIFIED    Vitamin D deficiency     Wound infection 2018       Allergies   Allergen Reactions    Bupropion Seizure and Unknown - High Severity    Penicillins Other (See Comments)     \"im resistant to any \"cillin\" drugs\"     Tramadol Unknown - Low Severity     Patient states that medicine may cause a seizure with taking the Wellbutrin so neurology doesn't want patient to take this Rx        Past Surgical History:   Procedure Laterality Date    ABDOMINAL WALL ABSCESS INCISION AND DRAINAGE N/A 2018    Procedure: Debridement of abdominal wall;  Surgeon: Brigido Navarrete MD;  Location: [facility] OR;  Service: General    ADENOIDECTOMY       SECTION      X2    CHOLECYSTECTOMY      LAP    HERNIA REPAIR      HX OVARIAN CYSTECTOMY      INCISION AND DRAINAGE TRUNK N/A 2018    Procedure: wound debridement, abdomen;  Surgeon: Rachel Dolan MD;  Location: [facility] OR;  Service: General    INCISION AND DRAINAGE TRUNK N/A 2019    Procedure: WOUND CLOSURE ABDOMINAL;  Surgeon: Rachel Dolan MD;  Location: [facility] OR;  Service: General    KNEE ACL RECONSTRUCTION Left  "    DR. CARRASCO    OVARIAN CYST REMOVAL      EX LAP WITH OVARIAN CYST REMOVAL HERNIA REPAIR 2014    TONSILLECTOMY      T&A    TRUNK DEBRIDEMENT N/A 2018    Procedure: Abdominal wound debridement;  Surgeon: Rachel Dolan MD;  Location: [facility] OR;  Service: General    VENTRAL/INCISIONAL HERNIA REPAIR N/A 11/15/2018    Procedure: Lateral Component Separation Herniorrhapy Repair with Mesh, Lysis of adhesions, and skin lesion excision of Right Side;  Surgeon: Rachel Dolan MD;  Location: [facility] OR;  Service: General       Family History   Problem Relation Age of Onset    Stroke Mother     Heart disease Mother     Hypertension Mother     Heart disease Father     Hypertension Father     Diabetes Father     Crohn's disease Son     Kidney disease Son         Just diagnosed this month    Dementia Maternal Grandmother     Stroke Maternal Grandmother     Diabetes Maternal Grandmother     Breast cancer Paternal Grandmother     Stroke Paternal Grandmother     Hypertension Paternal Grandmother     Cancer Paternal Grandmother     Breast cancer Paternal Aunt     Heart disease Paternal Grandfather     Diabetes Paternal Grandfather        Social History     Socioeconomic History    Marital status:    Tobacco Use    Smoking status: Former     Current packs/day: 0.00     Average packs/day: 0.5 packs/day for 1 year (0.5 ttl pk-yrs)     Types: Cigarettes     Start date:      Quit date: 2004     Years since quittin.8     Passive exposure: Never    Smokeless tobacco: Never   Vaping Use    Vaping status: Some Days   Substance and Sexual Activity    Alcohol use: Yes     Comment: Only on occasion    Drug use: No    Sexual activity: Defer     Partners: Male     Birth control/protection: I.U.D.     Comment: Mirena IUD 2018           Objective   Physical Exam  Vitals and nursing note reviewed. Exam conducted with a chaperone present.   Constitutional:       General: She is not in acute distress.     Appearance:  Normal appearance. She is obese. She is not ill-appearing, toxic-appearing or diaphoretic.      Comments: Morbidly obese   HENT:      Head: Normocephalic and atraumatic.      Nose: Nose normal.      Mouth/Throat:      Pharynx: No oropharyngeal exudate or posterior oropharyngeal erythema.   Eyes:      Extraocular Movements: Extraocular movements intact.      Conjunctiva/sclera: Conjunctivae normal.      Pupils: Pupils are equal, round, and reactive to light.   Cardiovascular:      Rate and Rhythm: Normal rate and regular rhythm.   Pulmonary:      Effort: Pulmonary effort is normal. No respiratory distress.      Breath sounds: Normal breath sounds. No stridor. No wheezing, rhonchi or rales.   Abdominal:      General: Abdomen is flat. There is no distension.      Palpations: Abdomen is soft.      Tenderness: There is abdominal tenderness. There is guarding (Voluntary guarding). There is no right CVA tenderness, left CVA tenderness or rebound.      Comments: Tenderness in the left lower and left upper quadrants   Musculoskeletal:         General: No swelling, tenderness, deformity or signs of injury. Normal range of motion.      Cervical back: Normal range of motion.   Skin:     General: Skin is warm and dry.      Capillary Refill: Capillary refill takes less than 2 seconds.      Findings: No erythema or rash.   Neurological:      General: No focal deficit present.      Mental Status: She is alert and oriented to person, place, and time. Mental status is at baseline.      Cranial Nerves: No cranial nerve deficit.      Sensory: No sensory deficit.      Motor: No weakness.   Psychiatric:         Mood and Affect: Mood normal.         Procedures           ED Course  ED Course as of 11/08/24 1809 Fri Nov 08, 2024   1809 Fabio evaluation pain is controlled, patient has a relative who is present with her who will drive her home.  Workup unremarkable recommend follow-up with OB for removal of cyst as the next logical step [AK]       ED Course User Index  [AK] Iron Hester MD                    No data recorded                             Medical Decision Making  History and physical exam remarkable for 39-year-old here for evaluation of ovarian torsion given her past medical history of 5 cm ovarian cyst.  Will get an ultrasound, she was voluntarily guarding in the left upper quadrant which is odd for a presentation of an ovarian torsion case, we will get a repeat CT scan to rule out alternative etiologies.    Amount and/or Complexity of Data Reviewed  Labs: ordered.  Radiology: ordered.    Risk  Prescription drug management.        Final diagnoses:   Abdominal pain, unspecified abdominal location       ED Disposition  ED Disposition       ED Disposition   Discharge    Condition   Stable    Comment   --               Julianne Gan, APRN  [address]  [address]  [place] [address]  [phone]    In 3 days      [facility] EMERGENCY DEPARTMENT  [address]   [place] [address] [phone]  Go to   If symptoms worsen    Please follow-up with your OB doctor for reevaluation of the ovarian cyst               Medication List      No changes were made to your prescriptions during this visit.            Iron Hester MD  11/08/24 0932

## 2024-11-08 NOTE — Clinical Note
NICA POPE  [facility] EMERGENCY DEPARTMENT  [address] [place] [address]  Phone: [phone]    Valery Aguilar was seen and treated in our emergency department on 11/8/2024.  She may return to work on 11/11/2024.         Thank you for choosing [facility].    Iron Hester MD

## 2024-11-11 ENCOUNTER — OFFICE VISIT (OUTPATIENT)
Dept: OBSTETRICS AND GYNECOLOGY | Facility: CLINIC | Age: 39
End: 2024-11-11
Payer: COMMERCIAL

## 2024-11-11 VITALS
BODY MASS INDEX: 63.32 KG/M2 | SYSTOLIC BLOOD PRESSURE: 126 MMHG | WEIGHT: 273.6 LBS | DIASTOLIC BLOOD PRESSURE: 84 MMHG | HEIGHT: 55 IN

## 2024-11-11 DIAGNOSIS — Z13.89 SCREENING FOR GENITOURINARY CONDITION: Primary | ICD-10-CM

## 2024-11-11 DIAGNOSIS — R10.32 LEFT LOWER QUADRANT PAIN: ICD-10-CM

## 2024-11-11 DIAGNOSIS — Z87.42 HISTORY OF OVARIAN CYST: ICD-10-CM

## 2024-11-11 DIAGNOSIS — R11.0 NAUSEA WITHOUT VOMITING: ICD-10-CM

## 2024-11-11 PROCEDURE — 81002 URINALYSIS NONAUTO W/O SCOPE: CPT | Performed by: OBSTETRICS & GYNECOLOGY

## 2024-11-11 PROCEDURE — 99213 OFFICE O/P EST LOW 20 MIN: CPT | Performed by: OBSTETRICS & GYNECOLOGY

## 2024-11-11 RX ORDER — ONDANSETRON 4 MG/1
4 TABLET, FILM COATED ORAL DAILY PRN
Qty: 30 TABLET | Refills: 1 | Status: SHIPPED | OUTPATIENT
Start: 2024-11-11 | End: 2025-11-11

## 2024-11-11 NOTE — PROGRESS NOTES
"      Valery Aguilar is a 39 y.o. patient who presents for follow up of   Chief Complaint   Patient presents with    Pelvic Pain     Had Us today     39-year-old established patient here for ultrasound.  She was seen last week by SYED Ortiz for left lower quadrant pain and had a left complex cyst that measured 5 x 4.3 cm.  She has complicated surgical history with multiple abdominal surgeries and history of infection.  She was started on Micronor for suppression and was seen for short-term follow-up.  She did have an increase in pain and nausea and was seen in the ER on Friday and had a 3.2 cm left ovarian cyst.  Her ultrasound today shows a 6.2 cm anteverted uterus with an endometrial lining of 0.9 cm.  Her IUD is in place.  Her ovaries appear polycystic, however, there is no dominant masses.  Her ultrasound was compared to her last scan on 11/8/2024.  We discussed that she probably had a cyst rupture over the weekend.  She is still symptomatic and so I would recommend that she continue Micronor and have short-term follow-up in a month for repeat ultrasound.  If she should have another episode of an increase in pain I have recommended that she come back for repeat scanning before month.  I also given her Zofran for nausea.        The following portions of the patient's history were reviewed and updated as appropriate: allergies, current medications and problem list.    Review of Systems   Constitutional:  Positive for activity change.   Gastrointestinal:  Positive for nausea.   Genitourinary:  Positive for pelvic pain.   All other systems reviewed and are negative.      /84   Ht 64 cm (25.2\")   Wt 124 kg (273 lb 9.6 oz)   .99 kg/m²     Physical Exam  Vitals and nursing note reviewed.   Constitutional:       Appearance: She is well-developed.   HENT:      Head: Normocephalic and atraumatic.   Eyes:      General: No scleral icterus.     Conjunctiva/sclera: Conjunctivae normal.   Neck:      " Thyroid: No thyromegaly.   Abdominal:      General: There is no distension.      Palpations: Abdomen is soft. There is no mass.      Tenderness: There is no abdominal tenderness. There is no guarding or rebound.      Hernia: No hernia is present.   Skin:     General: Skin is warm and dry.   Neurological:      Mental Status: She is alert and oriented to person, place, and time.   Psychiatric:         Mood and Affect: Mood normal.         Behavior: Behavior normal.         Thought Content: Thought content normal.         Judgment: Judgment normal.         A/P:  1.  History of left ovarian cyst-normal pelvic ultrasound today.  Cyst appears to have resolved.  2.  Left lower quadrant pain-patient still has mild to moderate pain despite normal ultrasound today.  Will continue Micronor and repeat ultrasound in 1 month.  Patient advised to call for any increase in symptoms.  3.  Nausea-eRx Zofran as needed nausea  4.  RTO 1 month for ultrasound and follow-up visit    Assessment & Plan   Diagnoses and all orders for this visit:    1. Screening for genitourinary condition (Primary)  -     POC Urinalysis Dipstick    2. History of ovarian cyst    3. Left lower quadrant pain    4. Nausea without vomiting    Other orders  -     ondansetron (Zofran) 4 MG tablet; Take 1 tablet by mouth Daily As Needed for Nausea or Vomiting.  Dispense: 30 tablet; Refill: 1                 No follow-ups on file.      Anca Hathaway MD    11/11/2024  11:16 EST

## 2024-11-26 ENCOUNTER — TELEPHONE (OUTPATIENT)
Dept: OBSTETRICS AND GYNECOLOGY | Facility: CLINIC | Age: 39
End: 2024-11-26
Payer: COMMERCIAL

## 2024-11-26 ENCOUNTER — OFFICE VISIT (OUTPATIENT)
Dept: OBSTETRICS AND GYNECOLOGY | Facility: CLINIC | Age: 39
End: 2024-11-26
Payer: COMMERCIAL

## 2024-11-26 VITALS
SYSTOLIC BLOOD PRESSURE: 128 MMHG | WEIGHT: 276.4 LBS | HEIGHT: 64 IN | BODY MASS INDEX: 47.19 KG/M2 | DIASTOLIC BLOOD PRESSURE: 82 MMHG

## 2024-11-26 DIAGNOSIS — Z87.42 HISTORY OF OVARIAN CYST: ICD-10-CM

## 2024-11-26 DIAGNOSIS — R10.9 LEFT SIDED ABDOMINAL PAIN: Primary | ICD-10-CM

## 2024-11-26 RX ORDER — CYCLOBENZAPRINE HCL 10 MG
10 TABLET ORAL EVERY 8 HOURS PRN
Qty: 30 TABLET | Refills: 1 | Status: SHIPPED | OUTPATIENT
Start: 2024-11-26 | End: 2024-12-26

## 2024-11-26 RX ORDER — NORETHINDRONE 5 MG/1
5 TABLET ORAL DAILY
Qty: 90 TABLET | Refills: 3 | Status: SHIPPED | OUTPATIENT
Start: 2024-11-26

## 2024-11-26 NOTE — TELEPHONE ENCOUNTER
Caller: Valery Aguilar  Female, 39 y.o., 1985  MRN: [id number]  CSN: [id number]  Phone: [phone]    Relationship to patient: SELF        Chief complaint: LEFT SIDE SHARP PAIN    Type of visit: GYN FOLLOW UP    Requested date: PT IS REQUESTING AN APPT TODAY OR TOMORROW       Additional notes:PT WOULD LIKE A CALL BACK AS SOON AS POSSIBLE TO ADVISE

## 2024-11-26 NOTE — PROGRESS NOTES
"Chief Complaint  Follow-up (US)    Subjective        Valery Aguilar presents to [facility]  For left-sided abdominal pain.  This is an acute presentation that started last night.  She describes a persistent cramping but shooting pain in her left side that will go towards her mid and lower abdomen.  She had an ultrasound today that showed resolution of the left ovarian cyst which she was managed for at her last visit.  She has a history of multiple abdominal surgeries and multiple ovarian cysts as well.  The pain that she was having when the left ovarian cyst was diagnosed resolved a few days later and she was feeling generally well for the past few weeks.  She feels that this new left sided pain is different from the left lower quadrant pain she was having when she was being seen for her recent cyst.   No nausea, vomiting, fevers, chills, vaginal bleeding, abnormal vaginal discharge.    Objective   Vital Signs:  /82   Ht 162.6 cm (64\")   Wt 125 kg (276 lb 6.4 oz)   BMI 47.44 kg/m²   Estimated body mass index is 47.44 kg/m² as calculated from the following:    Height as of this encounter: 162.6 cm (64\").    Weight as of this encounter: 125 kg (276 lb 6.4 oz).        Physical Exam  Constitutional:       General: She is not in acute distress.     Appearance: Normal appearance. She is obese. She is not ill-appearing.   Eyes:      Pupils: Pupils are equal, round, and reactive to light.   Cardiovascular:      Rate and Rhythm: Normal rate.   Pulmonary:      Effort: Pulmonary effort is normal. No respiratory distress.   Abdominal:      General: Abdomen is flat. There is no distension.   Musculoskeletal:      Comments: Left-sided pain at the level of the midepigastric region worsened with palpation but no pain with rebound   Neurological:      General: No focal deficit present.      Mental Status: She is alert.   Psychiatric:         Mood and Affect: Mood normal.         Thought Content: " Thought content normal.        Result Review :           Assessment and Plan   Diagnoses and all orders for this visit:    1. Left sided abdominal pain (Primary)  -     POC Urinalysis Dipstick, Multipro  -     cyclobenzaprine (FLEXERIL) 10 MG tablet; Take 1 tablet by mouth Every 8 (Eight) Hours As Needed for Muscle Spasms for up to 30 days.  Dispense: 30 tablet; Refill: 1    2. History of ovarian cyst  -     norethindrone (Aygestin) 5 MG tablet; Take 1 tablet by mouth Daily.  Dispense: 90 tablet; Refill: 3      Reviewed previous ultrasound and CT reports as well as last office visit   Patient due for refill of her Micronor, discussed that increasing dose to 5 mg in the form of Aygestin may provide better chance at ovulatory suppression, and Valery elected to try this.  Without any structural gynecologic source on ultrasound today, I discussed that we would be attempting to provide relief for potential musculoskeletal etiologies, and a script for Flexeril was sent to her pharmacy.  Warning signs and symptoms for when she should seek emergency department care were discussed.  Follow-up already established for 2 weeks, encouraged patient to keep appointment for monitoring of her symptoms.       Follow Up   Return in about 2 weeks (around 12/10/2024) for Left-sided pain follow-up.  Patient was given instructions and counseling regarding her condition or for health maintenance advice. Please see specific information pulled into the AVS if appropriate.       Patrick De La Cruz MD  11/26/2024   16:42 EST

## 2024-11-26 NOTE — TELEPHONE ENCOUNTER
Pt is getting us today at , will  be calling to let us know if she needs to be seen tomorrow if she does not end up in the ER later tonight

## 2024-12-04 DIAGNOSIS — E55.9 VITAMIN D DEFICIENCY: ICD-10-CM

## 2024-12-04 DIAGNOSIS — E28.2 PCOS (POLYCYSTIC OVARIAN SYNDROME): ICD-10-CM

## 2024-12-04 DIAGNOSIS — I10 ESSENTIAL HYPERTENSION: ICD-10-CM

## 2024-12-04 DIAGNOSIS — Z11.59 NEED FOR HEPATITIS C SCREENING TEST: ICD-10-CM

## 2024-12-07 DIAGNOSIS — F41.9 ANXIETY AND DEPRESSION: Chronic | ICD-10-CM

## 2024-12-07 DIAGNOSIS — F32.A ANXIETY AND DEPRESSION: Chronic | ICD-10-CM

## 2024-12-07 DIAGNOSIS — I10 ESSENTIAL HYPERTENSION: Chronic | ICD-10-CM

## 2024-12-09 RX ORDER — LOSARTAN POTASSIUM AND HYDROCHLOROTHIAZIDE 12.5; 5 MG/1; MG/1
1 TABLET ORAL DAILY
Qty: 90 TABLET | Refills: 1 | Status: SHIPPED | OUTPATIENT
Start: 2024-12-09

## 2024-12-12 ENCOUNTER — OFFICE VISIT (OUTPATIENT)
Dept: OBSTETRICS AND GYNECOLOGY | Facility: CLINIC | Age: 39
End: 2024-12-12
Payer: COMMERCIAL

## 2024-12-12 VITALS
DIASTOLIC BLOOD PRESSURE: 80 MMHG | HEIGHT: 64 IN | BODY MASS INDEX: 48.32 KG/M2 | SYSTOLIC BLOOD PRESSURE: 128 MMHG | WEIGHT: 283 LBS

## 2024-12-12 DIAGNOSIS — N83.201 RIGHT OVARIAN CYST: ICD-10-CM

## 2024-12-12 DIAGNOSIS — R10.2 PELVIC PAIN: ICD-10-CM

## 2024-12-12 DIAGNOSIS — Z13.89 SCREENING FOR GENITOURINARY CONDITION: Primary | ICD-10-CM

## 2024-12-12 DIAGNOSIS — Z30.431 IUD CHECK UP: ICD-10-CM

## 2024-12-12 LAB
B-HCG UR QL: NEGATIVE
BILIRUB BLD-MCNC: NEGATIVE MG/DL
CLARITY, POC: CLEAR
COLOR UR: YELLOW
EXPIRATION DATE: NORMAL
GLUCOSE UR STRIP-MCNC: NEGATIVE MG/DL
INTERNAL NEGATIVE CONTROL: NORMAL
INTERNAL POSITIVE CONTROL: NORMAL
KETONES UR QL: NEGATIVE
LEUKOCYTE EST, POC: NEGATIVE
Lab: NORMAL
NITRITE UR-MCNC: NEGATIVE MG/ML
PH UR: 5 [PH] (ref 5–8)
PROT UR STRIP-MCNC: NEGATIVE MG/DL
RBC # UR STRIP: NEGATIVE /UL
SP GR UR: 1 (ref 1–1.03)
UROBILINOGEN UR QL: NORMAL

## 2024-12-12 NOTE — PROGRESS NOTES
"      Valery Aguilar is a 39 y.o. patient who presents for follow up of   Chief Complaint   Patient presents with    Follow-up     cyst       39-year-old established patient here for ultrasound.  She has complicated surgical history with multiple abdominal surgeries as well as history of infection.  She has a Mirena IUD in place.  She was previously on Micronor for ovarian cyst suppression but still had pain and was so changed to Aygestin 5 mg.  Her ultrasound today shows a 7.6 cm anteverted uterus.  Her IUD is in place in the endometrium.  Her left ovary appears normal.  Her right ovary has a simple cyst that measures 3.9 x 3.5 cm.  Her ultrasound is compared to her last scan 11/26/2024.  She is not having any right-sided pain.  She still has some occasional left-sided discomfort however, the Flexeril is helping some.  She is frustrated at this constant cyst.  We will repeat her ultrasound in 2 months.  Her Mirena IUD was placed in 2018.  We may want to consider replacing it since she is continuing to have pelvic pain off and on.      The following portions of the patient's history were reviewed and updated as appropriate: allergies, current medications and problem list.    Review of Systems   Constitutional:  Positive for activity change.   Genitourinary:  Positive for pelvic pain (improved overall). Negative for menstrual problem, vaginal bleeding, vaginal discharge and vaginal pain.   Musculoskeletal:  Negative for back pain.       /80   Ht 162.6 cm (64\")   Wt 128 kg (283 lb)   Breastfeeding No   BMI 48.58 kg/m²     Physical Exam  Vitals and nursing note reviewed.   Constitutional:       Appearance: Normal appearance. She is well-developed.   HENT:      Head: Normocephalic and atraumatic.   Eyes:      General: No scleral icterus.     Conjunctiva/sclera: Conjunctivae normal.   Neck:      Thyroid: No thyromegaly.   Abdominal:      General: There is no distension.      Palpations: Abdomen is soft. There " is no mass.      Tenderness: There is no abdominal tenderness. There is no guarding or rebound.      Hernia: No hernia is present.   Skin:     General: Skin is warm and dry.   Neurological:      Mental Status: She is alert and oriented to person, place, and time.   Psychiatric:         Mood and Affect: Mood normal.         Behavior: Behavior normal.         Thought Content: Thought content normal.         Judgment: Judgment normal.         A/P:  1. Pelvic pain- improved overall. Still with intermittent symptoms on L side. Continue Aygestin  2. R ovarian cyst- simple and appears to be follicular.   3. CS- Mirena IUD 2018. In place on US. Next visit will discuss with pt changing out Mirena.  4. RTO 2 months US and visit, call for any increase in symptoms.     Assessment & Plan   Diagnoses and all orders for this visit:    1. Screening for genitourinary condition (Primary)  -     POC Urinalysis Dipstick  -     POC Pregnancy, Urine    2. Pelvic pain    3. Right ovarian cyst    4. IUD check up                 No follow-ups on file.      Anca Hathaway MD    12/12/2024  09:55 EST

## 2025-01-15 LAB
25(OH)D3+25(OH)D2 SERPL-MCNC: 25.1 NG/ML (ref 30–100)
ALBUMIN SERPL-MCNC: 4 G/DL (ref 3.5–5.2)
ALBUMIN/GLOB SERPL: 1.4 G/DL
ALP SERPL-CCNC: 56 U/L (ref 39–117)
ALT SERPL-CCNC: 18 U/L (ref 1–33)
AST SERPL-CCNC: 17 U/L (ref 1–32)
BILIRUB SERPL-MCNC: 0.5 MG/DL (ref 0–1.2)
BUN SERPL-MCNC: 10 MG/DL (ref 6–20)
BUN/CREAT SERPL: 12.8 (ref 7–25)
CALCIUM SERPL-MCNC: 9.1 MG/DL (ref 8.6–10.5)
CHLORIDE SERPL-SCNC: 104 MMOL/L (ref 98–107)
CHOLEST SERPL-MCNC: 176 MG/DL (ref 0–200)
CHOLEST/HDLC SERPL: 5.5 {RATIO}
CO2 SERPL-SCNC: 18 MMOL/L (ref 22–29)
CREAT SERPL-MCNC: 0.78 MG/DL (ref 0.57–1)
EGFRCR SERPLBLD CKD-EPI 2021: 98.6 ML/MIN/1.73
ERYTHROCYTE [DISTWIDTH] IN BLOOD BY AUTOMATED COUNT: 12.7 % (ref 12.3–15.4)
GLOBULIN SER CALC-MCNC: 2.9 GM/DL
GLUCOSE SERPL-MCNC: 100 MG/DL (ref 65–99)
HBA1C MFR BLD: 5.3 % (ref 4.8–5.6)
HCT VFR BLD AUTO: 46.7 % (ref 34–46.6)
HCV IGG SERPL QL IA: NON REACTIVE
HDLC SERPL-MCNC: 32 MG/DL (ref 40–60)
HGB BLD-MCNC: 15.8 G/DL (ref 12–15.9)
LDLC SERPL CALC-MCNC: 125 MG/DL (ref 0–100)
MCH RBC QN AUTO: 27.4 PG (ref 26.6–33)
MCHC RBC AUTO-ENTMCNC: 33.8 G/DL (ref 31.5–35.7)
MCV RBC AUTO: 81.1 FL (ref 79–97)
PLATELET # BLD AUTO: 214 10*3/MM3 (ref 140–450)
POTASSIUM SERPL-SCNC: 4.3 MMOL/L (ref 3.5–5.2)
PROT SERPL-MCNC: 6.9 G/DL (ref 6–8.5)
RBC # BLD AUTO: 5.76 10*6/MM3 (ref 3.77–5.28)
SODIUM SERPL-SCNC: 141 MMOL/L (ref 136–145)
TRIGL SERPL-MCNC: 100 MG/DL (ref 0–150)
TSH SERPL DL<=0.005 MIU/L-ACNC: 1.73 UIU/ML (ref 0.27–4.2)
VLDLC SERPL CALC-MCNC: 19 MG/DL (ref 5–40)
WBC # BLD AUTO: 5.7 10*3/MM3 (ref 3.4–10.8)

## 2025-01-23 ENCOUNTER — OFFICE VISIT (OUTPATIENT)
Dept: INTERNAL MEDICINE | Facility: CLINIC | Age: 40
End: 2025-01-23
Payer: COMMERCIAL

## 2025-01-23 VITALS
WEIGHT: 288 LBS | TEMPERATURE: 98.2 F | SYSTOLIC BLOOD PRESSURE: 130 MMHG | DIASTOLIC BLOOD PRESSURE: 82 MMHG | OXYGEN SATURATION: 97 % | BODY MASS INDEX: 49.44 KG/M2 | HEART RATE: 102 BPM

## 2025-01-23 DIAGNOSIS — Z23 NEED FOR VACCINATION: ICD-10-CM

## 2025-01-23 DIAGNOSIS — G89.29 CHRONIC HIP PAIN, BILATERAL: ICD-10-CM

## 2025-01-23 DIAGNOSIS — M25.552 CHRONIC HIP PAIN, BILATERAL: ICD-10-CM

## 2025-01-23 DIAGNOSIS — M25.562 BILATERAL CHRONIC KNEE PAIN: ICD-10-CM

## 2025-01-23 DIAGNOSIS — F32.A ANXIETY AND DEPRESSION: Chronic | ICD-10-CM

## 2025-01-23 DIAGNOSIS — M25.561 BILATERAL CHRONIC KNEE PAIN: ICD-10-CM

## 2025-01-23 DIAGNOSIS — E28.2 PCOS (POLYCYSTIC OVARIAN SYNDROME): ICD-10-CM

## 2025-01-23 DIAGNOSIS — F41.9 ANXIETY AND DEPRESSION: Chronic | ICD-10-CM

## 2025-01-23 DIAGNOSIS — G89.29 BILATERAL CHRONIC KNEE PAIN: ICD-10-CM

## 2025-01-23 DIAGNOSIS — Z00.00 ENCOUNTER FOR WELLNESS EXAMINATION IN ADULT: Primary | ICD-10-CM

## 2025-01-23 DIAGNOSIS — E55.9 VITAMIN D DEFICIENCY: ICD-10-CM

## 2025-01-23 DIAGNOSIS — M25.551 CHRONIC HIP PAIN, BILATERAL: ICD-10-CM

## 2025-01-23 DIAGNOSIS — G43.709 CHRONIC MIGRAINE WITHOUT AURA WITHOUT STATUS MIGRAINOSUS, NOT INTRACTABLE: Chronic | ICD-10-CM

## 2025-01-23 DIAGNOSIS — I10 ESSENTIAL HYPERTENSION: Chronic | ICD-10-CM

## 2025-01-23 PROCEDURE — 90656 IIV3 VACC NO PRSV 0.5 ML IM: CPT | Performed by: NURSE PRACTITIONER

## 2025-01-23 PROCEDURE — 99214 OFFICE O/P EST MOD 30 MIN: CPT | Performed by: NURSE PRACTITIONER

## 2025-01-23 PROCEDURE — 90471 IMMUNIZATION ADMIN: CPT | Performed by: NURSE PRACTITIONER

## 2025-01-23 PROCEDURE — 99396 PREV VISIT EST AGE 40-64: CPT | Performed by: NURSE PRACTITIONER

## 2025-01-23 RX ORDER — RIZATRIPTAN BENZOATE 10 MG/1
10 TABLET, ORALLY DISINTEGRATING ORAL ONCE AS NEEDED
Qty: 15 TABLET | Refills: 5 | Status: SHIPPED | OUTPATIENT
Start: 2025-01-23

## 2025-01-23 RX ORDER — DICLOFENAC SODIUM 75 MG/1
75 TABLET, DELAYED RELEASE ORAL 2 TIMES DAILY PRN
Qty: 60 TABLET | Refills: 5 | Status: SHIPPED | OUTPATIENT
Start: 2025-01-23

## 2025-01-23 RX ORDER — ERGOCALCIFEROL 1.25 MG/1
50000 CAPSULE, LIQUID FILLED ORAL WEEKLY
Qty: 15 CAPSULE | Refills: 3 | Status: SHIPPED | OUTPATIENT
Start: 2025-01-23

## 2025-01-23 NOTE — ASSESSMENT & PLAN NOTE
Orders:    Comprehensive Metabolic Panel; Future    Hemoglobin A1c; Future    Lipid Panel With / Chol / HDL Ratio; Future

## 2025-01-23 NOTE — ASSESSMENT & PLAN NOTE
Orders:    rizatriptan MLT (MAXALT-MLT) 10 MG disintegrating tablet; Place 1 tablet on the tongue 1 (One) Time As Needed for Migraine. May repeat in 2 hours if needed

## 2025-01-23 NOTE — ASSESSMENT & PLAN NOTE
- continue 4000IU QD 6 days a week  - add 50,000IU once weekly    Orders:    vitamin D (ERGOCALCIFEROL) 1.25 MG (76989 UT) capsule capsule; Take 1 capsule by mouth 1 (One) Time Per Week.    Vitamin D,25-Hydroxy; Future

## 2025-02-13 ENCOUNTER — OFFICE VISIT (OUTPATIENT)
Dept: OBSTETRICS AND GYNECOLOGY | Facility: CLINIC | Age: 40
End: 2025-02-13
Payer: COMMERCIAL

## 2025-02-13 VITALS
WEIGHT: 279 LBS | SYSTOLIC BLOOD PRESSURE: 128 MMHG | DIASTOLIC BLOOD PRESSURE: 92 MMHG | BODY MASS INDEX: 47.63 KG/M2 | HEIGHT: 64 IN

## 2025-02-13 DIAGNOSIS — R10.32 LLQ ABDOMINAL PAIN: ICD-10-CM

## 2025-02-13 DIAGNOSIS — Z87.42 HISTORY OF OVARIAN CYST: Primary | ICD-10-CM

## 2025-02-13 DIAGNOSIS — Z30.431 IUD CHECK UP: ICD-10-CM

## 2025-02-13 DIAGNOSIS — K59.09 OTHER CONSTIPATION: ICD-10-CM

## 2025-02-13 LAB
B-HCG UR QL: NEGATIVE
BILIRUB BLD-MCNC: NEGATIVE MG/DL
CLARITY, POC: CLEAR
COLOR UR: YELLOW
EXPIRATION DATE: NORMAL
GLUCOSE UR STRIP-MCNC: NEGATIVE MG/DL
INTERNAL NEGATIVE CONTROL: NEGATIVE
INTERNAL POSITIVE CONTROL: POSITIVE
KETONES UR QL: NEGATIVE
LEUKOCYTE EST, POC: NEGATIVE
Lab: 55
NITRITE UR-MCNC: NEGATIVE MG/ML
PH UR: 8 [PH] (ref 5–8)
PROT UR STRIP-MCNC: ABNORMAL MG/DL
RBC # UR STRIP: NEGATIVE /UL
SP GR UR: 1 (ref 1–1.03)
UROBILINOGEN UR QL: NORMAL

## 2025-03-15 DIAGNOSIS — F41.9 ANXIETY AND DEPRESSION: Chronic | ICD-10-CM

## 2025-03-15 DIAGNOSIS — F32.A ANXIETY AND DEPRESSION: Chronic | ICD-10-CM

## 2025-03-17 RX ORDER — BUSPIRONE HYDROCHLORIDE 15 MG/1
15 TABLET ORAL 3 TIMES DAILY
Qty: 270 TABLET | Refills: 1 | Status: SHIPPED | OUTPATIENT
Start: 2025-03-17

## 2025-07-16 DIAGNOSIS — E55.9 VITAMIN D DEFICIENCY: ICD-10-CM

## 2025-07-16 DIAGNOSIS — E28.2 PCOS (POLYCYSTIC OVARIAN SYNDROME): ICD-10-CM

## 2025-07-16 DIAGNOSIS — I10 ESSENTIAL HYPERTENSION: Chronic | ICD-10-CM

## (undated) DEVICE — CULT ANAERB

## (undated) DEVICE — TOTAL TRAY, 16FR 10ML SIL FOLEY, URN: Brand: MEDLINE

## (undated) DEVICE — SUT VIC 3/0 SH 27IN J416H

## (undated) DEVICE — 1010 S-DRAPE TOWEL DRAPE 10/BX: Brand: STERI-DRAPE™

## (undated) DEVICE — SUT SILK 3/0 SH CR8 18IN C013D

## (undated) DEVICE — COL CULT SYS

## (undated) DEVICE — LAG MINOR PROCEDURE: Brand: MEDLINE INDUSTRIES, INC.

## (undated) DEVICE — BNDR ABD 4PANEL 12IN 46 TO 62IN

## (undated) DEVICE — PAD,ABDOMINAL,8"X10",ST,LF: Brand: MEDLINE

## (undated) DEVICE — ENCORE® LATEX ORTHO SIZE 6.5, STERILE LATEX POWDER-FREE SURGICAL GLOVE: Brand: ENCORE

## (undated) DEVICE — TRY SKINPREP DRYPREP

## (undated) DEVICE — DRSNG PAD ABD 8X10IN STRL

## (undated) DEVICE — BNDR ABD 4PANEL12IN 30TO45IN

## (undated) DEVICE — SUT SILK 2/0 TIES 18IN A185H

## (undated) DEVICE — SPNG GZ WOVN 4X4IN 12PLY 10/BX STRL

## (undated) DEVICE — DRSNG TELFA PAD NONADH STR 1S 3X8IN

## (undated) DEVICE — SWABSTCK BENZOIN TINCTURE

## (undated) DEVICE — PREP SOL POVIDONE/IODINE BT 4OZ

## (undated) DEVICE — UNDYED BRAIDED (POLYGLACTIN 910), SYNTHETIC ABSORBABLE SUTURE: Brand: COATED VICRYL

## (undated) DEVICE — APPL CHLORAPREP W/TINT 26ML ORNG

## (undated) DEVICE — SUT NUROLON 0 CT1 CR8 18IN C521D

## (undated) DEVICE — ABDOMINAL BINDER: Brand: DEROYAL

## (undated) DEVICE — PK PROC MAJ 90

## (undated) DEVICE — SUT VIC 2/0 CT1 36IN

## (undated) DEVICE — SUT SILK 2/0 SH 30IN K833H

## (undated) DEVICE — SUT VIC TP1 SZ2 27IN J849G

## (undated) DEVICE — PAD,ABDOMINAL,8"X7.5",STERILE,LF,1/PK: Brand: MEDLINE

## (undated) DEVICE — SOL PVPI 4OZ

## (undated) DEVICE — BANDAGE,GAUZE,BULKEE II,4.5"X4.1YD,STRL: Brand: MEDLINE

## (undated) DEVICE — SPONGE,DRAIN,NONWVN,4"X4",6PLY,STRL,LF: Brand: MEDLINE

## (undated) DEVICE — MONTGOMERY STRAP: Brand: DEROYAL

## (undated) DEVICE — GLV SURG SENSICARE W/ALOE PF LF 7.5 STRL

## (undated) DEVICE — JACKSON-PRATT 100CC BULB RESERVOIR: Brand: CARDINAL HEALTH

## (undated) DEVICE — SUT ETHLN 3/0 PS2 18 IN 1669H

## (undated) DEVICE — ULTRACLEAN ACCESSORY ELECTRODE 1" (2.54 CM) COATED BLADE: Brand: ULTRACLEAN

## (undated) DEVICE — SUCTION CANISTER, 1000CC,SAFELINER: Brand: DEROYAL

## (undated) DEVICE — DRSNG SURESITE WNDW 4X4.5

## (undated) DEVICE — Device

## (undated) DEVICE — SPNG GZ STRL 2S 4X4 12PLY

## (undated) DEVICE — IRRIGATOR BULB 60CC

## (undated) DEVICE — BOWL PLSTC LG 32OZ BLU STRL

## (undated) DEVICE — SUT VIC 0 CT1 36IN J946H

## (undated) DEVICE — GOWN ,SIRUS,NONREINFORCED SMALL: Brand: MEDLINE

## (undated) DEVICE — TOWEL,OR,DSP,ST,BLUE,STD,4/PK,20PK/CS: Brand: MEDLINE

## (undated) DEVICE — EXTRCT STPL SKIN STRL BX/12

## (undated) DEVICE — SOL ANTISEP SCRB PVPI 7.5PCT 4OZ

## (undated) DEVICE — SKIN AFFIX SURG ADHESIVE 72/CS 0.55ML: Brand: MEDLINE

## (undated) DEVICE — STPLR SKIN VISISTAT WD 35CT

## (undated) DEVICE — SUT PDS 1 XLH LP 99IN Z881G

## (undated) DEVICE — SUT SILK 2/0 FS BLK 18IN 685G

## (undated) DEVICE — GLV SURG EUDERMIC PF LTX 8 STRL

## (undated) DEVICE — SPNG LAP 18X18IN LF STRL PK/5

## (undated) DEVICE — 3M™ IOBAN™ 2 ANTIMICROBIAL INCISE DRAPE 6650EZ: Brand: IOBAN™ 2

## (undated) DEVICE — PAD,ABDOMINAL,5"X9",STERILE,LF,1/PK: Brand: MEDLINE INDUSTRIES, INC.

## (undated) DEVICE — INTENDED USE FOR SURGICAL MARKING ON INTACT SKIN, ALSO PROVIDES A PERMANENT METHOD OF IDENTIFYING OBJECTS IN THE OPERATING ROOM: Brand: WRITESITE® REGULAR TIP SKIN MARKER

## (undated) DEVICE — HANDPIECE SET WITH COAXIAL HIGH FLOW TIP AND SUCTION TUBE: Brand: INTERPULSE

## (undated) DEVICE — SUT VIC 3/0 CTI 36IN J944H

## (undated) DEVICE — SOL PVPI ANTISEPT SCRB 4OZ